# Patient Record
Sex: MALE | Race: WHITE | NOT HISPANIC OR LATINO | Employment: OTHER | URBAN - METROPOLITAN AREA
[De-identification: names, ages, dates, MRNs, and addresses within clinical notes are randomized per-mention and may not be internally consistent; named-entity substitution may affect disease eponyms.]

---

## 2017-06-22 ENCOUNTER — TRANSCRIBE ORDERS (OUTPATIENT)
Dept: ADMINISTRATIVE | Facility: HOSPITAL | Age: 82
End: 2017-06-22

## 2017-06-22 DIAGNOSIS — R09.89 BILATERAL CAROTID BRUITS: Primary | ICD-10-CM

## 2017-06-27 ENCOUNTER — HOSPITAL ENCOUNTER (OUTPATIENT)
Dept: CARDIOLOGY | Facility: HOSPITAL | Age: 82
Discharge: HOME | End: 2017-06-27
Attending: INTERNAL MEDICINE | Admitting: INTERNAL MEDICINE

## 2017-06-27 DIAGNOSIS — R09.89 BILATERAL CAROTID BRUITS: ICD-10-CM

## 2017-06-27 LAB
BH CV XLRA MEAS LEFT CCA RATIO VEL: 134 CM/SEC
BH CV XLRA MEAS LEFT DIST CCA EDV: 12.6 CM/SEC
BH CV XLRA MEAS LEFT DIST CCA PSV: 134 CM/SEC
BH CV XLRA MEAS LEFT DIST ICA EDV: -15.8 CM/SEC
BH CV XLRA MEAS LEFT DIST ICA PSV: -81.5 CM/SEC
BH CV XLRA MEAS LEFT ICA RATIO VEL: -120 CM/SEC
BH CV XLRA MEAS LEFT ICA/CCA RATIO: -0.9
BH CV XLRA MEAS LEFT MID ICA EDV: -16.4 CM/SEC
BH CV XLRA MEAS LEFT MID ICA PSV: -81.5 CM/SEC
BH CV XLRA MEAS LEFT PROX CCA EDV: 14.9 CM/SEC
BH CV XLRA MEAS LEFT PROX CCA PSV: 108 CM/SEC
BH CV XLRA MEAS LEFT PROX ECA EDV: -12.8 CM/SEC
BH CV XLRA MEAS LEFT PROX ECA PSV: -133 CM/SEC
BH CV XLRA MEAS LEFT PROX ICA EDV: -15.7 CM/SEC
BH CV XLRA MEAS LEFT PROX ICA PSV: -120 CM/SEC
BH CV XLRA MEAS LEFT PROX SCLA PSV: 145 CM/SEC
BH CV XLRA MEAS LEFT VERTEBRAL A EDV: 13.5 CM/SEC
BH CV XLRA MEAS LEFT VERTEBRAL A PSV: 63.3 CM/SEC
BH CV XLRA MEAS RIGHT CCA RATIO VEL: -76.2 CM/SEC
BH CV XLRA MEAS RIGHT DIST CCA EDV: -14.1 CM/SEC
BH CV XLRA MEAS RIGHT DIST CCA PSV: -76.2 CM/SEC
BH CV XLRA MEAS RIGHT DIST ICA EDV: -13.5 CM/SEC
BH CV XLRA MEAS RIGHT DIST ICA PSV: -61.6 CM/SEC
BH CV XLRA MEAS RIGHT ICA RATIO VEL: -212 CM/SEC
BH CV XLRA MEAS RIGHT ICA/CCA RATIO: 2.8
BH CV XLRA MEAS RIGHT MID ICA EDV: -14.1 CM/SEC
BH CV XLRA MEAS RIGHT MID ICA PSV: -90.4 CM/SEC
BH CV XLRA MEAS RIGHT PROX CCA EDV: -16.7 CM/SEC
BH CV XLRA MEAS RIGHT PROX CCA PSV: -119 CM/SEC
BH CV XLRA MEAS RIGHT PROX ECA EDV: -29.9 CM/SEC
BH CV XLRA MEAS RIGHT PROX ECA PSV: -372 CM/SEC
BH CV XLRA MEAS RIGHT PROX ICA EDV: -27.5 CM/SEC
BH CV XLRA MEAS RIGHT PROX ICA PSV: -212 CM/SEC
BH CV XLRA MEAS RIGHT PROX SCLA PSV: 127 CM/SEC
BH CV XLRA MEAS RIGHT VERTEBRAL A EDV: -5.2 CM/SEC
BH CV XLRA MEAS RIGHT VERTEBRAL A PSV: -34 CM/SEC
LEFT ARM BP: NORMAL MMHG
RIGHT ARM BP: NORMAL MMHG

## 2017-06-27 PROCEDURE — 93880 EXTRACRANIAL BILAT STUDY: CPT

## 2017-08-15 ENCOUNTER — TRANSCRIBE ORDERS (OUTPATIENT)
Dept: ADMINISTRATIVE | Facility: HOSPITAL | Age: 82
End: 2017-08-15

## 2017-08-15 DIAGNOSIS — H57.12 LEFT EYE PAIN: Primary | ICD-10-CM

## 2017-08-22 ENCOUNTER — HOSPITAL ENCOUNTER (OUTPATIENT)
Dept: MRI IMAGING | Facility: HOSPITAL | Age: 82
Discharge: HOME | End: 2017-08-22
Attending: INTERNAL MEDICINE | Admitting: INTERNAL MEDICINE

## 2017-08-22 DIAGNOSIS — H57.12 LEFT EYE PAIN: ICD-10-CM

## 2017-08-22 PROCEDURE — 70543 MRI ORBT/FAC/NCK W/O &W/DYE: CPT

## 2017-08-22 PROCEDURE — 0 GADOBENATE DIMEGLUMINE 529 MG/ML SOLUTION: Performed by: INTERNAL MEDICINE

## 2017-08-22 PROCEDURE — A9577 INJ MULTIHANCE: HCPCS | Performed by: INTERNAL MEDICINE

## 2017-08-22 PROCEDURE — 82565 ASSAY OF CREATININE: CPT

## 2017-08-22 RX ADMIN — GADOBENATE DIMEGLUMINE 15 ML: 529 INJECTION, SOLUTION INTRAVENOUS at 16:44

## 2017-08-23 LAB — CREAT BLDA-MCNC: 1.1 MG/DL (ref 0.6–1.3)

## 2018-06-14 ENCOUNTER — APPOINTMENT (OUTPATIENT)
Dept: GENERAL RADIOLOGY | Facility: HOSPITAL | Age: 83
End: 2018-06-14

## 2018-06-14 ENCOUNTER — APPOINTMENT (OUTPATIENT)
Dept: CT IMAGING | Facility: HOSPITAL | Age: 83
End: 2018-06-14

## 2018-06-14 ENCOUNTER — HOSPITAL ENCOUNTER (EMERGENCY)
Facility: HOSPITAL | Age: 83
Discharge: HOME | End: 2018-06-14
Attending: EMERGENCY MEDICINE | Admitting: EMERGENCY MEDICINE

## 2018-06-14 VITALS
BODY MASS INDEX: 21.67 KG/M2 | OXYGEN SATURATION: 96 % | HEIGHT: 72 IN | TEMPERATURE: 100.2 F | HEART RATE: 104 BPM | SYSTOLIC BLOOD PRESSURE: 144 MMHG | DIASTOLIC BLOOD PRESSURE: 83 MMHG | RESPIRATION RATE: 18 BRPM | WEIGHT: 160 LBS
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
## 2018-06-14 DIAGNOSIS — R53.1 GENERALIZED WEAKNESS: Primary | ICD-10-CM

## 2018-06-14 LAB
ALBUMIN SERPL-MCNC: 3.7 G/DL (ref 3.5–5.2)
ALBUMIN/GLOB SERPL: 1.2 G/DL
ALP SERPL-CCNC: 122 U/L (ref 39–117)
ALT SERPL W P-5'-P-CCNC: 43 U/L (ref 1–41)
ANION GAP SERPL CALCULATED.3IONS-SCNC: 12 MMOL/L
AST SERPL-CCNC: 52 U/L (ref 1–40)
BACTERIA UR QL AUTO: ABNORMAL /HPF
BASOPHILS # BLD AUTO: 0.01 10*3/MM3 (ref 0–0.2)
BASOPHILS NFR BLD AUTO: 0.2 % (ref 0–1.5)
BILIRUB SERPL-MCNC: 0.9 MG/DL (ref 0.1–1.2)
BILIRUB UR QL STRIP: NEGATIVE
BUN BLD-MCNC: 20 MG/DL (ref 8–23)
BUN/CREAT SERPL: 20.6 (ref 7–25)
CALCIUM SPEC-SCNC: 9.1 MG/DL (ref 8.6–10.5)
CHLORIDE SERPL-SCNC: 91 MMOL/L (ref 98–107)
CLARITY UR: CLEAR
CO2 SERPL-SCNC: 27 MMOL/L (ref 22–29)
COLOR UR: ABNORMAL
CREAT BLD-MCNC: 0.97 MG/DL (ref 0.76–1.27)
D-LACTATE SERPL-SCNC: 1.7 MMOL/L (ref 0.5–2)
DEPRECATED RDW RBC AUTO: 41.7 FL (ref 37–54)
EOSINOPHIL # BLD AUTO: 0 10*3/MM3 (ref 0–0.7)
EOSINOPHIL NFR BLD AUTO: 0 % (ref 0.3–6.2)
ERYTHROCYTE [DISTWIDTH] IN BLOOD BY AUTOMATED COUNT: 12.9 % (ref 11.5–14.5)
GFR SERPL CREATININE-BSD FRML MDRD: 74 ML/MIN/1.73
GLOBULIN UR ELPH-MCNC: 3.1 GM/DL
GLUCOSE BLD-MCNC: 200 MG/DL (ref 65–99)
GLUCOSE UR STRIP-MCNC: ABNORMAL MG/DL
HCT VFR BLD AUTO: 41.4 % (ref 40.4–52.2)
HGB BLD-MCNC: 14.4 G/DL (ref 13.7–17.6)
HGB UR QL STRIP.AUTO: ABNORMAL
HYALINE CASTS UR QL AUTO: ABNORMAL /LPF
IMM GRANULOCYTES # BLD: 0.02 10*3/MM3 (ref 0–0.03)
IMM GRANULOCYTES NFR BLD: 0.4 % (ref 0–0.5)
KETONES UR QL STRIP: ABNORMAL
LEUKOCYTE ESTERASE UR QL STRIP.AUTO: NEGATIVE
LYMPHOCYTES # BLD AUTO: 1.55 10*3/MM3 (ref 0.9–4.8)
LYMPHOCYTES NFR BLD AUTO: 32.6 % (ref 19.6–45.3)
MCH RBC QN AUTO: 30.4 PG (ref 27–32.7)
MCHC RBC AUTO-ENTMCNC: 34.8 G/DL (ref 32.6–36.4)
MCV RBC AUTO: 87.3 FL (ref 79.8–96.2)
MONOCYTES # BLD AUTO: 0.27 10*3/MM3 (ref 0.2–1.2)
MONOCYTES NFR BLD AUTO: 5.7 % (ref 5–12)
NEUTROPHILS # BLD AUTO: 2.93 10*3/MM3 (ref 1.9–8.1)
NEUTROPHILS NFR BLD AUTO: 61.5 % (ref 42.7–76)
NITRITE UR QL STRIP: NEGATIVE
PH UR STRIP.AUTO: 5.5 [PH] (ref 5–8)
PLATELET # BLD AUTO: 66 10*3/MM3 (ref 140–500)
PMV BLD AUTO: 10.7 FL (ref 6–12)
POTASSIUM BLD-SCNC: 4.2 MMOL/L (ref 3.5–5.2)
PROT SERPL-MCNC: 6.8 G/DL (ref 6–8.5)
PROT UR QL STRIP: ABNORMAL
RBC # BLD AUTO: 4.74 10*6/MM3 (ref 4.6–6)
RBC # UR: ABNORMAL /HPF
REF LAB TEST METHOD: ABNORMAL
SODIUM BLD-SCNC: 130 MMOL/L (ref 136–145)
SP GR UR STRIP: 1.03 (ref 1–1.03)
SQUAMOUS #/AREA URNS HPF: ABNORMAL /HPF
TROPONIN T SERPL-MCNC: 0.02 NG/ML (ref 0–0.03)
UROBILINOGEN UR QL STRIP: ABNORMAL
WBC NRBC COR # BLD: 4.76 10*3/MM3 (ref 4.5–10.7)
WBC UR QL AUTO: ABNORMAL /HPF

## 2018-06-14 PROCEDURE — 93005 ELECTROCARDIOGRAM TRACING: CPT | Performed by: PHYSICIAN ASSISTANT

## 2018-06-14 PROCEDURE — 99284 EMERGENCY DEPT VISIT MOD MDM: CPT

## 2018-06-14 PROCEDURE — 71045 X-RAY EXAM CHEST 1 VIEW: CPT

## 2018-06-14 PROCEDURE — 87040 BLOOD CULTURE FOR BACTERIA: CPT | Performed by: PHYSICIAN ASSISTANT

## 2018-06-14 PROCEDURE — 85025 COMPLETE CBC W/AUTO DIFF WBC: CPT | Performed by: PHYSICIAN ASSISTANT

## 2018-06-14 PROCEDURE — 80053 COMPREHEN METABOLIC PANEL: CPT | Performed by: PHYSICIAN ASSISTANT

## 2018-06-14 PROCEDURE — 81001 URINALYSIS AUTO W/SCOPE: CPT | Performed by: PHYSICIAN ASSISTANT

## 2018-06-14 PROCEDURE — 83605 ASSAY OF LACTIC ACID: CPT | Performed by: PHYSICIAN ASSISTANT

## 2018-06-14 PROCEDURE — 93010 ELECTROCARDIOGRAM REPORT: CPT | Performed by: INTERNAL MEDICINE

## 2018-06-14 PROCEDURE — 84484 ASSAY OF TROPONIN QUANT: CPT | Performed by: PHYSICIAN ASSISTANT

## 2018-06-14 PROCEDURE — 70450 CT HEAD/BRAIN W/O DYE: CPT

## 2018-06-14 NOTE — ED PROVIDER NOTES
MD ATTESTATION NOTE    The JESSICA and I have discussed this patient's history, physical exam, and treatment plan.  I have reviewed the documentation and personally had a face to face interaction with the patient. I affirm the documentation and agree with the treatment and plan.  The attached note describes my personal findings.        Pt presents to the ED c/o generalized weakness onset about 3 days ago. Pt has also had malaise, subjective fever, decreased appetite, and a mild cough. Pt denies N/V/D, chest pain, dyspnea, and abdominal pain. Per family, pt fell several days ago secondary to the generalized weakness (however, pt did not sustain any significant injuries). On physical exam, pt is alert and answers most questions appropriately. Heart is tachycardic and irregular. Lungs are CTAB. Abdomen is soft and nontender. No BLE edema. Nonfocal neurological exam. Labs were reviewed. CXR is negative acute. CT Head has been ordered for further evaluation. Will discuss further course of care with LUPIS Pacheco.         EKG           EKG time: 09:04 AM  Rhythm/Rate: A-Fib rate 115  Frequent PVCs  P waves and WA: Irregular P waves. Varying RG  QRS, axis: LAD  Small lateral Q waves   ST and T waves: Nonspecific T wave changes      Interpreted Contemporaneously by me, independently viewed            Documentation assistance provided by Katharina Mcbride. Information recorded by the scribe was done at my direction and has been verified and validated by me.     Entered by Katharina Mcbride, acting as scribe for Dr. Fernando MD.        Katharina Mcbride  06/14/18 0256       Felix King MD  06/14/18 0962

## 2018-06-14 NOTE — DISCHARGE INSTRUCTIONS
Stay well-hydrated and eat regular meals.  Follow up with your PCP early next week.  Return to the ER For chest pain, shortness of breath, recurrent falls, worsening weakness/unable to walk, any concerns.

## 2018-06-14 NOTE — ED NOTES
"Wife notes that patient fell Tuesday afternoon, patient denies any LOC or hitting head.  Per wife, \"it knocked it cap off so I don't know if he hit his head or not, but he said no.\"  Patient notes all over weakness since Tuesday, worse in lower extremities.  Patient normally walks unassisted but since Tuesday patient has had unsteady gait. Patient denies headache.      Michael Glover RN  06/14/18 0839    "

## 2018-06-14 NOTE — ED PROVIDER NOTES
EMERGENCY DEPARTMENT ENCOUNTER    Room Number:  17/17  Date seen:  6/14/2018  Time seen: 8:34 AM  PCP: Percy Em MD    HPI:  Chief complaint: generalized weakness  Context:Javier Marin is a 82 y.o. male who presents to the ED with c/o generalized weakness for the last two days. Pt's wife says that they have not documented any fevers but he had chills and a cold sweat this morning. She states that he had trouble walking to the bathroom this morning due to weakness. He was nauseated this morning and vomited a small amount one time. Pt denies dysuria or difficulty urinating but states that he has had dark and foul smelling urine. Pt's wife states that he fell two days ago getting out of the car. She is not sure if he hit his head or not because she could not see him in the tall grass, but he did not have any injuries or changes from his baseline mental status. She also describes an incident last night in which he stumbled over the corner of his chair but did not fall or injure himself. He denies CP, SOA, or recent illness over the past two days.    Onset: gradual  Location: generalized  Radiation:  none  Duration: two days  Timing:  constant  Character: low energy, weaker than normal  Aggravating Factors: none  Alleviating Factors: none  Severity: moderate    ALLERGIES  Patient has no known allergies.    PAST MEDICAL HISTORY  Active Ambulatory Problems     Diagnosis Date Noted   • No Active Ambulatory Problems     Resolved Ambulatory Problems     Diagnosis Date Noted   • No Resolved Ambulatory Problems     Past Medical History:   Diagnosis Date   • Arthritis    • Diabetes mellitus    • Hard of hearing    • Hypertension    • Ptosis of left eyelid        PAST SURGICAL HISTORY  Past Surgical History:   Procedure Laterality Date   • EYE PTOSIS REPAIR Left 11/15/2016    Procedure: LT UPPER LID EYE PTOSIS REPAIR;  Surgeon: Anup Lancaster MD;  [place] [facility] [place] [facility] [place]  Service:    • EYE SURGERY     • HIP  ARTHROPLASTY         FAMILY HISTORY  History reviewed. No pertinent family history.    SOCIAL HISTORY  Social History     Social History   • Marital status:      Spouse name: N/A   • Number of children: N/A   • Years of education: N/A     Occupational History   • Not on file.     Social History Main Topics   • Smoking status: Former Smoker     Types: Cigars   • Smokeless tobacco: Never Used      Comment: QUIT 1994   • Alcohol use No   • Drug use: No   • Sexual activity: Not on file     Other Topics Concern   • Not on file     Social History Narrative   • No narrative on file       REVIEW OF SYSTEMS  Review of Systems   Constitutional: Positive for chills and diaphoresis (cold sweat). Negative for fever.   HENT: Negative.    Eyes: Negative.    Respiratory: Negative for shortness of breath.    Cardiovascular: Negative for chest pain.   Gastrointestinal: Positive for nausea and vomiting (one episode). Negative for abdominal pain.   Genitourinary: Negative.  Negative for difficulty urinating and dysuria.        Dark and foul smelling urine   Musculoskeletal: Negative.    Skin: Negative.    Neurological: Positive for weakness (generalized).   Psychiatric/Behavioral: Negative.        PHYSICAL EXAM  ED Triage Vitals [06/14/18 0751]   Temp Heart Rate Resp BP SpO2   100.4 °F (38 °C) 92 18 136/76 96 %      Temp src Heart Rate Source Patient Position BP Location FiO2 (%)   Tympanic Monitor -- -- --     Physical Exam   Constitutional: He is oriented to person, place, and time and well-developed, well-nourished, and in no distress.   HENT:   Head: Normocephalic and atraumatic.   Right Ear: External ear normal.   Left Ear: External ear normal.   Nose: Nose normal.   Mouth/Throat: Mucous membranes are dry.   Eyes: Conjunctivae and EOM are normal. Pupils are equal, round, and reactive to light.   Neck: Normal range of motion.   Cardiovascular: Normal rate.  An irregularly irregular rhythm present.   No lower extremity edema    Pulmonary/Chest: Effort normal and breath sounds normal. He has no wheezes. He has no rhonchi. He has no rales.   Abdominal: Soft. There is no tenderness.   Musculoskeletal: Normal range of motion.   Neurological: He is alert and oriented to person, place, and time.   Skin: Skin is warm and dry.   Psychiatric: Affect normal.   Nursing note and vitals reviewed.      LAB RESULTS  Recent Results (from the past 24 hour(s))   Comprehensive Metabolic Panel    Collection Time: 06/14/18  8:56 AM   Result Value Ref Range    Glucose 200 (H) 65 - 99 mg/dL    BUN 20 8 - 23 mg/dL    Creatinine 0.97 0.76 - 1.27 mg/dL    Sodium 130 (L) 136 - 145 mmol/L    Potassium 4.2 3.5 - 5.2 mmol/L    Chloride 91 (L) 98 - 107 mmol/L    CO2 27.0 22.0 - 29.0 mmol/L    Calcium 9.1 8.6 - 10.5 mg/dL    Total Protein 6.8 6.0 - 8.5 g/dL    Albumin 3.70 3.50 - 5.20 g/dL    ALT (SGPT) 43 (H) 1 - 41 U/L    AST (SGOT) 52 (H) 1 - 40 U/L    Alkaline Phosphatase 122 (H) 39 - 117 U/L    Total Bilirubin 0.9 0.1 - 1.2 mg/dL    eGFR Non African Amer 74 >60 mL/min/1.73    Globulin 3.1 gm/dL    A/G Ratio 1.2 g/dL    BUN/Creatinine Ratio 20.6 7.0 - 25.0    Anion Gap 12.0 mmol/L   Troponin    Collection Time: 06/14/18  8:56 AM   Result Value Ref Range    Troponin T 0.018 0.000 - 0.030 ng/mL   Lactic Acid, Plasma    Collection Time: 06/14/18  8:56 AM   Result Value Ref Range    Lactate 1.7 0.5 - 2.0 mmol/L   CBC Auto Differential    Collection Time: 06/14/18  8:56 AM   Result Value Ref Range    WBC 4.76 4.50 - 10.70 10*3/mm3    RBC 4.74 4.60 - 6.00 10*6/mm3    Hemoglobin 14.4 13.7 - 17.6 g/dL    Hematocrit 41.4 40.4 - 52.2 %    MCV 87.3 79.8 - 96.2 fL    MCH 30.4 27.0 - 32.7 pg    MCHC 34.8 32.6 - 36.4 g/dL    RDW 12.9 11.5 - 14.5 %    RDW-SD 41.7 37.0 - 54.0 fl    MPV 10.7 6.0 - 12.0 fL    Platelets 66 (L) 140 - 500 10*3/mm3    Neutrophil % 61.5 42.7 - 76.0 %    Lymphocyte % 32.6 19.6 - 45.3 %    Monocyte % 5.7 5.0 - 12.0 %    Eosinophil % 0.0 (L) 0.3 - 6.2 %     Basophil % 0.2 0.0 - 1.5 %    Immature Grans % 0.4 0.0 - 0.5 %    Neutrophils, Absolute 2.93 1.90 - 8.10 10*3/mm3    Lymphocytes, Absolute 1.55 0.90 - 4.80 10*3/mm3    Monocytes, Absolute 0.27 0.20 - 1.20 10*3/mm3    Eosinophils, Absolute 0.00 0.00 - 0.70 10*3/mm3    Basophils, Absolute 0.01 0.00 - 0.20 10*3/mm3    Immature Grans, Absolute 0.02 0.00 - 0.03 10*3/mm3   Urinalysis With / Culture If Indicated - Urine, Catheter    Collection Time: 06/14/18  8:57 AM   Result Value Ref Range    Color, UA Dark Yellow (A) Yellow, Straw    Appearance, UA Clear Clear    pH, UA 5.5 5.0 - 8.0    Specific Gravity, UA 1.028 1.005 - 1.030    Glucose,  mg/dL (1+) (A) Negative    Ketones, UA 15 mg/dL (1+) (A) Negative    Bilirubin, UA Negative Negative    Blood, UA Moderate (2+) (A) Negative    Protein, UA >=300 mg/dL (3+) (A) Negative    Leuk Esterase, UA Negative Negative    Nitrite, UA Negative Negative    Urobilinogen, UA 1.0 E.U./dL 0.2 - 1.0 E.U./dL   Urinalysis, Microscopic Only - Urine, Clean Catch    Collection Time: 06/14/18  8:57 AM   Result Value Ref Range    RBC, UA 3-5 (A) None Seen, 0-2 /HPF    WBC, UA 0-2 None Seen, 0-2 /HPF    Bacteria, UA 1+ (A) None Seen /HPF    Squamous Epithelial Cells, UA 3-6 (A) None Seen, 0-2 /HPF    Hyaline Casts, UA 0-2 None Seen /LPF    Methodology Manual Light Microscopy        I ordered the above labs and reviewed the results    RADIOLOGY  CT Head Without Contrast   Preliminary Result   No evidence of fracture or hemorrhage. 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   Further evaluation could be performed with a MRI examination of the   brain as indicated.               Radiation dose reduction techniques were utilized, including automated   exposure control and exposure modulation based on body size.              XR Chest 1 View   Final Result   Negative.       This  report was finalized on 6/14/2018 10:38 AM by Dr. Sy Tong M.D.            Reviewed CT head which shows nothing acute. Independently viewed by me. Interpreted by radiologist.    I ordered the above noted radiological studies and reviewed the images on the PACS system.     MEDICATIONS GIVEN IN ER  Medications - No data to display    EKG  Interpreted by ED Physician    PROCEDURES  Procedures      PROGRESS AND CONSULTS    Progress Notes:       1030  Reviewed Pt's history and workup with Dr. King. After bedside evaluation, Dr. King agrees with the plan of care.      1115  Discussed Pt's case with Dr. Em (PCP) who agrees to with plan to discharge Pt after a normal workup in the ER. If Pt does not improve, he should follow up in the office early next week.     1127  Rechecked Pt who is resting comfortably. Informed him that his CXR, CT head, labs, and UA are unremarkable. His low grade fever and normal WBC indicate that it might be a viral illness that could be causing his symptoms. His LFTs are slightly elevated today and I instructed Pt to have these rechecked in a few weeks to see if they improve.  I have also consulted with Dr. Em (PCP) who is in agreement with plans of discharge and instructed Pt to follow up with him if his symptoms persist.  Pt should rest today and try to drink plenty of fluids. If his symptoms worsen, Pt should return to the ED for further workup. Pt's family voiced concern with the Pt being able to reach the car, so I will ambulate Pt to see how he does. If he is unable to ambulate, his disposition may need to be reconsidered. Pt and Pt's family understand and agree to all plans. All questions answered.     1156  Per ER Tech, Pt was able to ambulate without difficulty.     1159  Rechecked Pt and informed him that he will be discharged. After ambulating, Pt and Pt's wife feel comfortable with plan for discharge.       Disposition vitals:  /83   Pulse 104   Temp 100.4  "°F (38 °C) (Tympanic)   Resp 18   Ht 182.9 cm (72\")   Wt 72.6 kg (160 lb)   SpO2 96%   BMI 21.70 kg/m²       DIAGNOSIS  Final diagnoses:   Generalized weakness   Fall, initial encounter     DISCHARGE    Patient discharged in stable condition.    Reviewed implications of results, diagnosis, meds, responsibility to follow up, warning signs and symptoms of possible worsening, potential complications and reasons to return to ER, including new or worsening symptoms .    Patient/Family voiced understanding of above instructions.    Discussed plan for discharge, as there is no emergent indication for admission. Patient referred to primary care provider for BP management due to today's BP. Pt/family is agreeable and understands need for follow up and repeat testing.  Pt is aware that discharge does not mean that nothing is wrong but it indicates no emergency is present that requires admission and they must continue care with follow-up as given below or physician of their choice.       FOLLOW UP   Percy Em MD  [address] [address]  [phone]    In 2 days        RX     Medication List      No changes were made to your prescriptions during this visit.       Documentation assistance provided by zoraida Hernandez for Melly Wild PA-C.  Information recorded by the zoraida was done at my direction and has been verified and validated by me.         Chey Hernandez  06/14/18 1217       ADAM Tucker  06/21/18 1305    "

## 2018-06-15 ENCOUNTER — HOSPITAL ENCOUNTER (EMERGENCY)
Facility: HOSPITAL | Age: 83
Discharge: HOME | End: 2018-06-15
Attending: EMERGENCY MEDICINE | Admitting: EMERGENCY MEDICINE

## 2018-06-15 VITALS
WEIGHT: 160 LBS | HEART RATE: 97 BPM | SYSTOLIC BLOOD PRESSURE: 119 MMHG | TEMPERATURE: 97.6 F | OXYGEN SATURATION: 94 % | HEIGHT: 67 IN | BODY MASS INDEX: 25.11 KG/M2 | RESPIRATION RATE: 18 BRPM | DIASTOLIC BLOOD PRESSURE: 64 MMHG

## 2018-06-15 DIAGNOSIS — I95.9 TRANSIENT HYPOTENSION: ICD-10-CM

## 2018-06-15 DIAGNOSIS — B34.9 VIRAL ILLNESS: Primary | ICD-10-CM

## 2018-06-15 PROCEDURE — 93005 ELECTROCARDIOGRAM TRACING: CPT

## 2018-06-15 PROCEDURE — 93005 ELECTROCARDIOGRAM TRACING: CPT | Performed by: EMERGENCY MEDICINE

## 2018-06-15 PROCEDURE — 99284 EMERGENCY DEPT VISIT MOD MDM: CPT

## 2018-06-15 PROCEDURE — 93010 ELECTROCARDIOGRAM REPORT: CPT | Performed by: INTERNAL MEDICINE

## 2018-06-15 RX ADMIN — SODIUM CHLORIDE 500 ML: 9 INJECTION, SOLUTION INTRAVENOUS at 01:47

## 2018-06-15 NOTE — ED TRIAGE NOTES
Pt to ED per [facility] with reports of dizziness and weakness.  Pt seen in ED today and D/C home.

## 2018-06-15 NOTE — ED PROVIDER NOTES
EMERGENCY DEPARTMENT ENCOUNTER    CHIEF COMPLAINT  Chief Complaint: Generalized weakness  History given by: Pt and pt's family  History limited by: none  Room Number: 06/06  PMD: Percy Em MD      HPI:  Pt is a 82 y.o. male who presents complaining of generalized weakness that began earlier today. He complains of CP that he states is from coughing. He denies all other symptoms. Pt was seen this morning in the ED and discharged after a negative workup with a likely viral infection. Pt's family states they are mostly concerned because pt has been weak and difficult to arouse-although he has been quite fatigued after his recent visit. They state they gave him his BP medicine, Accupril, earlier today.    Duration:  Since earlier today  Onset: gradual  Timing: constant  Location: generalized   Quality: weakness  Intensity/Severity: moderate  Associated Symptoms: CP and cough  Aggravating Factors: Pt has a likely viral infection  Alleviating Factors: none stated  Previous Episodes: none stated  Treatment before arrival: Pt was seen this morning and discharged after a negative workup.    PAST MEDICAL HISTORY  Active Ambulatory Problems     Diagnosis Date Noted   • No Active Ambulatory Problems     Resolved Ambulatory Problems     Diagnosis Date Noted   • No Resolved Ambulatory Problems     Past Medical History:   Diagnosis Date   • Arthritis    • Diabetes mellitus    • Hard of hearing    • Hypertension    • Ptosis of left eyelid        PAST SURGICAL HISTORY  Past Surgical History:   Procedure Laterality Date   • EYE PTOSIS REPAIR Left 11/15/2016    Procedure: LT UPPER LID EYE PTOSIS REPAIR;  Surgeon: Anup Lancaster MD;  Location: [facility] [place] [facility] [place];  Service:    • EYE SURGERY     • HIP ARTHROPLASTY         FAMILY HISTORY  History reviewed. No pertinent family history.    SOCIAL HISTORY  Social History     Social History   • Marital status:      Spouse name: N/A   • Number of children: N/A   • Years of  education: N/A     Occupational History   • Not on file.     Social History Main Topics   • Smoking status: Former Smoker     Types: Cigars   • Smokeless tobacco: Never Used      Comment: QUIT 1994   • Alcohol use No   • Drug use: No   • Sexual activity: Not on file     Other Topics Concern   • Not on file     Social History Narrative   • No narrative on file       ALLERGIES  Patient has no known allergies.    REVIEW OF SYSTEMS  Review of Systems   Constitutional: Negative for activity change, appetite change and fever.   HENT: Negative for congestion and sore throat.    Eyes: Negative.    Respiratory: Positive for cough. Negative for shortness of breath.    Cardiovascular: Positive for chest pain. Negative for leg swelling.   Gastrointestinal: Negative for abdominal pain, diarrhea and vomiting.   Endocrine: Negative.    Genitourinary: Negative for decreased urine volume and dysuria.   Musculoskeletal: Negative for neck pain.   Skin: Negative for rash and wound.   Allergic/Immunologic: Negative.    Neurological: Positive for weakness (Generalized weakness). Negative for numbness and headaches.   Hematological: Negative.    Psychiatric/Behavioral: Negative.    All other systems reviewed and are negative.      PHYSICAL EXAM  ED Triage Vitals   Temp Heart Rate Resp BP SpO2   06/15/18 0031 06/15/18 0029 06/15/18 0029 06/15/18 0029 06/15/18 0029   97.6 °F (36.4 °C) 80 16 101/63 100 %      Temp src Heart Rate Source Patient Position BP Location FiO2 (%)   06/15/18 0031 06/15/18 0029 06/15/18 0029 06/15/18 0029 --   Tympanic Monitor Sitting Right arm        Physical Exam   Constitutional: He is oriented to person, place, and time. No distress.   HENT:   Head: Normocephalic and atraumatic.   Eyes: EOM are normal. Pupils are equal, round, and reactive to light.   Neck: Normal range of motion. Neck supple.   Cardiovascular: Normal rate, regular rhythm and normal heart sounds.    Pulmonary/Chest: Effort normal and breath  sounds normal. No respiratory distress.   Abdominal: Soft. There is no tenderness. There is no rebound and no guarding.   Musculoskeletal: Normal range of motion. He exhibits no edema.   Neurological: He is alert and oriented to person, place, and time. He has normal sensation and normal strength.   Skin: Skin is warm and dry.   Psychiatric: Mood and affect normal.   Nursing note and vitals reviewed.      PROCEDURES  Procedures  EKG           EKG time: 0055  Rhythm/Rate: Afib 80  QRS, axis: nml   ST and T waves: nml     Interpreted Contemporaneously by me, independently viewed  unchanged compared to prior earlier today    PROGRESS AND CONSULTS  ED Course as of Cortez 15 0625   Fri Cortez 15, 2018   0131 1:31 AM  Patient seen earlier in the ER today by Dr. King for gen weakness, low grade fever.  EKG with atrial fibrillation at 115.  Labs, EKG and UA unremarkable.  Patient's case was discussed with Dr. Em at that time, who felt that he could be safely discharged home.  Patient represents and still feels poorly.  [WC]   0309 3:09 AM  Patient feeling better, desires discharge.  He is not septic.  His BP has improved with a gentle IVF bolus- I suspect his symptoms tonight were due to his BP medications.  I advised him to call Dr. Em in the AM and give him an update.  [WC]      ED Course User Index  [WC] Felix Lancaster MD   12:49 AM  Ordered EKG for further evaluation    1:39 AM  Ordered IVF for hydration    3:10 AM  BP- 118/72 HR- 96 Temp- 97.6 °F (36.4 °C) (Tympanic) O2 sat- 99%  Rechecked the patient who is in NAD and is resting comfortably. Informed pt and his family of results of EKG being nml and the plan for discharge with instructions to follow up with his PCP. Pt's wife does NOT want me to call  at this time.Pt understands and agrees with the plan, all questions answered.      MEDICAL DECISION MAKING  Results were reviewed/discussed with the patient and they were also made aware of online access. Pt  also made aware that some labs, such as cultures, will not be resulted during ER visit and follow up with PMD is necessary.     MDM  Number of Diagnoses or Management Options     Amount and/or Complexity of Data Reviewed  Tests in the medicine section of CPT®: reviewed and ordered (See Procedure Note)  Decide to obtain previous medical records or to obtain history from someone other than the patient: yes  Review and summarize past medical records: yes           DIAGNOSIS  Final diagnoses:   Viral illness   Transient hypotension       DISPOSITION  DISCHARGE    Patient discharged in stable condition.    Reviewed implications of results, diagnosis, meds, responsibility to follow up, warning signs and symptoms of possible worsening, potential complications and reasons to return to ER.    Patient/Family voiced understanding of above instructions.    Discussed plan for discharge, as there is no emergent indication for admission. Patient referred to primary care provider for BP management due to today's BP. Pt/family is agreeable and understands need for follow up and repeat testing.  Pt is aware that discharge does not mean that nothing is wrong but it indicates no emergency is present that requires admission and they must continue care with follow-up as given below or physician of their choice.     FOLLOW-UP  Percy Em MD  [address] [id number] [phone]    Call today      [facility] Emergency Department  [address] [facility] [address] [phone]    If symptoms worsen         Medication List      No changes were made to your prescriptions during this visit.           Latest Documented Vital Signs:  As of 6:25 AM  BP- 119/64 HR- 97 Temp- 97.6 °F (36.4 °C) (Tympanic) O2 sat- 94%    --  Documentation assistance provided by zoraida Niño for .  Information recorded by the zoraida was done at my direction and has been verified and validated  by me.          Michael Niño  06/15/18 0310       Felix Lancaster MD  06/15/18 0625

## 2018-06-15 NOTE — ED NOTES
Pt to rm 6 in WC per this RN.  Pt placed on full CR monitor, report given to Nelly RUTH.     Chanell Russo, FARAZ  06/15/18 0132

## 2018-06-18 ENCOUNTER — APPOINTMENT (OUTPATIENT)
Dept: CT IMAGING | Facility: HOSPITAL | Age: 83
End: 2018-06-18

## 2018-06-18 ENCOUNTER — HOSPITAL ENCOUNTER (INPATIENT)
Facility: HOSPITAL | Age: 83
LOS: 4 days | Discharge: HOME HEALTH | End: 2018-06-22
Attending: EMERGENCY MEDICINE | Admitting: INTERNAL MEDICINE

## 2018-06-18 DIAGNOSIS — I10 HYPERTENSION, ESSENTIAL: ICD-10-CM

## 2018-06-18 DIAGNOSIS — N18.9 ACUTE KIDNEY INJURY SUPERIMPOSED ON CHRONIC KIDNEY DISEASE (HCC): ICD-10-CM

## 2018-06-18 DIAGNOSIS — R91.1 LUNG NODULE SEEN ON IMAGING STUDY: ICD-10-CM

## 2018-06-18 DIAGNOSIS — R79.89 ELEVATED LIVER FUNCTION TESTS: ICD-10-CM

## 2018-06-18 DIAGNOSIS — R10.10 PAIN OF UPPER ABDOMEN: Primary | ICD-10-CM

## 2018-06-18 DIAGNOSIS — N17.9 ACUTE KIDNEY INJURY SUPERIMPOSED ON CHRONIC KIDNEY DISEASE (HCC): ICD-10-CM

## 2018-06-18 DIAGNOSIS — M48.061 SPINAL STENOSIS OF LUMBAR REGION WITHOUT NEUROGENIC CLAUDICATION: ICD-10-CM

## 2018-06-18 DIAGNOSIS — N17.9 AKI (ACUTE KIDNEY INJURY) (HCC): ICD-10-CM

## 2018-06-18 DIAGNOSIS — R53.1 GENERAL WEAKNESS: ICD-10-CM

## 2018-06-18 DIAGNOSIS — I48.0 PAROXYSMAL ATRIAL FIBRILLATION (HCC): ICD-10-CM

## 2018-06-18 DIAGNOSIS — K20.90 ESOPHAGITIS DETERMINED BY ENDOSCOPY: ICD-10-CM

## 2018-06-18 DIAGNOSIS — E86.0 DEHYDRATION: ICD-10-CM

## 2018-06-18 DIAGNOSIS — K22.9 ESOPHAGUS DISORDER: ICD-10-CM

## 2018-06-18 PROBLEM — D71 GRANULOMATOUS DISEASE, CHRONIC (HCC): Status: ACTIVE | Noted: 2018-06-18

## 2018-06-18 PROBLEM — IMO0002 DIABETES MELLITUS TYPE 2, UNCONTROLLED: Status: ACTIVE | Noted: 2018-06-18

## 2018-06-18 PROBLEM — M79.89 LIMB SWELLING: Status: ACTIVE | Noted: 2018-06-18

## 2018-06-18 PROBLEM — E87.1 ACUTE HYPONATREMIA: Status: ACTIVE | Noted: 2018-06-18

## 2018-06-18 PROBLEM — E11.9 DIABETES (HCC): Status: ACTIVE | Noted: 2018-06-18

## 2018-06-18 PROBLEM — E11.319: Status: ACTIVE | Noted: 2018-06-18

## 2018-06-18 PROBLEM — D72.829 LEUKOCYTOSIS: Status: ACTIVE | Noted: 2018-06-18

## 2018-06-18 PROBLEM — R05.9 COUGH: Status: ACTIVE | Noted: 2018-06-18

## 2018-06-18 PROBLEM — M48.56XA COMPRESSION FRACTURE OF LUMBAR SPINE, NON-TRAUMATIC (HCC): Status: ACTIVE | Noted: 2018-06-18

## 2018-06-18 PROBLEM — E11.9 DIABETES: Status: RESOLVED | Noted: 2018-06-18 | Resolved: 2018-06-18

## 2018-06-18 PROBLEM — R63.0 ANOREXIA: Status: ACTIVE | Noted: 2018-06-18

## 2018-06-18 LAB
ALBUMIN SERPL-MCNC: 3.3 G/DL (ref 3.5–5.2)
ALBUMIN/GLOB SERPL: 1.1 G/DL
ALP SERPL-CCNC: 139 U/L (ref 39–117)
ALT SERPL W P-5'-P-CCNC: 70 U/L (ref 1–41)
ANION GAP SERPL CALCULATED.3IONS-SCNC: 12.9 MMOL/L
ANION GAP SERPL CALCULATED.3IONS-SCNC: 16.2 MMOL/L
AST SERPL-CCNC: 72 U/L (ref 1–40)
BACTERIA UR QL AUTO: NORMAL /HPF
BASOPHILS # BLD AUTO: 0.17 10*3/MM3 (ref 0–0.2)
BASOPHILS # BLD MANUAL: 0.15 10*3/MM3 (ref 0–0.2)
BASOPHILS NFR BLD AUTO: 1 % (ref 0–1.5)
BASOPHILS NFR BLD AUTO: 1.1 % (ref 0–1.5)
BILIRUB SERPL-MCNC: 0.7 MG/DL (ref 0.1–1.2)
BILIRUB UR QL STRIP: NEGATIVE
BUN BLD-MCNC: 55 MG/DL (ref 8–23)
BUN BLD-MCNC: 56 MG/DL (ref 8–23)
BUN/CREAT SERPL: 40 (ref 7–25)
BUN/CREAT SERPL: 53.4 (ref 7–25)
CALCIUM SPEC-SCNC: 8.5 MG/DL (ref 8.6–10.5)
CALCIUM SPEC-SCNC: 9.3 MG/DL (ref 8.6–10.5)
CHLORIDE SERPL-SCNC: 92 MMOL/L (ref 98–107)
CHLORIDE SERPL-SCNC: 95 MMOL/L (ref 98–107)
CLARITY UR: CLEAR
CO2 SERPL-SCNC: 21.8 MMOL/L (ref 22–29)
CO2 SERPL-SCNC: 27.1 MMOL/L (ref 22–29)
COLOR UR: YELLOW
CREAT BLD-MCNC: 1.03 MG/DL (ref 0.76–1.27)
CREAT BLD-MCNC: 1.4 MG/DL (ref 0.76–1.27)
DACRYOCYTES BLD QL SMEAR: NORMAL
DEPRECATED RDW RBC AUTO: 43.4 FL (ref 37–54)
DEPRECATED RDW RBC AUTO: 43.9 FL (ref 37–54)
EOSINOPHIL # BLD AUTO: 0 10*3/MM3 (ref 0–0.7)
EOSINOPHIL # BLD MANUAL: 0.15 10*3/MM3 (ref 0–0.7)
EOSINOPHIL NFR BLD AUTO: 0 % (ref 0.3–6.2)
EOSINOPHIL NFR BLD MANUAL: 1 % (ref 0.3–6.2)
ERYTHROCYTE [DISTWIDTH] IN BLOOD BY AUTOMATED COUNT: 13.5 % (ref 11.5–14.5)
ERYTHROCYTE [DISTWIDTH] IN BLOOD BY AUTOMATED COUNT: 13.7 % (ref 11.5–14.5)
GFR SERPL CREATININE-BSD FRML MDRD: 49 ML/MIN/1.73
GFR SERPL CREATININE-BSD FRML MDRD: 69 ML/MIN/1.73
GLOBULIN UR ELPH-MCNC: 3 GM/DL
GLUCOSE BLD-MCNC: 211 MG/DL (ref 65–99)
GLUCOSE BLD-MCNC: 254 MG/DL (ref 65–99)
GLUCOSE BLDC GLUCOMTR-MCNC: 234 MG/DL (ref 70–130)
GLUCOSE UR STRIP-MCNC: NEGATIVE MG/DL
HCT VFR BLD AUTO: 37.8 % (ref 40.4–52.2)
HCT VFR BLD AUTO: 42 % (ref 40.4–52.2)
HGB BLD-MCNC: 13 G/DL (ref 13.7–17.6)
HGB BLD-MCNC: 14.5 G/DL (ref 13.7–17.6)
HGB UR QL STRIP.AUTO: NEGATIVE
HOLD SPECIMEN: NORMAL
HOLD SPECIMEN: NORMAL
HYALINE CASTS UR QL AUTO: NORMAL /LPF
IMM GRANULOCYTES # BLD: 0.02 10*3/MM3 (ref 0–0.03)
IMM GRANULOCYTES NFR BLD: 0.1 % (ref 0–0.5)
KETONES UR QL STRIP: ABNORMAL
LEUKOCYTE ESTERASE UR QL STRIP.AUTO: NEGATIVE
LIPASE SERPL-CCNC: 21 U/L (ref 13–60)
LYMPHOCYTES # BLD AUTO: 8.85 10*3/MM3 (ref 0.9–4.8)
LYMPHOCYTES # BLD MANUAL: 5.37 10*3/MM3 (ref 0.9–4.8)
LYMPHOCYTES NFR BLD AUTO: 59.2 % (ref 19.6–45.3)
LYMPHOCYTES NFR BLD MANUAL: 37 % (ref 19.6–45.3)
LYMPHOCYTES NFR BLD MANUAL: 7 % (ref 5–12)
MCH RBC QN AUTO: 29.9 PG (ref 27–32.7)
MCH RBC QN AUTO: 30.2 PG (ref 27–32.7)
MCHC RBC AUTO-ENTMCNC: 34.4 G/DL (ref 32.6–36.4)
MCHC RBC AUTO-ENTMCNC: 34.5 G/DL (ref 32.6–36.4)
MCV RBC AUTO: 86.9 FL (ref 79.8–96.2)
MCV RBC AUTO: 87.5 FL (ref 79.8–96.2)
MONOCYTES # BLD AUTO: 0.7 10*3/MM3 (ref 0.2–1.2)
MONOCYTES # BLD AUTO: 1.02 10*3/MM3 (ref 0.2–1.2)
MONOCYTES NFR BLD AUTO: 4.7 % (ref 5–12)
NEUTROPHILS # BLD AUTO: 5.23 10*3/MM3 (ref 1.9–8.1)
NEUTROPHILS # BLD AUTO: 7.84 10*3/MM3 (ref 1.9–8.1)
NEUTROPHILS NFR BLD AUTO: 35 % (ref 42.7–76)
NEUTROPHILS NFR BLD MANUAL: 54 % (ref 42.7–76)
NITRITE UR QL STRIP: NEGATIVE
NRBC BLD MANUAL-RTO: 0 /100 WBC (ref 0–0)
PH UR STRIP.AUTO: <=5 [PH] (ref 5–8)
PLAT MORPH BLD: NORMAL
PLAT MORPH BLD: NORMAL
PLATELET # BLD AUTO: 62 10*3/MM3 (ref 140–500)
PLATELET # BLD AUTO: 71 10*3/MM3 (ref 140–500)
PMV BLD AUTO: 12.2 FL (ref 6–12)
PMV BLD AUTO: 12.3 FL (ref 6–12)
POTASSIUM BLD-SCNC: 3.9 MMOL/L (ref 3.5–5.2)
POTASSIUM BLD-SCNC: 4.1 MMOL/L (ref 3.5–5.2)
PROT SERPL-MCNC: 6.3 G/DL (ref 6–8.5)
PROT UR QL STRIP: ABNORMAL
RBC # BLD AUTO: 4.35 10*6/MM3 (ref 4.6–6)
RBC # BLD AUTO: 4.8 10*6/MM3 (ref 4.6–6)
RBC # UR: NORMAL /HPF
RBC MORPH BLD: NORMAL
REF LAB TEST METHOD: NORMAL
SCAN SLIDE: NORMAL
SODIUM BLD-SCNC: 132 MMOL/L (ref 136–145)
SODIUM BLD-SCNC: 133 MMOL/L (ref 136–145)
SP GR UR STRIP: 1.02 (ref 1–1.03)
SQUAMOUS #/AREA URNS HPF: NORMAL /HPF
UROBILINOGEN UR QL STRIP: ABNORMAL
WBC MORPH BLD: NORMAL
WBC MORPH BLD: NORMAL
WBC NRBC COR # BLD: 14.51 10*3/MM3 (ref 4.5–10.7)
WBC NRBC COR # BLD: 14.95 10*3/MM3 (ref 4.5–10.7)
WBC UR QL AUTO: NORMAL /HPF
WHOLE BLOOD HOLD SPECIMEN: NORMAL
WHOLE BLOOD HOLD SPECIMEN: NORMAL

## 2018-06-18 PROCEDURE — 85007 BL SMEAR W/DIFF WBC COUNT: CPT | Performed by: INTERNAL MEDICINE

## 2018-06-18 PROCEDURE — 85025 COMPLETE CBC W/AUTO DIFF WBC: CPT | Performed by: INTERNAL MEDICINE

## 2018-06-18 PROCEDURE — 25010000002 ENOXAPARIN PER 10 MG: Performed by: INTERNAL MEDICINE

## 2018-06-18 PROCEDURE — 80053 COMPREHEN METABOLIC PANEL: CPT

## 2018-06-18 PROCEDURE — 85007 BL SMEAR W/DIFF WBC COUNT: CPT

## 2018-06-18 PROCEDURE — 81001 URINALYSIS AUTO W/SCOPE: CPT | Performed by: EMERGENCY MEDICINE

## 2018-06-18 PROCEDURE — 74176 CT ABD & PELVIS W/O CONTRAST: CPT

## 2018-06-18 PROCEDURE — 82962 GLUCOSE BLOOD TEST: CPT

## 2018-06-18 PROCEDURE — 83690 ASSAY OF LIPASE: CPT

## 2018-06-18 PROCEDURE — 99283 EMERGENCY DEPT VISIT LOW MDM: CPT

## 2018-06-18 PROCEDURE — 85025 COMPLETE CBC W/AUTO DIFF WBC: CPT

## 2018-06-18 PROCEDURE — 36415 COLL VENOUS BLD VENIPUNCTURE: CPT

## 2018-06-18 RX ORDER — MELATONIN
1000 DAILY
Status: DISCONTINUED | OUTPATIENT
Start: 2018-06-19 | End: 2018-06-22 | Stop reason: HOSPADM

## 2018-06-18 RX ORDER — SODIUM CHLORIDE 0.9 % (FLUSH) 0.9 %
10 SYRINGE (ML) INJECTION AS NEEDED
Status: DISCONTINUED | OUTPATIENT
Start: 2018-06-18 | End: 2018-06-22 | Stop reason: HOSPADM

## 2018-06-18 RX ORDER — ONDANSETRON 2 MG/ML
4 INJECTION INTRAMUSCULAR; INTRAVENOUS EVERY 6 HOURS PRN
Status: DISCONTINUED | OUTPATIENT
Start: 2018-06-18 | End: 2018-06-22 | Stop reason: HOSPADM

## 2018-06-18 RX ORDER — CALCIUM CARBONATE 200(500)MG
2 TABLET,CHEWABLE ORAL 3 TIMES DAILY PRN
Status: DISCONTINUED | OUTPATIENT
Start: 2018-06-18 | End: 2018-06-22 | Stop reason: HOSPADM

## 2018-06-18 RX ORDER — ASPIRIN 325 MG
325 TABLET ORAL DAILY
Status: ON HOLD | COMMUNITY
End: 2018-06-22

## 2018-06-18 RX ORDER — DILTIAZEM HYDROCHLORIDE 120 MG/1
120 TABLET, FILM COATED ORAL EVERY MORNING
Status: DISCONTINUED | OUTPATIENT
Start: 2018-06-19 | End: 2018-06-22 | Stop reason: HOSPADM

## 2018-06-18 RX ORDER — FUROSEMIDE 20 MG/1
20 TABLET ORAL
Status: DISCONTINUED | OUTPATIENT
Start: 2018-06-18 | End: 2018-06-19

## 2018-06-18 RX ORDER — MORPHINE SULFATE 2 MG/ML
1 INJECTION, SOLUTION INTRAMUSCULAR; INTRAVENOUS EVERY 4 HOURS PRN
Status: DISCONTINUED | OUTPATIENT
Start: 2018-06-18 | End: 2018-06-22 | Stop reason: HOSPADM

## 2018-06-18 RX ORDER — SODIUM CHLORIDE 9 MG/ML
75 INJECTION, SOLUTION INTRAVENOUS CONTINUOUS
Status: DISCONTINUED | OUTPATIENT
Start: 2018-06-18 | End: 2018-06-22 | Stop reason: HOSPADM

## 2018-06-18 RX ORDER — DOCUSATE SODIUM 100 MG/1
100 CAPSULE, LIQUID FILLED ORAL DAILY
Status: DISCONTINUED | OUTPATIENT
Start: 2018-06-19 | End: 2018-06-21 | Stop reason: CLARIF

## 2018-06-18 RX ORDER — SODIUM CHLORIDE 9 MG/ML
125 INJECTION, SOLUTION INTRAVENOUS CONTINUOUS
Status: DISCONTINUED | OUTPATIENT
Start: 2018-06-18 | End: 2018-06-18

## 2018-06-18 RX ORDER — ATORVASTATIN CALCIUM 10 MG/1
10 TABLET, FILM COATED ORAL DAILY
Status: DISCONTINUED | OUTPATIENT
Start: 2018-06-19 | End: 2018-06-22 | Stop reason: HOSPADM

## 2018-06-18 RX ORDER — FUROSEMIDE 20 MG/1
20 TABLET ORAL DAILY
COMMUNITY
End: 2021-08-26 | Stop reason: ALTCHOICE

## 2018-06-18 RX ORDER — NALOXONE HCL 0.4 MG/ML
0.4 VIAL (ML) INJECTION
Status: DISCONTINUED | OUTPATIENT
Start: 2018-06-18 | End: 2018-06-22 | Stop reason: HOSPADM

## 2018-06-18 RX ORDER — LORAZEPAM 2 MG/ML
0.5 INJECTION INTRAMUSCULAR EVERY 6 HOURS PRN
Status: DISCONTINUED | OUTPATIENT
Start: 2018-06-18 | End: 2018-06-22 | Stop reason: HOSPADM

## 2018-06-18 RX ORDER — TAMSULOSIN HYDROCHLORIDE 0.4 MG/1
0.4 CAPSULE ORAL DAILY
Status: DISCONTINUED | OUTPATIENT
Start: 2018-06-19 | End: 2018-06-22 | Stop reason: HOSPADM

## 2018-06-18 RX ORDER — SODIUM CHLORIDE 0.9 % (FLUSH) 0.9 %
1-10 SYRINGE (ML) INJECTION AS NEEDED
Status: DISCONTINUED | OUTPATIENT
Start: 2018-06-18 | End: 2018-06-22 | Stop reason: HOSPADM

## 2018-06-18 RX ORDER — LISINOPRIL 10 MG/1
10 TABLET ORAL
Status: DISCONTINUED | OUTPATIENT
Start: 2018-06-19 | End: 2018-06-19

## 2018-06-18 RX ORDER — ACETAMINOPHEN 325 MG/1
650 TABLET ORAL EVERY 4 HOURS PRN
Status: DISCONTINUED | OUTPATIENT
Start: 2018-06-18 | End: 2018-06-22 | Stop reason: HOSPADM

## 2018-06-18 RX ORDER — NITROGLYCERIN 0.4 MG/1
0.4 TABLET SUBLINGUAL
Status: DISCONTINUED | OUTPATIENT
Start: 2018-06-18 | End: 2018-06-22 | Stop reason: HOSPADM

## 2018-06-18 RX ORDER — HYDROCODONE BITARTRATE AND ACETAMINOPHEN 5; 325 MG/1; MG/1
1 TABLET ORAL EVERY 4 HOURS PRN
Status: DISCONTINUED | OUTPATIENT
Start: 2018-06-18 | End: 2018-06-22 | Stop reason: HOSPADM

## 2018-06-18 RX ADMIN — FUROSEMIDE 20 MG: 20 TABLET ORAL at 22:44

## 2018-06-18 RX ADMIN — ENOXAPARIN SODIUM 40 MG: 100 INJECTION SUBCUTANEOUS at 22:48

## 2018-06-18 RX ADMIN — SODIUM CHLORIDE 75 ML/HR: 9 INJECTION, SOLUTION INTRAVENOUS at 22:45

## 2018-06-18 RX ADMIN — SODIUM CHLORIDE 125 ML/HR: 9 INJECTION, SOLUTION INTRAVENOUS at 16:54

## 2018-06-18 RX ADMIN — SODIUM CHLORIDE 500 ML: 9 INJECTION, SOLUTION INTRAVENOUS at 16:07

## 2018-06-18 RX ADMIN — SODIUM CHLORIDE 125 ML/HR: 9 INJECTION, SOLUTION INTRAVENOUS at 19:15

## 2018-06-18 NOTE — ED PROVIDER NOTES
" EMERGENCY DEPARTMENT ENCOUNTER    CHIEF COMPLAINT  Chief Complaint: Abdominal Pain  History given by: Patient  History limited by: none  Room Number: 05/05  PMD: Percy Em MD      HPI:  Pt is a 82 y.o. male who presents complaining of epigastric abdominal pain and difficulty swallowing for the past several days. Pt's family states pt was seen in ED twice in the past week for weakness and cough, dx with weakness and URI upon discharge. Pt's family states pt has developed loss of appetite for the past few days, \"watery\" emesis, increased cough, and decreased urine and fecal output.     Duration:  Several days  Onset: gradual  Timing: constant  Location: epigastric region  Radiation: none  Quality: pain  Intensity/Severity: moderate  Progression: unchanged  Associated Symptoms: loss of appetite, difficulty swallowing, emesis, cough, and decreased urine and fecal output  Aggravating Factors: unknown  Alleviating Factors: none  Previous Episodes: Pt was seen in ED twice this past week, dx with URI and weakness.  Treatment before arrival: none    PAST MEDICAL HISTORY  Active Ambulatory Problems     Diagnosis Date Noted   • No Active Ambulatory Problems     Resolved Ambulatory Problems     Diagnosis Date Noted   • No Resolved Ambulatory Problems     Past Medical History:   Diagnosis Date   • Arthritis    • Diabetes mellitus    • Hard of hearing    • Hypertension    • Ptosis of left eyelid        PAST SURGICAL HISTORY  Past Surgical History:   Procedure Laterality Date   • EYE PTOSIS REPAIR Left 11/15/2016    Procedure: LT UPPER LID EYE PTOSIS REPAIR;  Surgeon: Anup Lancaster MD;  Location: [facility] OR [facility] [place];  Service:    • EYE SURGERY     • HIP ARTHROPLASTY         FAMILY HISTORY  History reviewed. No pertinent family history.    SOCIAL HISTORY  Social History     Social History   • Marital status:      Spouse name: N/A   • Number of children: N/A   • Years of education: N/A     Occupational History " "  • Not on file.     Social History Main Topics   • Smoking status: Former Smoker     Types: Cigars   • Smokeless tobacco: Never Used      Comment: QUIT 1994   • Alcohol use No   • Drug use: No   • Sexual activity: Not on file     Other Topics Concern   • Not on file     Social History Narrative   • No narrative on file       ALLERGIES  Patient has no known allergies.    REVIEW OF SYSTEMS  Review of Systems   Constitutional: Positive for appetite change (loss of). Negative for activity change and fever.   HENT: Positive for trouble swallowing. Negative for congestion and sore throat.    Eyes: Negative.    Respiratory: Positive for cough (worsening). Negative for shortness of breath.    Cardiovascular: Negative for chest pain and leg swelling.   Gastrointestinal: Positive for abdominal pain (epigastric) and vomiting (\"watery\"). Negative for diarrhea.   Endocrine: Negative.    Genitourinary: Positive for decreased urine volume (and fecal decreased). Negative for dysuria.   Musculoskeletal: Negative for neck pain.   Skin: Negative for rash and wound.   Allergic/Immunologic: Negative.    Neurological: Negative for weakness, numbness and headaches.   Hematological: Negative.    Psychiatric/Behavioral: Negative.    All other systems reviewed and are negative.      PHYSICAL EXAM  ED Triage Vitals   Temp Heart Rate Resp BP SpO2   06/18/18 1101 06/18/18 1101 06/18/18 1101 06/18/18 1342 06/18/18 1101   98.2 °F (36.8 °C) 87 16 (!) 89/50 95 %      Temp src Heart Rate Source Patient Position BP Location FiO2 (%)   -- -- -- -- --              Physical Exam   Constitutional: He is oriented to person, place, and time. No distress.   HENT:   Head: Normocephalic and atraumatic.   Mouth/Throat: Mucous membranes are dry (mildly).   Eyes: EOM are normal. Pupils are equal, round, and reactive to light.   Neck: Normal range of motion. Neck supple.   Cardiovascular: Normal rate, regular rhythm and normal heart sounds.    Pulmonary/Chest: " Effort normal and breath sounds normal. No respiratory distress.   Abdominal: Soft. There is tenderness in the epigastric area. There is no rebound and no guarding.   Musculoskeletal: Normal range of motion. He exhibits no edema.   Neurological: He is alert and oriented to person, place, and time. He has normal sensation and normal strength.   Skin: Skin is warm and dry.   Psychiatric: Mood and affect normal.   Nursing note and vitals reviewed.      LAB RESULTS  Lab Results (last 24 hours)     Procedure Component Value Units Date/Time    CBC & Differential [[id number]] Collected:  06/18/18 1308    Specimen:  Blood Updated:  06/18/18 1413    Narrative:       The following orders were created for panel order CBC & Differential.  Procedure                               Abnormality         Status                     ---------                               -----------         ------                     Manual Differential[[id number]]          Abnormal            Final result               Scan Slide[[id number]]                                       Final result               CBC Auto Differential[[id number]]        Abnormal            Final result                 Please view results for these tests on the individual orders.    Comprehensive Metabolic Panel [[id number]]  (Abnormal) Collected:  06/18/18 1308    Specimen:  Blood Updated:  06/18/18 1358     Glucose 211 (H) mg/dL      BUN 56 (H) mg/dL      Creatinine 1.40 (H) mg/dL      Sodium 132 (L) mmol/L      Potassium 4.1 mmol/L      Chloride 92 (L) mmol/L      CO2 27.1 mmol/L      Calcium 9.3 mg/dL      Total Protein 6.3 g/dL      Albumin 3.30 (L) g/dL      ALT (SGPT) 70 (H) U/L      AST (SGOT) 72 (H) U/L      Alkaline Phosphatase 139 (H) U/L      Total Bilirubin 0.7 mg/dL      eGFR Non African Amer 49 (L) mL/min/1.73      Globulin 3.0 gm/dL      A/G Ratio 1.1 g/dL      BUN/Creatinine Ratio 40.0 (H)     Anion Gap 12.9 mmol/L     Narrative:       The MDRD GFR formula is only  valid for adults with stable renal function between ages 18 and 70.    Lipase [[id number]]  (Normal) Collected:  06/18/18 1308    Specimen:  Blood Updated:  06/18/18 1357     Lipase 21 U/L     CBC Auto Differential [[id number]]  (Abnormal) Collected:  06/18/18 1308    Specimen:  Blood Updated:  06/18/18 1413     WBC 14.51 (H) 10*3/mm3      RBC 4.80 10*6/mm3      Hemoglobin 14.5 g/dL      Hematocrit 42.0 %      MCV 87.5 fL      MCH 30.2 pg      MCHC 34.5 g/dL      RDW 13.5 %      RDW-SD 43.4 fl      MPV 12.2 (H) fL      Platelets 62 (L) 10*3/mm3     Scan Slide [[id number]] Collected:  06/18/18 1308    Specimen:  Blood Updated:  06/18/18 1413     Scan Slide --     Comment: See Manual Differential Results       Manual Differential [[id number]]  (Abnormal) Collected:  06/18/18 1308    Specimen:  Blood Updated:  06/18/18 1413     Neutrophil % 54.0 %      Lymphocyte % 37.0 %      Monocyte % 7.0 %      Eosinophil % 1.0 %      Basophil % 1.0 %      Neutrophils Absolute 7.84 10*3/mm3      Lymphocytes Absolute 5.37 (H) 10*3/mm3      Monocytes Absolute 1.02 10*3/mm3      Eosinophils Absolute 0.15 10*3/mm3      Basophils Absolute 0.15 10*3/mm3      RBC Morphology Normal     WBC Morphology Normal     Platelet Morphology Normal          I ordered the above labs and reviewed the results    RADIOLOGY  CT Abdomen Pelvis Without Contrast   Final Result           1. Mild nonspecific wall thickening in the distal esophagus, as   described. Otherwise no abnormal wall thickening of bowel is identified.   2. No obstructive uropathy. Assessment at the level of the pelvis is   limited by hardware artifact.   3. Age-indeterminate lumbar compression fractures, correlate clinically.   4. Small nonspecific pulmonary nodules, follow-up recommended.       Discussed by telephone with Dr. Mosley at time of interpretation,   1708, 6/18/2018.       This report was finalized on 6/18/2018 5:14 PM by Dr. Ron Freitas M.D.               I ordered  the above noted radiological studies. Interpreted by radiologist. Discussed with radiologist (Fortino). Reviewed by me in PACS.     Procedures      PROGRESS AND CONSULTS     1550: CT Abd/Pelvis ordered for further evaluation.     1712: Call placed to Dr. Rodriguez (family medicine consult)    1735: Discussed pt's case with Dr. Rodriguez (family medicine) who agreed to admit pt to telemetry for fluids and overnight observation.    1737: Pt rechecked and resting comfortably. Discussed results of labs and CT, as well as call with Dr. Rodriguez and plan for admission for further treatment and overnight observation. Pt understands and agrees with plan, all questions addressed.       MEDICAL DECISION MAKING  Results were reviewed/discussed with the patient and they were also made aware of online access. Pt also made aware that some labs, such as cultures, will not be resulted during ER visit and follow up with PMD is necessary.     MDM  Number of Diagnoses or Management Options     Amount and/or Complexity of Data Reviewed  Clinical lab tests: ordered and reviewed (BUN - 56  Creatinine - 1.40  ALT -70  ASR - 72  Alkaline Phosphate - 139  eGFR - 49)  Tests in the radiology section of CPT®: ordered and reviewed (CT - non specific thickening of the wall of the distal esophagus, and lumbar compression fractures of indeterminate age.)  Decide to obtain previous medical records or to obtain history from someone other than the patient: yes  Review and summarize past medical records: yes (6/14/18 - seen in ED for weakness, dx with weakness from fall. Returned 6/15/18 for weakness and cough and was dx with weakness, URI, and transient hypotension)  Discuss the patient with other providers: yes (Dr. Rodriguez (admitting physician))           DIAGNOSIS  Final diagnoses:   Pain of upper abdomen   Dehydration   ROSA (acute kidney injury)       DISPOSITION  ADMISSION    Discussed treatment plan and reason for admission with pt/family and admitting  physician.  Pt/family voiced understanding of the plan for admission for further testing/treatment as needed.         Latest Documented Vital Signs:  As of 5:41 PM  BP- 97/59 HR- 87 Temp- 98.2 °F (36.8 °C) O2 sat- 95%    --  Documentation assistance provided by zoraida Morales for Dr. Mosley.  Information recorded by the scribe was done at my direction and has been verified and validated by me.             Joi Morales  06/18/18 3580       Anand Mosley MD  06/18/18 0943

## 2018-06-19 ENCOUNTER — ANESTHESIA EVENT (OUTPATIENT)
Dept: GASTROENTEROLOGY | Facility: HOSPITAL | Age: 83
End: 2018-06-19

## 2018-06-19 ENCOUNTER — ANESTHESIA (OUTPATIENT)
Dept: GASTROENTEROLOGY | Facility: HOSPITAL | Age: 83
End: 2018-06-19

## 2018-06-19 ENCOUNTER — APPOINTMENT (OUTPATIENT)
Dept: CARDIOLOGY | Facility: HOSPITAL | Age: 83
End: 2018-06-19
Attending: INTERNAL MEDICINE

## 2018-06-19 ENCOUNTER — APPOINTMENT (OUTPATIENT)
Dept: ULTRASOUND IMAGING | Facility: HOSPITAL | Age: 83
End: 2018-06-19
Attending: INTERNAL MEDICINE

## 2018-06-19 ENCOUNTER — APPOINTMENT (OUTPATIENT)
Dept: ULTRASOUND IMAGING | Facility: HOSPITAL | Age: 83
End: 2018-06-19

## 2018-06-19 ENCOUNTER — APPOINTMENT (OUTPATIENT)
Dept: MRI IMAGING | Facility: HOSPITAL | Age: 83
End: 2018-06-19

## 2018-06-19 ENCOUNTER — INPATIENT HOSPITAL (OUTPATIENT)
Dept: URBAN - METROPOLITAN AREA HOSPITAL 113 | Facility: HOSPITAL | Age: 83
End: 2018-06-19

## 2018-06-19 DIAGNOSIS — R13.10 DYSPHAGIA, UNSPECIFIED: ICD-10-CM

## 2018-06-19 DIAGNOSIS — R11.2 NAUSEA WITH VOMITING, UNSPECIFIED: ICD-10-CM

## 2018-06-19 DIAGNOSIS — K44.9 DIAPHRAGMATIC HERNIA WITHOUT OBSTRUCTION OR GANGRENE: ICD-10-CM

## 2018-06-19 DIAGNOSIS — R12 HEARTBURN: ICD-10-CM

## 2018-06-19 PROBLEM — Z86.73 REMOTE HISTORY OF STROKE: Status: ACTIVE | Noted: 2018-06-19
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
## 2018-06-19 PROBLEM — I65.23 CAROTID STENOSIS, ASYMPTOMATIC, BILATERAL: Status: ACTIVE | Noted: 2018-06-19

## 2018-06-19 PROBLEM — H02.402 PTOSIS, LEFT EYELID: Status: ACTIVE | Noted: 2018-06-19

## 2018-06-19 PROBLEM — H02.401 PTOSIS, RIGHT EYELID: Status: ACTIVE | Noted: 2018-06-19

## 2018-06-19 PROBLEM — I48.91 ATRIAL FIBRILLATION (HCC): Status: ACTIVE | Noted: 2018-06-19

## 2018-06-19 PROBLEM — D69.6 THROMBOCYTOPENIA (HCC): Status: ACTIVE | Noted: 2018-06-19

## 2018-06-19 PROBLEM — M15.9 OSTEOARTHRITIS OF MULTIPLE JOINTS: Status: ACTIVE | Noted: 2018-06-19

## 2018-06-19 PROBLEM — H91.90 HARD OF HEARING: Status: ACTIVE | Noted: 2018-06-19

## 2018-06-19 PROBLEM — E78.5 HYPERLIPIDEMIA: Status: ACTIVE | Noted: 2018-06-19
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
## 2018-06-19 LAB
ANION GAP SERPL CALCULATED.3IONS-SCNC: 14.8 MMOL/L
B PERT DNA SPEC QL NAA+PROBE: NOT DETECTED
BACTERIA SPEC AEROBE CULT: NORMAL
BACTERIA SPEC AEROBE CULT: NORMAL
BASOPHILS # BLD AUTO: 0.12 10*3/MM3 (ref 0–0.2)
BASOPHILS NFR BLD AUTO: 0.9 % (ref 0–1.5)
BH CV XLRA MEAS LEFT CCA RATIO VEL: -121 CM/SEC
BH CV XLRA MEAS LEFT DIST CCA EDV: -12.8 CM/SEC
BH CV XLRA MEAS LEFT DIST CCA PSV: -121 CM/SEC
BH CV XLRA MEAS LEFT DIST ICA EDV: -19.6 CM/SEC
BH CV XLRA MEAS LEFT DIST ICA PSV: -86.4 CM/SEC
BH CV XLRA MEAS LEFT ICA RATIO VEL: -121 CM/SEC
BH CV XLRA MEAS LEFT ICA/CCA RATIO: 1
BH CV XLRA MEAS LEFT MID ICA EDV: -23.6 CM/SEC
BH CV XLRA MEAS LEFT MID ICA PSV: -123 CM/SEC
BH CV XLRA MEAS LEFT PROX CCA EDV: -15.7 CM/SEC
BH CV XLRA MEAS LEFT PROX CCA PSV: -132 CM/SEC
BH CV XLRA MEAS LEFT PROX ECA PSV: -158 CM/SEC
BH CV XLRA MEAS LEFT PROX ICA EDV: 17 CM/SEC
BH CV XLRA MEAS LEFT PROX ICA PSV: 121 CM/SEC
BH CV XLRA MEAS LEFT PROX SCLA PSV: 102 CM/SEC
BH CV XLRA MEAS LEFT VERTEBRAL A EDV: 14.7 CM/SEC
BH CV XLRA MEAS LEFT VERTEBRAL A PSV: 70.4 CM/SEC
BH CV XLRA MEAS RIGHT CCA RATIO VEL: 116 CM/SEC
BH CV XLRA MEAS RIGHT DIST CCA EDV: 15.7 CM/SEC
BH CV XLRA MEAS RIGHT DIST CCA PSV: 94.3 CM/SEC
BH CV XLRA MEAS RIGHT DIST ICA EDV: -14.1 CM/SEC
BH CV XLRA MEAS RIGHT DIST ICA PSV: -74.6 CM/SEC
BH CV XLRA MEAS RIGHT ICA RATIO VEL: -121 CM/SEC
BH CV XLRA MEAS RIGHT ICA/CCA RATIO: -1
BH CV XLRA MEAS RIGHT MID ICA EDV: -20.4 CM/SEC
BH CV XLRA MEAS RIGHT MID ICA PSV: -90.4 CM/SEC
BH CV XLRA MEAS RIGHT PROX CCA EDV: 11.8 CM/SEC
BH CV XLRA MEAS RIGHT PROX CCA PSV: 106 CM/SEC
BH CV XLRA MEAS RIGHT PROX ECA EDV: -12.6 CM/SEC
BH CV XLRA MEAS RIGHT PROX ECA PSV: -352 CM/SEC
BH CV XLRA MEAS RIGHT PROX ICA EDV: -26.2 CM/SEC
BH CV XLRA MEAS RIGHT PROX ICA PSV: -230 CM/SEC
BH CV XLRA MEAS RIGHT PROX SCLA PSV: 94.3 CM/SEC
BH CV XLRA MEAS RIGHT VERTEBRAL A EDV: 11.7 CM/SEC
BH CV XLRA MEAS RIGHT VERTEBRAL A PSV: 49.8 CM/SEC
BUN BLD-MCNC: 45 MG/DL (ref 8–23)
BUN/CREAT SERPL: 50 (ref 7–25)
C PNEUM DNA NPH QL NAA+NON-PROBE: NOT DETECTED
CA-I BLD-MCNC: 4.6 MG/DL (ref 4.6–5.4)
CA-I SERPL ISE-MCNC: 1.16 MMOL/L (ref 1.15–1.35)
CALCIUM SPEC-SCNC: 8.3 MG/DL (ref 8.6–10.5)
CHLORIDE SERPL-SCNC: 98 MMOL/L (ref 98–107)
CHOLEST SERPL-MCNC: 75 MG/DL (ref 0–200)
CO2 SERPL-SCNC: 24.2 MMOL/L (ref 22–29)
CREAT BLD-MCNC: 0.9 MG/DL (ref 0.76–1.27)
D-LACTATE SERPL-SCNC: 1.4 MMOL/L (ref 0.5–2)
DEPRECATED RDW RBC AUTO: 44.1 FL (ref 37–54)
EOSINOPHIL # BLD AUTO: 0 10*3/MM3 (ref 0–0.7)
EOSINOPHIL NFR BLD AUTO: 0 % (ref 0.3–6.2)
ERYTHROCYTE [DISTWIDTH] IN BLOOD BY AUTOMATED COUNT: 13.8 % (ref 11.5–14.5)
FLUAV H1 2009 PAND RNA NPH QL NAA+PROBE: NOT DETECTED
FLUAV H1 HA GENE NPH QL NAA+PROBE: NOT DETECTED
FLUAV H3 RNA NPH QL NAA+PROBE: NOT DETECTED
FLUAV SUBTYP SPEC NAA+PROBE: NOT DETECTED
FLUBV RNA ISLT QL NAA+PROBE: NOT DETECTED
GFR SERPL CREATININE-BSD FRML MDRD: 81 ML/MIN/1.73
GLUCOSE BLD-MCNC: 241 MG/DL (ref 65–99)
GLUCOSE BLDC GLUCOMTR-MCNC: 217 MG/DL (ref 70–130)
GLUCOSE BLDC GLUCOMTR-MCNC: 221 MG/DL (ref 70–130)
GLUCOSE BLDC GLUCOMTR-MCNC: 246 MG/DL (ref 70–130)
GLUCOSE BLDC GLUCOMTR-MCNC: 283 MG/DL (ref 70–130)
GLUCOSE BLDC GLUCOMTR-MCNC: 340 MG/DL (ref 70–130)
HADV DNA SPEC NAA+PROBE: NOT DETECTED
HBA1C MFR BLD: 6.8 % (ref 4.8–5.6)
HCOV 229E RNA SPEC QL NAA+PROBE: NOT DETECTED
HCOV HKU1 RNA SPEC QL NAA+PROBE: NOT DETECTED
HCOV NL63 RNA SPEC QL NAA+PROBE: NOT DETECTED
HCOV OC43 RNA SPEC QL NAA+PROBE: NOT DETECTED
HCT VFR BLD AUTO: 38 % (ref 40.4–52.2)
HDLC SERPL-MCNC: 12 MG/DL (ref 40–60)
HGB BLD-MCNC: 13 G/DL (ref 13.7–17.6)
HMPV RNA NPH QL NAA+NON-PROBE: NOT DETECTED
HPIV1 RNA SPEC QL NAA+PROBE: NOT DETECTED
HPIV2 RNA SPEC QL NAA+PROBE: NOT DETECTED
HPIV3 RNA NPH QL NAA+PROBE: NOT DETECTED
HPIV4 P GENE NPH QL NAA+PROBE: NOT DETECTED
IMM GRANULOCYTES # BLD: 0.04 10*3/MM3 (ref 0–0.03)
IMM GRANULOCYTES NFR BLD: 0.3 % (ref 0–0.5)
LDLC SERPL CALC-MCNC: 22 MG/DL (ref 0–100)
LDLC/HDLC SERPL: 1.8 {RATIO}
LEFT ARM BP: NORMAL MMHG
LYMPHOCYTES # BLD AUTO: 7.98 10*3/MM3 (ref 0.9–4.8)
LYMPHOCYTES NFR BLD AUTO: 61.3 % (ref 19.6–45.3)
M PNEUMO IGG SER IA-ACNC: NOT DETECTED
MAGNESIUM SERPL-MCNC: 2.1 MG/DL (ref 1.6–2.4)
MCH RBC QN AUTO: 29.7 PG (ref 27–32.7)
MCHC RBC AUTO-ENTMCNC: 34.2 G/DL (ref 32.6–36.4)
MCV RBC AUTO: 87 FL (ref 79.8–96.2)
MONOCYTES # BLD AUTO: 0.51 10*3/MM3 (ref 0.2–1.2)
MONOCYTES NFR BLD AUTO: 3.9 % (ref 5–12)
NEUTROPHILS # BLD AUTO: 4.4 10*3/MM3 (ref 1.9–8.1)
NEUTROPHILS NFR BLD AUTO: 33.9 % (ref 42.7–76)
NRBC BLD MANUAL-RTO: 0 /100 WBC (ref 0–0)
NT-PROBNP SERPL-MCNC: 974.7 PG/ML (ref 0–1800)
PHOSPHATE SERPL-MCNC: 3.3 MG/DL (ref 2.5–4.5)
PLATELET # BLD AUTO: 91 10*3/MM3 (ref 140–500)
PMV BLD AUTO: 12 FL (ref 6–12)
POTASSIUM BLD-SCNC: 3.9 MMOL/L (ref 3.5–5.2)
PROCALCITONIN SERPL-MCNC: 0.42 NG/ML (ref 0.1–0.25)
PTH-INTACT SERPL-MCNC: 13.3 PG/ML (ref 15–65)
RBC # BLD AUTO: 4.37 10*6/MM3 (ref 4.6–6)
RHINOVIRUS RNA SPEC NAA+PROBE: NOT DETECTED
RIGHT ARM BP: NORMAL MMHG
RSV RNA NPH QL NAA+NON-PROBE: NOT DETECTED
SODIUM BLD-SCNC: 137 MMOL/L (ref 136–145)
TRIGL SERPL-MCNC: 207 MG/DL (ref 0–150)
TROPONIN T SERPL-MCNC: 0.01 NG/ML (ref 0–0.03)
TROPONIN T SERPL-MCNC: <0.01 NG/ML (ref 0–0.03)
TSH SERPL DL<=0.05 MIU/L-ACNC: 0.68 MIU/ML (ref 0.27–4.2)
VIT B12 BLD-MCNC: 1315 PG/ML (ref 211–946)
VLDLC SERPL-MCNC: 41.4 MG/DL (ref 5–40)
WBC NRBC COR # BLD: 13.01 10*3/MM3 (ref 4.5–10.7)

## 2018-06-19 PROCEDURE — 87633 RESP VIRUS 12-25 TARGETS: CPT | Performed by: INTERNAL MEDICINE

## 2018-06-19 PROCEDURE — 63710000001 INSULIN ASPART PER 5 UNITS: Performed by: INTERNAL MEDICINE

## 2018-06-19 PROCEDURE — 84484 ASSAY OF TROPONIN QUANT: CPT | Performed by: INTERNAL MEDICINE

## 2018-06-19 PROCEDURE — 80048 BASIC METABOLIC PNL TOTAL CA: CPT | Performed by: INTERNAL MEDICINE

## 2018-06-19 PROCEDURE — 25010000002 ENOXAPARIN PER 10 MG: Performed by: INTERNAL MEDICINE

## 2018-06-19 PROCEDURE — 87486 CHLMYD PNEUM DNA AMP PROBE: CPT | Performed by: INTERNAL MEDICINE

## 2018-06-19 PROCEDURE — 87581 M.PNEUMON DNA AMP PROBE: CPT | Performed by: INTERNAL MEDICINE

## 2018-06-19 PROCEDURE — 84436 ASSAY OF TOTAL THYROXINE: CPT | Performed by: INTERNAL MEDICINE

## 2018-06-19 PROCEDURE — 70551 MRI BRAIN STEM W/O DYE: CPT

## 2018-06-19 PROCEDURE — 43239 EGD BIOPSY SINGLE/MULTIPLE: CPT | Performed by: INTERNAL MEDICINE

## 2018-06-19 PROCEDURE — 82607 VITAMIN B-12: CPT | Performed by: RADIOLOGY

## 2018-06-19 PROCEDURE — 83036 HEMOGLOBIN GLYCOSYLATED A1C: CPT | Performed by: INTERNAL MEDICINE

## 2018-06-19 PROCEDURE — 70544 MR ANGIOGRAPHY HEAD W/O DYE: CPT

## 2018-06-19 PROCEDURE — 83735 ASSAY OF MAGNESIUM: CPT | Performed by: INTERNAL MEDICINE

## 2018-06-19 PROCEDURE — 83880 ASSAY OF NATRIURETIC PEPTIDE: CPT | Performed by: INTERNAL MEDICINE

## 2018-06-19 PROCEDURE — 84145 PROCALCITONIN (PCT): CPT | Performed by: INTERNAL MEDICINE

## 2018-06-19 PROCEDURE — 82330 ASSAY OF CALCIUM: CPT | Performed by: INTERNAL MEDICINE

## 2018-06-19 PROCEDURE — 85025 COMPLETE CBC W/AUTO DIFF WBC: CPT | Performed by: INTERNAL MEDICINE

## 2018-06-19 PROCEDURE — 84480 ASSAY TRIIODOTHYRONINE (T3): CPT | Performed by: INTERNAL MEDICINE

## 2018-06-19 PROCEDURE — 83970 ASSAY OF PARATHORMONE: CPT | Performed by: INTERNAL MEDICINE

## 2018-06-19 PROCEDURE — 84484 ASSAY OF TROPONIN QUANT: CPT | Performed by: NURSE PRACTITIONER

## 2018-06-19 PROCEDURE — 88312 SPECIAL STAINS GROUP 1: CPT | Performed by: INTERNAL MEDICINE

## 2018-06-19 PROCEDURE — 82962 GLUCOSE BLOOD TEST: CPT

## 2018-06-19 PROCEDURE — 88305 TISSUE EXAM BY PATHOLOGIST: CPT | Performed by: INTERNAL MEDICINE

## 2018-06-19 PROCEDURE — 63710000001 INSULIN DETEMER PER 5 UNITS: Performed by: INTERNAL MEDICINE

## 2018-06-19 PROCEDURE — 84443 ASSAY THYROID STIM HORMONE: CPT | Performed by: INTERNAL MEDICINE

## 2018-06-19 PROCEDURE — 84100 ASSAY OF PHOSPHORUS: CPT | Performed by: INTERNAL MEDICINE

## 2018-06-19 PROCEDURE — 80061 LIPID PANEL: CPT | Performed by: INTERNAL MEDICINE

## 2018-06-19 PROCEDURE — 99222 1ST HOSP IP/OBS MODERATE 55: CPT | Performed by: INTERNAL MEDICINE

## 2018-06-19 PROCEDURE — 99221 1ST HOSP IP/OBS SF/LOW 40: CPT | Performed by: INTERNAL MEDICINE

## 2018-06-19 PROCEDURE — 99223 1ST HOSP IP/OBS HIGH 75: CPT | Performed by: RADIOLOGY

## 2018-06-19 PROCEDURE — 88342 IMHCHEM/IMCYTCHM 1ST ANTB: CPT | Performed by: INTERNAL MEDICINE

## 2018-06-19 PROCEDURE — 70547 MR ANGIOGRAPHY NECK W/O DYE: CPT

## 2018-06-19 PROCEDURE — 76700 US EXAM ABDOM COMPLETE: CPT

## 2018-06-19 PROCEDURE — 83605 ASSAY OF LACTIC ACID: CPT | Performed by: INTERNAL MEDICINE

## 2018-06-19 PROCEDURE — 0DB58ZX EXCISION OF ESOPHAGUS, VIA NATURAL OR ARTIFICIAL OPENING ENDOSCOPIC, DIAGNOSTIC: ICD-10-PCS | Performed by: INTERNAL MEDICINE

## 2018-06-19 PROCEDURE — 25010000002 PROPOFOL 10 MG/ML EMULSION: Performed by: NURSE ANESTHETIST, CERTIFIED REGISTERED

## 2018-06-19 PROCEDURE — 94799 UNLISTED PULMONARY SVC/PX: CPT

## 2018-06-19 PROCEDURE — 87798 DETECT AGENT NOS DNA AMP: CPT | Performed by: INTERNAL MEDICINE

## 2018-06-19 PROCEDURE — 84479 ASSAY OF THYROID (T3 OR T4): CPT | Performed by: INTERNAL MEDICINE

## 2018-06-19 PROCEDURE — 84443 ASSAY THYROID STIM HORMONE: CPT | Performed by: NURSE PRACTITIONER

## 2018-06-19 PROCEDURE — 93880 EXTRACRANIAL BILAT STUDY: CPT

## 2018-06-19 RX ORDER — LIDOCAINE HYDROCHLORIDE 20 MG/ML
INJECTION, SOLUTION INFILTRATION; PERINEURAL AS NEEDED
Status: DISCONTINUED | OUTPATIENT
Start: 2018-06-19 | End: 2018-06-19 | Stop reason: SURG

## 2018-06-19 RX ORDER — DEXTROSE MONOHYDRATE 25 G/50ML
25 INJECTION, SOLUTION INTRAVENOUS
Status: DISCONTINUED | OUTPATIENT
Start: 2018-06-19 | End: 2018-06-22 | Stop reason: HOSPADM

## 2018-06-19 RX ORDER — PROPOFOL 10 MG/ML
VIAL (ML) INTRAVENOUS AS NEEDED
Status: DISCONTINUED | OUTPATIENT
Start: 2018-06-19 | End: 2018-06-19 | Stop reason: SURG

## 2018-06-19 RX ORDER — SUCRALFATE ORAL 1 G/10ML
1 SUSPENSION ORAL EVERY 6 HOURS SCHEDULED
Status: DISCONTINUED | OUTPATIENT
Start: 2018-06-19 | End: 2018-06-22 | Stop reason: HOSPADM

## 2018-06-19 RX ORDER — FAMOTIDINE 10 MG/ML
20 INJECTION, SOLUTION INTRAVENOUS EVERY 12 HOURS SCHEDULED
Status: DISCONTINUED | OUTPATIENT
Start: 2018-06-19 | End: 2018-06-20

## 2018-06-19 RX ORDER — SUCRALFATE ORAL 1 G/10ML
1 SUSPENSION ORAL EVERY 6 HOURS SCHEDULED
Status: DISCONTINUED | OUTPATIENT
Start: 2018-06-19 | End: 2018-06-19 | Stop reason: SDUPTHER

## 2018-06-19 RX ORDER — PROPOFOL 10 MG/ML
VIAL (ML) INTRAVENOUS CONTINUOUS PRN
Status: DISCONTINUED | OUTPATIENT
Start: 2018-06-19 | End: 2018-06-19 | Stop reason: SURG

## 2018-06-19 RX ORDER — SODIUM CHLORIDE, SODIUM LACTATE, POTASSIUM CHLORIDE, CALCIUM CHLORIDE 600; 310; 30; 20 MG/100ML; MG/100ML; MG/100ML; MG/100ML
30 INJECTION, SOLUTION INTRAVENOUS CONTINUOUS PRN
Status: DISCONTINUED | OUTPATIENT
Start: 2018-06-19 | End: 2018-06-19

## 2018-06-19 RX ORDER — PANTOPRAZOLE SODIUM 40 MG/1
40 TABLET, DELAYED RELEASE ORAL NIGHTLY
Status: DISCONTINUED | OUTPATIENT
Start: 2018-06-19 | End: 2018-06-19

## 2018-06-19 RX ORDER — PANTOPRAZOLE SODIUM 40 MG/1
40 TABLET, DELAYED RELEASE ORAL
Status: DISCONTINUED | OUTPATIENT
Start: 2018-06-19 | End: 2018-06-21 | Stop reason: CLARIF

## 2018-06-19 RX ORDER — NICOTINE POLACRILEX 4 MG
15 LOZENGE BUCCAL
Status: DISCONTINUED | OUTPATIENT
Start: 2018-06-19 | End: 2018-06-22 | Stop reason: HOSPADM

## 2018-06-19 RX ADMIN — SODIUM CHLORIDE, POTASSIUM CHLORIDE, SODIUM LACTATE AND CALCIUM CHLORIDE 30 ML/HR: 600; 310; 30; 20 INJECTION, SOLUTION INTRAVENOUS at 11:18

## 2018-06-19 RX ADMIN — INSULIN ASPART 5 UNITS: 100 INJECTION, SOLUTION INTRAVENOUS; SUBCUTANEOUS at 17:54

## 2018-06-19 RX ADMIN — SODIUM CHLORIDE 75 ML/HR: 9 INJECTION, SOLUTION INTRAVENOUS at 07:51

## 2018-06-19 RX ADMIN — PROPOFOL 180 MCG/KG/MIN: 10 INJECTION, EMULSION INTRAVENOUS at 13:01

## 2018-06-19 RX ADMIN — LIDOCAINE HYDROCHLORIDE 50 MG: 20 INJECTION, SOLUTION INFILTRATION; PERINEURAL at 12:59

## 2018-06-19 RX ADMIN — FAMOTIDINE 20 MG: 10 INJECTION, SOLUTION INTRAVENOUS at 08:26

## 2018-06-19 RX ADMIN — SUCRALFATE 1 G: 1 SUSPENSION ORAL at 18:02

## 2018-06-19 RX ADMIN — DILTIAZEM HYDROCHLORIDE 120 MG: 120 TABLET, FILM COATED ORAL at 06:22

## 2018-06-19 RX ADMIN — ATORVASTATIN CALCIUM 10 MG: 10 TABLET, FILM COATED ORAL at 08:26

## 2018-06-19 RX ADMIN — TAMSULOSIN HYDROCHLORIDE 0.4 MG: 0.4 CAPSULE ORAL at 08:26

## 2018-06-19 RX ADMIN — INSULIN ASPART 3 UNITS: 100 INJECTION, SOLUTION INTRAVENOUS; SUBCUTANEOUS at 21:30

## 2018-06-19 RX ADMIN — LISINOPRIL 10 MG: 10 TABLET ORAL at 08:26

## 2018-06-19 RX ADMIN — INSULIN ASPART 4 UNITS: 100 INJECTION, SOLUTION INTRAVENOUS; SUBCUTANEOUS at 17:56

## 2018-06-19 RX ADMIN — FAMOTIDINE 20 MG: 10 INJECTION, SOLUTION INTRAVENOUS at 21:29

## 2018-06-19 RX ADMIN — ENOXAPARIN SODIUM 40 MG: 100 INJECTION SUBCUTANEOUS at 21:29

## 2018-06-19 RX ADMIN — PROPOFOL 100 MG: 10 INJECTION, EMULSION INTRAVENOUS at 13:01

## 2018-06-19 RX ADMIN — SUCRALFATE 1 G: 1 SUSPENSION ORAL at 23:07

## 2018-06-19 RX ADMIN — INSULIN ASPART 3 UNITS: 100 INJECTION, SOLUTION INTRAVENOUS; SUBCUTANEOUS at 08:26

## 2018-06-19 RX ADMIN — FUROSEMIDE 20 MG: 20 TABLET ORAL at 08:26

## 2018-06-19 RX ADMIN — PANTOPRAZOLE SODIUM 40 MG: 40 TABLET, DELAYED RELEASE ORAL at 17:56

## 2018-06-19 RX ADMIN — INSULIN DETEMIR 15 UNITS: 100 INJECTION, SOLUTION SUBCUTANEOUS at 23:07

## 2018-06-19 RX ADMIN — PROPOFOL 60 MG: 10 INJECTION, EMULSION INTRAVENOUS at 13:00

## 2018-06-19 RX ADMIN — METOPROLOL TARTRATE 25 MG: 25 TABLET ORAL at 21:29

## 2018-06-19 NOTE — PROGRESS NOTES
"Pharmacy Consult - [facility]    Javier Marin has a consult for pharmacy to dose enoxaparin for prophylaxis of VTE per Dr. Rodriguez's request.       Relevant clinical data and objective history reviewed:  82 y.o. male 154.4 cm (60.8\") 71.6 kg (157 lb 14.4 oz)      Past Medical History:   Diagnosis Date   • Arthritis    • Diabetes mellitus    • Hard of hearing    • Hypertension    • Ptosis of left eyelid      has No Known Allergies.    Lab Results   Component Value Date    WBC 14.51 (H) 06/18/2018    HGB 14.5 06/18/2018    HCT 42.0 06/18/2018    MCV 87.5 06/18/2018    PLT 62 (L) 06/18/2018     Lab Results   Component Value Date    GLUCOSE 211 (H) 06/18/2018    CALCIUM 9.3 06/18/2018     (L) 06/18/2018    K 4.1 06/18/2018    CO2 27.1 06/18/2018    CL 92 (L) 06/18/2018    BUN 56 (H) 06/18/2018    CREATININE 1.40 (H) 06/18/2018    EGFRIFNONA 49 (L) 06/18/2018    BCR 40.0 (H) 06/18/2018    ANIONGAP 12.9 06/18/2018       Estimated Creatinine Clearance: 34.4 mL/min (A) (by C-G formula based on SCr of 1.4 mg/dL (H)).    Assessment/Plan    Will start patient on 40 mg subcutaneous every q24h hours, adjusted for renal function.  Pharmacy will continue to follow.     Denisse Lane [facility]    "

## 2018-06-19 NOTE — CONSULTS
82 y.o.  Patient Care Team:  Percy Em MD as PCP - General  Percy Em MD as PCP - Family Medicine      Chief complaint - abdominal pain    Reason for consultation-uncontrolled blood sugars.    HPI   82-year-old white male with past medical history of diabetes mellitus, hypertension presents to the hospital with significant weakness, poor by mouth intake associated with some difficulty in swallowing.  Patient has also been noted to be in atrial fibrillation during the hospitalization and is being evaluated by cardiology.  Endocrinology has been consulted for the management of his blood sugars.    Type 2 diabetes mellitus  Diagnosed in 1994.  Patient used to see Dr. David in the past and currently being managed by Dr. Em.   At home he is on Lantus 10 units at bedtime, Humalog 8 units with each meal.  Checks his blood sugars maybe 3-4 times a day.  Average blood sugars are around 100-1 50 mg/dL range.  But in the last few weeks prior to the hospitalization his blood sugars have been running in 200-to 15 mg/dL range.  Does have history of diabetic neuropathy which has been mild with some numbness and tingling in his bilateral feet.  No ulcers or amputations of his toes.  Questionable history of diabetic retinopathy with vitreous hemorrhage.  No prior history of CAD, CK D, does have history of CVA.      Past Medical History:   Diagnosis Date   • Arthritis    • Diabetes mellitus    • Hard of hearing    • Hypertension    • Ptosis of left eyelid      Family history  Mother-diabetes, hypertension  Father-cardiac issues.    Social History     Social History   • Marital status:      Spouse name: N/A   • Number of children: N/A   • Years of education: N/A     Occupational History   • Retired      Social History Main Topics   • Smoking status: Former Smoker     Types: Cigars   • Smokeless tobacco: Never Used      Comment: QUIT 1994   • Alcohol use No   • Drug use: No   • Sexual activity: Not Currently      Partners: Female     Other Topics Concern   • Not on file     Social History Narrative   • No narrative on file       No Known Allergies      Current Facility-Administered Medications:   •  acetaminophen (TYLENOL) tablet 650 mg, 650 mg, Oral, Q4H PRN, Felix Rodriguez MD  •  atorvastatin (LIPITOR) tablet 10 mg, 10 mg, Oral, Daily, Felix Rodriguez MD, 10 mg at 06/19/18 0826  •  calcium carbonate (TUMS) chewable tablet 500 mg (200 mg elemental), 2 tablet, Oral, TID PRN, Felix Rodriguez MD  •  cholecalciferol (VITAMIN D3) tablet 1,000 Units, 1,000 Units, Oral, Daily, Felix Rodriguez MD  •  dextrose (D50W) solution 25 g, 25 g, Intravenous, Q15 Min PRN, Felix Rodriguez MD  •  dextrose (GLUTOSE) oral gel 15 g, 15 g, Oral, Q15 Min PRN, Felix Rodriguez MD  •  diltiaZEM (CARDIZEM) tablet 120 mg, 120 mg, Oral, QAM, Felix Rodriguez MD, 120 mg at 06/19/18 0622  •  docusate sodium (COLACE) capsule 100 mg, 100 mg, Oral, Daily, Felix Rodriguez MD  •  enoxaparin (LOVENOX) syringe 40 mg, 40 mg, Subcutaneous, Q24H, Felix Rodriguez MD, 40 mg at 06/18/18 2248  •  famotidine (PEPCID) injection 20 mg, 20 mg, Intravenous, Q12H, Felix Rodriguez MD, 20 mg at 06/19/18 0826  •  glucagon (human recombinant) (GLUCAGEN DIAGNOSTIC) injection 1 mg, 1 mg, Subcutaneous, PRN, Felix Rodriguez MD  •  HYDROcodone-acetaminophen (NORCO) 5-325 MG per tablet 1 tablet, 1 tablet, Oral, Q4H PRN, Felix Rodriguez MD  •  insulin aspart (novoLOG) injection 0-7 Units, 0-7 Units, Subcutaneous, 4x Daily With Meals & Nightly, Felix Rodriguez MD, 3 Units at 06/19/18 0826  •  insulin aspart (novoLOG) injection 5 Units, 5 Units, Subcutaneous, TID With Meals, Albertina Daniel MD  •  insulin detemir (LEVEMIR) injection 15 Units, 15 Units, Subcutaneous, Nightly, Albertina Daniel MD  •  LORazepam (ATIVAN) injection 0.5 mg, 0.5 mg, Intravenous, Q6H PRN, Felix Rodriguez MD  •  metoprolol tartrate (LOPRESSOR) tablet 25 mg, 25 mg, Oral, Q12H, JERRY Graham  •   morphine injection 1 mg, 1 mg, Intravenous, Q4H PRN **AND** naloxone (NARCAN) injection 0.4 mg, 0.4 mg, Intravenous, Q5 Min PRN, Felix Rodriguez MD  •  nitroglycerin (NITROSTAT) SL tablet 0.4 mg, 0.4 mg, Sublingual, Q5 Min PRN, Felix Rodriguez MD  •  ondansetron (ZOFRAN) injection 4 mg, 4 mg, Intravenous, Q6H PRN, Felix Rodriguez MD  •  pantoprazole (PROTONIX) EC tablet 40 mg, 40 mg, Oral, BID AC, Toribio Wade MD  •  Pharmacy to Dose enoxaparin (LOVENOX), , Does not apply, Continuous PRN, Felix Rodriguez MD  •  sodium chloride 0.9 % flush 1-10 mL, 1-10 mL, Intravenous, PRN, Felix Rodriguez MD  •  sodium chloride 0.9 % flush 10 mL, 10 mL, Intravenous, PRN, Anand Mosley MD  •  sodium chloride 0.9 % infusion, 75 mL/hr, Intravenous, Continuous, Felix Rodriguez MD, Last Rate: 75 mL/hr at 06/19/18 0751, 75 mL/hr at 06/19/18 0751  •  sucralfate (CARAFATE) 1 GM/10ML suspension 1 g, 1 g, Oral, Q6H, Toribio Wade MD  •  tamsulosin (FLOMAX) 24 hr capsule 0.4 mg, 0.4 mg, Oral, Daily, Felix Rodriguez MD, 0.4 mg at 06/19/18 0826         Review of Systems   Constitutional: Positive for appetite change and fatigue. Negative for fever.   Eyes: Negative for visual disturbance.   Respiratory: Negative for shortness of breath.    Cardiovascular: Negative for palpitations and leg swelling.   Gastrointestinal: Positive for nausea and vomiting. Negative for abdominal pain.   Endocrine: Negative for polydipsia and polyuria.   Musculoskeletal: Positive for arthralgias and gait problem. Negative for joint swelling and neck pain.   Skin: Negative for rash.   Neurological: Positive for weakness. Negative for numbness.   Psychiatric/Behavioral: Negative for behavioral problems.     Objective     Vital Signs  Temp:  [98 °F (36.7 °C)-98.7 °F (37.1 °C)] 98.7 °F (37.1 °C)  Heart Rate:  [] 74  Resp:  [16-20] 16  BP: ()/(49-83) 115/59    Physical Exam  Physical Exam   Constitutional: He is oriented to person, place, and  time. He appears well-nourished.   Thin male   HENT:   Head: Normocephalic and atraumatic.   Eyes: Conjunctivae and EOM are normal. No scleral icterus.   Neck: No thyromegaly present.   Cardiovascular: Normal rate.    Murmur heard.  afib   Pulmonary/Chest: Effort normal and breath sounds normal. No stridor. No respiratory distress. He has no wheezes.   Abdominal: Soft. Bowel sounds are normal. He exhibits no distension. There is no tenderness.   Musculoskeletal: He exhibits no edema or deformity.   Lymphadenopathy:     He has no cervical adenopathy.   Neurological: He is alert and oriented to person, place, and time.   Skin: Skin is warm and dry. No rash noted. He is not diaphoretic.   Psychiatric: He has a normal mood and affect.   Vitals reviewed.      Results Review:    I reviewed the patient's new clinical results and summarized them in the HPI and in plan.  Glucose   Date/Time Value Ref Range Status   06/19/2018 1109 217 (H) 70 - 130 mg/dL Final   06/19/2018 0814 246 (H) 70 - 130 mg/dL Final   06/18/2018 2023 234 (H) 70 - 130 mg/dL Final     Lab Results (last 24 hours)     Procedure Component Value Units Date/Time    TSH [[id number]] Collected:  06/19/18 0645    Specimen:  Blood Updated:  06/19/18 1529    Tissue Pathology Exam [[id number]] Collected:  06/19/18 1306    Specimen:  Tissue from Small Intestine; Tissue from Gastric, Antrum; Tissue from Esophagus, Distal Updated:  06/19/18 1435    PTH, Intact [[id number]]  (Abnormal) Collected:  06/19/18 0648    Specimen:  Blood Updated:  06/19/18 1406     PTH, Intact 13.3 (L) pg/mL     POC Glucose Once [[id number]]  (Abnormal) Collected:  06/19/18 1109    Specimen:  Blood Updated:  06/19/18 1112     Glucose 217 (H) mg/dL     Narrative:       Meter: [id number] : [id number] Daphney Ram RN    Respiratory Panel, PCR - Swab, Nasopharynx [[id number]]  (Normal) Collected:  06/19/18 0626    Specimen:  Swab from Nasopharynx Updated:  06/19/18 0941     ADENOVIRUS, PCR Not  "Detected     Coronavirus 229E Not Detected     Coronavirus HKU1 Not Detected     Coronavirus NL63 Not Detected     Coronavirus OC43 Not Detected     Human Metapneumovirus Not Detected     Human Rhinovirus/Enterovirus Not Detected     Influenza B PCR Not Detected     Parainfluenza Virus 1 Not Detected     Parainfluenza Virus 2 Not Detected     Parainfluenza Virus 3 Not Detected     Parainfluenza Virus 4 Not Detected     Bordetella pertussis pcr Not Detected     Influenza A H1 2009 PCR Not Detected     Chlamydophila pneumoniae PCR Not Detected     Mycoplasma pneumo by PCR Not Detected     Influenza A PCR Not Detected     Influenza A H3 Not Detected     Influenza A H1 Not Detected     RSV, PCR Not Detected    POC Glucose Once [[id number]]  (Abnormal) Collected:  06/19/18 0814    Specimen:  Blood Updated:  06/19/18 0816     Glucose 246 (H) mg/dL     Narrative:       Meter: [id number] : [id number] Miesha PIERRE    Calcium, Ionized [[id number]]  (Normal) Collected:  06/19/18 0647    Specimen:  Blood Updated:  06/19/18 0747     Ionized Calcium 1.16 mmol/L      Ionized Calcium 4.6 mg/dL     Procalcitonin [[id number]]  (Abnormal) Collected:  06/19/18 0645    Specimen:  Blood Updated:  06/19/18 0726     Procalcitonin 0.42 (H) ng/mL     Narrative:       As a Marker for Sepsis (Non-Neonates):   1. <0.5 ng/mL represents a low risk of severe sepsis and/or septic shock.  1. >2 ng/mL represents a high risk of severe sepsis and/or septic shock.    As a Marker for Lower Respiratory Tract Infections that require antibiotic therapy:  PCT on Admission     Antibiotic Therapy             6-12 Hrs later  > 0.5                Strongly Recommended            >0.25 - <0.5         Recommended  0.1 - 0.25           Discouraged                   Remeasure/reassess PCT  <0.1                 Strongly Discouraged          Remeasure/reassess PCT      As 28 day mortality risk marker: \"Change in Procalcitonin Result\" (> 80 % or <=80 %) if Day 0 " (or Day 1) and Day 4 values are available. Refer to http://www.[facility]-pct-calculator.com/   Change in PCT <=80 %   A decrease of PCT levels below or equal to 80 % defines a positive change in PCT test result representing a higher risk for 28-day all-cause mortality of patients diagnosed with severe sepsis or septic shock.  Change in PCT > 80 %   A decrease of PCT levels of more than 80 % defines a negative change in PCT result representing a lower risk for 28-day all-cause mortality of patients diagnosed with severe sepsis or septic shock.                Lactic Acid, Plasma [[id number]]  (Normal) Collected:  06/19/18 0642    Specimen:  Blood Updated:  06/19/18 0720     Lactate 1.4 mmol/L     Troponin [[id number]]  (Normal) Collected:  06/19/18 0645    Specimen:  Blood Updated:  06/19/18 0720     Troponin T 0.012 ng/mL     Narrative:       Troponin T Reference Ranges:  Less than 0.03 ng/mL:    Negative for AMI  0.03 to 0.09 ng/mL:      Indeterminant for AMI  Greater than 0.09 ng/mL: Positive for AMI    BNP [[id number]]  (Normal) Collected:  06/19/18 0645    Specimen:  Blood Updated:  06/19/18 0720     proBNP 974.7 pg/mL     Narrative:       Among patients with dyspnea, NT-proBNP is highly sensitive for the detection of acute congestive heart failure. In addition NT-proBNP of <300 pg/ml effectively rules out acute congestive heart failure with 99% negative predictive value.    Basic Metabolic Panel [[id number]]  (Abnormal) Collected:  06/19/18 0645    Specimen:  Blood Updated:  06/19/18 0718     Glucose 241 (H) mg/dL      BUN 45 (H) mg/dL      Creatinine 0.90 mg/dL      Sodium 137 mmol/L      Potassium 3.9 mmol/L      Chloride 98 mmol/L      CO2 24.2 mmol/L      Calcium 8.3 (L) mg/dL      eGFR Non African Amer 81 mL/min/1.73      BUN/Creatinine Ratio 50.0 (H)     Anion Gap 14.8 mmol/L     Narrative:       The MDRD GFR formula is only valid for adults with stable renal function between ages 18 and 70.    Phosphorus  [[id number]]  (Normal) Collected:  06/19/18 0645    Specimen:  Blood Updated:  06/19/18 0718     Phosphorus 3.3 mg/dL     Lipid Panel [[id number]]  (Abnormal) Collected:  06/19/18 0645    Specimen:  Blood Updated:  06/19/18 0718     Total Cholesterol 75 mg/dL      Triglycerides 207 (H) mg/dL      HDL Cholesterol 12 (L) mg/dL      LDL Cholesterol  22 mg/dL      VLDL Cholesterol 41.4 (H) mg/dL      LDL/HDL Ratio 1.80    Narrative:       Cholesterol Reference Ranges  (U.S. Department of Health and Human Services ATP III Classifications)    Desirable          <200 mg/dL  Borderline High    200-239 mg/dL  High Risk          >240 mg/dL      Triglyceride Reference Ranges  (U.S. Department of Health and Human Services ATP III Classifications)    Normal           <150 mg/dL  Borderline High  150-199 mg/dL  High             200-499 mg/dL  Very High        >500 mg/dL    HDL Reference Ranges  (U.S. Department of Health and Human Services ATP III Classifcations)    Low     <40 mg/dl (major risk factor for CHD)  High    >60 mg/dl ('negative' risk factor for CHD)        LDL Reference Ranges  (U.S. Department of Health and Human Services ATP III Classifcations)    Optimal          <100 mg/dL  Near Optimal     100-129 mg/dL  Borderline High  130-159 mg/dL  High             160-189 mg/dL  Very High        >189 mg/dL    Magnesium [[id number]]  (Normal) Collected:  06/19/18 0645    Specimen:  Blood Updated:  06/19/18 0718     Magnesium 2.1 mg/dL     Hemoglobin A1c [[id number]]  (Abnormal) Collected:  06/19/18 0647    Specimen:  Blood Updated:  06/19/18 0704     Hemoglobin A1C 6.80 (H) %     Narrative:       Hemoglobin A1C Ranges:    Increased Risk for Diabetes  5.7% to 6.4%  Diabetes                     >= 6.5%  Diabetic Goal                < 7.0%    CBC & Differential [[id number]] Collected:  06/19/18 0643    Specimen:  Blood Updated:  06/19/18 0659    Narrative:       The following orders were created for panel order CBC &  Differential.  Procedure                               Abnormality         Status                     ---------                               -----------         ------                     Scan Slide[[id number]]                                                                  CBC Auto Differential[[id number]]        Abnormal            Final result                 Please view results for these tests on the individual orders.    CBC Auto Differential [[id number]]  (Abnormal) Collected:  06/19/18 0643    Specimen:  Blood Updated:  06/19/18 0659     WBC 13.01 (H) 10*3/mm3      RBC 4.37 (L) 10*6/mm3      Hemoglobin 13.0 (L) g/dL      Hematocrit 38.0 (L) %      MCV 87.0 fL      MCH 29.7 pg      MCHC 34.2 g/dL      RDW 13.8 %      RDW-SD 44.1 fl      MPV 12.0 fL      Platelets 91 (L) 10*3/mm3      Neutrophil % 33.9 (L) %      Lymphocyte % 61.3 (H) %      Monocyte % 3.9 (L) %      Eosinophil % 0.0 (L) %      Basophil % 0.9 %      Immature Grans % 0.3 %      Neutrophils, Absolute 4.40 10*3/mm3      Lymphocytes, Absolute 7.98 (H) 10*3/mm3      Monocytes, Absolute 0.51 10*3/mm3      Eosinophils, Absolute 0.00 10*3/mm3      Basophils, Absolute 0.12 10*3/mm3      Immature Grans, Absolute 0.04 (H) 10*3/mm3      nRBC 0.0 /100 WBC     Thyroid Profile II [[id number]] Collected:  06/19/18 0647    Specimen:  Blood Updated:  06/19/18 0647    Basic Metabolic Panel [[id number]]  (Abnormal) Collected:  06/18/18 2046    Specimen:  Blood Updated:  06/18/18 2200     Glucose 254 (H) mg/dL      BUN 55 (H) mg/dL      Creatinine 1.03 mg/dL      Sodium 133 (L) mmol/L      Potassium 3.9 mmol/L      Chloride 95 (L) mmol/L      CO2 21.8 (L) mmol/L      Calcium 8.5 (L) mg/dL      eGFR Non African Amer 69 mL/min/1.73      BUN/Creatinine Ratio 53.4 (H)     Anion Gap 16.2 mmol/L     Narrative:       The MDRD GFR formula is only valid for adults with stable renal function between ages 18 and 70.    CBC & Differential [[id number]] Collected:  06/18/18 2046     Specimen:  Blood Updated:  06/18/18 2149    Narrative:       The following orders were created for panel order CBC & Differential.  Procedure                               Abnormality         Status                     ---------                               -----------         ------                     Scan Slide[[id number]]                                       Final result               CBC Auto Differential[[id number]]        Abnormal            Final result                 Please view results for these tests on the individual orders.    Scan Slide [[id number]] Collected:  06/18/18 2046    Specimen:  Blood Updated:  06/18/18 2149     Dacrocytes Slight/1+     WBC Morphology Normal     Platelet Morphology Normal    CBC Auto Differential [[id number]]  (Abnormal) Collected:  06/18/18 2046    Specimen:  Blood Updated:  06/18/18 2149     WBC 14.95 (H) 10*3/mm3      RBC 4.35 (L) 10*6/mm3      Hemoglobin 13.0 (L) g/dL      Hematocrit 37.8 (L) %      MCV 86.9 fL      MCH 29.9 pg      MCHC 34.4 g/dL      RDW 13.7 %      RDW-SD 43.9 fl      MPV 12.3 (H) fL      Platelets 71 (L) 10*3/mm3      Neutrophil % 35.0 (L) %      Lymphocyte % 59.2 (H) %      Monocyte % 4.7 (L) %      Eosinophil % 0.0 (L) %      Basophil % 1.1 %      Immature Grans % 0.1 %      Neutrophils, Absolute 5.23 10*3/mm3      Lymphocytes, Absolute 8.85 (H) 10*3/mm3      Monocytes, Absolute 0.70 10*3/mm3      Eosinophils, Absolute 0.00 10*3/mm3      Basophils, Absolute 0.17 10*3/mm3      Immature Grans, Absolute 0.02 10*3/mm3      nRBC 0.0 /100 WBC     POC Glucose Once [[id number]]  (Abnormal) Collected:  06/18/18 2023    Specimen:  Blood Updated:  06/18/18 2024     Glucose 234 (H) mg/dL     Narrative:       Meter: [id number] : [id number] Tong PIERRE    Urinalysis With / Culture If Indicated - Urine, Clean Catch [[id number]]  (Abnormal) Collected:  06/18/18 1754    Specimen:  Urine from Urine, Clean Catch Updated:  06/18/18 1814     Color, UA Yellow      Appearance, UA Clear     pH, UA <=5.0     Specific Gravity, UA 1.022     Glucose, UA Negative     Ketones, UA Trace (A)     Bilirubin, UA Negative     Blood, UA Negative     Protein, UA 30 mg/dL (1+) (A)     Leuk Esterase, UA Negative     Nitrite, UA Negative     Urobilinogen, UA 1.0 E.U./dL    Urinalysis, Microscopic Only - Urine, Clean Catch [[id number]] Collected:  06/18/18 1754    Specimen:  Urine from Urine, Clean Catch Updated:  06/18/18 1814     RBC, UA 0-2 /HPF      WBC, UA 0-2 /HPF      Bacteria, UA None Seen /HPF      Squamous Epithelial Cells, UA 0-2 /HPF      Hyaline Casts, UA 3-6 /LPF      Methodology Automated Microscopy          Imaging Results (last 24 hours)     Procedure Component Value Units Date/Time    US Abdomen Complete [[id number]] Updated:  06/19/18 0949    CT Abdomen Pelvis Without Contrast [[id number]] Collected:  06/18/18 1658     Updated:  06/18/18 1717    Narrative:       CT ABDOMEN PELVIS WO CONTRAST-     INDICATIONS: Epigastric pain     TECHNIQUE: Radiation dose reduction techniques were utilized, including  automated exposure control and exposure modulation based on body size.   Unenhanced ABDOMEN AND PELVIS CT     COMPARISON: None available     FINDINGS:      Assessment is limited at the level of the pelvis by hardware artifact,  which partly obscures the urinary bladder, distal ureters and bowel.  Motion artifact limits the assessment.     Pancreas is thinned.     Slight perinephric fat stranding may reflect chronic change motion  artifact, correlate clinically to exclude any possibility of urinary  infection. Urachal duct remnant is apparent, with calcifications,  similar in appearance to the prior exam, interval follow-up can  characterize change.     Otherwise unremarkable unenhanced appearance of the liver, gallbladder,  spleen, adrenal glands, pancreas, kidneys, bladder.     No bowel obstruction. Distal esophageal wall appears mildly thickened  that could be evidence of  inflammation, for example 1 cm, image 11 of  series 1, endoscopy can be obtained to exclude the possibility of a  lesion. Moderate colonic fecal retention is apparent. The appendix does  not appear thickened. Small hiatal hernia.     No free intraperitoneal gas or free fluid.     Scattered small mesenteric and para-aortic lymph nodes are seen that are  not significant by size criteria.     Abdominal aorta is not aneurysmal. Aortic and other arterial  calcifications are present.     The lung bases show old granulomatous disease. Lateral and posterior  subpleural nodules of the right lower lobe, 2 to 3 mm on image 8 of  series 1, and another on image 17, and another in the left lower lobe,  image 12, are indeterminate, advise follow-up CT in 3 months to  characterize change coronary artery calcification is present.     Degenerative changes are seen in the spine. Compression fractures at L4  (50% loss of height), and L5 appearance 40% loss of height) represent a  change from 5/23/2013, age-indeterminate, correlate clinically.  Compression fracture of L1 (25% loss of height) above the level of the  prior exam, likewise, is age-indeterminate. The posterior margins of the  upper aspect of the L4 and L5 vertebral bodies show posterior  retropulsion by about 4 mm, contributing to conspicuous central stenosis  (if further evaluation is indicated, MRI could be considered). Some  subcutaneous stranding density at the right anterior pelvis, for example  image 94 of series 1 could for example reflect contusion or sequela of  injection.             Impression:             1. Mild nonspecific wall thickening in the distal esophagus, as  described. Otherwise no abnormal wall thickening of bowel is identified.  2. No obstructive uropathy. Assessment at the level of the pelvis is  limited by hardware artifact.  3. Age-indeterminate lumbar compression fractures, correlate clinically.  4. Small nonspecific pulmonary nodules, follow-up  recommended.     Discussed by telephone with Dr. Mosley at time of interpretation,  1708, 6/18/2018.     This report was finalized on 6/18/2018 5:14 PM by Dr. Ron Freitas M.D.             Assessment/Plan     Active Hospital Problems (** Indicates Principal Problem)    Diagnosis Date Noted   • **Esophagus disorder, thickened distal wall CT Scan 6/18/18 [K22.9] 06/18/2018   • Hard of hearing [H91.90] 06/19/2018   • Ptosis, left eyelid [H02.402] 06/19/2018   • Osteoarthritis of multiple joints [M15.9] 06/19/2018   • Atrial fibrillation [I48.91] 06/19/2018   • Carotid stenosis, asymptomatic, bilateral 16-49% 2/24/14 [I65.23] 06/19/2018   • Remote history of stroke, small caudate nucleus [Z86.73] 06/19/2018   • Thrombocytopenia [D69.6] 06/19/2018   • Fall at home, subsequent encounter [W19.XXXD, Y92.099] 06/19/2018   • Hyperlipidemia [E78.5] 06/19/2018   • Pain of upper abdomen [R10.10] 06/18/2018   • Hypertension, essential [I10] 06/18/2018   • Acute hyponatremia [E87.1] 06/18/2018   • Acute kidney injury superimposed on chronic kidney disease [N17.9, N18.9] 06/18/2018   • Elevated liver function tests [R79.89] 06/18/2018   • Leukocytosis [D72.829] 06/18/2018   • Anorexia [R63.0] 06/18/2018   • Granulomatous disease, chronic [D71] 06/18/2018   • Lung nodule seen on imaging study [R91.1] 06/18/2018   • Compression fracture of lumbar spine, non-traumatic [M48.56XA] 06/18/2018   • Lumbar canal stenosis [M48.061] 06/18/2018   • Weakness generalized [R53.1] 06/18/2018   • Cough [R05] 06/18/2018   • Diabetes mellitus type 2, uncontrolled [E11.65] 06/18/2018   • Limb swelling [M79.89] 06/18/2018   • 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       Type 2 diabetes mellitus-decently controlled  Will start patient on levemir 15 units at bedtime  Will start NovoLog 5 units with each meal  Will cover with  "NovoLog sliding scale 3 times a day before meals and at bedtime    Hyperlipidemia  Continue Lipitor 10 mg oral daily  Will check lipid panel    New onset A. fib  Cardiology on board.  Will check thyroid levels to rule out thyroid abnormality to be a precipitating factor in his A. fib.    Discussed plan with nurse.     Albertina Daniel MD.  06/19/18  4:00 PM      [facility] Dragon / transcription disclaimer:    \"Dictated utilizing Dragon dictation\".   "

## 2018-06-19 NOTE — CONSULTS
Inpatient Consult to Gastroenterology  Consult performed by: MAYRA MARTINEZ  Consult ordered by: KRIS SAMPSON          Patient Care Team:  Percy Em MD as PCP - General  Percy Em MD as PCP - Family Medicine    Chief complaint: vomiting, abnormal imaging    Subjective     History of Present Illness  Presents with nausea, vomiting, renal failure,  Abnormal imaging.  Food may stick in the esophagus at times. No black or bloody stools  Ct notes thickened esophagus.   Review of Systems   Constitutional: Positive for unexpected weight change.   HENT: Positive for trouble swallowing.    Cardiovascular: Positive for chest pain.   Gastrointestinal: Positive for nausea and vomiting.   Musculoskeletal: Positive for arthralgias.        Past Medical History:   Diagnosis Date   • Arthritis    • Diabetes mellitus    • Hard of hearing    • Hypertension    • Ptosis of left eyelid    ,   Past Surgical History:   Procedure Laterality Date   • EYE PTOSIS REPAIR Left 11/15/2016    Procedure: LT UPPER LID EYE PTOSIS REPAIR;  Surgeon: Anup Lancaster MD;  Location: [facility] OR [facility] [place];  Service:    • EYE SURGERY     • HIP ARTHROPLASTY     • JOINT REPLACEMENT     , History reviewed. No pertinent family history.,   Social History   Substance Use Topics   • Smoking status: Former Smoker     Types: Cigars   • Smokeless tobacco: Never Used      Comment: QUIT 1994   • Alcohol use No   ,   Prescriptions Prior to Admission   Medication Sig Dispense Refill Last Dose   • aspirin 325 MG tablet Take 325 mg by mouth Daily.      • atorvastatin (LIPITOR) 10 MG tablet Take 10 mg by mouth Daily.      • cholecalciferol (VITAMIN D3) 1000 UNITS tablet Take 1,000 Units by mouth Daily. PT HOLDING FOR SURGERY   11/1/2016   • diltiaZEM (CARDIZEM) 120 MG tablet Take 120 mg by mouth Every Morning.   11/15/2016 at Unknown time   • furosemide (LASIX) 20 MG tablet Take 20 mg by mouth 2 (Two) Times a Day.      • insulin glargine (LANTUS) 100  UNIT/ML injection Inject 10 Units under the skin Every Night.   11/14/2016 at Unknown time   • insulin lispro (humaLOG) 100 UNIT/ML injection Inject 8 Units under the skin 3 (Three) Times a Day Before Meals.      • quinapril (ACCUPRIL) 10 MG tablet Take 10 mg by mouth Every Morning.   11/14/2016 at Unknown time   • tamsulosin (FLOMAX) 0.4 MG capsule 24 hr capsule Take 1 capsule by mouth Daily.   11/14/2016 at Unknown time   , Scheduled Meds:    atorvastatin 10 mg Oral Daily   cholecalciferol 1,000 Units Oral Daily   diltiaZEM 120 mg Oral QAM   docusate sodium 100 mg Oral Daily   enoxaparin 40 mg Subcutaneous Q24H   famotidine 20 mg Intravenous Q12H   furosemide 20 mg Oral BID   insulin aspart 0-7 Units Subcutaneous 4x Daily With Meals & Nightly   insulin aspart 8 Units Subcutaneous TID With Meals   lisinopril 10 mg Oral Q24H   tamsulosin 0.4 mg Oral Daily   , Continuous Infusions:    lactated ringers 30 mL/hr Last Rate: 30 mL/hr (06/19/18 1118)   Pharmacy to Dose enoxaparin (LOVENOX)     sodium chloride 75 mL/hr Last Rate: 75 mL/hr (06/19/18 0751)   , PRN Meds:  •  acetaminophen  •  calcium carbonate  •  dextrose  •  dextrose  •  glucagon (human recombinant)  •  HYDROcodone-acetaminophen  •  lactated ringers  •  LORazepam  •  Morphine **AND** naloxone  •  nitroglycerin  •  ondansetron  •  Pharmacy to Dose enoxaparin (LOVENOX)  •  sodium chloride  •  sodium chloride and Allergies:  Patient has no known allergies.    Objective      Vital Signs  Temp:  [98 °F (36.7 °C)-98.6 °F (37 °C)] 98.6 °F (37 °C)  Heart Rate:  [] 105  Resp:  [16-18] 16  BP: ()/(50-83) 140/83    Physical Exam   Constitutional: He is oriented to person, place, and time. He appears well-developed and well-nourished.   HENT:   Mouth/Throat: Oropharynx is clear and moist.   Eyes: Conjunctivae are normal.   Neck: Neck supple.   Cardiovascular: Normal rate and regular rhythm.    Pulmonary/Chest: Effort normal and breath sounds normal.    Abdominal: Soft. Bowel sounds are normal.   Neurological: He is alert and oriented to person, place, and time.   Skin: Skin is warm and dry.   Psychiatric: He has a normal mood and affect.       Results Review:    I reviewed the patient's new clinical results.        Assessment/Plan     Principal Problem:    Esophagus disorder, thickened distal wall CT Scan 6/18/18  Active Problems:    Pain of upper abdomen    Advanced diabetic retinal disease    Limb swelling    Diabetes mellitus type 2, uncontrolled    Hypertension, essential    Acute hyponatremia    Acute kidney injury superimposed on chronic kidney disease    Elevated liver function tests    Leukocytosis    Anorexia    Granulomatous disease, chronic    Lung nodule seen on imaging study    Compression fracture of lumbar spine, non-traumatic    Lumbar canal stenosis    Weakness generalized    Cough    Hard of hearing    Ptosis, left eyelid    Osteoarthritis of multiple joints    Atrial fibrillation    Carotid stenosis, asymptomatic, bilateral 16-49% 2/24/14    Remote history of stroke, small caudate nucleus    Thrombocytopenia    Fall at home, subsequent encounter    Hyperlipidemia      Assessment:  (Nausea and vomiting  Dysphagia for liquids suspect motility disorder  Dyspepsia,  gerd).     Plan:   (Upper tract endoscopy today if schedule will allow  Suspect he will improve and that the vomiting was in part due to renal failure, that is improved, and decreased motility ).       I discussed the patients findings and my recommendations with patient and nursing staff    Toribio Wade MD  06/19/18  7:40 AM    Time: Critical care 20 min

## 2018-06-19 NOTE — SIGNIFICANT NOTE
06/19/18 1114   Rehab Time/Intention   Evaluation Not Performed patient unavailable for evaluation  (Currently JOSSELIN: ENDO. Will check back later today as time allows.)   Rehab Treatment   Discipline physical therapist   Recommendation   PT - Next Appointment 06/20/18

## 2018-06-19 NOTE — PROGRESS NOTES
Discharge Planning Assessment  [facility]     Patient Name: Javier Marin  MRN: [id number]  Today's Date: 6/19/2018    Admit Date: 6/18/2018          Discharge Needs Assessment     Row Name 06/19/18 1011       Living Environment    Lives With spouse    Name(s) of Who Lives With Patient --   Spouse- Olga Marin [phone]    Current Living Arrangements home/apartment/condo    Primary Care Provided by self    Provides Primary Care For no one    Family Caregiver if Needed spouse    Able to Return to Prior Arrangements yes       Resource/Environmental Concerns    Resource/Environmental Concerns none    Transportation Concerns car, none       Transition Planning    Patient/Family Anticipates Transition to home with family    Patient/Family Anticipated Services at Transition none    Transportation Anticipated family or friend will provide       Discharge Needs Assessment    Readmission Within the Last 30 Days no previous admission in last 30 days    Concerns to be Addressed no discharge needs identified    Equipment Currently Used at Home shower chair;other (see comments)   walking stick    Anticipated Changes Related to Illness none    Equipment Needed After Discharge none            Discharge Plan     Row Name 06/19/18 1013       Plan    Plan plan is home with spouse at d/c    Patient/Family in Agreement with Plan yes    Plan Comments CCP role explained and face sheet verified; advance directive on file; emergency contact is wife Olga Marin [phone], she will provide transportation at d/c; current pharmacy is [email or web address] in [facility]; DME includes a walking stick and a shower chair; IMM given 6/18; has used [facility] and [facility] in the past. Plans to return home at d/c no needs anticipated, CCP to follow. FARAZ Pyle        Destination     No service coordination in this encounter.      Durable Medical Equipment     No service coordination in this encounter.      Dialysis/Infusion      No service coordination in this encounter.      Home Medical Care     No service coordination in this encounter.      Social Care     No service coordination in this encounter.                Demographic Summary     Row Name 06/19/18 1010       General Information    Admission Type inpatient    Required Notices Provided Important Message from Medicare   IMM given 6/18    Reason for Consult discharge planning            Functional Status     Row Name 06/19/18 1011       Functional Status    Usual Activity Tolerance moderate    Current Activity Tolerance fair       Functional Status, IADL    Medications independent    Meal Preparation independent    Housekeeping independent    Laundry independent    Shopping independent            Psychosocial    No documentation.           Abuse/Neglect    No documentation.           Legal     Row Name 06/19/18 1011       Financial/Legal    Finance Comments --   Advance directive on file            Substance Abuse    No documentation.           Patient Forms    No documentation.         Luzma Lam RN

## 2018-06-19 NOTE — SIGNIFICANT NOTE
06/19/18 1301   Rehab Time/Intention   Evaluation Not Performed patient unavailable for evaluation;other (see comments)  (NPO for studies.  Currently in endoscopy.  Will see pt tomorrow as schedule allows.)   Rehab Treatment   Discipline speech language pathologist

## 2018-06-19 NOTE — CONSULTS
"Adult Nutrition  Assessment/PES    Patient Name:  Javier Marin  YOB: 1935  MRN: [id number]  Admit Date:  6/18/2018    Assessment Date:  6/19/2018    Comments:  Nutrition assessment initiated.  Pt just came back from Endo. S/P EGD. Esophagitis noted. Spoke with pt and wife. Clarified Ht and corrected in Epic. Pain with swallow and nausea. Will follow po and add supplements if needed.  ? Need for calorie count. Po only has been poor for a few days per wife. No significant weight loss.           Reason for Assessment     Row Name 06/19/18 1350          Reason for Assessment    Reason For Assessment physician consult     Diagnosis gastrointestinal disease   esophagitis, DM             Nutrition/Diet History     Row Name 06/19/18 1353          Nutrition/Diet History    Typical Food/Fluid Intake very little po x 3 days due to N/V     Factors Affecting Nutritional Intake nausea;vomiting             Anthropometrics     Row Name 06/19/18 1354 06/19/18 1112       Anthropometrics    Height Method stated  --    Height 172.7 cm (68\") 154 cm (60.63\")    Weight  -- 73 kg (160 lb 15 oz)       Admit Weight    Admit Weight 73 kg (160 lb 15 oz)  --       Ideal Body Weight (IBW)    Ideal Body Weight (IBW) (kg) 70.89 50.3    % Ideal Body Weight  -- 145.13       Body Mass Index (BMI)    BMI (kg/m2)  -- 30.85    BMI Assessment BMI 18.5-24.9: normal   BMI=24  --       IBW Adjustment, Para/Tetraplegia    5% Adjustment, Para (IBW) 67.35 47.79    10% Adjustment, Para (IBW) 63.8 45.27    10% Adjustment, Tetra (IBW) 63.8 45.27    15% Adjustment, Tetra (IBW) 60.26 42.76    Row Name 06/19/18 0626          Anthropometrics    Height 177.8 cm (70\")     Weight 73.4 kg (161 lb 13.1 oz)        Ideal Body Weight (IBW)    Ideal Body Weight (IBW) (kg) 76.48     % Ideal Body Weight 95.97        Body Mass Index (BMI)    BMI (kg/m2) 23.27        IBW Adjustment, Para/Tetraplegia    5% Adjustment, Para (IBW) 72.66     10% Adjustment, Para " "(IBW) 68.83     10% Adjustment, Tetra (IBW) 68.83     15% Adjustment, Tetra (IBW) 65.01             Labs/Tests/Procedures/Meds     Row Name 06/19/18 1356          Labs/Procedures/Meds    Lab Results Reviewed reviewed, pertinent     Lab Results Comments glu, HgbA1c        Diagnostic Tests/Procedures    Diagnostic Test/Procedure Reviewed reviewed, pertinent     Diagnostic Test/Procedures Comments EGD today        Medications    Pertinent Medications Reviewed reviewed, pertinent     Pertinent Medications Comments Vit D, pepcid, lasix, insulin             Physical Findings     Row Name 06/19/18 1356          Physical Findings    Oral/Mouth Cavity --   dentures     Skin other (see comments)   no breakdown             Estimated/Assessed Needs     Row Name 06/19/18 1409 06/19/18 1354       Calculation Measurements    Height  -- 172.7 cm (68\")       Estimated/Assessed Needs    Additional Documentation --   1825 kcals (25/kg), 73g pro (1.0/kg)  --    Row Name 06/19/18 1112 06/19/18 0626       Calculation Measurements    Height 154 cm (60.63\") 177.8 cm (70\")            Nutrition Prescription Ordered     Row Name 06/19/18 1357          Nutrition Prescription PO    Current PO Diet NPO             Evaluation of Received Nutrient/Fluid Intake     Row Name 06/19/18 1354 06/19/18 1112       Calculation Measurements    Height 172.7 cm (68\") 154 cm (60.63\")    Row Name 06/19/18 0626          Calculation Measurements    Height 177.8 cm (70\")             Evaluation of Prescribed Nutrient/Fluid Intake     Row Name 06/19/18 1354 06/19/18 1112       Calculation Measurements    Height 172.7 cm (68\") 154 cm (60.63\")    Row Name 06/19/18 0626          Calculation Measurements    Height 177.8 cm (70\")       Problem/Interventions:        Problem 1     Row Name 06/19/18 1358          Nutrition Diagnoses Problem 1    Problem 1 Altered GI Function     Etiology (related to) Medical Diagnosis     Gastrointestinal Esophagitis             "   Intervention Goal     Row Name 06/19/18 1358          Intervention Goal    General Maintain nutrition;Reduce/improve symptoms;Meet nutritional needs for age/condition;Disease management/therapy;Improved nutrition related lab(s)     PO Tolerate PO;Initiate feeding     Weight Maintain weight             Nutrition Intervention     Row Name 06/19/18 1358          Nutrition Intervention    RD/Tech Action Follow Tx progress;Care plan reviewd;Await begin PO               Education/Evaluation     Row Name 06/19/18 1358          Education    Education Will Instruct as appropriate        Monitor/Evaluation    Monitor Per protocol;Symptoms;I&O;PO intake;Pertinent labs;Skin status;Weight         Electronically signed by:  Collette Botello RD  06/19/18 2:10 PM

## 2018-06-19 NOTE — SIGNIFICANT NOTE
06/19/18 1442   Rehab Time/Intention   Evaluation Not Performed patient unavailable for evaluation  (OT attempted x2 to see pt in PM- at 1:45, pt was still JOSSELIN. At 2:42, MD was in room still working w/ pt; OT to evaluate tomorrow 6/20)   Rehab Treatment   Discipline occupational therapist   Recommendation   OT - Next Appointment 06/20/18

## 2018-06-19 NOTE — CONSULTS
[facility]  Cardiology Consult Note    Patient Identification:  Name: Javier Marin  Age: 82 y.o.  Sex: male  :  1935  MRN: [id number]             Requesting Physician: Dr Rodriguez    Reason for Consultation / Chief Complaint: new onset atrial fib, anticoagulation recommmendations    History of Present Illness:   This patient is an 82-year-old white male new to our service.  He has no known history of CAD, previous ischemic workup, arrhythmia or congestive heart failure.  His PCP manages his benign essential hypertension and diabetes.    The patient was seen in the emergency room last week with complaints of generalized weakness - especially lower extremity weakness, and unsteady gait and nausea/ vomiting.  There was also an unwitnessed fall-spouse states his legs were very weak -she found him out in the yard on the ground in tall grass for an undetermined length of time without any overt injuries or changes in his baseline mental status.  He denied any chest pain or shortness of breath.  Workup showed evidence of transient hypotension (treated with IV fluids) and viral URI.    He presented back to the emergency room last evening reporting a 2-3 day history of abdominal pain and epigastric discomfort with development of decreased oral intake, decreased urine output, vomiting and worsened coughing.  We have been asked to see the patient for A. fib RVR noted on admission EKG.  The A. fib was present on 2018 but new since study dated 2016.     The patient (and spouse) deny any history of chest pain, tightness, palpitations, dizziness or syncope.  Troponin on 2018 and 2018 were both negative.  Admission  with unremarkable chest x-ray.  He was also found to have hyponatremia, acute on CKD,  UTI, leukocytosis, thrombocytopenia must small nonspecific pulmonary nodules, and age indeterminate lumbar compression fractures    Comorbid cardiac risk factors: Pretension,  diabetes    Past Medical History:  Past Medical History:   Diagnosis Date   • Arthritis    • Diabetes mellitus    • Hard of hearing    • Hypertension    • Ptosis of left eyelid      Past Surgical History:  Past Surgical History:   Procedure Laterality Date   • EYE PTOSIS REPAIR Left 11/15/2016    Procedure: LT UPPER LID EYE PTOSIS REPAIR;  Surgeon: Anup Lancaster MD;  Location: [facility] OR [facility] [place];  Service:    • EYE SURGERY     • HIP ARTHROPLASTY        Allergies:  No Known Allergies  Home Meds:  Prescriptions Prior to Admission   Medication Sig Dispense Refill Last Dose   • aspirin 325 MG tablet Take 325 mg by mouth Daily.      • atorvastatin (LIPITOR) 10 MG tablet Take 10 mg by mouth Daily.      • cholecalciferol (VITAMIN D3) 1000 UNITS tablet Take 1,000 Units by mouth Daily. PT HOLDING FOR SURGERY   11/1/2016   • diltiaZEM (CARDIZEM) 120 MG tablet Take 120 mg by mouth Every Morning.   11/15/2016 at Unknown time   • furosemide (LASIX) 20 MG tablet Take 20 mg by mouth 2 (Two) Times a Day.      • insulin glargine (LANTUS) 100 UNIT/ML injection Inject 10 Units under the skin Every Night.   11/14/2016 at Unknown time   • insulin lispro (humaLOG) 100 UNIT/ML injection Inject 8 Units under the skin 3 (Three) Times a Day Before Meals.      • quinapril (ACCUPRIL) 10 MG tablet Take 10 mg by mouth Every Morning.   11/14/2016 at Unknown time   • tamsulosin (FLOMAX) 0.4 MG capsule 24 hr capsule Take 1 capsule by mouth Daily.   11/14/2016 at Unknown time     Current Meds:   [unfilled]  Social History:   Social History   Substance Use Topics   • Smoking status: Former Smoker     Types: Cigars   • Smokeless tobacco: Never Used      Comment: QUIT 1994   • Alcohol use No      Family History:  History reviewed. No pertinent family history.     Review of Systems    Constitutional: +weakness,fatigue. No rigors, chills   Eyes: No vision changes, eye pain   ENT/oropharynx: No  sore throat, epistaxis, changes in hearing    Cardiovascular: No chest pain, chest tightness, palpitations, paroxysmal nocturnal dyspnea, orthopnea, diaphoresis   Respiratory: No shortness of breath, dyspnea on exertion, +Nonproductive cough   Gastrointestinal: +abdominal and epigastric pain, nausea, vomiting, diarrhea. No bloody stools   Genitourinary: No hematuria,+ dysuria   Neurological: No headache, +Nausea of generalized and lower extremity weakness    Musculoskeletal: No neck pain joint swelling   Integument: No rash, edema           Constitutional:  Temp:  [98 °F (36.7 °C)-98.6 °F (37 °C)] 98.6 °F (37 °C)  Heart Rate:  [] 105  Resp:  [16-18] 16  BP: ()/(50-83) 140/83    Physical Exam   General:  Well-developed elderly white male resting quietly.  Appears in no acute distress  Eyes: PERTL,  HEENT:  No JVD. Thyroid not visibly enlarged. No mucosal pallor or cyanosis  Respiratory: Respirations regular and unlabored at rest. BBS with good air entry in all fields. No crackles or wheezes auscultated  Cardiovascular: S1S2 irregular rate and rhythm. No murmur, rub or gallop auscultated. Faint right carotid bruits. DP pulses 2   . No pretibial pitting edema  Gastrointestinal: Abdomen soft, flat, . Bowel sounds present. No ascites  Musculoskeletal: LOVELACE x4. No abnormal movements  Extremities: No digital clubbing or cyanosis  Skin: Color pink. Skin warm and dry to touch. No rashes  Neuro: AAO x2-3. [facility]               Cardiographics  Admission ECG:       Comparison EKG :      Telemetry:        Imaging  Chest X-ray FINDINGS: The cardiomediastinal silhouette is normal. Lung volumes are low but the lungs are clear and the costophrenic sulci are dry.     IMPRESSION: Negative.    Head CT 6-14-18 IMPRESSION:  No evidence of fracture or hemorrhage. Atrophy, small vessel  ischemic disease, small remote infarct lateral to the head of the  caudate nucleus on the right and vascular calcification is noted.  Further evaluation could be performed with a MRI  examination of the brain as indicated    Lab Review     Results from last 7 days  Lab Units 06/19/18  0645 06/14/18  0856   TROPONIN T ng/mL 0.012 0.018       Results from last 7 days  Lab Units 06/19/18  0645   MAGNESIUM mg/dL 2.1     Results from last 7 days  Lab Units 06/19/18  0643 06/18/18  2046 06/18/18  1308   WBC 10*3/mm3 13.01* 14.95* 14.51*   HEMOGLOBIN g/dL 13.0* 13.0* 14.5   HEMATOCRIT % 38.0* 37.8* 42.0   PLATELETS 10*3/mm3 91* 71* 62*       Assessment:  - New onset atrial fibrillation with RVR, unknown duration  - elevated [id number]   - HTN  - Generalized weakness with fall  - Abdominal/epigastric pain  - Anorexia  - dilated distal esophagus  - Nausea/vomiting  - acute on CKD  - Leukocytosis  - DM  - anemia  - thrombocytopenia  - Age indeterminate lumbar compression fractures  - h/o stroke  - h/o carotid disease    Recommendations / Plan:   In summary, this is an 82-year-old male admitted with multiple medical issues with newly diagnosed atrial fibrillation with rvr, generalized weakness with fall and possible syncope.  He has been started on oral Cardizem for rate control. He has a [id number] score of >/= 6 and a HAS BLED score of >/=  (hypertension, abnormal LFTs, stroke history, predisposition to bleeding [anemia, thrombocytopenia] and age >65) in addition to lower extremity weakness with gait disturbance and fall.  His first set of cardiac enzymes are negative.  For now, will add beta blocker to his home dose of Cardizem and hold on aspirin/anticoagulation until patient seen and cleared by neurology.  A 2-D echo, TSH, additional troponin have been ordered.  Further recommendations including ischemic workup pending    Labs/tests ordered: Medication adjustment, echo, TSH, troponin      Juliana Mccrary MD  6/19/2018, 9:23 AM      EMR Dragon/Transcription:   Dictated utilizing Dragon dictation

## 2018-06-19 NOTE — ANESTHESIA POSTPROCEDURE EVALUATION
"Patient: Javier Marin    Procedure Summary     Date:  06/19/18 Room / Location:   [facility] /  [facility]    Anesthesia Start:  1245 Anesthesia Stop:  1316    Procedure:  ESOPHAGOGASTRODUODENOSCOPY WITH BIOPSY (N/A Esophagus) Diagnosis:       Esophagus disorder      Pain of upper abdomen      (Esophagus disorder [K22.9])      (Pain of upper abdomen [R10.10])    Surgeon:  Toribio Wade MD Provider:  Leanna Kent MD    Anesthesia Type:  MAC ASA Status:  3          Anesthesia Type: MAC  Last vitals  BP   115/59 (06/19/18 1341)   Temp   37.1 °C (98.7 °F) (06/19/18 1112)   Pulse   74 (06/19/18 1341)   Resp   16 (06/19/18 1341)     SpO2   96 % (06/19/18 1341)     Post Anesthesia Care and Evaluation    Patient location during evaluation: PACU  Patient participation: complete - patient participated  Level of consciousness: awake and alert  Pain score: 0  Pain management: adequate  Airway patency: patent  Anesthetic complications: No anesthetic complications    Cardiovascular status: acceptable  Respiratory status: acceptable  Hydration status: acceptable    Comments: /59 (Patient Position: Sitting)   Pulse 74   Temp 37.1 °C (98.7 °F) (Oral)   Resp 16   Ht 154 cm (60.63\")   Wt 73 kg (160 lb 15 oz)   SpO2 96%   BMI 30.78 kg/m²       "

## 2018-06-19 NOTE — CONSULTS
DOS: 2018  NAME: Javier Marin   : 1935  PCP: Percy Em MD  CC: Stroke  Referring MD: Felix Rodriguez MD    Neurological Problem and Interval History:  82 y.o. RHW male with a Hx of diabetes mellitus, hypertension and new onset AF.  Patient has been in his usual state of health until the past week or 2 when he is had some generalized weakness and difficulty eating.  He also had a fall last week.  He was admitted and is being evaluated for the cause of these symptoms.  [place] evaluation a CT scan of the brain was performed which revealed a chronic stroke.  The patient and his wife deny prior stroke or TIA symptoms however less than a year ago he had a nerve palsy dose followed by third nerve palsy.  He was worked up and reportedly no cause was found.  He states he has recovered fully from that event.  Joya in a defibrillation was diagnosed.  He has been taking aspirin.  He has been using a walker only recently.  He denies a history of headaches.  Per his wife he is cognitively normal for an 82-year-old and by that she means he has some occasional forgetfulness.  He is independent for ADLs.    Past Medical/Surgical Hx:  Past Medical History:   Diagnosis Date   • Arthritis    • Diabetes mellitus    • Hard of hearing    • Hypertension    • Ptosis of left eyelid      Past Surgical History:   Procedure Laterality Date   • EYE PTOSIS REPAIR Left 11/15/2016    Procedure: LT UPPER LID EYE PTOSIS REPAIR;  Surgeon: Anup Lancaster MD;  Location: [facility];  Service:    • EYE SURGERY     • HIP ARTHROPLASTY     • JOINT REPLACEMENT         Review of Systems:        A complete review of all systems is negative except as described above plus see H&P by Dr Rodriguez from yesterday.    Medications On Admission  Prescriptions Prior to Admission   Medication Sig Dispense Refill Last Dose   • aspirin 325 MG tablet Take 325 mg by mouth Daily.      • atorvastatin (LIPITOR) 10 MG tablet Take 10 mg by  mouth Daily.      • cholecalciferol (VITAMIN D3) 1000 UNITS tablet Take 1,000 Units by mouth Daily. PT HOLDING FOR SURGERY   11/1/2016   • diltiaZEM (CARDIZEM) 120 MG tablet Take 120 mg by mouth Every Morning.   11/15/2016 at Unknown time   • furosemide (LASIX) 20 MG tablet Take 20 mg by mouth 2 (Two) Times a Day.      • insulin glargine (LANTUS) 100 UNIT/ML injection Inject 10 Units under the skin Every Night.   11/14/2016 at Unknown time   • insulin lispro (humaLOG) 100 UNIT/ML injection Inject 8 Units under the skin 3 (Three) Times a Day Before Meals.      • quinapril (ACCUPRIL) 10 MG tablet Take 10 mg by mouth Every Morning.   11/14/2016 at Unknown time   • tamsulosin (FLOMAX) 0.4 MG capsule 24 hr capsule Take 1 capsule by mouth Daily.   11/14/2016 at Unknown time       Allergies:  No Known Allergies    Social Hx:  Social History     Social History   • Marital status:      Spouse name: N/A   • Number of children: N/A   • Years of education: N/A     Occupational History   • Retired      Social History Main Topics   • Smoking status: Former Smoker     Types: Cigars   • Smokeless tobacco: Never Used      Comment: QUIT 1994   • Alcohol use No   • Drug use: No   • Sexual activity: Not Currently     Partners: Female     Other Topics Concern   • Not on file     Social History Narrative   • No narrative on file       Family Hx:  History reviewed. No pertinent family history.    Review of Imaging (Interpretation of images not reports):  CT brain 6/14/18: There is diffuse cerebral atrophy, as an old right head of caudate stroke, there is severe concentric ossification of the intracranial vertebral arteries bilaterally there is severe calcification of the intracranial internal carotid arteries bilaterally  Carotid ultrasound 6/19/18: Is a mild stenosis of the proximal left internal carotid artery with a peak systolic velocity of 121 there is acoustic shadowing of the right ICA bulb consistent with dense  "calcification and turbulence with a peak systolic velocity of 230 and an EDV of 61, compared to carotid upstroke 2017 peak systolic velocity on the right was 212 and EDV was 27 Master the left ICA peak systolic velocity is unchanged    Laboratory Results:   Lab Results   Component Value Date    GLUCOSE 241 (H) 06/19/2018    CALCIUM 8.3 (L) 06/19/2018     06/19/2018    K 3.9 06/19/2018    CO2 24.2 06/19/2018    CL 98 06/19/2018    BUN 45 (H) 06/19/2018    CREATININE 0.90 06/19/2018    EGFRIFNONA 81 06/19/2018    BCR 50.0 (H) 06/19/2018    ANIONGAP 14.8 06/19/2018     Lab Results   Component Value Date    WBC 13.01 (H) 06/19/2018    HGB 13.0 (L) 06/19/2018    HCT 38.0 (L) 06/19/2018    MCV 87.0 06/19/2018    PLT 91 (L) 06/19/2018     Lab Results   Component Value Date    CHOL 75 06/19/2018     Lab Results   Component Value Date    HDL 12 (L) 06/19/2018     Lab Results   Component Value Date    LDL 22 06/19/2018     Lab Results   Component Value Date    TRIG 207 (H) 06/19/2018     Lab Results   Component Value Date    HGBA1C 6.80 (H) 06/19/2018     No results found for: INR, PROTIME  EKG: A. fib    Physical Examination:  /63 (BP Location: Left arm, Patient Position: Lying)   Pulse 76   Temp 98.7 °F (37.1 °C) (Oral)   Resp 16   Ht 154 cm (60.63\")   Wt 73 kg (160 lb 15 oz)   SpO2 95%   BMI 30.78 kg/m²  patient height is incorrect  General Appearance:   Well developed, thin, well groomed, alert, and cooperative.  HEENT: Normocephalic.  Normal fundoscopic exam including normal retina, discs are flat with sharp margins, normal vasculature.  Neck and Spine: Normal range of motion.  Normal alignment. No mass or tenderness. No bruits.  Cardiac: Regular rate and rhythm. No murmurs.  Peripheral Vasculature: Radial and pedal pulses are equal and symmetric. No signs of distal embolization.  Extremities:    No edema or deformities. Normal joint ROM. AGUSTIN hoses and SCD's in place  Skin:    No rashes or birth " marks.    Neurological examination:  Higher Integrative  Function: Oriented to middle of June 2018, place and person. Normal registration, recall, attention span and concentration. Normal language including comprehension, spontaneous speech, repetition, reading, writing, naming and vocabulary. No neglect with normal visual-spatial function and construction.  Fair to slightly reduced fund of knowledge and higher integrative function.  CN II: Pupils are equal, round, and reactive to light. Normal visual acuity and visual fields.    CN III IV VI: Extraocular movements are full without nystagmus.   CN V: Normal facial sensation and strength of muscles of mastication.  CN VII: Facial movements are symmetric. No weakness.  CN VIII:   Auditory acuity is reduced left greater than right.  CN IX & X:   Symmetric palatal movement.  CN XI: Sternocleidomastoid and trapezius are normal.  No weakness.  CN XII:   The tongue is midline.  No atrophy or fasciculations.  Motor: Normal muscle strength and tone in upper and lower extremities.  Reduced muscle bulk in the hands as well as the legs.  No fasciculations, rigidity, spasticity, or abnormal movements.  Reflexes: 1+ in the upper and absent lower extremities. Plantar responses are flexor.  Sensation: Normal to light touch, temperature, and proprioception in arms and legs. Normal graphesthesia and no extinction on DSS.  Station and Gait: Needs assistance to stand up as well as to walk but gait is only slightly wide based slow and cautious.    Coordination: Finger to nose test shows no dysmetria.  Rapid alternating movements are normal.  Heel to shin normal.  NIHSS: 0    CHADSVASC: 6    Diagnoses / Discussion:  82 y.o. who presents with Sx of generalized weakness and decreased intake who was found to have an incidental stroke on CT scan.  He has known mild carotid artery disease and has now been found to have atrial fibrillation.  Neurological examination is nonfocal although he  does have some gait imbalance as well as areflexia in the legs suggesting a polyneuropathy.  It is reasonable to complete the stroke workup with an MRA of the head and neck because carotid ultrasonography alone is insufficient for the evaluation of patients with cerebrovascular disease.  If no significant stenosis is found then the patient will need anticoagulation for his atrial fibrillation as he has a very high risk of stroke.  His other risk factors are well-controlled and in fact his LDL is very low which may necessitate discontinuing the statin due to a theoretical increased risk of intracerebral hemorrhage.  An MRI would be helpful to look for silent hemosiderin deposits in the brain.    Plan:  AC depending on MRI/MRA  Hydrate  Neurochecks  Check RPR, TSH, B12  Non-pharmacological DVT prophylaxis  TTE  MRI/MRA  EKG Tele  PT/OT/ST  Stroke Education  Blood pressure control to <130/80  Goal LDL <70-recommend high dose statins-   Serum glucose < 140     Call 911 for stroke any stroke symptoms    I have discussed the above with the patient and family.  Time spent with patient: 60min    MDM  Reviewed: vitals  Interpretation: CT scan, MRI and labs        Dictated using Dragon dictation.

## 2018-06-19 NOTE — PLAN OF CARE
Problem: Nutrition, Imbalanced: Inadequate Oral Intake (Pediatric)  Intervention: Promote/Optimize Nutrition   06/19/18 1412   Promote Effective Wound Healing   Oral Nutrition Promotion (Peds) nutritional therapy counseling provided. Calorie count ordered. Pt is NPO.       Goal: Identify Related Risk Factors and Signs and Symptoms  Outcome: Ongoing (interventions implemented as appropriate)    Goal: Improved Oral Intake  Outcome: Ongoing (interventions implemented as appropriate)

## 2018-06-19 NOTE — ANESTHESIA PREPROCEDURE EVALUATION
Anesthesia Evaluation     NPO Solid Status: > 8 hours  NPO Liquid Status: > 2 hours           Airway   Mallampati: I  TM distance: >3 FB  Neck ROM: full  No difficulty expected  Dental    (+) edentulous    Pulmonary - normal exam   Cardiovascular - normal exam    (+) hypertension 2 medications or greater, dysrhythmias Atrial Fib, hyperlipidemia,  carotid artery disease      Neuro/Psych  GI/Hepatic/Renal/Endo    (+)   renal disease CRI, diabetes mellitus using insulin,     Musculoskeletal     Abdominal    Substance History      OB/GYN          Other   (+) arthritis                     Anesthesia Plan    ASA 3     MAC     Anesthetic plan and risks discussed with patient.

## 2018-06-19 NOTE — PROGRESS NOTES
Asked to see for possible VCF.    82 yr old admitted with abd pain, currently in endo.      Spoke to family in the room. Pt has chronic mild LBP, has not reported any back pain to them. CT A/P yesterday suggested age indeterminate VCF L4, L5.    He is currently off the floor. I will try to follow up again later today or we can see tomorrow. If he is not having much pain then there may not be much of a need for any additional workup. If having pain can order L spine MRI to further eval.     Call as needed in the interim.

## 2018-06-19 NOTE — SIGNIFICANT NOTE
06/19/18 1048   Rehab Time/Intention   Evaluation Not Performed patient unavailable for evaluation  (Per RN, pt is off floor for testing, and will be in testing most of the the day. OT to check back in PM if time allows.)   Rehab Treatment   Discipline occupational therapist   Recommendation   OT - Next Appointment 06/19/18

## 2018-06-19 NOTE — CONSULTS
Referring Provider: Dr. Felix Rodriguez  Reason for Consultation: ROSA    Subjective     Chief complaint   Chief Complaint   Patient presents with   • Abdominal Pain     can't eat - no nvd       History of present illness:  83 yo WM with well-preserved renal fxn at baseline admitted yesterday for further eval of burning chest and neck pain, weakness, and inability to eat.  Asked to see d/t SCr 1.4, tho after IVF level has fallen to 0.9 today.  Full PMH outlined below.  Just got back from EGD: gastritis and esophagitis.  · No urinary c/o; BM's ok  · Has had N/V for a few days; has eaten very little past few days, per wife  · No SOB, orthopnea, or CP; no leg swelling.  Was having orthostatic sx in days preceding admission  · No chills but wife reports low-grade F few days ago  · Weakness for past few weeks      Past Medical History:   Diagnosis Date   • Arthritis    • Diabetes mellitus    • Hard of hearing    • Hypertension    • Ptosis of left eyelid      Past Surgical History:   Procedure Laterality Date   • ENDOSCOPY N/A 6/19/2018    Procedure: ESOPHAGOGASTRODUODENOSCOPY WITH BIOPSY;  Surgeon: Toribio Wade MD;  Location: [facility] ENDOSCOPY;  Service: Gastroenterology   • EYE PTOSIS REPAIR Left 11/15/2016    Procedure: LT UPPER LID EYE PTOSIS REPAIR;  Surgeon: Anup Lancaster MD;  Location: [facility] OR [facility];  Service:    • EYE SURGERY     • HIP ARTHROPLASTY     • JOINT REPLACEMENT       History reviewed. No pertinent family history.  Social History   Substance Use Topics   • Smoking status: Former Smoker     Types: Cigars   • Smokeless tobacco: Never Used      Comment: QUIT 1994   • Alcohol use No     Prescriptions Prior to Admission   Medication Sig Dispense Refill Last Dose   • aspirin 325 MG tablet Take 325 mg by mouth Daily.      • atorvastatin (LIPITOR) 10 MG tablet Take 10 mg by mouth Daily.      • cholecalciferol (VITAMIN D3) 1000 UNITS tablet Take 1,000 Units by mouth Daily. PT HOLDING FOR SURGERY    "11/1/2016   • diltiaZEM (CARDIZEM) 120 MG tablet Take 120 mg by mouth Every Morning.   11/15/2016 at Unknown time   • furosemide (LASIX) 20 MG tablet Take 20 mg by mouth 2 (Two) Times a Day.      • insulin glargine (LANTUS) 100 UNIT/ML injection Inject 10 Units under the skin Every Night.   11/14/2016 at Unknown time   • insulin lispro (humaLOG) 100 UNIT/ML injection Inject 8 Units under the skin 3 (Three) Times a Day Before Meals.      • quinapril (ACCUPRIL) 10 MG tablet Take 10 mg by mouth Every Morning.   11/14/2016 at Unknown time   • tamsulosin (FLOMAX) 0.4 MG capsule 24 hr capsule Take 1 capsule by mouth Daily.   11/14/2016 at Unknown time     Allergies:  Patient has no known allergies.    Review of Systems  14-point ROS performed and all negative except for pertinent +/-'s detailed in HPI.     Objective     Vital Signs  Temp:  [98 °F (36.7 °C)-98.7 °F (37.1 °C)] 98.7 °F (37.1 °C)  Heart Rate:  [] 74  Resp:  [16-20] 16  BP: ()/(49-83) 115/59    Flowsheet Rows      First Filed Value   Admission Height  172.7 cm (68\") Documented at 06/18/2018 1101   Admission Weight  72.6 kg (160 lb) Documented at 06/18/2018 1101           I/O this shift:  In: 1300 [I.V.:1300]  Out: 250 [Urine:250]  I/O last 3 completed shifts:  In: 853.8 [P.O.:60; I.V.:293.8; IV Piggyback:500]  Out: 1500 [Urine:1500]    Intake/Output Summary (Last 24 hours) at 06/19/18 1444  Last data filed at 06/19/18 1315   Gross per 24 hour   Intake          2153.75 ml   Output             1750 ml   Net           403.75 ml       Physical Exam:  NAD; pleasant; oriented; hard of hearing; looks stated age  Dry MM; AT/NC  No eye d/c; no scleral icterus  No JVD; no carotid bruits  CTA bilat; not labored  irreg irreg, no rub  Soft, NT, ND, BS+  No edema  No clubbing  No asterixis  Moves all extremities   Speech fluent  Mood and affect fine  No skin rash; no tenting    Results Review:    Results from last 7 days  Lab Units 06/19/18  0645 06/18/18 2046 " 06/18/18  1308 06/14/18  0856   SODIUM mmol/L 137 133* 132* 130*   POTASSIUM mmol/L 3.9 3.9 4.1 4.2   CHLORIDE mmol/L 98 95* 92* 91*   CO2 mmol/L 24.2 21.8* 27.1 27.0   BUN mg/dL 45* 55* 56* 20   CREATININE mg/dL 0.90 1.03 1.40* 0.97   CALCIUM mg/dL 8.3* 8.5* 9.3 9.1   BILIRUBIN mg/dL  --   --  0.7 0.9   ALK PHOS U/L  --   --  139* 122*   ALT (SGPT) U/L  --   --  70* 43*   AST (SGOT) U/L  --   --  72* 52*   GLUCOSE mg/dL 241* 254* 211* 200*       Estimated Creatinine Clearance: 65.3 mL/min (by C-G formula based on SCr of 0.9 mg/dL).      Results from last 7 days  Lab Units 06/19/18  0645   MAGNESIUM mg/dL 2.1   PHOSPHORUS mg/dL 3.3         Results from last 7 days  Lab Units 06/19/18  0643 06/18/18 2046 06/18/18  1308 06/14/18  0856   WBC 10*3/mm3 13.01* 14.95* 14.51* 4.76   HEMOGLOBIN g/dL 13.0* 13.0* 14.5 14.4   PLATELETS 10*3/mm3 91* 71* 62* 66*             Active Medications    atorvastatin 10 mg Oral Daily   cholecalciferol 1,000 Units Oral Daily   diltiaZEM 120 mg Oral QAM   docusate sodium 100 mg Oral Daily   enoxaparin 40 mg Subcutaneous Q24H   famotidine 20 mg Intravenous Q12H   furosemide 20 mg Oral BID   insulin aspart 0-7 Units Subcutaneous 4x Daily With Meals & Nightly   insulin aspart 8 Units Subcutaneous TID With Meals   lisinopril 10 mg Oral Q24H   pantoprazole 40 mg Oral BID AC   pantoprazole 40 mg Oral Nightly   sucralfate 1 g Oral Q6H   sucralfate 1 g Oral Q6H   tamsulosin 0.4 mg Oral Daily       lactated ringers 30 mL/hr Last Rate: 30 mL/hr (06/19/18 1118)   Pharmacy to Dose enoxaparin (LOVENOX)     sodium chloride 75 mL/hr Last Rate: 75 mL/hr (06/19/18 4685)       Assessment/Plan   Assessment  1.  RSOA, non-oliguric, resolving.  Prerenal from poor PO, modest hypotension, and compromised renal autoregulation from ACEi and loop diuretic.  Better with IVF.  No hydro by CT; minimal proteinuria in concentrated urine free of cells.  Stable lytes; central vol low  2.  Gastritis and esophagitis  3.   Atrial fibrillation  4.  Generalized weakness  5.  Anemia and TCP  6.  HTN, over-controlled    Principal Problem:    Esophagus disorder, thickened distal wall CT Scan 6/18/18  Active Problems:    Pain of upper abdomen    Advanced diabetic retinal disease    Limb swelling    Diabetes mellitus type 2, uncontrolled    Hypertension, essential    Acute hyponatremia    Acute kidney injury superimposed on chronic kidney disease    Elevated liver function tests    Leukocytosis    Anorexia    Granulomatous disease, chronic    Lung nodule seen on imaging study    Compression fracture of lumbar spine, non-traumatic    Lumbar canal stenosis    Weakness generalized    Cough    Hard of hearing    Ptosis, left eyelid    Osteoarthritis of multiple joints    Atrial fibrillation    Carotid stenosis, asymptomatic, bilateral 16-49% 2/24/14    Remote history of stroke, small caudate nucleus    Thrombocytopenia    Fall at home, subsequent encounter    Hyperlipidemia      Plan  1.  IVF for now  2.  Hold both diuretic and ACEi  3.  Surveillance labs    I discussed the patient's findings and my recommendations with patient and wife at bedside    Ramez Knowles MD  06/19/18  2:44 PM

## 2018-06-19 NOTE — H&P
[facility]  INTERNAL MEDICINE  FELIX RODRIGUEZ MD  [address] [place] [address]  Phone [phone] Fax [phone]  E-mail:  [email or web address]      INTERNAL MEDICINE HISTORY AND PHYSICAL EXAM  Felix Rodriguez M.D.  2018            Patient Identification:  Name: Javier Marin  Age: 82 y.o.  Sex: male  :  1935  MRN: [id number]         Primary Care Physician: Percy Em MD  LENGTH OF STAY 1 DAYS    Consults     Date and Time Order Name Status Description    2018 Inpatient Consult to Endocrinology      2018 Inpatient Consult to Gastroenterology      2018 1712 Family Medicine Consult Completed     2018 1038 Family Medicine Consult Completed           Chief Complaint: Abdominal pain and weakness    History of Present Illness:  Subjective     Interval History: Patient is a 82 y.o.male who presented with abdominal pain and weakness to the [facility] ER or the third time in 3 days.  Patient is a pleasant 82-year-old white male who is regularly followed by Dr. Percy Em.  Patient's symptoms on each of the ER visits have been relatively stable.  He apparently developed weakness 3 days prior to the original ER presentation on 18.  In addition he had complaints of generalized malaise, low-grade fever, decreased appetite, and a mild cough.  Patient denies nausea vomiting diarrhea, chest pain, dyspnea.  He has developed a new symptom of upper abdominal pain and localizes pain to an area near the sternum.  He has had one small bowel movement in the last 24 hours and has not noticed any melena, blood, or diarrhea with the bowel movement.  He has been tachycardic at the time of his ER presentation and has had irregular heartbeat found on EKG to be atrial fibrillation.  Patient does have a long history of insulin-dependent diabetes mellitus and has been followed by Dr. David in the past for this  condition.  His other medical problems include: hypertension for which he takes diltiazem, Accupril, and Lasix; hyperlipidemia for which he takes atorvastatin; diabetes mellitus2 for which she takes NovoLog and Lantus; BPH for which he takes Flomax, and vitamin D deficiency.    Patient was evaluated on his urgent visit to the ER by Dr. Anand Mosley.  Family and patient were complaining of ongoing   epigastric abdominal pain and difficulty swallowing, weakness, loss of appetite, watery emesis, increasing cough, and decreased output of urine and feces.  His ER history of present illness described the abdominal pain as being of several days' duration, gradual onset, constant timing, epigastric location, no radiation, quality pain, moderate intensity, unchanged progression, associated symptoms of decreased appetite and difficulty swallowing emesis cough and decreased urine output, aggravating factors unknown, alleviating factors none, previous episodes 2 ER visits in the past week, treatment before arrival none.  Review of systems was positive for appetite loss, trouble swallowing, worsening cough, abdominal pain and vomiting, decreased urine volume, decreased fetal volume.  Physical exam in the ER showed temperature 98.2 pulse 87 respiratory rate 16 blood pressure 89/50 sat 95%.  Positives on exam included dry mucous membranes  Tenderness in the epigastric region.lab results showed blood sugar uncontrolled slightly at 211, BUN 56, creatinine 1.40, sodium 132, potassium 4.1, albumin 3.3, ALT 70, AST 72, alkaline phosphatase 139, lipase normal at 21, WBC 14.51, platelets 62,000.  CT scan of abdomen and pelvis showed nonspecific wall thickening of the distal esophagus, no obstructive uropathy, lumbar compression fractures, small nonspecific pulmonary nodule with three-month follow-up recommended by radiology.  Patient was discussed with me as the on-call doctor for Dr. Em and was admitted with diagnoses of acute  kidney injury, volume depletion, and esophageal distal thickening.      6/18/2018.  I personally saw the patient for the first time on this date.  He was resting quietly in his bed on 6 S. room 601 with wife and son present at the bedside.  Patient appeared to be in no acute pain.  Nurse was in the middle of completing his history when I entered the room.  Patient reported to me that he was having ongoing pain in the mid epigastric area without radiation.  He stated that his appetite was quite poor and that he really had not been able to eat much of anything for the last 3 days.  The last thing he tried earlier today was of normal and he actually ended up vomiting that back up.  Patient has no other recent medical issues and has been followed by only  since Dr. David's care home a few years ago.  He is unaware of the cardiac arrhythmia which has been seen on EKGs during his last 2 hospital visits and consisted of atrial fibrillation with right and left bundle branch block.  He also did not recall his previous history of carotid stenosis bilaterally.  He does not recall a history of stroke though a small infarct age undetermined was seen on his CT scan in the ER 4 days ago.  Wife does recall that patient had a previous upper GI endoscopy.  Records show that this was done by Dr. Villalobos in 2005 and was unremarkable.  Patient seems very comfortable at the present time.  He is on IV fluids that are infusing now at 75 cc per hour.  He did receive a bolus of normal saline in the emergency room.  I discussed the planned workup with the patient and his family.  I actually called Dr.L. Wade who will see the patient in the a.m. to consider a possible upper GI endoscopy.  I will also seek input from renal with respect to his volume status, from endocrine with respect to his poorly controlled diabetes, from cardiology with respect to his newly diagnosed atrial fibrillation, and from neurosurgery regarding new  discovery of multiple compression fractures and lumbar spine age undetermined.    Review of Systems:    A comprehensive 14 point review of systems was negative except for:  Constitution:  positive for anorexia, fatigue, fevers and malaise  Eyes:  positive for blurriness and Ptosis left eye  Respiratory: positive for  cough, dry and pleuritic pain  Cardiovascular: positive for  irregular pulse and palpitations  Gastrointestinal: postitive for  constipation, difficulty / pain with swallowing, nausea, vomiting and Upper abdominal pain  Hematologic / Lymphatic: positive for  fatigue and Low platelet count  Musculoskeletal: positive for  back pain, muscle weakness and Difficulty walking  Neurological: positive for  difficulty walking and dizziness    Past Medical History:   Diagnosis Date   • Arthritis    • Diabetes mellitus    • Hard of hearing    • Hypertension    • Ptosis of left eyelid      Past Surgical History:   Procedure Laterality Date   • EYE PTOSIS REPAIR Left 11/15/2016    Procedure: LT UPPER LID EYE PTOSIS REPAIR;  Surgeon: Anup Lancaster MD;  [place] [facility] [place] [facility] [place]  Service:    • EYE SURGERY     • HIP ARTHROPLASTY       No Known Allergies  History reviewed. No pertinent family history.    Social History     Social History   • Marital status:      Occupational History   • Retired      Social History Main Topics   • Smoking status: Former Smoker     Types: Cigars   • Smokeless tobacco: Never Used      Comment: QUIT 1994   • Alcohol use No   • Drug use: No   • Sexual activity: Not Currently     Partners: Female     Other Topics Concern   • Not on file       PMH, FH, SH and ROS completed with Admission History and Physical and updated in EPIC system.        Objective     Scheduled Meds:    atorvastatin 10 mg Oral Daily   cholecalciferol 1,000 Units Oral Daily   diltiaZEM 120 mg Oral QAM   docusate sodium 100 mg Oral Daily   enoxaparin 40 mg Subcutaneous Q24H   furosemide 20 mg Oral BID  "  insulin aspart 8 Units Subcutaneous TID With Meals   lisinopril 10 mg Oral Q24H   tamsulosin 0.4 mg Oral Daily     Continuous Infusions:    Pharmacy to Dose enoxaparin (LOVENOX)     sodium chloride 75 mL/hr Last Rate: 75 mL/hr (06/18/18 2245)       Vital signs in last 24 hours:  Temp:  [98 °F (36.7 °C)-98.4 °F (36.9 °C)] 98 °F (36.7 °C)  Heart Rate:  [] 105  Resp:  [16-18] 18  BP: ()/(50-82) 151/82    Intake/Output:    Intake/Output Summary (Last 24 hours) at 06/19/18 0059  Last data filed at 06/18/18 2319   Gross per 24 hour   Intake           853.75 ml   Output              400 ml   Net           453.75 ml       Exam:  /82 (BP Location: Right arm, Patient Position: Lying)   Pulse 105   Temp 98 °F (36.7 °C) (Oral)   Resp 18   Ht 154.4 cm (60.8\")   Wt 71.6 kg (157 lb 14.4 oz)   SpO2 94%   BMI 30.03 kg/m²     Constitutional: Alert, cooperative, mild distress, AAOx3, resting comfortably   Head: Normocephalic, without obvious abnormality, atraumatic   Eyes: PERRLA, conjunctiva/corneas clear, no icterus, no conjunctival pallor, EOM's intact, both eyes, mild ptosis left eyelid upper    ENT and Mouth: Lips, tongue, gums normal; oral mucosa pink and Dry    Neck: Supple, symmetrical, trachea midline, no JVD, Bilateral bruits    Respiratory: Clear to auscultation bilaterally, respirations unlabored   Cardiovascular: Regular rate and rhythm, S1 and S2 normal, no murmur, No rub or gallop. Pulses normal.   Gastrointestinal: BS present x4. Soft, non-tender, bowels sounds active, no masses no hepatosplenomegaly.Mid epigastric tenderness noted no rebound or guarding    : No hernia. Normal exam for sex.   Neurologic: CN-XII intact, motor strength grossly intact, sensation grossly intact to light touch, no focal reflex deficits noted.Generalized weakness    Psychiatric: Alert, oriented X3, no delusions, psychoses, depression or anxiety   Heme/Lymph/Imun: No bruises, petechiae. Lymph nodes normal in size " / configuration.     Data Review:  Lab Results   Component Value Date    CALCIUM 8.5 (L) 06/18/2018       Results from last 7 days  Lab Units 06/18/18  2046 06/18/18  1308 06/14/18  0856   AST (SGOT) U/L  --  72* 52*   SODIUM mmol/L 133* 132* 130*   POTASSIUM mmol/L 3.9 4.1 4.2   CHLORIDE mmol/L 95* 92* 91*   CO2 mmol/L 21.8* 27.1 27.0   BUN mg/dL 55* 56* 20   CREATININE mg/dL 1.03 1.40* 0.97   GLUCOSE mg/dL 254* 211* 200*   CALCIUM mg/dL 8.5* 9.3 9.1   WBC 10*3/mm3 14.95* 14.51* 4.76   HEMOGLOBIN g/dL 13.0* 14.5 14.4   PLATELETS 10*3/mm3 71* 62* 66*   ALT (SGPT) U/L  --  70* 43*     Lab Results   Component Value Date    TROPONINT 0.018 06/14/2018     Estimated Creatinine Clearance: 46.7 mL/min (by C-G formula based on SCr of 1.03 mg/dL).  WEIGHTS:     Wt Readings from Last 1 Encounters:   06/18/18 1912 71.6 kg (157 lb 14.4 oz)   06/18/18 1101 72.6 kg (160 lb)         Assessment:  Principal Problem:    Esophagus disorder, thickened distal wall CT Scan 6/18/18  Active Problems:    Pain of upper abdomen    Diabetes mellitus type 2, uncontrolled    Acute hyponatremia    Acute kidney injury superimposed on chronic kidney disease    Elevated liver function tests    Leukocytosis    Compression fracture of lumbar spine, non-traumatic    Lumbar canal stenosis    Weakness generalized    Cough    Atrial fibrillation    Advanced diabetic retinal disease    Limb swelling    Hypertension, essential    Anorexia    Granulomatous disease, chronic    Lung nodule seen on imaging study    Carotid stenosis, asymptomatic, bilateral 16-49% 2/24/14    Remote history of stroke, small caudate nucleus    Thrombocytopenia    Fall at home, subsequent encounter    Hyperlipidemia    Hard of hearing    Ptosis, left eyelid    Osteoarthritis of multiple joints      Attending Physician Assessment and Plan:    1.  Thickened distal esophageal wall on CT scan in 6/18/18 accompanied by dysphagia and generalized weakness.  Exact etiology unknown.   Patient had normal EGD 2005 by Dr. Villalobos.  Discussed with Dr. SHAWNA Wade.   He will see patient tomorrow a.m. And consider for possible repeat EGD to further evaluate and recommend treatment options.  Speech therapy consulted for input on swallowing and dysphagia.  We'll cover with empiric Protonix.  We will ask dietitian to see and do calorie count.  2.  Volume depletion with acute kidney injury superimposed on chronic kidney disease.  IV fluids were started in the ER and are continued on the floor.  Renal has been consulted for their input on volume status and correction of deficits.  Will continue to monitor kidney function and hope for resolution of the acute kidney injury and improvement in the chronic kidney disease.  3.  Generalized weakness with reported difficulty using legs and fall at home prior to first ER visit.  Workup at the time of the first ER visit did show a remote caudate nucleus infarct.  MRI of brain will be ordered.  PT and OT to evaluate patient and make recommendations on safety to prevent further falls.  4.  Upper abdominal pain reported on physical exam.  Etiology of this symptom is unclear at the present time.  Ultrasound of abdomen is ordered to look for gallstones and other biliary duct abnormalities.  GI planning endoscopy which may well help with diagnosis of this symptom.  Liver function tests noted to be elevated at present time.  5.  Atrial fibrillation on EKGs from first 2 ER visits apparently new in onset.  Cardiology has been consulted for input on his finding.  Echocardiogram, EKG, and cardiac enzymes have been ordered.  We will discuss with cardiology need for anticoagulation in the future at the time of patient's discharge since CHRIS score is elevated.  Further work up as planned by cardiology.  6.  Multiple compression fractures of lumbar spine, age undetermined.  Neurosurgery is consulted for their input on this finding.  At least 2 of the fracture showed greater than 50%  compression.  Patient's symptomatology is relatively limited.  May need to consider MRI to further evaluate situation.  PT and OT to work with patient tomorrow as indicated above.  7.  Leukocytosis with dry nonproductive cough and no other signs of acute infection.  Patient is afebrile at the present time with no other signs of sepsis.  We will check pro-calcitonin and lactate levels with a.m. labs.  Holding at present time with respect to IV antibiotics as no signs of acute infection.  8.  Type 2 diabetes mellitus poorly controlled at times.  Input is requested from endocrinology with respect to diabetic management.  Thyroid profile is ordered.  Will check fingerstick glucoses and hemoglobin A1c.  9.  Acute hyponatremia.  Treating with IV fluids which should help with this problem.  Again, renal is seeing the patient and we will be addressing fluid and volume.  10.  Thrombocytopenia with platelet counts in the 60 and 70,000 range.  Etiology for this finding is unclear at the present time.  We will monitor and consider hematology consult if situation changes or worsens.  Could be result of mild viral infection.  11.  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  Patient certainly is at risk for strokes with the diabetic history.  Holding aspirin for now but will resume prior to discharge.  May need to anticoagulate or atrial fibrillation which also would be useful with respect to this diagnosis.  May need to consider MRI of brain. Neurologic consultation is planned.  12.  Essential hypertension.  This seems to be fairly well controlled at present time with current medications.  Cardiology to follow patient and adjust medications as they feel necessary.  13.  Hyperlipidemia. Patient is currently on Lipitor for treatment of this problem.  We will continue to monitor and adjust diet as necessary to control.   14.  Granulomatous disease of lung with nodules noted on  scan in the ER.  Follow-up suggested in 3 months and will be arranged at time of patient's discharge.  15.  Carotid stenosis bilateral asymptomatic in range of 16-49% on last scan in 2014.  We will repeat the carotid Doppler studies while patient is in the hospital and treat based upon findings from that study.      Plan for disposition:Where: home and home health and When:  Medically stable 3-4 days.      Felix Rodriguez MD  6/18/2018  7:15 PM

## 2018-06-20 ENCOUNTER — APPOINTMENT (OUTPATIENT)
Dept: MRI IMAGING | Facility: HOSPITAL | Age: 83
End: 2018-06-20

## 2018-06-20 ENCOUNTER — APPOINTMENT (OUTPATIENT)
Dept: GENERAL RADIOLOGY | Facility: HOSPITAL | Age: 83
End: 2018-06-20
Attending: INTERNAL MEDICINE

## 2018-06-20 ENCOUNTER — APPOINTMENT (OUTPATIENT)
Dept: CARDIOLOGY | Facility: HOSPITAL | Age: 83
End: 2018-06-20
Attending: INTERNAL MEDICINE

## 2018-06-20 PROBLEM — K21.00 HIATAL HERNIA WITH GERD AND ESOPHAGITIS: Status: ACTIVE | Noted: 2018-06-20

## 2018-06-20 PROBLEM — K29.00 ACUTE GASTRITIS: Status: ACTIVE | Noted: 2018-06-18

## 2018-06-20 PROBLEM — K20.90 ESOPHAGITIS DETERMINED BY ENDOSCOPY: Status: ACTIVE | Noted: 2018-06-20

## 2018-06-20 PROBLEM — K44.9 HIATAL HERNIA WITH GERD AND ESOPHAGITIS: Status: ACTIVE | Noted: 2018-06-20

## 2018-06-20 LAB
ALBUMIN SERPL-MCNC: 2.7 G/DL (ref 3.5–5.2)
ALBUMIN/GLOB SERPL: 1 G/DL
ALP SERPL-CCNC: 111 U/L (ref 39–117)
ALT SERPL W P-5'-P-CCNC: 63 U/L (ref 1–41)
ANION GAP SERPL CALCULATED.3IONS-SCNC: 9.5 MMOL/L
AORTIC DIMENSIONLESS INDEX: 0.5 (DI)
AST SERPL-CCNC: 58 U/L (ref 1–40)
BASOPHILS # BLD AUTO: 0.05 10*3/MM3 (ref 0–0.2)
BASOPHILS NFR BLD AUTO: 0.5 % (ref 0–1.5)
BH CV ECHO MEAS - ACS: 2 CM
BH CV ECHO MEAS - AO MAX PG (FULL): 4.7 MMHG
BH CV ECHO MEAS - AO MAX PG: 7.2 MMHG
BH CV ECHO MEAS - AO MEAN PG (FULL): 3 MMHG
BH CV ECHO MEAS - AO MEAN PG: 4 MMHG
BH CV ECHO MEAS - AO ROOT AREA (BSA CORRECTED): 1.9
BH CV ECHO MEAS - AO ROOT AREA: 9.6 CM^2
BH CV ECHO MEAS - AO ROOT DIAM: 3.5 CM
BH CV ECHO MEAS - AO V2 MAX: 134 CM/SEC
BH CV ECHO MEAS - AO V2 MEAN: 93.1 CM/SEC
BH CV ECHO MEAS - AO V2 VTI: 30.3 CM
BH CV ECHO MEAS - AVA(I,A): 1.9 CM^2
BH CV ECHO MEAS - AVA(I,D): 1.9 CM^2
BH CV ECHO MEAS - AVA(V,A): 2.1 CM^2
BH CV ECHO MEAS - AVA(V,D): 2.1 CM^2
BH CV ECHO MEAS - BSA(HAYCOCK): 1.9 M^2
BH CV ECHO MEAS - BSA: 1.9 M^2
BH CV ECHO MEAS - BZI_BMI: 24.3 KILOGRAMS/M^2
BH CV ECHO MEAS - BZI_METRIC_HEIGHT: 172.7 CM
BH CV ECHO MEAS - BZI_METRIC_WEIGHT: 72.6 KG
BH CV ECHO MEAS - CONTRAST EF (2CH): 62.8 ML/M^2
BH CV ECHO MEAS - CONTRAST EF 4CH: 64.6 ML/M^2
BH CV ECHO MEAS - EDV(CUBED): 64 ML
BH CV ECHO MEAS - EDV(MOD-SP2): 78 ML
BH CV ECHO MEAS - EDV(MOD-SP4): 65 ML
BH CV ECHO MEAS - EDV(TEICH): 70 ML
BH CV ECHO MEAS - EF(CUBED): 38.6 %
BH CV ECHO MEAS - EF(MOD-BP): 64 %
BH CV ECHO MEAS - EF(MOD-SP2): 62.8 %
BH CV ECHO MEAS - EF(MOD-SP4): 64.6 %
BH CV ECHO MEAS - EF(TEICH): 32.2 %
BH CV ECHO MEAS - ESV(CUBED): 39.3 ML
BH CV ECHO MEAS - ESV(MOD-SP2): 29 ML
BH CV ECHO MEAS - ESV(MOD-SP4): 23 ML
BH CV ECHO MEAS - ESV(TEICH): 47.4 ML
BH CV ECHO MEAS - FS: 15 %
BH CV ECHO MEAS - IVS/LVPW: 1.1
BH CV ECHO MEAS - IVSD: 1 CM
BH CV ECHO MEAS - LAT PEAK E' VEL: 4 CM/SEC
BH CV ECHO MEAS - LV DIASTOLIC VOL/BSA (35-75): 35 ML/M^2
BH CV ECHO MEAS - LV MASS(C)D: 118.2 GRAMS
BH CV ECHO MEAS - LV MASS(C)DI: 63.6 GRAMS/M^2
BH CV ECHO MEAS - LV MAX PG: 2.5 MMHG
BH CV ECHO MEAS - LV MEAN PG: 1 MMHG
BH CV ECHO MEAS - LV SYSTOLIC VOL/BSA (12-30): 12.4 ML/M^2
BH CV ECHO MEAS - LV V1 MAX: 79.5 CM/SEC
BH CV ECHO MEAS - LV V1 MEAN: 53.8 CM/SEC
BH CV ECHO MEAS - LV V1 VTI: 16.6 CM
BH CV ECHO MEAS - LVIDD: 4 CM
BH CV ECHO MEAS - LVIDS: 3.4 CM
BH CV ECHO MEAS - LVLD AP2: 7.6 CM
BH CV ECHO MEAS - LVLD AP4: 7 CM
BH CV ECHO MEAS - LVLS AP2: 5.7 CM
BH CV ECHO MEAS - LVLS AP4: 5.8 CM
BH CV ECHO MEAS - LVOT AREA (M): 3.5 CM^2
BH CV ECHO MEAS - LVOT AREA: 3.5 CM^2
BH CV ECHO MEAS - LVOT DIAM: 2.1 CM
BH CV ECHO MEAS - LVPWD: 0.9 CM
BH CV ECHO MEAS - MED PEAK E' VEL: 11 CM/SEC
BH CV ECHO MEAS - MV A DUR: 0.15 SEC
BH CV ECHO MEAS - MV A MAX VEL: 53.8 CM/SEC
BH CV ECHO MEAS - MV DEC SLOPE: 379.5 CM/SEC^2
BH CV ECHO MEAS - MV DEC TIME: 0.09 SEC
BH CV ECHO MEAS - MV E MAX VEL: 78.6 CM/SEC
BH CV ECHO MEAS - MV E/A: 1.5
BH CV ECHO MEAS - MV MAX PG: 3.4 MMHG
BH CV ECHO MEAS - MV MEAN PG: 2 MMHG
BH CV ECHO MEAS - MV P1/2T MAX VEL: 94.4 CM/SEC
BH CV ECHO MEAS - MV P1/2T: 72.9 MSEC
BH CV ECHO MEAS - MV V2 MAX: 92.8 CM/SEC
BH CV ECHO MEAS - MV V2 MEAN: 60.1 CM/SEC
BH CV ECHO MEAS - MV V2 VTI: 23.6 CM
BH CV ECHO MEAS - MVA P1/2T LCG: 2.3 CM^2
BH CV ECHO MEAS - MVA(P1/2T): 3 CM^2
BH CV ECHO MEAS - MVA(VTI): 2.4 CM^2
BH CV ECHO MEAS - PA ACC TIME: 0.11 SEC
BH CV ECHO MEAS - PA MAX PG (FULL): 3 MMHG
BH CV ECHO MEAS - PA MAX PG: 5.9 MMHG
BH CV ECHO MEAS - PA PR(ACCEL): 29.1 MMHG
BH CV ECHO MEAS - PA V2 MAX: 121 CM/SEC
BH CV ECHO MEAS - PULM DIAS VEL: 87.5 CM/SEC
BH CV ECHO MEAS - PULM S/D: 0.5
BH CV ECHO MEAS - PULM SYS VEL: 43.5 CM/SEC
BH CV ECHO MEAS - PVA(V,A): 1.4 CM^2
BH CV ECHO MEAS - PVA(V,D): 1.4 CM^2
BH CV ECHO MEAS - QP/QS: 0.51
BH CV ECHO MEAS - RAP SYSTOLE: 8 MMHG
BH CV ECHO MEAS - RV MAX PG: 2.8 MMHG
BH CV ECHO MEAS - RV MEAN PG: 1 MMHG
BH CV ECHO MEAS - RV V1 MAX: 83.9 CM/SEC
BH CV ECHO MEAS - RV V1 MEAN: 54.3 CM/SEC
BH CV ECHO MEAS - RV V1 VTI: 14.6 CM
BH CV ECHO MEAS - RVOT AREA: 2 CM^2
BH CV ECHO MEAS - RVOT DIAM: 1.6 CM
BH CV ECHO MEAS - RVSP: 76.6 MMHG
BH CV ECHO MEAS - SI(AO): 156.8 ML/M^2
BH CV ECHO MEAS - SI(CUBED): 13.3 ML/M^2
BH CV ECHO MEAS - SI(LVOT): 30.9 ML/M^2
BH CV ECHO MEAS - SI(MOD-SP2): 26.4 ML/M^2
BH CV ECHO MEAS - SI(MOD-SP4): 22.6 ML/M^2
BH CV ECHO MEAS - SI(TEICH): 12.1 ML/M^2
BH CV ECHO MEAS - SV(AO): 291.5 ML
BH CV ECHO MEAS - SV(CUBED): 24.7 ML
BH CV ECHO MEAS - SV(LVOT): 57.5 ML
BH CV ECHO MEAS - SV(MOD-SP2): 49 ML
BH CV ECHO MEAS - SV(MOD-SP4): 42 ML
BH CV ECHO MEAS - SV(RVOT): 29.4 ML
BH CV ECHO MEAS - SV(TEICH): 22.6 ML
BH CV ECHO MEAS - TAPSE (>1.6): 1.5 CM2
BH CV ECHO MEAS - TR MAX VEL: 414 CM/SEC
BH CV ECHO MEASUREMENTS AVERAGE E/E' RATIO: 10.48
BH CV VAS BP LEFT ARM: NORMAL MMHG
BH CV XLRA - RV BASE: 3.8 CM
BH CV XLRA - TDI S': 11 CM/SEC
BILIRUB SERPL-MCNC: 0.6 MG/DL (ref 0.1–1.2)
BUN BLD-MCNC: 27 MG/DL (ref 8–23)
BUN/CREAT SERPL: 38.6 (ref 7–25)
CALCIUM SPEC-SCNC: 8.3 MG/DL (ref 8.6–10.5)
CHLORIDE SERPL-SCNC: 102 MMOL/L (ref 98–107)
CHOLEST SERPL-MCNC: 71 MG/DL (ref 0–200)
CO2 SERPL-SCNC: 27.5 MMOL/L (ref 22–29)
CREAT BLD-MCNC: 0.7 MG/DL (ref 0.76–1.27)
DEPRECATED RDW RBC AUTO: 45.2 FL (ref 37–54)
EOSINOPHIL # BLD AUTO: 0 10*3/MM3 (ref 0–0.7)
EOSINOPHIL NFR BLD AUTO: 0 % (ref 0.3–6.2)
ERYTHROCYTE [DISTWIDTH] IN BLOOD BY AUTOMATED COUNT: 13.9 % (ref 11.5–14.5)
FT4I SERPL CALC-MCNC: 2.2 (ref 1.2–4.9)
GFR SERPL CREATININE-BSD FRML MDRD: 108 ML/MIN/1.73
GLOBULIN UR ELPH-MCNC: 2.8 GM/DL
GLUCOSE BLD-MCNC: 120 MG/DL (ref 65–99)
GLUCOSE BLDC GLUCOMTR-MCNC: 106 MG/DL (ref 70–130)
GLUCOSE BLDC GLUCOMTR-MCNC: 185 MG/DL (ref 70–130)
GLUCOSE BLDC GLUCOMTR-MCNC: 195 MG/DL (ref 70–130)
GLUCOSE BLDC GLUCOMTR-MCNC: 244 MG/DL (ref 70–130)
HCT VFR BLD AUTO: 35.7 % (ref 40.4–52.2)
HDLC SERPL-MCNC: 14 MG/DL (ref 40–60)
HGB BLD-MCNC: 11.9 G/DL (ref 13.7–17.6)
IMM GRANULOCYTES # BLD: 0.02 10*3/MM3 (ref 0–0.03)
IMM GRANULOCYTES NFR BLD: 0.2 % (ref 0–0.5)
LDLC SERPL CALC-MCNC: 29 MG/DL (ref 0–100)
LDLC/HDLC SERPL: 2.1 {RATIO}
LEFT ATRIUM VOLUME INDEX: 42.4 ML/M2
LYMPHOCYTES # BLD AUTO: 5.41 10*3/MM3 (ref 0.9–4.8)
LYMPHOCYTES NFR BLD AUTO: 59.3 % (ref 19.6–45.3)
MAXIMAL PREDICTED HEART RATE: 138 BPM
MCH RBC QN AUTO: 29.5 PG (ref 27–32.7)
MCHC RBC AUTO-ENTMCNC: 33.3 G/DL (ref 32.6–36.4)
MCV RBC AUTO: 88.4 FL (ref 79.8–96.2)
MONOCYTES # BLD AUTO: 0.45 10*3/MM3 (ref 0.2–1.2)
MONOCYTES NFR BLD AUTO: 4.9 % (ref 5–12)
NEUTROPHILS # BLD AUTO: 3.22 10*3/MM3 (ref 1.9–8.1)
NEUTROPHILS NFR BLD AUTO: 35.3 % (ref 42.7–76)
NRBC BLD MANUAL-RTO: 0 /100 WBC (ref 0–0)
PHOSPHATE SERPL-MCNC: 1.4 MG/DL (ref 2.5–4.5)
PLATELET # BLD AUTO: 114 10*3/MM3 (ref 140–500)
PMV BLD AUTO: 10.7 FL (ref 6–12)
POTASSIUM BLD-SCNC: 3.5 MMOL/L (ref 3.5–5.2)
PROCALCITONIN SERPL-MCNC: 0.24 NG/ML (ref 0.1–0.25)
PROT SERPL-MCNC: 5.5 G/DL (ref 6–8.5)
RBC # BLD AUTO: 4.04 10*6/MM3 (ref 4.6–6)
RPR SER QL: NORMAL
SODIUM BLD-SCNC: 139 MMOL/L (ref 136–145)
STRESS TARGET HR: 117 BPM
T3 SERPL-MCNC: 68 NG/DL (ref 71–180)
T3RU NFR SERPL: 36 % (ref 24–39)
T4 SERPL-MCNC: 6 UG/DL (ref 4.5–12)
TRIGL SERPL-MCNC: 138 MG/DL (ref 0–150)
TROPONIN T SERPL-MCNC: <0.01 NG/ML (ref 0–0.03)
TSH SERPL-ACNC: 0.66 UIU/ML (ref 0.45–4.5)
VLDLC SERPL-MCNC: 27.6 MG/DL (ref 5–40)
WBC NRBC COR # BLD: 9.13 10*3/MM3 (ref 4.5–10.7)

## 2018-06-20 PROCEDURE — 70549 MR ANGIOGRAPH NECK W/O&W/DYE: CPT

## 2018-06-20 PROCEDURE — 84145 PROCALCITONIN (PCT): CPT | Performed by: INTERNAL MEDICINE

## 2018-06-20 PROCEDURE — 71046 X-RAY EXAM CHEST 2 VIEWS: CPT

## 2018-06-20 PROCEDURE — 80061 LIPID PANEL: CPT | Performed by: INTERNAL MEDICINE

## 2018-06-20 PROCEDURE — 99232 SBSQ HOSP IP/OBS MODERATE 35: CPT | Performed by: INTERNAL MEDICINE

## 2018-06-20 PROCEDURE — 87340 HEPATITIS B SURFACE AG IA: CPT | Performed by: INTERNAL MEDICINE

## 2018-06-20 PROCEDURE — 25010000002 ENOXAPARIN PER 10 MG: Performed by: INTERNAL MEDICINE

## 2018-06-20 PROCEDURE — 80053 COMPREHEN METABOLIC PANEL: CPT | Performed by: INTERNAL MEDICINE

## 2018-06-20 PROCEDURE — 99231 SBSQ HOSP IP/OBS SF/LOW 25: CPT | Performed by: INTERNAL MEDICINE

## 2018-06-20 PROCEDURE — 93010 ELECTROCARDIOGRAM REPORT: CPT | Performed by: INTERNAL MEDICINE

## 2018-06-20 PROCEDURE — 86709 HEPATITIS A IGM ANTIBODY: CPT | Performed by: INTERNAL MEDICINE

## 2018-06-20 PROCEDURE — 63710000001 INSULIN ASPART PER 5 UNITS: Performed by: INTERNAL MEDICINE

## 2018-06-20 PROCEDURE — 82962 GLUCOSE BLOOD TEST: CPT

## 2018-06-20 PROCEDURE — 86592 SYPHILIS TEST NON-TREP QUAL: CPT | Performed by: RADIOLOGY

## 2018-06-20 PROCEDURE — 93306 TTE W/DOPPLER COMPLETE: CPT | Performed by: INTERNAL MEDICINE

## 2018-06-20 PROCEDURE — 93005 ELECTROCARDIOGRAM TRACING: CPT | Performed by: INTERNAL MEDICINE

## 2018-06-20 PROCEDURE — 86705 HEP B CORE ANTIBODY IGM: CPT | Performed by: INTERNAL MEDICINE

## 2018-06-20 PROCEDURE — 84100 ASSAY OF PHOSPHORUS: CPT | Performed by: INTERNAL MEDICINE

## 2018-06-20 PROCEDURE — 94799 UNLISTED PULMONARY SVC/PX: CPT

## 2018-06-20 PROCEDURE — 93306 TTE W/DOPPLER COMPLETE: CPT

## 2018-06-20 PROCEDURE — 97161 PT EVAL LOW COMPLEX 20 MIN: CPT

## 2018-06-20 PROCEDURE — 0 GADOBENATE DIMEGLUMINE 529 MG/ML SOLUTION: Performed by: INTERNAL MEDICINE

## 2018-06-20 PROCEDURE — 85025 COMPLETE CBC W/AUTO DIFF WBC: CPT | Performed by: INTERNAL MEDICINE

## 2018-06-20 PROCEDURE — 99233 SBSQ HOSP IP/OBS HIGH 50: CPT | Performed by: NURSE PRACTITIONER

## 2018-06-20 PROCEDURE — 97110 THERAPEUTIC EXERCISES: CPT | Performed by: OCCUPATIONAL THERAPIST

## 2018-06-20 PROCEDURE — 63710000001 INSULIN DETEMER PER 5 UNITS: Performed by: INTERNAL MEDICINE

## 2018-06-20 PROCEDURE — 92610 EVALUATE SWALLOWING FUNCTION: CPT | Performed by: SPEECH-LANGUAGE PATHOLOGIST

## 2018-06-20 PROCEDURE — 97165 OT EVAL LOW COMPLEX 30 MIN: CPT | Performed by: OCCUPATIONAL THERAPIST

## 2018-06-20 PROCEDURE — A9577 INJ MULTIHANCE: HCPCS | Performed by: INTERNAL MEDICINE

## 2018-06-20 PROCEDURE — 99222 1ST HOSP IP/OBS MODERATE 55: CPT | Performed by: INTERNAL MEDICINE

## 2018-06-20 PROCEDURE — 99221 1ST HOSP IP/OBS SF/LOW 40: CPT | Performed by: NURSE PRACTITIONER

## 2018-06-20 RX ORDER — ASPIRIN 81 MG/1
81 TABLET ORAL DAILY
Status: DISCONTINUED | OUTPATIENT
Start: 2018-06-20 | End: 2018-06-21 | Stop reason: CLARIF

## 2018-06-20 RX ORDER — POTASSIUM CHLORIDE 750 MG/1
40 CAPSULE, EXTENDED RELEASE ORAL ONCE
Status: COMPLETED | OUTPATIENT
Start: 2018-06-20 | End: 2018-06-20

## 2018-06-20 RX ADMIN — ATORVASTATIN CALCIUM 10 MG: 10 TABLET, FILM COATED ORAL at 13:05

## 2018-06-20 RX ADMIN — PANTOPRAZOLE SODIUM 40 MG: 40 TABLET, DELAYED RELEASE ORAL at 18:59

## 2018-06-20 RX ADMIN — PANTOPRAZOLE SODIUM 40 MG: 40 TABLET, DELAYED RELEASE ORAL at 13:05

## 2018-06-20 RX ADMIN — INSULIN ASPART 2 UNITS: 100 INJECTION, SOLUTION INTRAVENOUS; SUBCUTANEOUS at 13:06

## 2018-06-20 RX ADMIN — GADOBENATE DIMEGLUMINE 15 ML: 529 INJECTION, SOLUTION INTRAVENOUS at 17:55

## 2018-06-20 RX ADMIN — SUCRALFATE 1 G: 1 SUSPENSION ORAL at 18:59

## 2018-06-20 RX ADMIN — DILTIAZEM HYDROCHLORIDE 120 MG: 120 TABLET, FILM COATED ORAL at 13:05

## 2018-06-20 RX ADMIN — INSULIN ASPART 3 UNITS: 100 INJECTION, SOLUTION INTRAVENOUS; SUBCUTANEOUS at 21:50

## 2018-06-20 RX ADMIN — SUCRALFATE 1 G: 1 SUSPENSION ORAL at 13:05

## 2018-06-20 RX ADMIN — ASPIRIN 81 MG: 81 TABLET, DELAYED RELEASE ORAL at 18:59

## 2018-06-20 RX ADMIN — ENOXAPARIN SODIUM 40 MG: 100 INJECTION SUBCUTANEOUS at 21:49

## 2018-06-20 RX ADMIN — INSULIN DETEMIR 15 UNITS: 100 INJECTION, SOLUTION SUBCUTANEOUS at 21:51

## 2018-06-20 RX ADMIN — TAMSULOSIN HYDROCHLORIDE 0.4 MG: 0.4 CAPSULE ORAL at 13:05

## 2018-06-20 RX ADMIN — METOPROLOL TARTRATE 25 MG: 25 TABLET ORAL at 13:07

## 2018-06-20 RX ADMIN — SUCRALFATE 1 G: 1 SUSPENSION ORAL at 06:24

## 2018-06-20 RX ADMIN — VITAMIN D, TAB 1000IU (100/BT) 1000 UNITS: 25 TAB at 13:05

## 2018-06-20 RX ADMIN — POTASSIUM CHLORIDE 40 MEQ: 750 CAPSULE, EXTENDED RELEASE ORAL at 21:58

## 2018-06-20 RX ADMIN — METOPROLOL TARTRATE 37.5 MG: 25 TABLET ORAL at 21:48

## 2018-06-20 RX ADMIN — INSULIN ASPART 2 UNITS: 100 INJECTION, SOLUTION INTRAVENOUS; SUBCUTANEOUS at 18:58

## 2018-06-20 RX ADMIN — SODIUM CHLORIDE 75 ML/HR: 9 INJECTION, SOLUTION INTRAVENOUS at 21:54

## 2018-06-20 RX ADMIN — DOCUSATE SODIUM 100 MG: 100 CAPSULE, LIQUID FILLED ORAL at 13:05

## 2018-06-20 NOTE — PLAN OF CARE
Problem: Patient Care Overview  Goal: Plan of Care Review  Outcome: Ongoing (interventions implemented as appropriate)   06/20/18 0404   Coping/Psychosocial   Plan of Care Reviewed With patient   Plan of Care Review   Progress improving   OTHER   Outcome Summary Patient had minimal c/o discomfort in chest/stomach when laying down but improved with upright posture and sleeping with HOB elevated. IVF continued. Heart rhythm has been sinus with some periods of a-fib, appears to be trying to convert back to SR. Infectious disease consult called. ACHS. VSS. Started on PO Protonix in morning and stopped Pepcid IV. MRI completed at beginning of shift. Will continue to monitor closely.     Goal: Individualization and Mutuality  Outcome: Ongoing (interventions implemented as appropriate)    Goal: Discharge Needs Assessment  Outcome: Ongoing (interventions implemented as appropriate)      Problem: Fall Risk (Adult)  Goal: Identify Related Risk Factors and Signs and Symptoms  Outcome: Outcome(s) achieved Date Met: 06/20/18    Goal: Absence of Fall  Outcome: Ongoing (interventions implemented as appropriate)      Problem: Skin Injury Risk (Adult)  Goal: Identify Related Risk Factors and Signs and Symptoms  Outcome: Outcome(s) achieved Date Met: 06/20/18    Goal: Skin Health and Integrity  Outcome: Ongoing (interventions implemented as appropriate)      Problem: Nutrition, Imbalanced: Inadequate Oral Intake (Pediatric)  Goal: Identify Related Risk Factors and Signs and Symptoms  Outcome: Ongoing (interventions implemented as appropriate)    Goal: Improved Oral Intake  Outcome: Ongoing (interventions implemented as appropriate)

## 2018-06-20 NOTE — NURSING NOTE
Spoke with MRI. Patient was c/o pain during MRI and was moving a lot during. MRI Tech said they got the images they could and uploaded them. If repeat needs to be done possibly need to consider sedation per MRI notes.

## 2018-06-20 NOTE — PROGRESS NOTES
Nutrition Services    Patient Name:  Javier Marin  YOB: 1935  MRN: [id number]  Admit Date:  6/18/2018    Calorie Count - Pt was on clears yesterday and on Full liquids today. Both diets are insufficient in calories even when consumed 100%. Will cancel order for calorie count.    Electronically signed by:  Collette Botello RD  06/20/18 12:58 PM

## 2018-06-20 NOTE — CONSULTS
"Inpatient Neurosurgery Consult  Consult performed by: SHAW BUENO  Consult ordered by: MAYRA MARTINEZ  Reason for consult: incidental finding of vertebral compression fractures on CT abdomen and pelvis          Patient Care Team:  Percy Em MD as PCP - General  Percy Em MD as PCP - Family Medicine    Chief complaint:    Patient has no back pain or leg pain.    Subjective     This is a very pleasant 81 yo male with history of back pain \"on\" and \"off\" for a number of years. He denies any back pain currently. He denies any recent falls or trauma. He also denies any leg pain, bowel/bladder incontinence or numbness. He has no history of ataxia. The patient had been dealing with viral illness, limited po intake for a couple of weeks. He had presented to the ER a week or so ago with c/o generalized weakness and then returned again 2 days ago when he did not improve. He was dehydrated and experiencing abdominal distension and a CT abdomen and pelvis was performed. The study showed some compression fractures. He denies any back pain. He states that over the last 24 hours the abdominal symptoms have improved. We have been asked to give a neurosurgical opinion regarding the compression fractures.       Weakness - Generalized   This is a new problem. The current episode started 1 to 4 weeks ago. The problem has been gradually improving. Associated symptoms include anorexia, fatigue, myalgias and weakness. Pertinent negatives include no abdominal pain, arthralgias, fever, headaches, numbness or urinary symptoms. He has tried nothing for the symptoms. The treatment provided mild relief.       Review of Systems   Constitutional: Positive for fatigue. Negative for fever.   Gastrointestinal: Positive for anorexia. Negative for abdominal pain.   Musculoskeletal: Positive for myalgias. Negative for arthralgias.   Neurological: Positive for weakness. Negative for numbness and headaches.   All other systems " reviewed and are negative.       Past Medical History:   Diagnosis Date   • Arthritis    • Diabetes mellitus    • Hard of hearing    • Hypertension    • Ptosis of left eyelid    ,   Past Surgical History:   Procedure Laterality Date   • ENDOSCOPY N/A 6/19/2018    Procedure: ESOPHAGOGASTRODUODENOSCOPY WITH BIOPSY;  Surgeon: Toribio Wade MD;  Location: [facility] ENDOSCOPY;  Service: Gastroenterology   • EYE PTOSIS REPAIR Left 11/15/2016    Procedure: LT UPPER LID EYE PTOSIS REPAIR;  Surgeon: Anup Lancaster MD;  [place] [facility] [place] [facility] [place]:    • EYE SURGERY     • HIP ARTHROPLASTY     • JOINT REPLACEMENT     , History reviewed. No pertinent family history.,   Social History   Substance Use Topics   • Smoking status: Former Smoker     Types: Cigars   • Smokeless tobacco: Never Used      Comment: QUIT 1994   • Alcohol use No   ,   Prescriptions Prior to Admission   Medication Sig Dispense Refill Last Dose   • aspirin 325 MG tablet Take 325 mg by mouth Daily.      • atorvastatin (LIPITOR) 10 MG tablet Take 10 mg by mouth Daily.      • cholecalciferol (VITAMIN D3) 1000 UNITS tablet Take 1,000 Units by mouth Daily. PT HOLDING FOR SURGERY   11/1/2016   • diltiaZEM (CARDIZEM) 120 MG tablet Take 120 mg by mouth Every Morning.   11/15/2016 at Unknown time   • furosemide (LASIX) 20 MG tablet Take 20 mg by mouth 2 (Two) Times a Day.      • insulin glargine (LANTUS) 100 UNIT/ML injection Inject 10 Units under the skin Every Night.   11/14/2016 at Unknown time   • insulin lispro (humaLOG) 100 UNIT/ML injection Inject 8 Units under the skin 3 (Three) Times a Day Before Meals.      • quinapril (ACCUPRIL) 10 MG tablet Take 10 mg by mouth Every Morning.   11/14/2016 at Unknown time   • tamsulosin (FLOMAX) 0.4 MG capsule 24 hr capsule Take 1 capsule by mouth Daily.   11/14/2016 at Unknown time   , Scheduled Meds:    aspirin 81 mg Oral Daily   atorvastatin 10 mg Oral Daily   cholecalciferol 1,000 Units Oral Daily   diltiaZEM  120 mg Oral QAM   docusate sodium 100 mg Oral Daily   enoxaparin 40 mg Subcutaneous Q24H   gadobenate dimeglumine 15 mL Intravenous Once in imaging   insulin aspart 0-7 Units Subcutaneous 4x Daily With Meals & Nightly   insulin aspart 5 Units Subcutaneous TID With Meals   insulin detemir 15 Units Subcutaneous Nightly   metoprolol tartrate 37.5 mg Oral Q12H   pantoprazole 40 mg Oral BID AC   sucralfate 1 g Oral Q6H   tamsulosin 0.4 mg Oral Daily   , Continuous Infusions:    Pharmacy to Dose enoxaparin (LOVENOX)     sodium chloride 75 mL/hr Last Rate: 75 mL/hr (06/19/18 0751)   , PRN Meds:  •  acetaminophen  •  calcium carbonate  •  dextrose  •  dextrose  •  glucagon (human recombinant)  •  HYDROcodone-acetaminophen  •  LORazepam  •  Morphine **AND** naloxone  •  nitroglycerin  •  ondansetron  •  Pharmacy to Dose enoxaparin (LOVENOX)  •  sodium chloride  •  sodium chloride and Allergies:  Patient has no known allergies.    Objective      Vital Signs  Temp:  [98.1 °F (36.7 °C)-99.4 °F (37.4 °C)] 98.9 °F (37.2 °C)  Heart Rate:  [71-93] 89  Resp:  [18] 18  BP: (128-155)/(61-82) 155/72    Physical Exam   Constitutional: He is oriented to person, place, and time. He appears well-developed and well-nourished. He is cooperative. No distress.   HENT:   Head: Normocephalic and atraumatic.   Eyes: Conjunctivae and EOM are normal. Pupils are equal, round, and reactive to light.   Neck: Normal range of motion. Neck supple.   Cardiovascular: Normal rate.    Pulmonary/Chest: Effort normal. No respiratory distress.   Abdominal: Soft. He exhibits no distension. There is no tenderness.   Musculoskeletal: Normal range of motion. He exhibits no edema or tenderness.   No tenderness with palpation of the thoracic or lumbar spine.    Neurological: He is alert and oriented to person, place, and time. He has normal strength. He displays normal reflexes. No sensory deficit. He exhibits normal muscle tone. Coordination normal. GCS eye subscore  is 4. GCS verbal subscore is 5. GCS motor subscore is 6.   Negative David's; negative clonus.    Skin: Skin is warm and dry. No rash noted. He is not diaphoretic.   Psychiatric: He has a normal mood and affect. Thought content normal.   Vitals reviewed.      Results Review:    I reviewed the patient's new clinical results.     CT ABDOMEN PELVIS WO CONTRAST revealed age indeterminate compression fractures L1, L4 and L5.           Assessment/Plan     Principal Problem:    Esophagus disorder, thickened distal wall CT Scan 6/18/18  Active Problems:    Acute gastritis    Advanced diabetic retinal disease    Limb swelling    Diabetes mellitus type 2, uncontrolled    Hypertension, essential    Acute hyponatremia    Acute kidney injury superimposed on chronic kidney disease    Elevated liver function tests    Leukocytosis    Anorexia    Granulomatous disease, chronic    Lung nodule seen on imaging study    Compression fracture of lumbar spine, non-traumatic    Lumbar canal stenosis    Weakness generalized    Cough    Hard of hearing    Ptosis, left eyelid    Osteoarthritis of multiple joints    Atrial fibrillation    Carotid stenosis, asymptomatic, bilateral 16-49% 2/24/14    Remote history of stroke, small caudate nucleus    Thrombocytopenia    Fall at home, subsequent encounter    Hyperlipidemia    Esophagitis determined by endoscopy by Sheri 6/19/18 LA Grade D Lower 1/3    Hiatal hernia with GERD and esophagitis      Assessment & Plan     Incidental finding of L1, L4, L5 VCF    Given that patient is not symptomatic; no further work up needed. I encouraged both the patient and his wife to call us if he develops any back pain. We would be happy to see if needed. Please call us if needed.     I discussed the patients findings and my recommendations with patient and family    JERRY Lomax  06/20/18  6:04 PM

## 2018-06-20 NOTE — PLAN OF CARE
Problem: Patient Care Overview  Goal: Plan of Care Review   06/20/18 1532   Coping/Psychosocial   Plan of Care Reviewed With patient;spouse   Plan of Care Review   Progress no change   OTHER   Outcome Summary pt evaluated for OT. pt w. decr B UE strength. tsf CGA w. walker and walks w. wx CGA.. pt did not want to complete bathing at this time due to fatigue. AROM 10x1. pt continue to benefit from OT to address self-care, tsf, balance, strength

## 2018-06-20 NOTE — PROGRESS NOTES
Continued Stay Note  [facility]     Patient Name: Javier Marin  MRN: [id number]  Today's Date: 6/20/2018    Admit Date: 6/18/2018          Discharge Plan     Row Name 06/20/18 1403       Plan    Plan Home    Plan Comments Unsure if HH services will be needed at d/c, CCP to follow up 6/21. FARAZ Dodson              Discharge Codes    No documentation.           Luzma Lam RN

## 2018-06-20 NOTE — PROGRESS NOTES
NEPHROLOGY PROGRESS NOTE    PATIENT IDENTIFICATION:   Name:  Javier Marin      MRN:  [id number]     82 y.o.  male             Reason for visit: ROSA    SUBJECTIVE:  Feels better, but still not eating much; coughing when taking present liquid diet; IVF infusing    OBJECTIVE:  Vitals:    06/20/18 0718 06/20/18 1110 06/20/18 1300 06/20/18 1307   BP: 148/82  155/72 155/72   BP Location: Right arm  Right arm    Patient Position: Sitting  Lying    Pulse: 88 93 89 89   Resp: 18  18    Temp: 99.4 °F (37.4 °C)  98.9 °F (37.2 °C)    TempSrc: Oral  Oral    SpO2: 95% 96%     Weight:       Height:               Body mass index is 24.43 kg/m².    Intake/Output Summary (Last 24 hours) at 06/20/18 1607  Last data filed at 06/20/18 0718   Gross per 24 hour   Intake              300 ml   Output             1000 ml   Net             -700 ml     Wt Readings from Last 1 Encounters:   06/20/18 0442 72.9 kg (160 lb 11.2 oz)   06/19/18 1112 73 kg (160 lb 15 oz)   06/19/18 0555 73.4 kg (161 lb 14.4 oz)   06/18/18 1912 71.6 kg (157 lb 14.4 oz)   06/18/18 1101 72.6 kg (160 lb)     Wt Readings from Last 3 Encounters:   06/20/18 72.9 kg (160 lb 11.2 oz)   06/19/18 73.4 kg (161 lb 13.1 oz)   06/15/18 72.6 kg (160 lb)         Exam:  NAD; pleasant; oriented; hard of hearing; looks stated age  MMM; AT/NC  No eye d/c; no scleral icterus  No JVD; no carotid bruits  CTA bilat; not labored  Mostly regular today, no rub  Soft, NT, ND, BS+  No edema  No clubbing  No asterixis  Moves all extremities   Speech fluent  Mood and affect fine  No skin rash; no tenting    Scheduled meds:    aspirin 81 mg Oral Daily   atorvastatin 10 mg Oral Daily   cholecalciferol 1,000 Units Oral Daily   diltiaZEM 120 mg Oral QAM   docusate sodium 100 mg Oral Daily   enoxaparin 40 mg Subcutaneous Q24H   insulin aspart 0-7 Units Subcutaneous 4x Daily With Meals & Nightly   insulin aspart 5 Units Subcutaneous TID With Meals   insulin detemir 15 Units Subcutaneous Nightly    metoprolol tartrate 37.5 mg Oral Q12H   pantoprazole 40 mg Oral BID AC   sucralfate 1 g Oral Q6H   tamsulosin 0.4 mg Oral Daily     IV meds:                        Pharmacy to Dose enoxaparin (LOVENOX)     sodium chloride 75 mL/hr Last Rate: 75 mL/hr (06/19/18 0751)       Data Review:      Results from last 7 days  Lab Units 06/20/18  0720 06/19/18  0645 06/18/18 2046 06/18/18  1308 06/14/18  0856   SODIUM mmol/L 139 137 133* 132* 130*   POTASSIUM mmol/L 3.5 3.9 3.9 4.1 4.2   CHLORIDE mmol/L 102 98 95* 92* 91*   CO2 mmol/L 27.5 24.2 21.8* 27.1 27.0   BUN mg/dL 27* 45* 55* 56* 20   CREATININE mg/dL 0.70* 0.90 1.03 1.40* 0.97   CALCIUM mg/dL 8.3* 8.3* 8.5* 9.3 9.1   BILIRUBIN mg/dL 0.6  --   --  0.7 0.9   ALK PHOS U/L 111  --   --  139* 122*   ALT (SGPT) U/L 63*  --   --  70* 43*   AST (SGOT) U/L 58*  --   --  72* 52*   GLUCOSE mg/dL 120* 241* 254* 211* 200*     Estimated Creatinine Clearance: 73.4 mL/min (A) (by C-G formula based on SCr of 0.7 mg/dL (L)).        Results from last 7 days  Lab Units 06/20/18  0720 06/19/18  0645   MAGNESIUM mg/dL  --  2.1   PHOSPHORUS mg/dL 1.4* 3.3         Results from last 7 days  Lab Units 06/20/18  0719 06/19/18  0643 06/18/18 2046 06/18/18  1308 06/14/18  0856   WBC 10*3/mm3 9.13 13.01* 14.95* 14.51* 4.76   HEMOGLOBIN g/dL 11.9* 13.0* 13.0* 14.5 14.4   PLATELETS 10*3/mm3 114* 91* 71* 62* 66*                   ASSESSMENT:   Principal Problem:    Esophagus disorder, thickened distal wall CT Scan 6/18/18  Active Problems:    Acute gastritis    Advanced diabetic retinal disease    Limb swelling    Diabetes mellitus type 2, uncontrolled    Hypertension, essential    Acute hyponatremia    Acute kidney injury superimposed on chronic kidney disease    Elevated liver function tests    Leukocytosis    Anorexia    Granulomatous disease, chronic    Lung nodule seen on imaging study    Compression fracture of lumbar spine, non-traumatic    Lumbar canal stenosis    Weakness generalized     Cough    Hard of hearing    Ptosis, left eyelid    Osteoarthritis of multiple joints    Atrial fibrillation    Carotid stenosis, asymptomatic, bilateral 16-49% 2/24/14    Remote history of stroke, small caudate nucleus    Thrombocytopenia    Fall at home, subsequent encounter    Hyperlipidemia    Esophagitis determined by endoscopy by Sheri 6/19/18 LA Grade D Lower 1/3    Hiatal hernia with GERD and esophagitis    1.  ROSA, non-oliguric, resolved.  Prerenal from poor PO, modest hypotension, and compromised renal autoregulation from ACEi and loop diuretic.  Better with IVF.  No hydro by CT; minimal proteinuria in concentrated urine free of cells.  Stable lytes; central vol low  2.  Gastritis and esophagitis  3.  Atrial fibrillation: sound like NSR today  4.  Generalized weakness  5.  Anemia and TCP  6.  HTN, stabilizing      PLAN:  1.  Continue IVF given still-poor PO intake  2.  D/c IVF entirely once taking PO well  3.  Will sign off for now, but please call if any questions    Ramez Knowles MD  6/20/2018    4:07 PM

## 2018-06-20 NOTE — THERAPY EVALUATION
Acute Care - Physical Therapy Initial Evaluation  [facility]     Patient Name: Javier Marin  : 1935  MRN: [id number]  Today's Date: 2018   Onset of Illness/Injury or Date of Surgery: 18  Date of Referral to PT: 18  Referring Physician: Michael      Admit Date: 2018    Visit Dx:     ICD-10-CM ICD-9-CM   1. Pain of upper abdomen R10.10 789.09   2. Dehydration E86.0 276.51   3. ROSA (acute kidney injury) N17.9 584.9   4. Esophagus disorder, thickened distal wall CT Scan 18 K22.9 530.9   5. Esophagus disorder K22.9 530.9   6. General weakness R53.1 780.79     Patient Active Problem List   Diagnosis   • Acute gastritis   • Advanced diabetic retinal disease   • Limb swelling   • Diabetes mellitus type 2, uncontrolled   • Hypertension, essential   • Acute hyponatremia   • Acute kidney injury superimposed on chronic kidney disease   • Elevated liver function tests   • Leukocytosis   • Anorexia   • Esophagus disorder, thickened distal wall CT Scan 18   • Granulomatous disease, chronic   • Lung nodule seen on imaging study   • Compression fracture of lumbar spine, non-traumatic   • Lumbar canal stenosis   • Weakness generalized   • Cough   • Hard of hearing   • Ptosis, left eyelid   • Osteoarthritis of multiple joints   • Atrial fibrillation   • Carotid stenosis, asymptomatic, bilateral 16-49% 14   • Remote history of stroke, small caudate nucleus   • Thrombocytopenia   • Fall at home, subsequent encounter   • Hyperlipidemia   • Esophagitis determined by endoscopy by Sheri 18 LA Grade D Lower 1/3   • Hiatal hernia with GERD and esophagitis     Past Medical History:   Diagnosis Date   • Arthritis    • Diabetes mellitus    • Hard of hearing    • Hypertension    • Ptosis of left eyelid      Past Surgical History:   Procedure Laterality Date   • ENDOSCOPY N/A 2018    Procedure: ESOPHAGOGASTRODUODENOSCOPY WITH BIOPSY;  Surgeon: Toribio Wade MD;  Location: [facility]  ENDOSCOPY;  Service: Gastroenterology   • EYE PTOSIS REPAIR Left 11/15/2016    Procedure: LT UPPER LID EYE PTOSIS REPAIR;  Surgeon: Anup Lancaster MD;  [place] [facility] [place] [facility] [place]  Service:    • EYE SURGERY     • HIP ARTHROPLASTY     • JOINT REPLACEMENT          PT ASSESSMENT (last 12 hours)      Physical Therapy Evaluation     Row Name 06/20/18 1100          PT Evaluation Time/Intention    Subjective Information complains of;weakness;fatigue  -SV     Document Type evaluation  -SV     Mode of Treatment individual therapy  -SV     Total Evaluation Minutes, Physical Therapy 18  -SV     Patient Effort good  -SV     Symptoms Noted During/After Treatment fatigue  -SV     Row Name 06/20/18 1100          General Information    Patient Profile Reviewed? yes  -SV     Onset of Illness/Injury or Date of Surgery 06/18/18  -SV     Referring Physician Michael  -     Patient Observations alert;cooperative;agree to therapy  -SV     Patient/Family Observations wife present  -SV     General Observations of Patient eob eating following test  -SV     Prior Level of Function independent:   has started using rwx in the last month, used cane   -SV     Equipment Currently Used at Home cane, straight;rollator  -SV     Pertinent History of Current Functional Problem Pt admit with afib, ROSA, recent fall,  gastritis and esphagitis, anemia, TCP, HTN, OA mulitple areas, prior right hip fx with arthroplasty  -SV     Existing Precautions/Restrictions fall   old right hip arthroplasty  -SV     Row Name 06/20/18 1100          Relationship/Environment    Primary Source of Support/Comfort spouse  -SV     Lives With spouse  -SV     Row Name 06/20/18 1100          Resource/Environmental Concerns    Current Living Arrangements home/apartment/condo  -SV     Row Name 06/20/18 1100          Home Main Entrance    Number of Stairs, Main Entrance two  -SV     Row Name 06/20/18 1100          Cognitive Assessment/Intervention- PT/OT    Orientation Status  (Cognition) oriented to;person;place;situation  -SV     Follows Commands (Cognition) follows one step commands;75-90% accuracy  -     Row Name 06/20/18 1100          Bed Mobility Assessment/Treatment    Bed Mobility Assessment/Treatment sit-supine  -SV     Sit-Supine Chapman (Bed Mobility) supervision  -     Row Name 06/20/18 1100          Transfer Assessment/Treatment    Transfer Assessment/Treatment sit-stand transfer;stand-sit transfer  -SV     Sit-Stand Chapman (Transfers) contact guard  -SV     Stand-Sit Chapman (Transfers) contact guard  -SV     Row Name 06/20/18 1100          Sit-Stand Transfer    Assistive Device (Sit-Stand Transfers) walker, front-wheeled  -SV     Row Name 06/20/18 1100          Stand-Sit Transfer    Assistive Device (Stand-Sit Transfers) walker, front-wheeled  -SV     Row Name 06/20/18 1100          Gait/Stairs Assessment/Training    Gait/Stairs Assessment/Training gait/ambulation assistive device  -     Chapman Level (Gait) contact guard  -     Assistive Device (Gait) walker, front-wheeled   flexed posture, shuffle steps, narrow ZENAIDA  -     Row Name 06/20/18 1100          General ROM    RT Upper Ext --   maris elbows/hands wfl, maris shoulder flex 50% sitting  -SV     Right Hand --   post lean  in sitting: worse with knee ext  -SV     RT Lower Ext --   hip flex, abd, knee ext all wfl observed sitting  -SV     LT Lower Ext --   hip flex, hip abd, knee ext wfl tested sitting  -SV     GENERAL ROM COMMENTS maris ankles < neutral( right worse than left)  -     Row Name 06/20/18 1100          General Assessment (Manual Muscle Testing)    Comment, General Manual Muscle Testing (MMT) Assessment gross strength tested sitting >3/5 except shoulders and DF2-/5  -     Row Name 06/20/18 1130          Motor Assessment/Intervention    Additional Documentation --   Posterior leg stretch: LAQwith DF 2 reps 10 count hold  -     Row Name 06/20/18 1100          Vision  Assessment/Intervention    Vision Assessment Comment Nt but appears wfl  -SV     Row Name 06/20/18 1100          Pain Assessment    Additional Documentation --   denies pain today  -SV     Row Name 06/20/18 1100          Physical Therapy Clinical Impression    Date of Referral to PT 06/19/18  -SV     PT Diagnosis (PT Clinical Impression) general weakness, unsteady gait  -SV     Functional Level at Time of Evaluation (PT Clinical Impression) cga with rwx 150'  -SV     Patient/Family Goals Statement (PT Clinical Impression) indep with least vs no Ad  -SV     Criteria for Skilled Interventions Met (PT Clinical Impression) treatment indicated  -SV     Pathology/Pathophysiology Noted (Describe Specifically for Each System) musculoskeletal  -SV     Impairments Found (describe specific impairments) aerobic capacity/endurance;gait, locomotion, and balance  -SV     Functional Limitations in Following Categories (Describe Specific Limitations) self-care;home management;community/leisure  -SV     Rehab Potential (PT Clinical Summary) good, to achieve stated therapy goals  -SV     Predicted Duration of Therapy (PT) 2-4 weeks  -SV     Row Name 06/20/18 1100          Vital Signs    Post SpO2 (%) 95  -SV     O2 Delivery Post Treatment room air  -SV     Pre Patient Position Sitting  -SV     Intra Patient Position Standing  -SV     Post Patient Position Supine  -SV     Row Name 06/20/18 1100          Physical Therapy Goals    Transfer Goal Selection (PT) transfer, PT goal 1  -SV     Gait Training Goal Selection (PT) gait training, PT goal 1  -SV     ROM Goal Selection (PT) ROM, PT goal 1  -SV     Additional Documentation ROM Goal Selection (PT) (Row)  -SV     Row Name 06/20/18 1100          Transfer Goal 1 (PT)    Activity/Assistive Device (Transfer Goal 1, PT) sit-to-stand/stand-to-sit  -SV     Nicholas Level/Cues Needed (Transfer Goal 1, PT) independent  -SV     Time Frame (Transfer Goal 1, PT) 2 weeks  -SV     Barriers  (Transfers Goal 1, PT) least vs no AD  -SV     Row Name 06/20/18 1100          Gait Training Goal 1 (PT)    Activity/Assistive Device (Gait Training Goal 1, PT) gait (walking locomotion)  -SV     [place] Level (Gait Training Goal 1, PT) independent  -SV     Distance (Gait Goal 1, PT) 300  -SV     Time Frame (Gait Training Goal 1, PT) 2 weeks  -SV     Barriers (Gait Training Goal 1, PT) least vs no AD  -SV     Row Name 06/20/18 1100          ROM Goal 1 (PT)    ROM Goal 1 (PT) --   maris ankle DF> neutral  -SV     Time Frame (ROM Goal 1, PT) 2 weeks  -SV     Row Name 06/20/18 1100          Positioning and Restraints    Pre-Treatment Position sitting in chair/recliner  -SV     Post Treatment Position bed  -SV     In Bed supine;call light within reach;encouraged to call for assist;exit alarm on;with family/caregiver  -SV     Row Name 06/20/18 1100          Living Environment    Home Accessibility stairs to enter home  -SV       User Key  (r) = Recorded By, (t) = Taken By, (c) = Cosigned By    Initials Name Provider Type    SV Bethanie Garcia, PT Physical Therapist          Physical Therapy Education     Title: PT OT SLP Therapies (Done)     Topic: Physical Therapy (Done)     Point: Mobility training (Done)    Learning Progress Summary     Learner Status Readiness Method Response Comment Documented by    Patient Done Acceptance E,TB,D VU,NR   06/20/18 1130    Significant Other Done Acceptance E,TB,D VU,NR   06/20/18 1130          Point: Home exercise program (Done)    Learning Progress Summary     Learner Status Readiness Method Response Comment Documented by    Patient Done Acceptance E,TB,D VU,NR   06/20/18 1130    Significant Other Done Acceptance E,TB,D VU,NR   06/20/18 1130                      User Key     Initials Effective Dates Name Provider Type Discipline     04/03/18 -  Bethanie Garcia, PT Physical Therapist PT                PT Recommendation and Plan  Anticipated Discharge Disposition (PT):  home with home health  Planned Therapy Interventions (PT Eval): balance training, bed mobility training, gait training, home exercise program, patient/family education, ROM (range of motion), strengthening, stair training, stretching, transfer training  Therapy Frequency (PT Clinical Impression): daily  Outcome Summary/Treatment Plan (PT)  Anticipated Discharge Disposition (PT): home with home health  Plan of Care Reviewed With: patient  Outcome Summary: Pt presents with decline in mobility due to general weakness, loss of ankle rom and impaired balance. Pt fell recently and has increased his need for external support with amb from stc to rwx. Pt will benefit from cont skilled PT for progression toward indep amb with least vs no AD. Recommend cont PT at discharge: HHPT vs outpatient PT.          Outcome Measures     Row Name 06/20/18 1100             How much help from another person do you currently need...    Turning from your back to your side while in flat bed without using bedrails? 4  -SV      Moving from lying on back to sitting on the side of a flat bed without bedrails? 4  -SV      Moving to and from a bed to a chair (including a wheelchair)? 3  -SV      Standing up from a chair using your arms (e.g., wheelchair, bedside chair)? 3  -SV      Climbing 3-5 steps with a railing? 2  -SV      To walk in hospital room? 3  -SV      AM-PAC 6 Clicks Score 19  -SV         Functional Assessment    Outcome Measure Options AM-PAC 6 Clicks Basic Mobility (PT)  -SV        User Key  (r) = Recorded By, (t) = Taken By, (c) = Cosigned By    Initials Name Provider Type    CELINE aGrcia, PT Physical Therapist           Time Calculation:         PT Charges     Row Name 06/20/18 1134             Time Calculation    Start Time 1100  -SV      Stop Time 1120  -SV      Time Calculation (min) 20 min  -SV      PT Received On 06/20/18  -SV      PT - Next Appointment 06/21/18  -SV      PT Goal Re-Cert Due Date 07/04/18  -SV         User Key  (r) = Recorded By, (t) = Taken By, (c) = Cosigned By    Initials Name Provider Type    SV Bethanie Garcia, PT Physical Therapist        Therapy Suggested Charges     Code   Minutes Charges    None           Therapy Charges for Today     Code Description Service Date Service Provider Modifiers Qty    39418548323 HC PT EVAL LOW COMPLEXITY 2 6/20/2018 Bethanie Garcia, PT GP 1          PT G-Codes  Outcome Measure Options: AM-PAC 6 Clicks Basic Mobility (PT)      Bethanie Garcia PT  6/20/2018

## 2018-06-20 NOTE — CONSULTS
Referring Provider: Felix Rodriguez MD  [address] [place] [id number]    Reason for Consultation: ? Role for antibiotics?    History of present illness:  Mr Marin is a 81 YO who I am asked to evaluate and give opinion for ? Role for antibiotics?. History is obtained from the patient, his wife, and review of the old medical records which I summarize/synthesize as follows: He came to the ER on 6/18/18 with gradual onset epigastric pain of several days' duration. He had associated dysphagia and poor appetite leading to generalized weakness. He was seen in the ER 6/14 and 6/15 and was diagnosed with a URI. When he came back to the ER on 6/18, he was afebrile with a slightly low systolic blood pressure. Labs were notable for Crt 1.4, ALT 70, AST 72, WBC 14, Plt 62. CT A/P showed only mild esophageal thickening Brain MRI with old lacunar infarcts. He was admitted for fluid resuscitation. He has been afebrile since admission and not antibiotics administered. WBC has now normalized with volume repletion. GI has performed upper endoscopy for the mild esophageal thickening. It showed esophagitis.     Past Medical History:   Diagnosis Date   • Arthritis    • Diabetes mellitus    • Hard of hearing    • Hypertension    • Ptosis of left eyelid        Past Surgical History:   Procedure Laterality Date   • ENDOSCOPY N/A 6/19/2018    Procedure: ESOPHAGOGASTRODUODENOSCOPY WITH BIOPSY;  Surgeon: Toribio Wade MD;  Location: [facility] ENDOSCOPY;  Service: Gastroenterology   • EYE PTOSIS REPAIR Left 11/15/2016    Procedure: LT UPPER LID EYE PTOSIS REPAIR;  Surgeon: Anup Lancaster MD;  Location: [facility] OR [facility] [place];  Service:    • EYE SURGERY     • HIP ARTHROPLASTY     • JOINT REPLACEMENT         Social History:  Retired     L[facility] in [facility]    Family History:  No 1st degree relatives w/ esophagitis    Allergies:  No Antibiotic Allergies    Medications:    Current Facility-Administered Medications:   •   acetaminophen (TYLENOL) tablet 650 mg, 650 mg, Oral, Q4H PRN, Felix Rodriguez MD  •  atorvastatin (LIPITOR) tablet 10 mg, 10 mg, Oral, Daily, Felix Rodriguez MD, 10 mg at 06/19/18 0826  •  calcium carbonate (TUMS) chewable tablet 500 mg (200 mg elemental), 2 tablet, Oral, TID PRN, Felix Rodriguez MD  •  cholecalciferol (VITAMIN D3) tablet 1,000 Units, 1,000 Units, Oral, Daily, Felix Rodriguez MD  •  dextrose (D50W) solution 25 g, 25 g, Intravenous, Q15 Min PRN, Felix Rodriguez MD  •  dextrose (GLUTOSE) oral gel 15 g, 15 g, Oral, Q15 Min PRN, Felix Rodriguez MD  •  diltiaZEM (CARDIZEM) tablet 120 mg, 120 mg, Oral, QAM, Felix Rodriguez MD, 120 mg at 06/19/18 0622  •  docusate sodium (COLACE) capsule 100 mg, 100 mg, Oral, Daily, Felix Rodriguez MD  •  enoxaparin (LOVENOX) syringe 40 mg, 40 mg, Subcutaneous, Q24H, Felix Rodriguez MD, 40 mg at 06/19/18 2129  •  glucagon (human recombinant) (GLUCAGEN DIAGNOSTIC) injection 1 mg, 1 mg, Subcutaneous, PRN, Felix Rodriguez MD  •  HYDROcodone-acetaminophen (NORCO) 5-325 MG per tablet 1 tablet, 1 tablet, Oral, Q4H PRN, Felix Rodriguez MD  •  insulin aspart (novoLOG) injection 0-7 Units, 0-7 Units, Subcutaneous, 4x Daily With Meals & Nightly, Felix Rodriguez MD, 3 Units at 06/19/18 2130  •  insulin aspart (novoLOG) injection 5 Units, 5 Units, Subcutaneous, TID With Meals, Albertina Daniel MD, 5 Units at 06/19/18 1754  •  insulin detemir (LEVEMIR) injection 15 Units, 15 Units, Subcutaneous, Nightly, Albertina Daniel MD, 15 Units at 06/19/18 2307  •  LORazepam (ATIVAN) injection 0.5 mg, 0.5 mg, Intravenous, Q6H PRN, Felix Rodriguez MD  •  metoprolol tartrate (LOPRESSOR) tablet 25 mg, 25 mg, Oral, Q12H, Jyoti Odonnell, APRN, 25 mg at 06/19/18 2129  •  morphine injection 1 mg, 1 mg, Intravenous, Q4H PRN **AND** naloxone (NARCAN) injection 0.4 mg, 0.4 mg, Intravenous, Q5 Min PRN, Felix Rodriguez MD  •  nitroglycerin (NITROSTAT) SL tablet 0.4 mg, 0.4 mg, Sublingual, Q5 Min  PRN, Felix Rodriguez MD  •  ondansetron (ZOFRAN) injection 4 mg, 4 mg, Intravenous, Q6H PRN, Felix Rodriguez MD  •  pantoprazole (PROTONIX) EC tablet 40 mg, 40 mg, Oral, BID AC, Toribio Wade MD, 40 mg at 06/19/18 1756  •  Pharmacy to Dose enoxaparin (LOVENOX), , Does not apply, Continuous PRN, Felix Rodriguez MD  •  sodium chloride 0.9 % flush 1-10 mL, 1-10 mL, Intravenous, PRN, Felix Rodriguez MD  •  sodium chloride 0.9 % flush 10 mL, 10 mL, Intravenous, PRN, Anand Mosley MD  •  sodium chloride 0.9 % infusion, 75 mL/hr, Intravenous, Continuous, Felix Rodriguez MD, Last Rate: 75 mL/hr at 06/19/18 0751, 75 mL/hr at 06/19/18 0751  •  sucralfate (CARAFATE) 1 GM/10ML suspension 1 g, 1 g, Oral, Q6H, Toribio Wade MD, 1 g at 06/20/18 0624  •  tamsulosin (FLOMAX) 24 hr capsule 0.4 mg, 0.4 mg, Oral, Daily, Felix Rodriguez MD, 0.4 mg at 06/19/18 0826      Review of Systems  All systems were reviewed and are negative unless otherwise stated above in the HPI    Objective   Vital Signs   Temp:  [98.1 °F (36.7 °C)-99.4 °F (37.4 °C)] 99.4 °F (37.4 °C)  Heart Rate:  [65-88] 88  Resp:  [16-20] 18  BP: ()/(49-82) 148/82    Physical Exam:   General: awake, alert, NAD, very nice  Head: Normocephalic, atraumatic  Eyes: , no scleral icterus, no conjunctival pallor, no conjunctival hemorrhages.   ENT: MM dry, OP clear, no thrush. Good dentition.   Neck: Supple, no visible thyromegaly  Cardiovascular: NR, RR, no murmurs, rubs, or gallops; no LE edema  Respiratory: Lungs are clear to ascultation bilaterally, no rales or wheezing; normal work of breathing on ambient air  GI: Abdomen is mild TTP in epigastrum, no distension, no HSM  : no Salazar catheter present  Musculoskeletal: no joint abnormalities, normal musculature  Skin: No rashes, lesions, or embolic phenomenon  Neurological: Alert and oriented x 3, cranial nerves 2-12 grossly intact, motor strength 5/5 in all four extremities  Psychiatric: Normal mood and affect    Lymph: no pre-auricular, post-auricular, submandibular, cervical, supraclavicular  LAD  Vasc: no cyanosis; PIV w/o erythema    Labs:     Lab Results   Component Value Date    WBC 9.13 06/20/2018    HGB 11.9 (L) 06/20/2018    HCT 35.7 (L) 06/20/2018    MCV 88.4 06/20/2018     (L) 06/20/2018       Lab Results   Component Value Date    GLUCOSE 120 (H) 06/20/2018    BUN 27 (H) 06/20/2018    CREATININE 0.70 (L) 06/20/2018    EGFRIFNONA 108 06/20/2018    BCR 38.6 (H) 06/20/2018    CO2 27.5 06/20/2018    CALCIUM 8.3 (L) 06/20/2018    ALBUMIN 2.70 (L) 06/20/2018    LABIL2 1.0 06/20/2018    AST 58 (H) 06/20/2018    ALT 63 (H) 06/20/2018     Lab Results   Component Value Date    HGBA1C 6.80 (H) 06/19/2018     UA 0-2 WBCs  RPR non-reactive  Procal 0.42--->0.24    Microbiology:  6/14 BCx: negative  6/19 RVP; negative    Radiology (personally reviewed images/report):  MRI brain with atrophy, multiple small and old lacunar infarcts per radiologist  Abdominal US negative for cholecystitis  CT A/P with no acute process; mild non-specific esophageal thickening  CXR negative for PNA    Assessment/Plan   1. Esophagitis  2. Leukocytosis  3. Hepatitis  4. Acute kidney injury  5. Generalized weakness    Not obvious ongoing infection. Low platelets and hepatitis bring to mind tick borne illness but his WBC argues against this and he has no known exposure. Also he is not having fever which is another predominant symptom.    I think his presentation can be explained by the reflux esophagitis, poor intake, and volume depletion. WBC has normalized with fluid resuscitation and I suspect an element of hemoconcentration. BCx and RVP are negative. I recommend continued supportive care from primary team. Reviewed GI procedure note. No evidence of Candida esophagitis.    Thank you for this consult. Please call ID at [phone] if repeat evaluation is needed.

## 2018-06-20 NOTE — PLAN OF CARE
Problem: Patient Care Overview  Goal: Plan of Care Review   06/20/18 1100   Coping/Psychosocial   Plan of Care Reviewed With patient   OTHER   Outcome Summary Speak with pt's spouse at bedside to assess needs for DM ed. pt's spouse denies need for DM ed, stating pt has had DM since 1993.

## 2018-06-20 NOTE — PROGRESS NOTES
82 y.o.   LOS: 2 days   Patient Care Team:  Percy Em MD as PCP - General  Percy Em MD as PCP - Family Medicine    Chief Complaint:  Elevated BG    Chief Complaint   Patient presents with   • Abdominal Pain     can't eat - no nvd     Subjective   No major overnight events.  Patient hasn't been eating well.  Blood sugars are decently controlled.      Interval History:    Review of Systems:   Review of Systems   Constitutional: Positive for appetite change and fatigue.   Eyes: Negative for visual disturbance.   Respiratory: Negative for shortness of breath.    Cardiovascular: Negative for palpitations.   Gastrointestinal: Negative for nausea and vomiting.   Endocrine: Negative for polydipsia and polyuria.   Musculoskeletal: Positive for arthralgias.   Skin: Negative for pallor and wound.   Neurological: Positive for weakness and numbness.   Psychiatric/Behavioral: Negative for agitation.     Objective     Vital Signs   Temp:  [98.1 °F (36.7 °C)-99.4 °F (37.4 °C)] 98.9 °F (37.2 °C)  Heart Rate:  [71-93] 89  Resp:  [18] 18  BP: (128-155)/(61-82) 155/72    Physical Exam:  Physical Exam   Constitutional: He is oriented to person, place, and time. He appears well-nourished.   HENT:   Head: Normocephalic and atraumatic.   poor dental hygiene   Eyes: Conjunctivae and EOM are normal. No scleral icterus.   Neck: No thyromegaly present.   Cardiovascular: Normal rate.    Murmur heard.  Pulmonary/Chest: Effort normal and breath sounds normal. No stridor. No respiratory distress. He has no wheezes.   Abdominal: Soft. Bowel sounds are normal. He exhibits no distension. There is no tenderness.   Musculoskeletal: He exhibits no edema or deformity.   Lymphadenopathy:     He has no cervical adenopathy.   Neurological: He is alert and oriented to person, place, and time.   Hammer toes   Skin: Skin is warm and dry. No rash noted. He is not diaphoretic.   Psychiatric: He has a normal mood and affect.   Vitals  reviewed.  Results Review:     I reviewed the patient's new clinical results and summarized them in subjective and in plan.      Glucose   Date/Time Value Ref Range Status   06/20/2018 0720 120 (H) 65 - 99 mg/dL Final   06/19/2018 0645 241 (H) 65 - 99 mg/dL Final   06/18/2018 2046 254 (H) 65 - 99 mg/dL Final   06/18/2018 1308 211 (H) 65 - 99 mg/dL Final     Lab Results (last 24 hours)     Procedure Component Value Units Date/Time    Tissue Pathology Exam [[id number]] Collected:  06/19/18 1306    Specimen:  Tissue from Small Intestine; Tissue from Gastric, Antrum; Tissue from Esophagus, Distal Updated:  06/20/18 1219     Case Report --     Surgical Pathology Report                         Case: [id number]                                  Authorizing Provider:  Toribio Wade MD         Collected:           06/19/2018 01:06 PM          Ordering Location:     [facility]  Received:            06/19/2018 02:35 PM                                 ENDO SUITES                                                                  Pathologist:           Anup Levin MD                                                                           Specimens:   1) - Small Intestine                                                                                2) - Gastric, Antrum                                                                                3) - Esophagus, Distal                                                                      Final Diagnosis --     1: SMALL INTESTINE:   SMALL INTESTINAL MUCOSA WITH MILD INCREASE OF CHRONIC INFLAMMATORY CELLS IN THE     LAM[employer] ASSOCIATED WITH FOCAL MILD VILLOUS BLUNTING, NONSPECIFIC.    COMMENT: I do not detect any definite increase in the number of intraepithelial mononuclear cells.    2: ANTRUM:   MILD CHRONIC GASTRITIS.   NO HELICOBACTER ORGANISMS IDENTIFIED ON DIFF-QUIK  "STAIN.    3: ESOPHAGUS, DISTAL:   SQUAMOUS MUCOSA WITH ULCERATION, FRAGMENTS OF PURULENT/NECROTIC EXUDATE.    COMMENT: A GMS stain for fungus will be performed on specimen #3 and reported separately. No glandular epithelium present for evaluation.  Immunoperoxidase stain for HSV will be performed as well.        IHC/TDJ (3)  CMK/antone     CPT CODES:  1: 77145  2: 81441, 40974  3: 33529, 21869, 30836       Gross Description --     1.  Received in formalin formalin labeled \"small intestine\" is a 0.6 x 0.6 x 0.1 cm aggregate of pink tan pieces of tissue.  The specimens are submitted all in block 1A.     2.  Received in formalin labeled \"gastric, antrum\" is a 0.8 x 0.5 x 0.1 cm aggregate of tan pieces of tissue.  The specimens are submitted all in block 2A.      3.  Received in formalin labeled \"esophagus, distal\" is a 0.7 x 0.7 x 0.1 cm aggregate of white tan friable tissue fragments.  The specimens are submitted all in block 3A.      CC/USO/TDJ/brb       Microscopic Description --     Performed, incorporated in diagnosis.       POC Glucose Once [[id number]]  (Abnormal) Collected:  06/20/18 1136    Specimen:  Blood Updated:  06/20/18 1138     Glucose 185 (H) mg/dL     Narrative:       Meter: [id number] : [id number] Miesha Lorena PIERRE    RPR [[id number]]  (Normal) Collected:  06/20/18 0720    Specimen:  Blood Updated:  06/20/18 0945     RPR Non-Reactive    CBC & Differential [[id number]] Collected:  06/20/18 0719    Specimen:  Blood Updated:  06/20/18 0758    Narrative:       The following orders were created for panel order CBC & Differential.  Procedure                               Abnormality         Status                     ---------                               -----------         ------                     Scan Slide[[id number]]                                                                  CBC Auto Differential[[id number]]        Abnormal            Final result                 Please view results for these " "tests on the individual orders.    CBC Auto Differential [[id number]]  (Abnormal) Collected:  06/20/18 0719    Specimen:  Blood Updated:  06/20/18 0758     WBC 9.13 10*3/mm3      RBC 4.04 (L) 10*6/mm3      Hemoglobin 11.9 (L) g/dL      Hematocrit 35.7 (L) %      MCV 88.4 fL      MCH 29.5 pg      MCHC 33.3 g/dL      RDW 13.9 %      RDW-SD 45.2 fl      MPV 10.7 fL      Platelets 114 (L) 10*3/mm3      Neutrophil % 35.3 (L) %      Lymphocyte % 59.3 (H) %      Monocyte % 4.9 (L) %      Eosinophil % 0.0 (L) %      Basophil % 0.5 %      Immature Grans % 0.2 %      Neutrophils, Absolute 3.22 10*3/mm3      Lymphocytes, Absolute 5.41 (H) 10*3/mm3      Monocytes, Absolute 0.45 10*3/mm3      Eosinophils, Absolute 0.00 10*3/mm3      Basophils, Absolute 0.05 10*3/mm3      Immature Grans, Absolute 0.02 10*3/mm3      nRBC 0.0 /100 WBC     Procalcitonin [[id number]]  (Normal) Collected:  06/20/18 0720    Specimen:  Blood Updated:  06/20/18 0757     Procalcitonin 0.24 ng/mL     Narrative:       As a Marker for Sepsis (Non-Neonates):   1. <0.5 ng/mL represents a low risk of severe sepsis and/or septic shock.  1. >2 ng/mL represents a high risk of severe sepsis and/or septic shock.    As a Marker for Lower Respiratory Tract Infections that require antibiotic therapy:  PCT on Admission     Antibiotic Therapy             6-12 Hrs later  > 0.5                Strongly Recommended            >0.25 - <0.5         Recommended  0.1 - 0.25           Discouraged                   Remeasure/reassess PCT  <0.1                 Strongly Discouraged          Remeasure/reassess PCT      As 28 day mortality risk marker: \"Change in Procalcitonin Result\" (> 80 % or <=80 %) if Day 0 (or Day 1) and Day 4 values are available. Refer to http://[email or web address]-pct-[email or web address]/   Change in PCT <=80 %   A decrease of PCT levels below or equal to 80 % defines a positive change in PCT test result representing a higher risk for 28-day all-cause mortality of patients " diagnosed with severe sepsis or septic shock.  Change in PCT > 80 %   A decrease of PCT levels of more than 80 % defines a negative change in PCT result representing a lower risk for 28-day all-cause mortality of patients diagnosed with severe sepsis or septic shock.                Phosphorus [[id number]]  (Abnormal) Collected:  06/20/18 0720    Specimen:  Blood Updated:  06/20/18 0756     Phosphorus 1.4 (L) mg/dL     Lipid Panel [[id number]]  (Abnormal) Collected:  06/20/18 0720    Specimen:  Blood Updated:  06/20/18 0752     Total Cholesterol 71 mg/dL      Triglycerides 138 mg/dL      HDL Cholesterol 14 (L) mg/dL      LDL Cholesterol  29 mg/dL      VLDL Cholesterol 27.6 mg/dL      LDL/HDL Ratio 2.10    Narrative:       Cholesterol Reference Ranges  (U.S. Department of Health and Human Services ATP III Classifications)    Desirable          <200 mg/dL  Borderline High    200-239 mg/dL  High Risk          >240 mg/dL      Triglyceride Reference Ranges  (U.S. Department of Health and Human Services ATP III Classifications)    Normal           <150 mg/dL  Borderline High  150-199 mg/dL  High             200-499 mg/dL  Very High        >500 mg/dL    HDL Reference Ranges  (U.S. Department of Health and Human Services ATP III Classifcations)    Low     <40 mg/dl (major risk factor for CHD)  High    >60 mg/dl ('negative' risk factor for CHD)        LDL Reference Ranges  (U.S. Department of Health and Human Services ATP III Classifcations)    Optimal          <100 mg/dL  Near Optimal     100-129 mg/dL  Borderline High  130-159 mg/dL  High             160-189 mg/dL  Very High        >189 mg/dL    Comprehensive Metabolic Panel [[id number]]  (Abnormal) Collected:  06/20/18 0720    Specimen:  Blood Updated:  06/20/18 0752     Glucose 120 (H) mg/dL      BUN 27 (H) mg/dL      Creatinine 0.70 (L) mg/dL      Sodium 139 mmol/L      Potassium 3.5 mmol/L      Chloride 102 mmol/L      CO2 27.5 mmol/L      Calcium 8.3 (L) mg/dL      Total  Protein 5.5 (L) g/dL      Albumin 2.70 (L) g/dL      ALT (SGPT) 63 (H) U/L      AST (SGOT) 58 (H) U/L      Alkaline Phosphatase 111 U/L      Total Bilirubin 0.6 mg/dL      eGFR Non African Amer 108 mL/min/1.73      Globulin 2.8 gm/dL      A/G Ratio 1.0 g/dL      BUN/Creatinine Ratio 38.6 (H)     Anion Gap 9.5 mmol/L     Narrative:       The MDRD GFR formula is only valid for adults with stable renal function between ages 18 and 70.    POC Glucose Once [[id number]]  (Normal) Collected:  06/20/18 0731    Specimen:  Blood Updated:  06/20/18 0733     Glucose 106 mg/dL     Narrative:       Meter: [id number] : [id number] Miesha PIERRE    Hepatitis Diagnostic Profile [[id number]] Collected:  06/20/18 0720    Specimen:  Blood Updated:  06/20/18 0720    Thyroid Profile II [[id number]]  (Abnormal) Collected:  06/19/18 0647    Specimen:  Blood Updated:  06/20/18 0618     TSH 0.661 uIU/mL      T4, Total 6.0 ug/dL      T3 Uptake 36 %      Free Thyroxine Index 2.2     T3, Total 68 (L) ng/dL     Narrative:       Performed at:  [address] [place], [place] [id number]  : Rodriguez Reddy PhD, Phone:  [phone]    Troponin [[id number]]  (Normal) Collected:  06/19/18 2357    Specimen:  Blood Updated:  06/20/18 0047     Troponin T <0.010 ng/mL     Narrative:       Troponin T Reference Ranges:  Less than 0.03 ng/mL:    Negative for AMI  0.03 to 0.09 ng/mL:      Indeterminant for AMI  Greater than 0.09 ng/mL: Positive for AMI    POC Glucose Once [[id number]]  (Abnormal) Collected:  06/19/18 2122    Specimen:  Blood Updated:  06/19/18 2123     Glucose 221 (H) mg/dL     Narrative:       Meter: [id number] : [id number] Tong PIERRE    Vitamin B12 [[id number]]  (Abnormal) Collected:  06/19/18 1837    Specimen:  Blood Updated:  06/19/18 2003     Vitamin B-12 1,315 (H) pg/mL     Troponin [[id number]]  (Normal) Collected:  06/19/18 1837    Specimen:  Blood Updated:  06/19/18 1943     Troponin T <0.010  ng/mL     Narrative:       Troponin T Reference Ranges:  Less than 0.03 ng/mL:    Negative for AMI  0.03 to 0.09 ng/mL:      Indeterminant for AMI  Greater than 0.09 ng/mL: Positive for AMI    POC Glucose Once [[id number]]  (Abnormal) Collected:  06/19/18 1923    Specimen:  Blood Updated:  06/19/18 1923     Glucose 340 (H) mg/dL     Narrative:       Meter: [id number] : [id number] Tong PIERRE    POC Glucose Once [[id number]]  (Abnormal) Collected:  06/19/18 1744    Specimen:  Blood Updated:  06/19/18 1746     Glucose 283 (H) mg/dL     Narrative:       Meter: [id number] : [id number] Miesha PIERRE    TSH [[id number]]  (Normal) Collected:  06/19/18 0645    Specimen:  Blood Updated:  06/19/18 1607     TSH 0.676 mIU/mL         Imaging Results (last 24 hours)     Procedure Component Value Units Date/Time    XR Chest PA & Lateral [[id number]] Collected:  06/20/18 1141     Updated:  06/20/18 1237    Narrative:       XR CHEST PA AND LATERAL-     HISTORY: 82-year-old male with leukocytosis.     FINDINGS: There is underexpansion of both lung fields with secondary  crowding of lung markings. There is prominence of the central pulmonary  vascularity, but no definite interstitial edema is seen and there are no  effusions. There is a focal increase in markings at the medial aspect of  the left lower lobe which may represent atelectasis, but pneumonia  cannot be excluded.     This report was finalized on 6/20/2018 12:34 PM by Dr. Eleonora Whiting M.D.       MRI Angiogram Head Without Contrast [[id number]] Collected:  06/19/18 2146     Updated:  06/19/18 2146    Narrative:       BRAIN MRA     HISTORY: Stroke; R10.10-Upper abdominal pain, unspecified;  E86.0-Dehydration; N17.9-Acute kidney failure, unspecified;  K22.9-Disease of esophagus, unspecified; K22.9-Disease of esophagus,  unspecified          FINDINGS: Axial 3-D time-of-flight images of the intracranial arterial  circulation centered at the level of the [place] were obtained  without contrast. Reconstruction images were supplemented.     Exam quality is degraded by excessive patient motion. Within these  limitations, the distal vertebrals are patent, left is dominant. Basilar  artery appears to be widely patent. Both posterior cerebrals originate  from the basilar and are grossly unremarkable. Internal carotid arteries  are widely patent bilaterally. Bifurcation regions are grossly  unremarkable. A1 and M1 segments appear to be unremarkable. MCA  trifurcations are grossly unremarkable. There is a small patent anterior  communicating artery present.       Impression:       1. Grossly unremarkable exam considering patient motion.                MRI Angiogram Neck Without Contrast [[id number]] Collected:  06/19/18 2144     Updated:  06/19/18 2145    Narrative:       MRA NECK WITHOUT CONTRAST     CLINICAL HISTORY: Ischemia, stenosis.     COMPARISON: [<None>].     FINDINGS: Axial and coronal source imaging centered about the cervical  carotid bifurcation regions performed without gadolinium.  Multiprojection 3-D MIP reformatted images were supplemented and  reviewed.     Exam quality is compromised by excessive patient motion. No obvious  proximal great vessel stenosis is appreciated. The cervical carotid  arteries bilaterally without an obvious significant stenosis. On the  right side, there is suspicion of moderate narrowing of the proximal  right internal carotid artery best seen on axial source images 113-119.  There is some motion but stenosis estimated at at least 60-70%. Milder  narrowing of the proximal left internal carotid artery, less than 50%.     Vertebral arteries are patent, the left is dominant. NASCET criteria was  employed.                   Impression:       1.  Limited study as above, there is suspicion of moderate narrowing of  the proximal right internal carotid artery. Consider either a repeat  examination or CT angiogram when the patient can fully  cooperate or with  conscious sedation provided to better evaluate..          MRI Brain Without Contrast [[id number]] Collected:  06/19/18 2143     Updated:  06/19/18 2143    Narrative:       BRAIN MRI WITHOUT GADOLINIUM     HISTORY: Stroke     COMPARISON: None     FINDINGS:  Multiplanar images of the head were obtained without the use  of intravenous contrast.     Motion artifact degrades image quality. Within these limitations, there  is diffuse cortical atrophy. Mild scattered areas of increased T2 and  FLAIR signal abnormality are noted in the periventricular white matter  predominantly and likely related to chronic ischemic gliotic changes.  There are multiple small and old appearing lacunar type infarcts, right  greater than left. No foci of restricted diffusion are demonstrated on  diffusion weighted images (DWI) to suggest acute ischemia. The  ventricular system is normal in caliber and configuration for age.  Midline structures corpus callosum, pituitary gland, and brainstem are  unremarkable. There is no large extra-axial mass or large extra-axial  fluid collection.          Impression:       1. Atrophy and chronic-appearing parenchymal changes as discussed,  grossly unremarkable exam otherwise considering extensive motion.          US Abdomen Complete [[id number]] Collected:  06/19/18 1606     Updated:  06/19/18 1614    Narrative:       US ABDOMEN COMPLETE-     INDICATIONS: Elevated liver function tests, abdominal pain     TECHNIQUE: Ultrasound of the abdomen     COMPARISON: CT from 6/18/2018     FINDINGS:     The gallbladder is nontender, with minimal sludge but no shadowing  stones demonstrated. No gallbladder wall thickening or pericholecystic  fluid.     No intrahepatic or extrahepatic biliary ductal dilatation is  demonstrated. The common biliary duct caliber is measured at 4 mm.     Trace perihepatic ascites is suggested. No liver lesion is is  identified, but assessment of the liver is limited by  partial  obscuration of the left hepatic lobe. Diffusely, the echogenicity of the  liver is otherwise in the range of normal relative to that of the right  kidney.     The pancreas is obscured.      The right kidney, 10.3 cm, left kidney, 10.5 cm, spleen, 10.8 cm, and  the pancreas appear unremarkable.     Bowel gas obscures the aorta, inferior vena cava.          Impression:          No evidence for acute cholecystitis. With persistent clinical  indication, hepatobiliary scintigraphy could be considered for further  evaluation.           This report was finalized on 6/19/2018 4:11 PM by Dr. Ron Freitas M.D.             Medication Review:  done      Current Facility-Administered Medications:   •  acetaminophen (TYLENOL) tablet 650 mg, 650 mg, Oral, Q4H PRN, Felix Rodriguez MD  •  atorvastatin (LIPITOR) tablet 10 mg, 10 mg, Oral, Daily, Felix Rodriguez MD, 10 mg at 06/20/18 1305  •  calcium carbonate (TUMS) chewable tablet 500 mg (200 mg elemental), 2 tablet, Oral, TID PRN, Felix Rodriguez MD  •  cholecalciferol (VITAMIN D3) tablet 1,000 Units, 1,000 Units, Oral, Daily, Felix Rodriguez MD, 1,000 Units at 06/20/18 1305  •  dextrose (D50W) solution 25 g, 25 g, Intravenous, Q15 Min PRN, Felix Rodriguez MD  •  dextrose (GLUTOSE) oral gel 15 g, 15 g, Oral, Q15 Min PRN, Felix Rodriguez MD  •  diltiaZEM (CARDIZEM) tablet 120 mg, 120 mg, Oral, QAM, Felix Rodriguez MD, 120 mg at 06/20/18 1305  •  docusate sodium (COLACE) capsule 100 mg, 100 mg, Oral, Daily, Felix Rodriguez MD, 100 mg at 06/20/18 1305  •  enoxaparin (LOVENOX) syringe 40 mg, 40 mg, Subcutaneous, Q24H, Felix Rodriguez MD, 40 mg at 06/19/18 2129  •  glucagon (human recombinant) (GLUCAGEN DIAGNOSTIC) injection 1 mg, 1 mg, Subcutaneous, PRN, Felix Rodriguez MD  •  HYDROcodone-acetaminophen (NORCO) 5-325 MG per tablet 1 tablet, 1 tablet, Oral, Q4H PRN, Felix Rodriguez MD  •  insulin aspart (novoLOG) injection 0-7 Units, 0-7 Units, Subcutaneous, 4x  Daily With Meals & Nightly, Felix Rodriguez MD, 2 Units at 06/20/18 1306  •  insulin aspart (novoLOG) injection 5 Units, 5 Units, Subcutaneous, TID With Meals, Albertina Daniel MD, 5 Units at 06/19/18 1754  •  insulin detemir (LEVEMIR) injection 15 Units, 15 Units, Subcutaneous, Nightly, Albertina Daniel MD, 15 Units at 06/19/18 2307  •  LORazepam (ATIVAN) injection 0.5 mg, 0.5 mg, Intravenous, Q6H PRN, Felix Rodriguez MD  •  metoprolol tartrate (LOPRESSOR) tablet 25 mg, 25 mg, Oral, Q12H, Jyoti Odonnell, APRN, 25 mg at 06/20/18 1307  •  morphine injection 1 mg, 1 mg, Intravenous, Q4H PRN **AND** naloxone (NARCAN) injection 0.4 mg, 0.4 mg, Intravenous, Q5 Min PRN, Felix Rodriguez MD  •  nitroglycerin (NITROSTAT) SL tablet 0.4 mg, 0.4 mg, Sublingual, Q5 Min PRN, Felix Rodriguez MD  •  ondansetron (ZOFRAN) injection 4 mg, 4 mg, Intravenous, Q6H PRN, Felix Rodriguez MD  •  pantoprazole (PROTONIX) EC tablet 40 mg, 40 mg, Oral, BID AC, Toribio Wade MD, 40 mg at 06/20/18 1305  •  Pharmacy to Dose enoxaparin (LOVENOX), , Does not apply, Continuous PRN, Felix Rodriguez MD  •  sodium chloride 0.9 % flush 1-10 mL, 1-10 mL, Intravenous, PRN, Felix Rodriguez MD  •  sodium chloride 0.9 % flush 10 mL, 10 mL, Intravenous, PRN, Anand Mosley MD  •  sodium chloride 0.9 % infusion, 75 mL/hr, Intravenous, Continuous, Felix Rodriguez MD, Last Rate: 75 mL/hr at 06/19/18 0751, 75 mL/hr at 06/19/18 0751  •  sucralfate (CARAFATE) 1 GM/10ML suspension 1 g, 1 g, Oral, Q6H, Toribio Wade MD, 1 g at 06/20/18 1305  •  tamsulosin (FLOMAX) 24 hr capsule 0.4 mg, 0.4 mg, Oral, Daily, Felix Rodriguez MD, 0.4 mg at 06/20/18 1305    Assessment/Plan     Active Hospital Problems (** Indicates Principal Problem)    Diagnosis Date Noted   • **Esophagus disorder, thickened distal wall CT Scan 6/18/18 [K22.9] 06/18/2018   • Esophagitis determined by endoscopy by Sheri 6/19/18 LA Grade D Lower 1/3 [K20.9] 06/20/2018   • Hiatal hernia with GERD and  "esophagitis [K44.9, K21.0] 06/20/2018   • Hard of hearing [H91.90] 06/19/2018   • Ptosis, left eyelid [H02.402] 06/19/2018   • Osteoarthritis of multiple joints [M15.9] 06/19/2018   • Atrial fibrillation [I48.91] 06/19/2018   • Carotid stenosis, asymptomatic, bilateral 16-49% 2/24/14 [I65.23] 06/19/2018   • Remote history of stroke, small caudate nucleus [Z86.73] 06/19/2018   • Thrombocytopenia [D69.6] 06/19/2018   • Fall at home, subsequent encounter [W19.XXXD, Y92.099] 06/19/2018   • Hyperlipidemia [E78.5] 06/19/2018   • Acute gastritis [K29.00] 06/18/2018   • Hypertension, essential [I10] 06/18/2018   • Acute hyponatremia [E87.1] 06/18/2018   • Acute kidney injury superimposed on chronic kidney disease [N17.9, N18.9] 06/18/2018   • Elevated liver function tests [R79.89] 06/18/2018   • Leukocytosis [D72.829] 06/18/2018   • Anorexia [R63.0] 06/18/2018   • Granulomatous disease, chronic [D71] 06/18/2018   • Lung nodule seen on imaging study [R91.1] 06/18/2018   • Compression fracture of lumbar spine, non-traumatic [M48.56XA] 06/18/2018   • Lumbar canal stenosis [M48.061] 06/18/2018   • Weakness generalized [R53.1] 06/18/2018   • Cough [R05] 06/18/2018   • Diabetes mellitus type 2, uncontrolled [E11.65] 06/18/2018   • Limb swelling [M79.89] 06/18/2018   • 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     Type 2 diabetes mellitus decently controlled  Continue levemir 15 units at bedtime  Continue NovoLog 5 units with each meal  Continue NovoLog sliding scale 3 times a day before meals and at bedtime.    Hyperlipidemia  Continue Lipitor 10 mg oral daily.  LDL at goal.    TSH levels noted to be within normal limits.    Discussed plan with Nurse.     Albertina Daniel MD.  06/20/18  2:31 PM      [facility] Dragon / transcription disclaimer:    \"Dictated utilizing Dragon dictation\".   "

## 2018-06-20 NOTE — PROGRESS NOTES
[facility]  INTERNAL MEDICINE  FELIX RODRIGUEZ MD  [address] [place] [address]  Phone [phone] Fax [phone]  E-mail:  [email or web address]      INTERNAL MEDICINE DAILY PROGRESS NOTE  Felix Rodriguez M.D.  2018              Patient Identification:  Name: Javier Marin  Age: 82 y.o.  Sex: male  :  1935  MRN: [id number]         Primary Care Physician: Percy Em MD  LENGTH OF STAY 1 DAYS    Consults     Date and Time Order Name Status Description    2018 0145 Inpatient Neurology Consult      2018 0114 Inpatient Neurosurgery Consult      2018 0114 Inpatient Nephrology Consult      2018 0114 Inpatient Cardiology Consult Completed     2018 Inpatient Consult to Endocrinology Completed     2018 Inpatient Consult to Gastroenterology Completed     2018 171 Family Medicine Consult Completed     2018 1038 Family Medicine Consult Completed            Chief Complaint: Abdominal pain and weakness     History of Present Illness:     Subjective         Interval History: Patient is a 82 y.o.male who presented with abdominal pain and weakness to the [facility] ER or the third time in 3 days.  Patient is a pleasant 82-year-old white male who is regularly followed by Dr. Percy Em.  Patient's symptoms on each of the ER visits have been relatively stable.  He apparently developed weakness 3 days prior to the original ER presentation on 18.  In addition he had complaints of generalized malaise, low-grade fever, decreased appetite, and a mild cough.  Patient denies nausea vomiting diarrhea, chest pain, dyspnea.  He has developed a new symptom of upper abdominal pain and localizes pain to an area near the sternum.  He has had one small bowel movement in the last 24 hours and has not noticed any melena, blood, or diarrhea with the bowel movement.  He has been tachycardic at the time  of his ER presentation and has had irregular heartbeat found on EKG to be atrial fibrillation.  Patient does have a long history of insulin-dependent diabetes mellitus and has been followed by Dr. David in the past for this condition.  His other medical problems include: hypertension for which he takes diltiazem, Accupril, and Lasix; hyperlipidemia for which he takes atorvastatin; diabetes mellitus2 for which she takes NovoLog and Lantus; BPH for which he takes Flomax, and vitamin D deficiency.     Patient was evaluated on his urgent visit to the ER by Dr. Anand Mosley.  Family and patient were complaining of ongoing   epigastric abdominal pain and difficulty swallowing, weakness, loss of appetite, watery emesis, increasing cough, and decreased output of urine and feces.  His ER history of present illness described the abdominal pain as being of several days' duration, gradual onset, constant timing, epigastric location, no radiation, quality pain, moderate intensity, unchanged progression, associated symptoms of decreased appetite and difficulty swallowing emesis cough and decreased urine output, aggravating factors unknown, alleviating factors none, previous episodes 2 ER visits in the past week, treatment before arrival none.  Review of systems was positive for appetite loss, trouble swallowing, worsening cough, abdominal pain and vomiting, decreased urine volume, decreased fetal volume.  Physical exam in the ER showed temperature 98.2 pulse 87 respiratory rate 16 blood pressure 89/50 sat 95%.  Positives on exam included dry mucous membranes  Tenderness in the epigastric region.lab results showed blood sugar uncontrolled slightly at 211, BUN 56, creatinine 1.40, sodium 132, potassium 4.1, albumin 3.3, ALT 70, AST 72, alkaline phosphatase 139, lipase normal at 21, WBC 14.51, platelets 62,000.  CT scan of abdomen and pelvis showed nonspecific wall thickening of the distal esophagus, no obstructive uropathy,  lumbar compression fractures, small nonspecific pulmonary nodule with three-month follow-up recommended by radiology.  Patient was discussed with me as the on-call doctor for Dr. Em and was admitted with diagnoses of acute kidney injury, volume depletion, and esophageal distal thickening.       6/18/2018.  I personally saw the patient for the first time on this date.  He was resting quietly in his bed on 6 S. room 601 with wife and son present at the bedside.  Patient appeared to be in no acute pain.  Nurse was in the middle of completing his history when I entered the room.  Patient reported to me that he was having ongoing pain in the mid epigastric area without radiation.  He stated that his appetite was quite poor and that he really had not been able to eat much of anything for the last 3 days.  The last thing he tried earlier today was of normal and he actually ended up vomiting that back up.  Patient has no other recent medical issues and has been followed by only  since Dr. David's [facility] a few years ago.  He is unaware of the cardiac arrhythmia which has been seen on EKGs during his last 2 hospital visits and consisted of atrial fibrillation with right and left bundle branch block.  He also did not recall his previous history of carotid stenosis bilaterally.  He does not recall a history of stroke though a small infarct age undetermined was seen on his CT scan in the ER 4 days ago.  Wife does recall that patient had a previous upper GI endoscopy.  Records show that this was done by Dr. Villalobos in 2005 and was unremarkable.  Patient seems very comfortable at the present time.  He is on IV fluids that are infusing now at 75 cc per hour.  He did receive a bolus of normal saline in the emergency room.  I discussed the planned workup with the patient and his family.  I actually called Dr.L. Wade who will see the patient in the a.m. to consider a possible upper GI endoscopy.  I will also seek input  from renal with respect to his volume status, from endocrine with respect to his poorly controlled diabetes, from cardiology with respect to his newly diagnosed atrial fibrillation, and from neurosurgery regarding new discovery of multiple compression fractures and lumbar spine age undetermined.    6/19/2018.  Patient in the midst of workup for multiple problems as outlined above.  My visit today with the patient was in the cardiology suite where he was in the midst of additional testing including an echocardiogram and ultrasound of abdomen.  Patient tells me that he still has ongoing discomfort and pain in the upper stomach and central chest areas.  He states that even liquids seem to still be hurting when he swallows them unless he warms them in his mouth for an extended period time before swallowing.  Speech therapy has not been able to meet with the patient at this point.  GI did take the patient to upper GI endoscopy and found evidence of severe distal esophagitis.  Patient has been started on PPI and Carafate for treatment of these findings.  Neurology did see the patient and has initiated a neurologic workup for stroke which is felt to be old in nature.  Cardiology is in the process of evaluating atrial fibrillation which appears to be new in onset.  This obviously increases need for anticoagulation.  Endocrinology is helping with adjustment of the patient's insulin levels.  Dietitian has seen the patient and his landing a calorie count to start once the patient is no longer nothing by mouth.  PT and speech therapy have not been able to complete their evaluations at this point.  Neurosurgery did see the patient for compression fractures of the lower back and are continuing to follow-up on this but feel that unless patient has significant pain or difficulty walking with PT and that little else needs to be done for the issue at this point.  Speech therapy also has not been able to complete their consult on the  patient will be trying to initiate this tomorrow.  Nephrology has helped with fluid balance.  Creatinine is improved on this date.  IV fluids are continued while patient has poor appetite, poor by mouth intake, and is continuing diagnostic testing.     Review of Systems:               A comprehensive 14 point review of systems was negative except for:  Constitution:  positive for anorexia, fatigue, fevers and malaise  Eyes:  positive for blurriness and Ptosis left eye  Respiratory: positive for  cough, dry and pleuritic pain  Cardiovascular: positive for  irregular pulse and palpitations  Gastrointestinal: postitive for  constipation, difficulty / pain with swallowing, nausea, vomiting and Upper abdominal pain  Hematologic / Lymphatic: positive for  fatigue and Low platelet count  Musculoskeletal: positive for  back pain, muscle weakness and Difficulty walking  Neurological: positive for  difficulty walking and dizziness      Past Medical History:   Diagnosis Date   • Arthritis    • Diabetes mellitus    • Hard of hearing    • Hypertension    • Ptosis of left eyelid      Past Surgical History:   Procedure Laterality Date   • ENDOSCOPY N/A 6/19/2018    Procedure: ESOPHAGOGASTRODUODENOSCOPY WITH BIOPSY;  Surgeon: Toribio Wade MD;  Location: [facility] ENDOSCOPY;  Service: Gastroenterology   • EYE PTOSIS REPAIR Left 11/15/2016    Procedure: LT UPPER LID EYE PTOSIS REPAIR;  Surgeon: Anup Lancaster MD;  [place] [facility] [place] [facility] [place]  Service:    • EYE SURGERY     • HIP ARTHROPLASTY     • JOINT REPLACEMENT       No Known Allergies  History reviewed. No pertinent family history.  Social History     Social History   • Marital status:      Occupational History   • Retired      Social History Main Topics   • Smoking status: Former Smoker     Types: Cigars   • Smokeless tobacco: Never Used      Comment: QUIT 1994   • Alcohol use No   • Drug use: No   • Sexual activity: Not Currently     Partners: Female     Other  "Topics Concern   • Not on file       PMH, FH, SH and ROS completed with Admission History and Physical and updated in EPIC system.        Objective     Scheduled Meds:  atorvastatin 10 mg Oral Daily   cholecalciferol 1,000 Units Oral Daily   diltiaZEM 120 mg Oral QAM   docusate sodium 100 mg Oral Daily   enoxaparin 40 mg Subcutaneous Q24H   famotidine 20 mg Intravenous Q12H   insulin aspart 0-7 Units Subcutaneous 4x Daily With Meals & Nightly   insulin aspart 5 Units Subcutaneous TID With Meals   insulin detemir 15 Units Subcutaneous Nightly   metoprolol tartrate 25 mg Oral Q12H   pantoprazole 40 mg Oral BID AC   sucralfate 1 g Oral Q6H   tamsulosin 0.4 mg Oral Daily     Continuous Infusions:  Pharmacy to Dose enoxaparin (LOVENOX)     sodium chloride 75 mL/hr Last Rate: 75 mL/hr (06/19/18 0751)       Vital signs in last 24 hours:  Temp:  [98.1 °F (36.7 °C)-98.7 °F (37.1 °C)] 98.1 °F (36.7 °C)  Heart Rate:  [65-83] 71  Resp:  [16-20] 18  BP: ()/(49-83) 128/74    Intake/Output:    Intake/Output Summary (Last 24 hours) at 06/20/18 0201  Last data filed at 06/19/18 2143   Gross per 24 hour   Intake             1420 ml   Output             1900 ml   Net             -480 ml       Exam:  /74 (BP Location: Left arm, Patient Position: Lying)   Pulse 71   Temp 98.1 °F (36.7 °C) (Oral)   Resp 18   Ht 172.7 cm (68\")   Wt 73 kg (160 lb 15 oz)   SpO2 97%   BMI 24.47 kg/m²     Constitutional:  Alert, cooperative, moderate distress due to GI issues, AAOx3, resting comfortably on stretcher in cardiology   Head:      Normocephalic, without obvious abnormality, atraumatic   Eyes:     PERRLA, conjunctiva/corneas clear, no icterus, no conjunctival                                     pallor, EOM's intact, both eyes      ENT and Mouth: Lips, tongue, gums normal; oral mucosa pink and moist   Neck:     Supple, symmetrical, trachea midline, no JVD  Respiratory:     Clear to auscultation bilaterally, respirations unlabored, " occasional cough but no significant wheezing  Cardiovascular:  Irregular rate and rhythm, S1 and S2 normal, no murmur,      no  Rub or gallop.  Pulses normal.    Gastrointestinal:   BS present x 4 Soft, tender in midepigastric area, bowel sounds active,      no masses, no hepatosplenomegaly                                                     :       No hernia.  Normal exam for sex.         Musculoskeletal: Extremities normal, atraumatic, no cyanosis or edema     No arthropathy.  No deformity.  Gait poorly about                                                 Skin:   Skin is warm and dry,  no rashes, swelling or palpable lesions   Neurologic:  CN -XII intact, motor strength showing lower extremity weakness, sensation grossly intact to light touch, no focal reflex deficits noted    Psychiatric:     Alert,oriented X3, no delusions, psychoses, depression or anxiety      Heme/Lymph/Imun:   No bruises, petechiae.  Lymph nodes normal in size/configuration       Data Review:  Lab Results   Component Value Date    CALCIUM 8.3 (L) 06/19/2018    PHOS 3.3 06/19/2018     Results from last 7 days  Lab Units 06/19/18  0645 06/19/18  0643 06/18/18  2046 06/18/18  1308 06/14/18  0856   AST (SGOT) U/L  --   --   --  72* 52*   MAGNESIUM mg/dL 2.1  --   --   --   --    SODIUM mmol/L 137  --  133* 132* 130*   POTASSIUM mmol/L 3.9  --  3.9 4.1 4.2   CHLORIDE mmol/L 98  --  95* 92* 91*   CO2 mmol/L 24.2  --  21.8* 27.1 27.0   BUN mg/dL 45*  --  55* 56* 20   CREATININE mg/dL 0.90  --  1.03 1.40* 0.97   GLUCOSE mg/dL 241*  --  254* 211* 200*   CALCIUM mg/dL 8.3*  --  8.5* 9.3 9.1   WBC 10*3/mm3  --  13.01* 14.95* 14.51* 4.76   HEMOGLOBIN g/dL  --  13.0* 13.0* 14.5 14.4   PLATELETS 10*3/mm3  --  91* 71* 62* 66*   ALT (SGPT) U/L  --   --   --  70* 43*     Lab Results   Component Value Date    TROPONINT <0.010 06/19/2018     Estimated Creatinine Clearance: 65.3 mL/min (by C-G formula based on SCr of 0.9 mg/dL).  WEIGHTS:     Wt Readings from  Last 1 Encounters:   06/19/18 1112 73 kg (160 lb 15 oz)   06/19/18 0555 73.4 kg (161 lb 14.4 oz)   06/18/18 1912 71.6 kg (157 lb 14.4 oz)   06/18/18 1101 72.6 kg (160 lb)         Assessment:  Principal Problem:    Esophagus disorder, thickened distal wall CT Scan 6/18/18  Active Problems:    Pain of upper abdomen    Diabetes mellitus type 2, uncontrolled    Acute hyponatremia    Acute kidney injury superimposed on chronic kidney disease    Elevated liver function tests    Leukocytosis    Compression fracture of lumbar spine, non-traumatic    Lumbar canal stenosis    Weakness generalized    Cough    Atrial fibrillation    Advanced diabetic retinal disease    Limb swelling    Hypertension, essential    Anorexia    Granulomatous disease, chronic    Lung nodule seen on imaging study    Carotid stenosis, asymptomatic, bilateral 16-49% 2/24/14    Remote history of stroke, small caudate nucleus    Thrombocytopenia    Fall at home, subsequent encounter    Hyperlipidemia    Hard of hearing    Ptosis, left eyelid    Osteoarthritis of multiple joints         Attending Physician Assessment and Plan:     1.  Thickened distal esophageal wall on CT scan in 6/18/18 accompanied by dysphagia and generalized weakness.  Exact etiology unknown.  Patient had normal EGD 2005 by Dr. Villalobos.  Discussed with Dr. SHAWNA Wade.    EGD today showing distal esophagitis and evidence of hiatal hernia.  Speech therapy consulted for input on swallowing and dysphagia.  Added oral protonix and Carafate slurry  We will ask dietitian to see and do calorie count.  As patient begins to take oral intake again.    2.  Volume depletion with acute kidney injury superimposed on chronic kidney disease.  IV fluids were started in the ER and are continued on the floor.  Renal has been consulted for their input on volume status and correction of deficits.  Acute kidney injury resolving.  Continuing IV fluid until able to support self with by mouth  3.  Generalized  weakness with reported difficulty using legs and fall at home prior to first ER visit.  Workup at the time of the first ER visit did show a remote caudate nucleus infarct.  MRI and MRA of brain  ordered .  Neurology to offer input on anticoagulation needs once studies are complete.  MRA of carotids also ordered along with carotid ultrasound.  Ultrasound does show moderate stenosis on the right and minimal stenosis on left sides  PT and OT to evaluate patient and make recommendations on safety to prevent further falls.  May need use of walker  4.  Upper abdominal pain reported on physical exam.  Etiology of this symptom would seem to be the esophagitis.  Ultrasound of abdomen is ordered to look for gallstones and other biliary duct abnormalities, but none were found other than minimal sludging.  .  Liver function tests noted to be elevated at present time.  5.  Atrial fibrillation on EKGs from first 2 ER visits apparently new in onset.  Cardiology has been consulted for input on his finding.  Echocardiogram, EKG, and cardiac enzymes have been ordered.  We will discuss with cardiology need for anticoagulation in the future at the time of patient's discharge since CHRIS score is elevated.  Further work up as planned by cardiology.  6.  Multiple compression fractures of lumbar spine, age undetermined.  Neurosurgery is consulted for their input on this finding.  At least 2 of the fracture showed greater than 50% compression.  Patient's symptomatology is relatively limited.  May need to consider MRI to further evaluate situation.  PT and OT to work with patient tomorrow as indicated above.  7.  Leukocytosis with dry nonproductive cough and no other signs of acute infection.  Patient is afebrile at the present time with no other signs of sepsis.  We will check pro-calcitonin and lactate levels with a.m. labs.  Holding at present time with respect to IV antibiotics as no signs of acute infection.ID consulted due to elevated  WBC and procalcitonin.  Will repeat in am.  8.  Type 2 diabetes mellitus poorly controlled at times.  Input is requested from endocrinology with respect to diabetic management.  Thyroid profile is ordered.  Will check fingerstick glucoses and hemoglobin A1c.  9.  Acute hyponatremia.  Treating with IV fluids which should help with this problem.  Again, renal is seeing the patient and we will be addressing fluid and volume.  10.  Thrombocytopenia with platelet counts in the 60 and 70,000 range.  Etiology for this finding is unclear at the present time.  We will monitor and consider hematology consult if situation changes or worsens.  Could be result of mild viral infection.  11.  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  Patient certainly is at risk for strokes with the diabetic history.  Holding aspirin for now but will resume prior to discharge.  May need to anticoagulate or atrial fibrillation which also would be useful with respect to this diagnosis.  May need to consider MRI of brain. Neurologic consultation is planned.  12.  Essential hypertension.  This seems to be fairly well controlled at present time with current medications.  Cardiology to follow patient and adjust medications as they feel necessary.  13.  Hyperlipidemia. Patient is currently on Lipitor for treatment of this problem.  We will continue to monitor and adjust diet as necessary to control.   14.  Granulomatous disease of lung with nodules noted on scan in the ER.  Follow-up suggested in 3 months and will be arranged at time of patient's discharge.  15.  Carotid stenosis bilateral asymptomatic in range of 16-49% on last scan in 2014.  We will repeat the carotid Doppler studies while patient is in the hospital and treat based upon findings from that study.       Plan for disposition:Where: home and home health and When:  2-3 days      Felix Rodriguez MD  6/19/2018  9:00 AM

## 2018-06-20 NOTE — PROGRESS NOTES
"   LOS: 2 days   Patient Care Team:  Percy Em MD as PCP - General  Percy Em MD as PCP - Family Medicine    Chief Complaint: esophagititis     Subjective     History of Present Illness  Patient is feeling ok,  Eating a little better currently  Weak.  Less chest discomfort   Subjective:  Symptoms:  Improved.    Diet:  Poor intake.    Activity level: Impaired due to weakness.        History taken from: patient chart RN    Objective     Vital Signs  Temp:  [98.1 °F (36.7 °C)-99.4 °F (37.4 °C)] 99.4 °F (37.4 °C)  Heart Rate:  [65-93] 93  Resp:  [16-20] 18  BP: ()/(49-82) 148/82    Objective:  General Appearance:  Ill-appearing.    Vital signs: (most recent): Blood pressure 155/72, pulse 89, temperature 98.9 °F (37.2 °C), temperature source Oral, resp. rate 18, height 172.7 cm (68\"), weight 72.9 kg (160 lb 11.2 oz), SpO2 96 %.    Output: Producing urine.    Lungs:  Increased effort.    Heart: Normal rate.    Abdomen: Abdomen is soft.  There is no abdominal tenderness.     Skin:  Warm.              Results Review:     I reviewed the patient's new clinical results.    Medication Review: done    Assessment/Plan     Principal Problem:    Esophagus disorder, thickened distal wall CT Scan 6/18/18  Active Problems:    Acute gastritis    Advanced diabetic retinal disease    Limb swelling    Diabetes mellitus type 2, uncontrolled    Hypertension, essential    Acute hyponatremia    Acute kidney injury superimposed on chronic kidney disease    Elevated liver function tests    Leukocytosis    Anorexia    Granulomatous disease, chronic    Lung nodule seen on imaging study    Compression fracture of lumbar spine, non-traumatic    Lumbar canal stenosis    Weakness generalized    Cough    Hard of hearing    Ptosis, left eyelid    Osteoarthritis of multiple joints    Atrial fibrillation    Carotid stenosis, asymptomatic, bilateral 16-49% 2/24/14    Remote history of stroke, small caudate nucleus    " Thrombocytopenia    Fall at home, subsequent encounter    Hyperlipidemia    Esophagitis determined by endoscopy by Sheri 6/19/18 LA Grade D Lower 1/3    Hiatal hernia with GERD and esophagitis      Assessment:  (Severe esophagitis  Dysphagia  Nausea secondary to renal failure  Possible oropharyngeal dysphagia).     Plan:   (Continue the PPI  May need eventual repeat endoscopy to document healing  Per speech pathology recommendation will order a VSS to further evaluate deglutition .).       Toribio Wade MD  06/20/18  12:35 PM    Time: Critical care 12 min

## 2018-06-20 NOTE — PROGRESS NOTES
"DOS: 2018  NAME: Javier Marin   : 1935  PCP: Percy Em MD  Chief Complaint   Patient presents with   • Abdominal Pain     can't eat - no nvd     Stroke    Subjective: No events overnight. No complaints on my exam.     Patient denies HA, double/blurred vision, dizziness, numbness or loss of sensation or any other new neurological complaints at this time.       Objective:  Vital signs: /82 (BP Location: Right arm, Patient Position: Sitting)   Pulse 88   Temp 99.4 °F (37.4 °C) (Oral)   Resp 18   Ht 172.7 cm (68\")   Wt 72.9 kg (160 lb 11.2 oz)   SpO2 95%   BMI 24.43 kg/m²       HEENT: Normocephalic, atraumatic   COR: RRR  Resp: Even and unlabored  Extremities: Equal pulses, non distal embolization    Neurological:   MS: AO. Language normal. No neglect. Higher integrative function normal  CN: II-XII normal; CN 1 impaired patient very [place]   Motor: 5/5, normal tone  Sensory: Intact  Coordination: Normal (finger to nose and heel to shin)     Laboratory results:  Lab Results   Component Value Date    GLUCOSE 120 (H) 2018    CALCIUM 8.3 (L) 2018     2018    K 3.5 2018    CO2 27.5 2018     2018    BUN 27 (H) 2018    CREATININE 0.70 (L) 2018    EGFRIFNONA 108 2018    BCR 38.6 (H) 2018    ANIONGAP 9.5 2018     Lab Results   Component Value Date    WBC 9.13 2018    HGB 11.9 (L) 2018    HCT 35.7 (L) 2018    MCV 88.4 2018     (L) 2018     Lab Results   Component Value Date    CHOL 71 2018    CHOL 75 2018     Lab Results   Component Value Date    HDL 14 (L) 2018    HDL 12 (L) 2018     Lab Results   Component Value Date    LDL 29 2018    LDL 22 2018     Lab Results   Component Value Date    TRIG 138 2018    TRIG 207 (H) 2018     Lab Results   Component Value Date    TSH 0.661 2018     Lab Results   Component Value Date    " VQNVLQYC51 1,315 (H) 06/19/2018     Lab Results   Component Value Date    CHOL 71 06/20/2018    TRIG 138 06/20/2018    HDL 14 (L) 06/20/2018    LDL 29 06/20/2018     Lab Results   Component Value Date    HGBA1C 6.80 (H) 06/19/2018     Lab Results   Component Value Date    RPR Non-Reactive 06/20/2018         Review and interpretation of imaging per Dr. Lawler:   CT brain 6/14/18: There is diffuse cerebral atrophy, as an old right head of caudate stroke, there is severe concentric ossification of the intracranial vertebral arteries bilaterally there is severe calcification of the intracranial internal carotid arteries bilaterally  Carotid ultrasound 6/19/18: Is a mild stenosis of the proximal left internal carotid artery with a peak systolic velocity of 121 there is acoustic shadowing of the right ICA bulb consistent with dense calcification and turbulence with a peak systolic velocity of 230 and an EDV of 61, compared to carotid upstroke 2017 peak systolic velocity on the right was 212 and EDV was 27 Master the left ICA peak systolic velocity is unchanged     MRI Brain (perindependent review w/ Dr. Lawler)   DWI negative. Flair poor quality. Right head of caudate. Old laculnar infarct. Old left anterior centrum semioval. GRE sequence w/ questionable lesion right sub. Ventricle; other wise negative.   MRA Head and Neck   Moderate-possible severe ICA stenosis; imaging completed w/o contrast d/t concern for ARF.      Impression: This is a 81 yo male w/  diabetes mellitus, hypertension and new onset AF who our service was consulted to see d/t incidental finding on imaging of chronic CVA which was being completed d/t patient complaint of generalized weakness and difficulty eating. MRI brain was unremarkable. MRA head and neck revealed moderate to possible severe right ICA stenosis. Unfortunately imaging was completed w/o contrast d/t concern for ARF which appears to be resolved. Nephrology consulted per primary  team (pending). We would like to obtain MRA Neck (aortic arch) w/ and w/o contrast if Renal agreeable. TTE pending. CCP to assist w/ discharge planning. PT/OT/ST. Call RRT for acute neurological changes. We will continue to follow and advise.     Plan:  NIHSS q shift and with acute neurological changes.   MRA of aortic arch with contrast (if renal agreeable)   Keep Hydrated  Neurochecks per protocol call RRT for any acute neurological chnages.   Check RPR, TSH, B12 (results above)   Non-pharmacological DVT prophylaxis  TTE (pending)   MRI/MRA (results above)   EKG Tele  PT/OT/ST  Stroke Education  Blood pressure control to <130/80  Goal LDL <70-recommend high dose statins-             Serum glucose < 140        Ideal targets for stroke prevention would be :  Blood pressure < 130/80; B12 > 500 TSH in normal range and LDL < 70; HbA1c < 6.5 and smoking cessation if applicable.      Case reviewed with Dr. Perla and he agrees with plan above.     Ideal targets for stroke prevention would be :  Blood pressure < 130/80; B12 > 500 TSH in normal range and LDL < 70; HbA1c < 6.5 and smoking cessation if applicable.       Case reviewed w/ Dr. Perla and he agrees with plan above.

## 2018-06-20 NOTE — PLAN OF CARE
Problem: Patient Care Overview  Goal: Plan of Care Review  Outcome: Ongoing (interventions implemented as appropriate)   06/20/18 1130   Coping/Psychosocial   Plan of Care Reviewed With patient   OTHER   Outcome Summary Pt presents with decline in mobility due to general weakness, loss of ankle rom and impaired balance. Pt fell recently and has increased his need for external support with amb from stc to rwx. Pt will benefit from cont skilled PT for progression toward indep amb with least vs no AD. Recommend cont PT at discharge: HHPT vs outpatient PT.

## 2018-06-20 NOTE — THERAPY EVALUATION
Acute Care - Occupational Therapy Initial Evaluation  [facility]     Patient Name: Javier Marin  : 1935  MRN: [id number]  Today's Date: 2018  Onset of Illness/Injury or Date of Surgery: 18     Referring Physician: Michael    Admit Date: 2018       ICD-10-CM ICD-9-CM   1. Pain of upper abdomen R10.10 789.09   2. Dehydration E86.0 276.51   3. ROSA (acute kidney injury) N17.9 584.9   4. Esophagus disorder, thickened distal wall CT Scan 18 K22.9 530.9   5. Esophagus disorder K22.9 530.9   6. General weakness R53.1 780.79     Patient Active Problem List   Diagnosis   • Acute gastritis   • Advanced diabetic retinal disease   • Limb swelling   • Diabetes mellitus type 2, uncontrolled   • Hypertension, essential   • Acute hyponatremia   • Acute kidney injury superimposed on chronic kidney disease   • Elevated liver function tests   • Leukocytosis   • Anorexia   • Esophagus disorder, thickened distal wall CT Scan 18   • Granulomatous disease, chronic   • Lung nodule seen on imaging study   • Compression fracture of lumbar spine, non-traumatic   • Lumbar canal stenosis   • Weakness generalized   • Cough   • Hard of hearing   • Ptosis, left eyelid   • Osteoarthritis of multiple joints   • Atrial fibrillation   • Carotid stenosis, asymptomatic, bilateral 16-49% 14   • Remote history of stroke, small caudate nucleus   • Thrombocytopenia   • Fall at home, subsequent encounter   • Hyperlipidemia   • Esophagitis determined by endoscopy by Sheri 18 LA Grade D Lower 1/3   • Hiatal hernia with GERD and esophagitis     Past Medical History:   Diagnosis Date   • Arthritis    • Diabetes mellitus    • Hard of hearing    • Hypertension    • Ptosis of left eyelid      Past Surgical History:   Procedure Laterality Date   • ENDOSCOPY N/A 2018    Procedure: ESOPHAGOGASTRODUODENOSCOPY WITH BIOPSY;  Surgeon: Toribio Wade MD;  [facility]: [facility] ENDOSCOPY;  Service: Gastroenterology   • EYE  PTOSIS REPAIR Left 11/15/2016    Procedure: LT UPPER LID EYE PTOSIS REPAIR;  Surgeon: Anup Lancaster MD;  Location: [facility] OR [facility];  Service:    • EYE SURGERY     • HIP ARTHROPLASTY     • JOINT REPLACEMENT            OT ASSESSMENT FLOWSHEET (last 72 hours)      Occupational Therapy Evaluation     Row Name 06/20/18 1518                   OT Evaluation Time/Intention    Subjective Information no complaints  -        Document Type evaluation  -        Mode of Treatment individual therapy;occupational therapy  -        Patient Effort good  -        Symptoms Noted During/After Treatment fatigue  -           General Information    Patient Profile Reviewed? yes  -        Onset of Illness/Injury or Date of Surgery 06/18/18  -        Patient Observations alert;cooperative;agree to therapy  -        Patient/Family Observations pt supine in bed. wife present in room  -        Prior Level of Function independent:;transfer;ADL's;min assist:   wife A some w. ADLs  -        Equipment Currently Used at Home cane, straight  -        Existing Precautions/Restrictions fall  -        Benefits Reviewed patient:;improve function;increase independence;increase strength;increase balance  -           Cognitive Assessment/Intervention- PT/OT    Orientation Status (Cognition) oriented to;person;place;situation;time  -        Follows Commands (Cognition) follows one step commands;over 90% accuracy;verbal cues/prompting required  -        Safety Deficit (Cognitive) mild deficit  -           Bed Mobility Assessment/Treatment    Bed Mobility Assessment/Treatment supine-sit-supine  -        Sit-Supine [place] (Bed Mobility) set up;minimum assist (75% patient effort)  -        Supine-Sit-Supine [place] (Bed Mobility) minimum assist (75% patient effort)  -           Functional Mobility    Functional Mobility- Ind. Level set up required;contact guard assist  -        Functional Mobility- Device  standard walker  -KP        Functional Mobility- Comment walks about in room out to ash and back.  -KP           Transfer Assessment/Treatment    Transfer Assessment/Treatment sit-stand transfer;stand-sit transfer  -        Sit-Stand [place] (Transfers) contact guard  -        Stand-Sit [place] (Transfers) set up;contact guard  -KP           Sit-Stand Transfer    Assistive Device (Sit-Stand Transfers) walker, standard  -KP           Stand-Sit Transfer    Assistive Device (Stand-Sit Transfers) walker, standard  -           ADL Assessment/Intervention    BADL Assessment/Intervention toileting;grooming  -           Grooming Assessment/Training    [place] Level (Grooming) grooming skills;hair care, combing/brushing;set up;supervision  -        Grooming Position edge of bed sitting  -           Toileting Assessment/Training    [place] Level (Toileting) toileting skills;adjust/manage clothing;set up;contact guard assist  -        Assistive Devices (Toileting) urinal  -        Toileting Position supported standing  -           General ROM    GENERAL ROM COMMENTS B UE 8/8  -KP           General Assessment (Manual Muscle Testing)    Comment, General Manual Muscle Testing (MMT) Assessment B UE 3+/5  -KP           Motor Assessment/Interventions    Additional Documentation Balance (Group);Balance Interventions (Group);Fine Motor Testing & Training (Group);Therapeutic Exercise (Group);Therapeutic Exercise Interventions (Group)  -           Therapeutic Exercise    Upper Extremity (Therapeutic Exercise) bicep curl, left;bicep curl, right  -        Upper Extremity Range of Motion (Therapeutic Exercise) shoulder flexion/extension, left;shoulder flexion/extension, right  -        Exercise Type (Therapeutic Exercise) AROM (active range of motion)  -        Position (Therapeutic Exercise) seated  -KP        Sets/Reps (Therapeutic Exercise) 10x1  -           Balance    Balance static  sitting balance;static standing balance  -           Static Sitting Balance    Level of [place] (Unsupported Sitting, Static Balance) supervision  -KP        Sitting Position (Unsupported Sitting, Static Balance) sitting on edge of bed  -KP        Time Able to Maintain Position (Unsupported Sitting, Static Balance) more than 5 minutes  -           Static Standing Balance    Level of [place] (Supported Standing, Static Balance) contact guard assist  -KP        Time Able to Maintain Position (Supported Standing, Static Balance) 3 to 4 minutes  -           Fine Motor Testing & Training    Comment, Fine Motor Coordination opposition in tact  -KP           Positioning and Restraints    Pre-Treatment Position in bed  -KP        Post Treatment Position bed  -KP        In Bed supine;call light within reach;encouraged to call for assist;exit alarm on;with family/caregiver  -           Plan of Care Review    Plan of Care Reviewed With patient  -KP           Clinical Impression (OT)    Criteria for Skilled Therapeutic Interventions Met (OT Eval) treatment indicated;yes  -        Rehab Potential (OT Eval) good, to achieve stated therapy goals  -        Therapy Frequency (OT Eval) 5 times/wk  -KP        Care Plan Review, Other Participant (OT Eval) spouse  -        Anticipated Discharge Disposition (OT) home;home with assist  -KP           OT Goals    Transfer Goal Selection (OT) transfer, OT goal 1  -KP        Strength Goal Selection (OT) strength, OT goal 1  -KP        Balance Goal Selection (OT) balance, OT goal 1  -KP        Additional Documentation Strength Goal Selection (OT) (Row);Balance Goal Selection (OT) (Row)  -           Transfer Goal 1 (OT)    Activity/Assistive Device (Transfer Goal 1, OT) toilet;sit-to-stand/stand-to-sit;walker, rolling  -        [place] Level/Cues Needed (Transfer Goal 1, OT) set-up required;supervision required  -        Time Frame (Transfer Goal 1, OT) long  term goal (LTG);by discharge  -           Strength Goal 1 (OT)    Strength Goal 1 (OT) to increase to 4/5 to inc tsf and self-care skills  -        Time Frame (Strength Goal 1, OT) long term goal (LTG);by discharge  -           Balance Goal 1 (OT)    Activity/Assistive Device (Balance Goal 1, OT) sitting, static;standing, static;walker, rolling  -        [place] Level/Cues Needed (Balance Goal 1, OT) supervision required  -        Time Frame (Balance Goal 1, OT) long term goal (LTG);by discharge  -          User Key  (r) = Recorded By, (t) = Taken By, (c) = Cosigned By    Initials Name Effective Dates     Catherine Adhikari, OTR 06/08/18 -            Occupational Therapy Education     Title: PT OT SLP Therapies (Done)     Topic: Occupational Therapy (Done)     Point: ADL training (Done)     Description: Instruct learner(s) on proper safety adaptation and remediation techniques during self care or transfers.   Instruct in proper use of assistive devices.   Learning Progress Summary     Learner Status Readiness Method Response Comment Documented by    Patient Done Acceptance E,MICHAELA JOVEL ed pt and family on role of OT. ed on safety w. ADLs and tsf. pt and family verbally understand and demo act  06/20/18 1531    Family Done Acceptance E,MICHAELA JOVEL ed pt and family on role of OT. ed on safety w. ADLs and tsf. pt and family verbally understand and demo act  06/20/18 1531          Point: Precautions (Done)     Description: Instruct learner(s) on prescribed precautions during self-care and functional transfers.   Learning Progress Summary     Learner Status Readiness Method Response Comment Documented by    Patient Done Acceptance E,MICHAELA JOVEL ed pt and family on role of OT. ed on safety w. ADLs and tsf. pt and family verbally understand and demo act  06/20/18 1531    Family Done Acceptance E,MICHAELA JOVEL ed pt and family on role of OT. ed on safety w. ADLs and tsf. pt and family verbally understand and  demo act  06/20/18 1531          Point: Body mechanics (Done)     Description: Instruct learner(s) on proper positioning and spine alignment during self-care, functional mobility activities and/or exercises.   Learning Progress Summary     Learner Status Readiness Method Response Comment Documented by    Patient Done Acceptance E,MICHAELA JOVEL ed pt and family on role of OT. ed on safety w. ADLs and tsf. pt and family verbally understand and demo act  06/20/18 1531    Family Done Acceptance E,MICHAELA JOVEL ed pt and family on role of OT. ed on safety w. ADLs and tsf. pt and family verbally understand and demo act  06/20/18 1531                      User Key     Initials Effective Dates Name Provider Type Discipline     06/08/18 -  Catherine Adhikari OTR Occupational Therapist OT                  OT Recommendation and Plan  Outcome Summary/Treatment Plan (OT)  Anticipated Discharge Disposition (OT): home, home with assist  Therapy Frequency (OT Eval): 5 times/wk  Plan of Care Review  Plan of Care Reviewed With: patient, spouse  Plan of Care Reviewed With: patient, spouse  Outcome Summary: pt evaluated for OT. pt w. decr B UE strength. tsf CGA w. walker and walks w. wx CGA.. pt did not want to complete bathing at this time due to fatigue. AROM 10x1. pt continue to benefit from OT to address self-care, tsf, balance, strength          Outcome Measures     Row Name 06/20/18 1533 06/20/18 1100          How much help from another person do you currently need...    Turning from your back to your side while in flat bed without using bedrails?  -- 4  -SV     Moving from lying on back to sitting on the side of a flat bed without bedrails?  -- 4  -SV     Moving to and from a bed to a chair (including a wheelchair)?  -- 3  -SV     Standing up from a chair using your arms (e.g., wheelchair, bedside chair)?  -- 3  -SV     Climbing 3-5 steps with a railing?  -- 2  -SV     To walk in hospital room?  -- 3  -SV     AM-PAC 6 Clicks  Score  -- 19  -SV        How much help from another is currently needed...    Putting on and taking off regular lower body clothing? 3  -KP  --     Bathing (including washing, rinsing, and drying) 3  -KP  --     Toileting (which includes using toilet bed pan or urinal) 3  -KP  --     Putting on and taking off regular upper body clothing 3  -KP  --     Taking care of personal grooming (such as brushing teeth) 3  -KP  --     Eating meals 3  -KP  --     Score 18  -KP  --        Functional Assessment    Outcome Measure Options AM-PAC 6 Clicks Daily Activity (OT)  -KP AM-PAC 6 Clicks Basic Mobility (PT)  -SV       User Key  (r) = Recorded By, (t) = Taken By, (c) = Cosigned By    Initials Name Provider Type    LISA Adhikari OTR Occupational Therapist    CELINE Garcia, PT Physical Therapist          Time Calculation:   OT Start Time: 1320  OT Stop Time: 1344  OT Time Calculation (min): 24 min  Therapy Suggested Charges     Code   Minutes Charges    None           Therapy Charges for Today     Code Description Service Date Service Provider Modifiers Qty    [id number]  OT THER PROC EA 15 MIN 6/20/2018 JIN Araiza GO 2    [id number]  OT EVAL LOW COMPLEXITY 2 6/20/2018 JIN Araiza GO 1               JIN Araiza  6/20/2018

## 2018-06-20 NOTE — PROGRESS NOTES
[facility]  Cardiology Progress Note    Patient Identification:  Name: Javier Marin  Age: 82 y.o.  Sex: male  :  1935  MRN: [id number]                 Follow Up / Chief Complaint: new onset atrial fib, anticoagulation recommmendations    Interval History:  82-year-old male admitted with multiple medical issues with newly diagnosed atrial fibrillation with rvr, generalized weakness with fall and possible syncope.  He has been started on oral Cardizem for rate control. He has a [id number] score of >/= 6 and a HAS BLED score of >/= 5 (hypertension, abnormal LFTs, stroke history, predisposition to bleeding [anemia, thrombocytopenia] and age >65) in addition to lower extremity weakness, gait disturbance and falls.      Subjective:  Denies chest pain, tightness, dizziness or palpitations. Denies PND or orthopnea.     Objective:  Afib -> SR 70-80's  -155 / 70's  Afeb      Past Medical History:  Past Medical History:   Diagnosis Date   • Arthritis    • Diabetes mellitus    • Hard of hearing    • Hypertension    • Ptosis of left eyelid      Past Surgical History:  Past Surgical History:   Procedure Laterality Date   • ENDOSCOPY N/A 2018    Procedure: ESOPHAGOGASTRODUODENOSCOPY WITH BIOPSY;  Surgeon: Toribio Wade MD;  Location: [facility] ENDOSCOPY;  Service: Gastroenterology   • EYE PTOSIS REPAIR Left 11/15/2016    Procedure: LT UPPER LID EYE PTOSIS REPAIR;  Surgeon: Anup Lancaster MD;  Location: [facility] OR [facility] [place];  Service:    • EYE SURGERY     • HIP ARTHROPLASTY     • JOINT REPLACEMENT          Social History:   Social History   Substance Use Topics   • Smoking status: Former Smoker     Types: Cigars   • Smokeless tobacco: Never Used      Comment: QUIT    • Alcohol use No      Family History:  History reviewed. No pertinent family history.       Allergies:  No Known Allergies  Scheduled Meds:    atorvastatin 10 mg Daily   cholecalciferol 1,000 Units Daily   diltiaZEM 120 mg  QAM   docusate sodium 100 mg Daily   enoxaparin 40 mg Q24H   insulin aspart 0-7 Units 4x Daily With Meals & Nightly   insulin aspart 5 Units TID With Meals   insulin detemir 15 Units Nightly   metoprolol tartrate 25 mg Q12H   pantoprazole 40 mg BID AC   sucralfate 1 g Q6H   tamsulosin 0.4 mg Daily           INTAKE AND OUTPUT:    Intake/Output Summary (Last 24 hours) at 06/20/18 1341  Last data filed at 06/20/18 0718   Gross per 24 hour   Intake              300 ml   Output             1250 ml   Net             -950 ml       Review of Systems:  GI: no nausea, appetite improved  Cardiac: No chest ain or palpitations  Pulmonary: No increased work of breathing    Constitutional:  Temp:  [98.1 °F (36.7 °C)-99.4 °F (37.4 °C)] 99.4 °F (37.4 °C)  Heart Rate:  [71-93] 89  Resp:  [18] 18  BP: (128-155)/(61-82) 155/72    Physical Exam:  General:  Awake, alert resting quietly Appears weak, but in no acute distress  Eyes: PERTL,  HEENT:  No JVD.  Oral mucosa moist, no cyanosis  Respiratory: Respirations regular and unlabored at rest. BBS with good air entry in all fields. No crackles, rubs or wheezes auscultated  Cardiovascular: S1S2 Regular rate and rhythm. No murmur, rub or gallop. No carotid bruits. DP/PT pulses     . No pretibial pitting edema  Gastrointestinal: Abdomen soft, flat, non tender. Bowel sounds present. No hepatosplenomegaly. No ascites  Musculoskeletal: LOVELACE x4. No abnormal movements  Extremities: No digital clubbing or cyanosis  Skin: Color pink. Skin warm and dry to touch. No rashes    Neuro: AAO x3 CN II-XII grossly intact  Psych: Mood and affect normal, pleasant and cooperative          Cardiographics  Telemetry:         ECG 6-20-18:       Echocardiogram: results pending    Lab Review     Results from last 7 days  Lab Units 06/19/18  2357 06/19/18  1837 06/19/18  0645   TROPONIN T ng/mL <0.010 <0.010 0.012     Results from last 7 days  Lab Units 06/19/18  0645   MAGNESIUM mg/dL 2.1     Results from last 7  "days  Lab Units 06/20/18  0720   SODIUM mmol/L 139   POTASSIUM mmol/L 3.5   BUN mg/dL 27*   CREATININE mg/dL 0.70*   CALCIUM mg/dL 8.3*     Results from last 7 days  Lab Units 06/20/18  0719 06/19/18  0643 06/18/18  2046   WBC 10*3/mm3 9.13 13.01* 14.95*   HEMOGLOBIN g/dL 11.9* 13.0* 13.0*   HEMATOCRIT % 35.7* 38.0* 37.8*   PLATELETS 10*3/mm3 114* 91* 71*             Assessment:  - New onset atrial fibrillation with RVR, unknown duration  - elevated [id number]   - HTN  - Generalized weakness with fall  - Esophagitis with distal bleeding, chronic gastritis  - Anorexia  - Nausea/vomiting  - acute on CKD  - Leukocytosis  - DM  - anemia  - thrombocytopenia  - Age indeterminate lumbar compression fractures  - Small, remote right lateral infarct   - h/o carotid disease      Plan:  - New onset atrial fib with RVR, unknown duration -> SR on Lopressor and Cardizem    - elevated [id number] - >/= 6 and HAS BLED score of >/= 5 (hypertension, abnormal LFTs, stroke history, predisposition to bleeding [anemia, thrombocytopenia] and age >65) in addition to lower extremity weakness, gait disturbance,  Falls and EGD=> esophagitis with evidence of bleeding in the distal third, chronic gastritis .    - HTN - 140-150s/80s on low-dose Lopressor and Cardizem       Increase Lopressor, continue Cardizem to maintain sinus rhythm.  Neurosurgery to make final recommendation regarding need for anticoagulation after review of MRI.  Difficult situation: Elevated [id number] and HAS BLED scores (see above) in this elderly patient with lower extremity weakness, falls, evidence of esophageal bleeding, anemia and (improving) thrombocytopenia.  For now, continue enteric-coated low-dose aspirin, DVT prophylaxis    Labs/tests ordered: EKG, medication adjustment    )6/20/2018  Juliana Mccrary MD      EMR Dragon/Transcription:   \"Dictated utilizing Dragon dictation\".     "

## 2018-06-20 NOTE — PLAN OF CARE
Problem: Patient Care Overview  Goal: Plan of Care Review  Outcome: Ongoing (interventions implemented as appropriate)   06/20/18 2681   Coping/Psychosocial   Plan of Care Reviewed With patient   OTHER   Outcome Summary Adequate laryngeal elevation and oral manipulation. Pt demonstrated delayed cough with all consistencies. Can not r/o cough as related to aspiration. REC VFSS to assess safety of diet.

## 2018-06-20 NOTE — MBS/VFSS/FEES
Acute Care - Speech Language Pathology   Swallow Initial Evaluation [facility]     Patient Name: Javier Marin  : 1935  MRN: [id number]  Today's Date: 2018  Onset of Illness/Injury or Date of Surgery: 18     Referring Physician: Michael      Admit Date: 2018    Visit Dx:     ICD-10-CM ICD-9-CM   1. Pain of upper abdomen R10.10 789.09   2. Dehydration E86.0 276.51   3. ROSA (acute kidney injury) N17.9 584.9   4. Esophagus disorder, thickened distal wall CT Scan 18 K22.9 530.9   5. Esophagus disorder K22.9 530.9   6. General weakness R53.1 780.79     Patient Active Problem List   Diagnosis   • Acute gastritis   • Advanced diabetic retinal disease   • Limb swelling   • Diabetes mellitus type 2, uncontrolled   • Hypertension, essential   • Acute hyponatremia   • Acute kidney injury superimposed on chronic kidney disease   • Elevated liver function tests   • Leukocytosis   • Anorexia   • Esophagus disorder, thickened distal wall CT Scan 18   • Granulomatous disease, chronic   • Lung nodule seen on imaging study   • Compression fracture of lumbar spine, non-traumatic   • Lumbar canal stenosis   • Weakness generalized   • Cough   • Hard of hearing   • Ptosis, left eyelid   • Osteoarthritis of multiple joints   • Atrial fibrillation   • Carotid stenosis, asymptomatic, bilateral 16-49% 14   • Remote history of stroke, small caudate nucleus   • Thrombocytopenia   • Fall at home, subsequent encounter   • Hyperlipidemia   • Esophagitis determined by endoscopy by Sheri 18 LA Grade D Lower 1/3   • Hiatal hernia with GERD and esophagitis     Past Medical History:   Diagnosis Date   • Arthritis    • Diabetes mellitus    • Hard of hearing    • Hypertension    • Ptosis of left eyelid      Past Surgical History:   Procedure Laterality Date   • ENDOSCOPY N/A 2018    Procedure: ESOPHAGOGASTRODUODENOSCOPY WITH BIOPSY;  Surgeon: Toribio Wade MD;  Location: [facility] ENDOSCOPY;  Service:  Gastroenterology   • EYE PTOSIS REPAIR Left 11/15/2016    Procedure: LT UPPER LID EYE PTOSIS REPAIR;  Surgeon: Anup Lancaster MD;  Location: [facility] OR [facility] [place];  Service:    • EYE SURGERY     • HIP ARTHROPLASTY     • JOINT REPLACEMENT            SWALLOW EVALUATION (last 72 hours)      SLP Adult Swallow Evaluation     Row Name 06/20/18 1430                   Rehab Evaluation    Document Type evaluation  -SA        Patient Observations alert;cooperative  -SA        Patient Effort good  -SA        Symptoms Noted During/After Treatment none  -SA           General Information    Patient Profile Reviewed yes  -SA        Pertinent History Of Current Problem reflux esophagitis, medium hiatal hernia, CXR negative for PNA, incidental finding of old stroke on CT  -SA        Current Method of Nutrition full liquids   per GI  -SA        Prior Level of Function-Swallowing no diet consistency restrictions  -SA        Plans/Goals Discussed with patient and family  -SA        Barriers to Rehab none identified  -SA        Patient's Goals for Discharge return home  -SA           Pain Assessment    Additional Documentation Pain Scale: Numbers Pre/Post-Treatment (Group)  -SA           Pain Scale: Numbers Pre/Post-Treatment    Pain Scale: Numbers, Pretreatment 0/10 - no pain  -SA        Pain Scale: Numbers, Post-Treatment 0/10 - no pain  -SA           Clinical Swallow Eval    Clinical Swallow Evaluation Summary Adequate laryngeal elevation and oral manipulation.  Pt demonstrated delayed cough with all consistencies.  Can not r/o cough as related to aspiration. REC VFSS to assess safety of diet.  -SA           Clinical Impression    SLP Swallowing Diagnosis suspected pharyngeal dysfunction  -SA        Functional Impact risk of aspiration/pneumonia  -SA        Rehab Potential/Prognosis, Swallowing good, to achieve stated therapy goals  -SA        Criteria for Skilled Therapeutic Interventions Met demonstrates skilled criteria  -SA            Recommendations    Therapy Frequency (Swallow) PRN  -        Predicted Duration Therapy Intervention (Days) until discharge  -        SLP Diet Recommendation full liquid diet  -        Recommended Diagnostics VFSS (MBS)  -        Recommended Precautions and Strategies upright posture during/after eating;small bites of food and sips of liquid  -        SLP Rec. for Method of Medication Administration as tolerated  -        Monitor for Signs of Aspiration yes;notify SLP if any concerns;gurgly voice;cough;pneumonia;right lower lobe infiltrates  -        Anticipated Dischage Disposition unknown  -          User Key  (r) = Recorded By, (t) = Taken By, (c) = Cosigned By    Initials Name Effective Dates     Arely Lazcano, MS CCC-SLP 03/07/18 -         EDUCATION  The patient has been educated in the following areas:   Dysphagia (Swallowing Impairment).    SLP Recommendation and Plan  SLP Swallowing Diagnosis: suspected pharyngeal dysfunction  SLP Diet Recommendation: full liquid diet  Recommended Precautions and Strategies: upright posture during/after eating, small bites of food and sips of liquid     Monitor for Signs of Aspiration: yes, notify SLP if any concerns, gurgly voice, cough, pneumonia, right lower lobe infiltrates  Recommended Diagnostics: VFSS (MBS)  Criteria for Skilled Therapeutic Interventions Met: demonstrates skilled criteria  Anticipated Dischage Disposition: unknown  Rehab Potential/Prognosis, Swallowing: good, to achieve stated therapy goals  Therapy Frequency (Swallow): PRN  Predicted Duration Therapy Intervention (Days): until discharge       Plan of Care Reviewed With: patient  Plan of Care Review  Plan of Care Reviewed With: patient  Outcome Summary: Adequate laryngeal elevation and oral manipulation.  Pt demonstrated delayed cough with all consistencies.  Can not r/o cough as related to aspiration. REC VFSS to assess safety of diet.           SLP Outcome Measures (last 72  hours)      SLP Outcome Measures     Row Name 06/20/18 1600             SLP Outcome Measures    Outcome Measure Used? Adult [facility]         FCM Scores    Swallowing FCM Score 3  -        User Key  (r) = Recorded By, (t) = Taken By, (c) = Cosigned By    Initials Name Effective Dates    SA Arely Lazcano MS CCC-SLP 03/07/18 -            Time Calculation:         Time Calculation- SLP     Row Name 06/20/18 1657             Time Calculation- SLP    SLP Start Time 1430  -      SLP Stop Time 1530  -      SLP Time Calculation (min) 60 min  -      SLP Received On 06/20/18  -        User Key  (r) = Recorded By, (t) = Taken By, (c) = Cosigned By    Initials Name Provider Type    SA Arely Lazcano MS CCC-SLP Speech and Language Pathologist          Therapy Charges for Today     Code Description Service Date Service Provider Modifiers Qty    [id number] HC ST EVAL ORAL PHARYNG SWALLOW 4 6/20/2018 Arely Lazcano MS CCC-SLP GN 1               Arely Lazcano MS CCC-JOSE F  6/20/2018

## 2018-06-20 NOTE — SIGNIFICANT NOTE
06/20/18 0938   Rehab Time/Intention   Evaluation Not Performed patient unavailable for evaluation;other (see comments)  (pt off floor in xray/echo)   Rehab Treatment   Discipline speech language pathologist

## 2018-06-21 ENCOUNTER — APPOINTMENT (OUTPATIENT)
Dept: GENERAL RADIOLOGY | Facility: HOSPITAL | Age: 83
End: 2018-06-21

## 2018-06-21 LAB
ALBUMIN SERPL-MCNC: 2.7 G/DL (ref 3.5–5.2)
ALBUMIN/GLOB SERPL: 1 G/DL
ALP SERPL-CCNC: 107 U/L (ref 39–117)
ALT SERPL W P-5'-P-CCNC: 64 U/L (ref 1–41)
ANION GAP SERPL CALCULATED.3IONS-SCNC: 7.4 MMOL/L
AST SERPL-CCNC: 61 U/L (ref 1–40)
BILIRUB SERPL-MCNC: 0.5 MG/DL (ref 0.1–1.2)
BUN BLD-MCNC: 19 MG/DL (ref 8–23)
BUN/CREAT SERPL: 32.8 (ref 7–25)
CALCIUM SPEC-SCNC: 8.1 MG/DL (ref 8.6–10.5)
CHLORIDE SERPL-SCNC: 105 MMOL/L (ref 98–107)
CO2 SERPL-SCNC: 25.6 MMOL/L (ref 22–29)
CREAT BLD-MCNC: 0.58 MG/DL (ref 0.76–1.27)
DEPRECATED RDW RBC AUTO: 46.2 FL (ref 37–54)
ERYTHROCYTE [DISTWIDTH] IN BLOOD BY AUTOMATED COUNT: 13.7 % (ref 11.5–14.5)
GFR SERPL CREATININE-BSD FRML MDRD: 134 ML/MIN/1.73
GLOBULIN UR ELPH-MCNC: 2.7 GM/DL
GLUCOSE BLD-MCNC: 113 MG/DL (ref 65–99)
GLUCOSE BLDC GLUCOMTR-MCNC: 102 MG/DL (ref 70–130)
GLUCOSE BLDC GLUCOMTR-MCNC: 105 MG/DL (ref 70–130)
GLUCOSE BLDC GLUCOMTR-MCNC: 107 MG/DL (ref 70–130)
GLUCOSE BLDC GLUCOMTR-MCNC: 138 MG/DL (ref 70–130)
HAV IGM SERPL QL IA: NEGATIVE
HBV CORE IGM SERPL QL IA: NEGATIVE
HBV SURFACE AG SERPL QL IA: NEGATIVE
HCT VFR BLD AUTO: 36.8 % (ref 40.4–52.2)
HGB BLD-MCNC: 11.8 G/DL (ref 13.7–17.6)
MCH RBC QN AUTO: 29.6 PG (ref 27–32.7)
MCHC RBC AUTO-ENTMCNC: 32.1 G/DL (ref 32.6–36.4)
MCV RBC AUTO: 92.2 FL (ref 79.8–96.2)
PLATELET # BLD AUTO: 124 10*3/MM3 (ref 140–500)
PMV BLD AUTO: 10.7 FL (ref 6–12)
POTASSIUM BLD-SCNC: 3.7 MMOL/L (ref 3.5–5.2)
PROT SERPL-MCNC: 5.4 G/DL (ref 6–8.5)
RBC # BLD AUTO: 3.99 10*6/MM3 (ref 4.6–6)
SODIUM BLD-SCNC: 138 MMOL/L (ref 136–145)
WBC NRBC COR # BLD: 8.24 10*3/MM3 (ref 4.5–10.7)

## 2018-06-21 PROCEDURE — 97530 THERAPEUTIC ACTIVITIES: CPT

## 2018-06-21 PROCEDURE — 74230 X-RAY XM SWLNG FUNCJ C+: CPT

## 2018-06-21 PROCEDURE — 92611 MOTION FLUOROSCOPY/SWALLOW: CPT

## 2018-06-21 PROCEDURE — 99232 SBSQ HOSP IP/OBS MODERATE 35: CPT | Performed by: NURSE PRACTITIONER

## 2018-06-21 PROCEDURE — 99231 SBSQ HOSP IP/OBS SF/LOW 25: CPT | Performed by: INTERNAL MEDICINE

## 2018-06-21 PROCEDURE — 99232 SBSQ HOSP IP/OBS MODERATE 35: CPT | Performed by: INTERNAL MEDICINE

## 2018-06-21 PROCEDURE — 25010000002 ENOXAPARIN PER 10 MG: Performed by: INTERNAL MEDICINE

## 2018-06-21 PROCEDURE — 63710000001 INSULIN ASPART PER 5 UNITS: Performed by: INTERNAL MEDICINE

## 2018-06-21 PROCEDURE — 82962 GLUCOSE BLOOD TEST: CPT

## 2018-06-21 PROCEDURE — 93010 ELECTROCARDIOGRAM REPORT: CPT | Performed by: INTERNAL MEDICINE

## 2018-06-21 PROCEDURE — 97110 THERAPEUTIC EXERCISES: CPT

## 2018-06-21 PROCEDURE — 85027 COMPLETE CBC AUTOMATED: CPT | Performed by: INTERNAL MEDICINE

## 2018-06-21 PROCEDURE — 93005 ELECTROCARDIOGRAM TRACING: CPT | Performed by: NURSE PRACTITIONER

## 2018-06-21 PROCEDURE — 94799 UNLISTED PULMONARY SVC/PX: CPT

## 2018-06-21 PROCEDURE — 80053 COMPREHEN METABOLIC PANEL: CPT | Performed by: INTERNAL MEDICINE

## 2018-06-21 RX ORDER — ASPIRIN 81 MG/1
81 TABLET, CHEWABLE ORAL DAILY
Status: DISCONTINUED | OUTPATIENT
Start: 2018-06-22 | End: 2018-06-22 | Stop reason: HOSPADM

## 2018-06-21 RX ORDER — DOCUSATE SODIUM 50 MG/5 ML
100 LIQUID (ML) ORAL 2 TIMES DAILY
Status: DISCONTINUED | OUTPATIENT
Start: 2018-06-21 | End: 2018-06-22 | Stop reason: HOSPADM

## 2018-06-21 RX ORDER — LANSOPRAZOLE
30 KIT EVERY MORNING
Status: DISCONTINUED | OUTPATIENT
Start: 2018-06-22 | End: 2018-06-22 | Stop reason: HOSPADM

## 2018-06-21 RX ADMIN — DOCUSATE SODIUM 100 MG: 50 LIQUID ORAL at 21:25

## 2018-06-21 RX ADMIN — ATORVASTATIN CALCIUM 10 MG: 10 TABLET, FILM COATED ORAL at 08:31

## 2018-06-21 RX ADMIN — SUCRALFATE 1 G: 1 SUSPENSION ORAL at 06:36

## 2018-06-21 RX ADMIN — ASPIRIN 81 MG: 81 TABLET, DELAYED RELEASE ORAL at 08:30

## 2018-06-21 RX ADMIN — BARIUM SULFATE 50 ML: 400 SUSPENSION ORAL at 09:45

## 2018-06-21 RX ADMIN — SUCRALFATE 1 G: 1 SUSPENSION ORAL at 23:58

## 2018-06-21 RX ADMIN — INSULIN ASPART 5 UNITS: 100 INJECTION, SOLUTION INTRAVENOUS; SUBCUTANEOUS at 08:30

## 2018-06-21 RX ADMIN — BARIUM SULFATE 700 MG: 700 TABLET ORAL at 09:44

## 2018-06-21 RX ADMIN — METOPROLOL TARTRATE 37.5 MG: 25 TABLET ORAL at 08:31

## 2018-06-21 RX ADMIN — DILTIAZEM HYDROCHLORIDE 120 MG: 120 TABLET, FILM COATED ORAL at 08:30

## 2018-06-21 RX ADMIN — BARIUM SULFATE 4 ML: 980 POWDER, FOR SUSPENSION ORAL at 09:45

## 2018-06-21 RX ADMIN — VITAMIN D, TAB 1000IU (100/BT) 1000 UNITS: 25 TAB at 08:31

## 2018-06-21 RX ADMIN — METOPROLOL TARTRATE 37.5 MG: 25 TABLET ORAL at 21:24

## 2018-06-21 RX ADMIN — ENOXAPARIN SODIUM 40 MG: 100 INJECTION SUBCUTANEOUS at 21:24

## 2018-06-21 RX ADMIN — PANTOPRAZOLE SODIUM 40 MG: 40 TABLET, DELAYED RELEASE ORAL at 06:36

## 2018-06-21 RX ADMIN — SUCRALFATE 1 G: 1 SUSPENSION ORAL at 00:46

## 2018-06-21 RX ADMIN — TAMSULOSIN HYDROCHLORIDE 0.4 MG: 0.4 CAPSULE ORAL at 08:31

## 2018-06-21 RX ADMIN — BARIUM SULFATE 65 ML: 960 POWDER, FOR SUSPENSION ORAL at 09:44

## 2018-06-21 RX ADMIN — DOCUSATE SODIUM 100 MG: 100 CAPSULE, LIQUID FILLED ORAL at 08:32

## 2018-06-21 NOTE — PLAN OF CARE
Problem: Patient Care Overview  Goal: Plan of Care Review  Outcome: Ongoing (interventions implemented as appropriate)   06/21/18 1400   Coping/Psychosocial   Plan of Care Reviewed With patient   Plan of Care Review   Progress improving   OTHER   Outcome Summary Pt increasing with strength and balance with use of RWX and increased amb distance with RWX

## 2018-06-21 NOTE — PROGRESS NOTES
"   LOS: 3 days   Patient Care Team:  Percy Em MD as PCP - General  Percy Em MD as PCP - Family Medicine    Chief Complaint:  Chest pain,  Swallowing issues    Subjective     History of Present Illness  No nausea, eating breakfast,  On full liquids does not want to try anything else  But appears to be tolerating it.   Subjective:  Activity level: Normal.    Pain:  He reports no pain.        History taken from: patient chart    Objective     Vital Signs  Temp:  [98.1 °F (36.7 °C)-99 °F (37.2 °C)] 98.1 °F (36.7 °C)  Heart Rate:  [69-89] 79  Resp:  [18-20] 18  BP: (112-155)/(64-92) 152/85    Objective:  General Appearance:  Comfortable.    Vital signs: (most recent): Blood pressure 152/85, pulse 79, temperature 98.1 °F (36.7 °C), temperature source Oral, resp. rate 18, height 172.7 cm (68\"), weight 71.5 kg (157 lb 11.2 oz), SpO2 96 %.  Vital signs are normal.    Output: Producing urine.    Lungs:  Normal effort.    Abdomen: Abdomen is soft.  There is no abdominal tenderness.               Results Review:     I reviewed the patient's new clinical results.    Medication Review: done    Assessment/Plan     Principal Problem:    Esophagus disorder, thickened distal wall CT Scan 6/18/18  Active Problems:    Acute gastritis    Advanced diabetic retinal disease    Limb swelling    Diabetes mellitus type 2, uncontrolled    Hypertension, essential    Acute hyponatremia    Acute kidney injury superimposed on chronic kidney disease    Elevated liver function tests    Leukocytosis    Anorexia    Granulomatous disease, chronic    Lung nodule seen on imaging study    Compression fracture of lumbar spine, non-traumatic    Lumbar canal stenosis    Weakness generalized    Cough    Hard of hearing    Ptosis, left eyelid    Osteoarthritis of multiple joints    Atrial fibrillation    Carotid stenosis, asymptomatic, bilateral 16-49% 2/24/14    Remote history of stroke, small caudate nucleus    Thrombocytopenia    Fall at " home, subsequent encounter    Hyperlipidemia    Esophagitis determined by endoscopy by Sheri 6/19/18 LA Grade D Lower 1/3    Hiatal hernia with GERD and esophagitis      Assessment:  (Severe esophagitis  gerd  Dysphagia? oropharygneal component. ).     Plan:   (Advance diet when patient wants to   Speech pathology to further evaluated with vss  Stable form gi viewpoint. ).       Toribio Wade MD  06/21/18  12:37 PM    Time: Critical care 12 min

## 2018-06-21 NOTE — PLAN OF CARE
Problem: Patient Care Overview  Goal: Plan of Care Review  Outcome: Ongoing (interventions implemented as appropriate)   06/21/18 0502   Coping/Psychosocial   Plan of Care Reviewed With patient   Plan of Care Review   Progress no change   OTHER   Outcome Summary Prt alert & oriented. VSS. Pt arrived to this unit @ 2112 via transport. Pt continues to randomly cough after PO intake. Pt states he has coughed for about 3 weeks now. Pt scheduled for a video swallow study on 06/21. Denies pain or discomfort. Pt able to voice needs. No acute distress noted this shift. Will continue to monitor.       Problem: Fall Risk (Adult)  Goal: Absence of Fall  Outcome: Ongoing (interventions implemented as appropriate)      Problem: Skin Injury Risk (Adult)  Goal: Skin Health and Integrity  Outcome: Ongoing (interventions implemented as appropriate)      Problem: Nutrition, Imbalanced: Inadequate Oral Intake (Pediatric)  Goal: Identify Related Risk Factors and Signs and Symptoms  Outcome: Outcome(s) achieved Date Met: 06/21/18    Goal: Improved Oral Intake  Outcome: Ongoing (interventions implemented as appropriate)

## 2018-06-21 NOTE — PROGRESS NOTES
82 y.o.   LOS: 3 days   Patient Care Team:  Percy Em MD as PCP - General  Percy Em MD as PCP - Family Medicine    Chief Complaint:  Elevated BG    Chief Complaint   Patient presents with   • Abdominal Pain     can't eat - no nvd     Subjective   Patient diet was advanced to soft diet.  He did not receive any short-acting insulin yesterday as he was not eating.  Blood sugars are tightly controlled.    Interval History:    Review of Systems:   Review of Systems   Constitutional: Positive for appetite change and fatigue.   Eyes: Negative for visual disturbance.   Respiratory: Negative for shortness of breath.    Cardiovascular: Negative for palpitations.   Gastrointestinal: Negative for nausea and vomiting.   Endocrine: Negative for polydipsia and polyuria.   Musculoskeletal: Positive for arthralgias.   Skin: Negative for pallor and wound.   Neurological: Positive for weakness. Negative for numbness.   Psychiatric/Behavioral: Negative for agitation.     Objective     Vital Signs   Temp:  [98.1 °F (36.7 °C)-99 °F (37.2 °C)] 98.1 °F (36.7 °C)  Heart Rate:  [64-79] 64  Resp:  [18-20] 20  BP: (112-152)/(64-92) 119/64    Physical Exam:  Physical Exam   Constitutional: He is oriented to person, place, and time. He appears well-nourished.   HENT:   Head: Normocephalic and atraumatic.   Eyes: Conjunctivae and EOM are normal. No scleral icterus.   Neck: No thyromegaly present.   Cardiovascular: Normal rate and regular rhythm.    Murmur heard.  Pulmonary/Chest: Effort normal and breath sounds normal. No stridor. No respiratory distress. He has no wheezes.   Abdominal: Soft. Bowel sounds are normal. He exhibits no distension. There is no tenderness.   Musculoskeletal: He exhibits no edema or deformity.   Lymphadenopathy:     He has no cervical adenopathy.   Neurological: He is alert and oriented to person, place, and time.   Skin: Skin is warm and dry. No rash noted. He is not diaphoretic.   Psychiatric: He has  a normal mood and affect.   Vitals reviewed.  Results Review:     I reviewed the patient's new clinical results and summarized them in subjective and in plan.      Glucose   Date/Time Value Ref Range Status   06/21/2018 0553 113 (H) 65 - 99 mg/dL Final   06/20/2018 0720 120 (H) 65 - 99 mg/dL Final   06/19/2018 0645 241 (H) 65 - 99 mg/dL Final   06/18/2018 2046 254 (H) 65 - 99 mg/dL Final     Lab Results (last 24 hours)     Procedure Component Value Units Date/Time    POC Glucose Once [[id number]]  (Normal) Collected:  06/21/18 1055    Specimen:  Blood Updated:  06/21/18 1056     Glucose 105 mg/dL     Narrative:       Meter: [id number] : [id number] Nevaeh PIERRE    POC Glucose Once [[id number]]  (Normal) Collected:  06/21/18 0705    Specimen:  Blood Updated:  06/21/18 0706     Glucose 102 mg/dL     Narrative:       Meter: [id number] : [id number] Cody PIERRE    Comprehensive Metabolic Panel [[id number]]  (Abnormal) Collected:  06/21/18 0553    Specimen:  Blood Updated:  06/21/18 0644     Glucose 113 (H) mg/dL      BUN 19 mg/dL      Creatinine 0.58 (L) mg/dL      Sodium 138 mmol/L      Potassium 3.7 mmol/L      Chloride 105 mmol/L      CO2 25.6 mmol/L      Calcium 8.1 (L) mg/dL      Total Protein 5.4 (L) g/dL      Albumin 2.70 (L) g/dL      ALT (SGPT) 64 (H) U/L      AST (SGOT) 61 (H) U/L      Alkaline Phosphatase 107 U/L      Total Bilirubin 0.5 mg/dL      eGFR Non African Amer 134 mL/min/1.73      Globulin 2.7 gm/dL      A/G Ratio 1.0 g/dL      BUN/Creatinine Ratio 32.8 (H)     Anion Gap 7.4 mmol/L     Narrative:       The MDRD GFR formula is only valid for adults with stable renal function between ages 18 and 70.    CBC (No Diff) [[id number]]  (Abnormal) Collected:  06/21/18 0553    Specimen:  Blood Updated:  06/21/18 0625     WBC 8.24 10*3/mm3      RBC 3.99 (L) 10*6/mm3      Hemoglobin 11.8 (L) g/dL      Hematocrit 36.8 (L) %      MCV 92.2 fL      MCH 29.6 pg      MCHC 32.1 (L) g/dL      RDW 13.7 %       RDW-SD 46.2 fl      MPV 10.7 fL      Platelets 124 (L) 10*3/mm3     Hepatitis Diagnostic Profile [[id number]] Collected:  06/20/18 0720    Specimen:  Blood Updated:  06/21/18 0619     Hep A IgM Negative     Hepatitis B Surface Ag Negative     Hep B Core IgM Negative    Narrative:       Performed at:  [facility]  [id number]  : Rodriguez Reddy PhD, Phone:  [phone]    POC Glucose Once [[id number]]  (Abnormal) Collected:  06/20/18 2137    Specimen:  Blood Updated:  06/20/18 2138     Glucose 244 (H) mg/dL     Narrative:       Meter: [id number] : [id number] Cody PIERRE    POC Glucose Once [[id number]]  (Abnormal) Collected:  06/20/18 1656    Specimen:  Blood Updated:  06/20/18 1658     Glucose 195 (H) mg/dL     Narrative:       Meter: [id number] : [id number] Miesha PIERRE        Imaging Results (last 24 hours)     Procedure Component Value Units Date/Time    FL Video Swallow [[id number]] Updated:  06/21/18 0945    MRI Angiogram Neck With & Without Contrast [[id number]] Collected:  06/20/18 2057     Updated:  06/21/18 0854    Narrative:       MR ANGIOGRAM OF THE NECK WITHOUT CONTRAST 06/20/2018     HISTORY: Stroke.     TECHNIQUE: 3-D time-of-flight MR angiography was performed through the  neck without contrast. Source images and maximum intensity projection  images were reviewed.     NASCET criteria was used.     FINDINGS: There is moderately severe narrowing at the origin of the  right internal carotid artery estimated at approximately 70% to 80%  diameter stenosis..     Moderate narrowing at the origin of the right external carotid artery is  seen.     There is minimal narrowing at the origin of the left internal carotid  artery estimated at approximately 20% diameter. Left external carotid  artery appears patent. The bilateral vertebral arteries demonstrate  normal flow signal except for some absent flow signal in the distal  vertebral artery  approximately 2 cm from the basilar artery. This could  be at least partially related to artifact related to direction of flow.       Impression:       1. Moderately severe narrowing at the origin of the right internal  carotid artery with some narrowing at the origin of the right external  carotid artery as well.  2. Minimal narrowing at the origin of the left internal carotid artery.     This report was finalized on 6/21/2018 8:51 AM by Dr. Umesh Velasquez M.D.       MRI Angiogram Head Without Contrast [[id number]] Collected:  06/19/18 2146     Updated:  06/21/18 0244    Narrative:       BRAIN MRA     HISTORY: Stroke; R10.10-Upper abdominal pain, unspecified;  E86.0-Dehydration; N17.9-Acute kidney failure, unspecified;  K22.9-Disease of esophagus, unspecified; K22.9-Disease of esophagus,  unspecified          FINDINGS: Axial 3-D time-of-flight images of the intracranial arterial  circulation centered at the level of the [place] were obtained  without contrast. Reconstruction images were supplemented.     Exam quality is degraded by excessive patient motion. Within these  limitations, the distal vertebrals are patent, left is dominant. Basilar  artery appears to be widely patent. Both posterior cerebrals originate  from the basilar and are grossly unremarkable. Internal carotid arteries  are widely patent bilaterally. Bifurcation regions are grossly  unremarkable. A1 and M1 segments appear to be unremarkable. MCA  trifurcations are grossly unremarkable. There is a small patent anterior  communicating artery present.       Impression:       1. Grossly unremarkable exam considering patient motion.        This report was finalized on 6/21/2018 2:40 AM by Clement Faulkner M.D.       MRI Angiogram Neck Without Contrast [[id number]] Collected:  06/19/18 2144     Updated:  06/21/18 0243    Narrative:       MRA NECK WITHOUT CONTRAST     CLINICAL HISTORY: Ischemia, stenosis.     COMPARISON: [<None>].     FINDINGS:  Axial and coronal source imaging centered about the cervical  carotid bifurcation regions performed without gadolinium.  Multiprojection 3-D MIP reformatted images were supplemented and  reviewed.     Exam quality is compromised by excessive patient motion. No obvious  proximal great vessel stenosis is appreciated. The cervical carotid  arteries bilaterally without an obvious significant stenosis. On the  right side, there is suspicion of moderate narrowing of the proximal  right internal carotid artery best seen on axial source images 113-119.  There is some motion but stenosis estimated at at least 60-70%. Milder  narrowing of the proximal left internal carotid artery, less than 50%.     Vertebral arteries are patent, the left is dominant. NASCET criteria was  employed.                   Impression:       1.  Limited study as above, there is suspicion of moderate narrowing of  the proximal right internal carotid artery. Consider either a repeat  examination or CT angiogram when the patient can fully cooperate or with  conscious sedation provided to better evaluate..     This report was finalized on 6/21/2018 2:40 AM by Clement Faulkner M.D.       MRI Brain Without Contrast [[id number]] Collected:  06/19/18 2143     Updated:  06/21/18 0243    Narrative:       BRAIN MRI WITHOUT GADOLINIUM     HISTORY: Stroke     COMPARISON: None     FINDINGS:  Multiplanar images of the head were obtained without the use  of intravenous contrast.     Motion artifact degrades image quality. Within these limitations, there  is diffuse cortical atrophy. Mild scattered areas of increased T2 and  FLAIR signal abnormality are noted in the periventricular white matter  predominantly and likely related to chronic ischemic gliotic changes.  There are multiple small and old appearing lacunar type infarcts, right  greater than left. No foci of restricted diffusion are demonstrated on  diffusion weighted images (DWI) to suggest acute ischemia.  The  ventricular system is normal in caliber and configuration for age.  Midline structures corpus callosum, pituitary gland, and brainstem are  unremarkable. There is no large extra-axial mass or large extra-axial  fluid collection.          Impression:       1. Atrophy and chronic-appearing parenchymal changes as discussed,  grossly unremarkable exam otherwise considering extensive motion.     This report was finalized on 6/21/2018 2:40 AM by Clement Faulkner M.D.             Medication Review:  done      Current Facility-Administered Medications:   •  acetaminophen (TYLENOL) tablet 650 mg, 650 mg, Oral, Q4H PRN, Felix Rodriguez MD  •  [START ON 6/22/2018] aspirin chewable tablet 81 mg, 81 mg, Oral, Daily, Felix Rodriguez MD  •  atorvastatin (LIPITOR) tablet 10 mg, 10 mg, Oral, Daily, Felix Rodriguez MD, 10 mg at 06/21/18 0831  •  calcium carbonate (TUMS) chewable tablet 500 mg (200 mg elemental), 2 tablet, Oral, TID PRN, Felix Rodriguez MD  •  cholecalciferol (VITAMIN D3) tablet 1,000 Units, 1,000 Units, Oral, Daily, Felix Rodriguez MD, 1,000 Units at 06/21/18 0831  •  dextrose (D50W) solution 25 g, 25 g, Intravenous, Q15 Min PRN, Felix Rodriguez MD  •  dextrose (GLUTOSE) oral gel 15 g, 15 g, Oral, Q15 Min PRN, Felix Rodriguez MD  •  diltiaZEM (CARDIZEM) tablet 120 mg, 120 mg, Oral, QAM, Felix Rodriguez MD, 120 mg at 06/21/18 0830  •  docusate sodium (COLACE) liquid 100 mg, 100 mg, Oral, BID, Felix Rodriguez MD  •  enoxaparin (LOVENOX) syringe 40 mg, 40 mg, Subcutaneous, Q24H, Felix Rodriguez MD, 40 mg at 06/20/18 2149  •  glucagon (human recombinant) (GLUCAGEN DIAGNOSTIC) injection 1 mg, 1 mg, Subcutaneous, PRN, Felix Rodriguez MD  •  HYDROcodone-acetaminophen (NORCO) 5-325 MG per tablet 1 tablet, 1 tablet, Oral, Q4H PRN, Felix Rodriguez MD  •  insulin aspart (novoLOG) injection 0-7 Units, 0-7 Units, Subcutaneous, 4x Daily With Meals & Nightly, Felix Rodriguez MD, 3 Units at 06/20/18 5930  •  insulin  aspart (novoLOG) injection 5 Units, 5 Units, Subcutaneous, TID With Meals, Albertina Daniel MD, 5 Units at 06/21/18 0830  •  insulin detemir (LEVEMIR) injection 12 Units, 12 Units, Subcutaneous, Nightly, Albertina Daniel MD  •  [START ON 6/22/2018] lansoprazole (FIRST) oral suspension 30 mg, 30 mg, Oral, QAM, Felix Rodriguez MD  •  LORazepam (ATIVAN) injection 0.5 mg, 0.5 mg, Intravenous, Q6H PRN, Felix Rodriguez MD  •  metoprolol tartrate (LOPRESSOR) tablet 37.5 mg, 37.5 mg, Oral, Q12H, JERRY Graham, 37.5 mg at 06/21/18 0831  •  morphine injection 1 mg, 1 mg, Intravenous, Q4H PRN **AND** naloxone (NARCAN) injection 0.4 mg, 0.4 mg, Intravenous, Q5 Min PRN, Felix Rodriguez MD  •  nitroglycerin (NITROSTAT) SL tablet 0.4 mg, 0.4 mg, Sublingual, Q5 Min PRN, Felix Rodriguez MD  •  ondansetron (ZOFRAN) injection 4 mg, 4 mg, Intravenous, Q6H PRN, Felix Rodriguez MD  •  Pharmacy to Dose enoxaparin (LOVENOX), , Does not apply, Continuous PRN, Felix Rodriguez MD  •  sodium chloride 0.9 % flush 1-10 mL, 1-10 mL, Intravenous, PRN, Felix Rodriguez MD  •  sodium chloride 0.9 % flush 10 mL, 10 mL, Intravenous, PRN, Anand Mosley MD  •  sodium chloride 0.9 % infusion, 75 mL/hr, Intravenous, Continuous, Felix Rodriguez MD, Last Rate: 75 mL/hr at 06/20/18 2154, 75 mL/hr at 06/20/18 2154  •  sucralfate (CARAFATE) 1 GM/10ML suspension 1 g, 1 g, Oral, Q6H, Toribio Wade MD, 1 g at 06/21/18 0636  •  tamsulosin (FLOMAX) 24 hr capsule 0.4 mg, 0.4 mg, Oral, Daily, Felix Rodriguez MD, 0.4 mg at 06/21/18 0831    Assessment/Plan     Active Hospital Problems (** Indicates Principal Problem)    Diagnosis Date Noted   • **Esophagus disorder, thickened distal wall CT Scan 6/18/18 [K22.9] 06/18/2018   • Esophagitis determined by endoscopy by Sheri 6/19/18 LA Grade D Lower 1/3 [K20.9] 06/20/2018   • Hiatal hernia with GERD and esophagitis [K44.9, K21.0] 06/20/2018   • Hard of hearing [H91.90] 06/19/2018   • Ptosis, left eyelid  "[H02.402] 06/19/2018   • Osteoarthritis of multiple joints [M15.9] 06/19/2018   • Atrial fibrillation [I48.91] 06/19/2018   • Carotid stenosis, asymptomatic, bilateral 16-49% 2/24/14 [I65.23] 06/19/2018   • Remote history of stroke, small caudate nucleus [Z86.73] 06/19/2018   • Thrombocytopenia [D69.6] 06/19/2018   • Fall at home, subsequent encounter [W19.XXXD, Y92.099] 06/19/2018   • Hyperlipidemia [E78.5] 06/19/2018   • Acute gastritis [K29.00] 06/18/2018   • Hypertension, essential [I10] 06/18/2018   • Acute hyponatremia [E87.1] 06/18/2018   • Acute kidney injury superimposed on chronic kidney disease [N17.9, N18.9] 06/18/2018   • Elevated liver function tests [R79.89] 06/18/2018   • Leukocytosis [D72.829] 06/18/2018   • Anorexia [R63.0] 06/18/2018   • Granulomatous disease, chronic [D71] 06/18/2018   • Lung nodule seen on imaging study [R91.1] 06/18/2018   • Compression fracture of lumbar spine, non-traumatic [M48.56XA] 06/18/2018   • Lumbar canal stenosis [M48.061] 06/18/2018   • Weakness generalized [R53.1] 06/18/2018   • Cough [R05] 06/18/2018   • Diabetes mellitus type 2, uncontrolled [E11.65] 06/18/2018   • Limb swelling [M79.89] 06/18/2018   • 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     Type 2 diabetes mellitus decently controlled  Decrease levemir to 12 units at bedtime  Continue NovoLog 5 units with each meal, will place holding parameters.  Continue NovoLog sliding scale 3 times a day before meals and at bedtime.    Hyperlipidemia  Continue Lipitor 10 mg oral daily.  LDL at goal.    TSH levels noted to be within normal limits.    Discussed plan with Nurse.     Albertina Daniel MD.  06/21/18  2:31 PM      [facility] Dragon / transcription disclaimer:    \"Dictated utilizing Dragon dictation\".   "

## 2018-06-21 NOTE — PLAN OF CARE
Problem: Patient Care Overview  Goal: Plan of Care Review  Outcome: Ongoing (interventions implemented as appropriate)   06/21/18 7461   Coping/Psychosocial   Plan of Care Reviewed With patient;spouse   Plan of Care Review   Progress improving   OTHER   Outcome Summary Pt participated in OT treatment. Pt engaged grooming tasks and UE thera ex while seated at EOB w/ good sitting balance. Pt completed supine <> sit @ EOB transition w/ min A - SBA. Pt completed sit <> stand transfer w/ CGA. Pt wife present.

## 2018-06-21 NOTE — NURSING NOTE
Spoke with family about speech/swallow results-ok per MD.  Family does not wish to have any type of enteral feeding.  Dr. Michael choi to order [facility] soft diet with nectar thickened liquids. Patient and spouse educated on dysphagia.

## 2018-06-21 NOTE — THERAPY TREATMENT NOTE
Acute Care - Physical Therapy Treatment Note  [facility]     Patient Name: Javier Marin  : 1935  MRN: [id number]  Today's Date: 2018  Onset of Illness/Injury or Date of Surgery: 18  Date of Referral to PT: 18  Referring Physician: Michael    Admit Date: 2018    Visit Dx:    ICD-10-CM ICD-9-CM   1. Pain of upper abdomen R10.10 789.09   2. Dehydration E86.0 276.51   3. ROSA (acute kidney injury) N17.9 584.9   4. Esophagus disorder, thickened distal wall CT Scan 18 K22.9 530.9   5. Esophagus disorder K22.9 530.9   6. General weakness R53.1 780.79     Patient Active Problem List   Diagnosis   • Acute gastritis   • Advanced diabetic retinal disease   • Limb swelling   • Diabetes mellitus type 2, uncontrolled   • Hypertension, essential   • Acute hyponatremia   • Acute kidney injury superimposed on chronic kidney disease   • Elevated liver function tests   • Leukocytosis   • Anorexia   • Esophagus disorder, thickened distal wall CT Scan 18   • Granulomatous disease, chronic   • Lung nodule seen on imaging study   • Compression fracture of lumbar spine, non-traumatic   • Lumbar canal stenosis   • Weakness generalized   • Cough   • Hard of hearing   • Ptosis, left eyelid   • Osteoarthritis of multiple joints   • Atrial fibrillation   • Carotid stenosis, asymptomatic, bilateral 16-49% 14   • Remote history of stroke, small caudate nucleus   • Thrombocytopenia   • Fall at home, subsequent encounter   • Hyperlipidemia   • Esophagitis determined by endoscopy by Sheri 18 LA Grade D Lower /3   • Hiatal hernia with GERD and esophagitis       Therapy Treatment          Rehabilitation Treatment Summary     Row Name 18 1300             Treatment Time/Intention    Discipline physical therapy assistant  -CW      Document Type therapy note (daily note)  -CW      Subjective Information complains of;weakness;fatigue  -CW      Mode of Treatment physical therapy  -CW      Therapy  Frequency (PT Clinical Impression) daily  -CW      Patient Effort good  -CW      Existing Precautions/Restrictions fall  -CW      Recorded by [CW] Giacomo Ayala, [facility] 06/21/18 1400      Row Name 06/21/18 1300             Vital Signs    O2 Delivery Pre Treatment room air  -CW      Recorded by [CW] Giacomo Ayala, [facility] 06/21/18 1400      Row Name 06/21/18 1300             Cognitive Assessment/Intervention- PT/OT    Orientation Status (Cognition) oriented x 3  -CW      Follows Commands (Cognition) follows one step commands;over 90% accuracy;verbal cues/prompting required  -CW      Safety Deficit (Cognitive) mild deficit  -CW      Personal Safety Interventions fall prevention program maintained;gait belt;muscle strengthening facilitated;nonskid shoes/slippers when out of bed  -CW      Recorded by [CW] Giacomo Ayala, [facility] 06/21/18 1400      Row Name 06/21/18 1300             Bed Mobility Assessment/Treatment    Bed Mobility Assessment/Treatment supine-sit;sit-supine  -CW      Supine-Sit [place] (Bed Mobility) supervision  -CW      Sit-Supine [place] (Bed Mobility) supervision  -CW      Recorded by [CW] Giacomo Ayala, [facility] 06/21/18 1400      Row Name 06/21/18 1300             Transfer Assessment/Treatment    Transfer Assessment/Treatment sit-stand transfer;stand-sit transfer  -CW      Sit-Stand [place] (Transfers) contact guard  -CW      Stand-Sit [place] (Transfers) contact guard  -CW      Recorded by [CW] Giacomo Ayala, [facility] 06/21/18 1400      Row Name 06/21/18 1300             Sit-Stand Transfer    Assistive Device (Sit-Stand Transfers) walker, front-wheeled  -CW      Recorded by [CW] Giacomo Ayala, PTA 06/21/18 1400      Row Name 06/21/18 1300             Stand-Sit Transfer    Assistive Device (Stand-Sit Transfers) walker, front-wheeled  -CW      Recorded by [CW] Giacomo Ayala, [facility] 06/21/18 1400      Row Name 06/21/18 1300             Gait/Stairs Assessment/Training     [place] Level (Gait) contact guard  -CW      Assistive Device (Gait) walker, front-wheeled  -CW      Distance in Feet (Gait) 100  -CW      Pattern (Gait) step-through  -CW      Deviations/Abnormal Patterns (Gait) oneil decreased;gait speed decreased;stride length decreased  -CW      Recorded by [CW] Giacomo Ayala, [facility] 06/21/18 1400      Row Name 06/21/18 1300             Positioning and Restraints    Pre-Treatment Position in bed  -CW      Post Treatment Position bed  -CW      In Bed notified nsg;fowlers;call light within reach;encouraged to call for assist;exit alarm on;with family/caregiver  -CW      Recorded by [] Giacomo Ayala, [facility] 06/21/18 1400      Row Name 06/21/18 1300             Outcome Summary/Treatment Plan (PT)    Anticipated Discharge Disposition (PT) home with home health  -CW      Recorded by [] Giacomo Ayala, [facility] 06/21/18 1400        User Key  (r) = Recorded By, (t) = Taken By, (c) = Cosigned By    Initials Name Effective Dates Discipline     Giacomo Ayala, [facility] 03/07/18 -  PT                     Physical Therapy Education     Title: PT OT SLP Therapies (Done)     Topic: Physical Therapy (Done)     Point: Mobility training (Done)    Learning Progress Summary     Learner Status Readiness Method Response Comment Documented by    Patient Done Acceptance E,TB VU,MICHAELA   06/21/18 1400     Done Acceptance E,TB,D VU,NR   06/20/18 1130    Significant Other Done Acceptance E,TB,D VU,NR   06/20/18 1130          Point: Home exercise program (Done)    Learning Progress Summary     Learner Status Readiness Method Response Comment Documented by    Patient Done Acceptance E,TB VU,MICHAELA   06/21/18 1400     Done Acceptance E,TB,D VU,NR   06/20/18 1130    Significant Other Done Acceptance E,TB,D VU,NR   06/20/18 1130                      User Key     Initials Effective Dates Name Provider Type Discipline     04/03/18 -  Bethanie Garcia, PT Physical Therapist PT     03/07/18 -   Giacomo Ayala, PTA Physical Therapy Assistant PT                    PT Recommendation and Plan  Anticipated Discharge Disposition (PT): home with home health  Therapy Frequency (PT Clinical Impression): daily  Outcome Summary/Treatment Plan (PT)  Anticipated Discharge Disposition (PT): home with home health  Plan of Care Reviewed With: patient  Progress: improving  Outcome Summary: Pt increasing with strength and balance with use of RWX and increased amb distance with RWX          Outcome Measures     Row Name 06/21/18 1400 06/20/18 1533 06/20/18 1100       How much help from another person do you currently need...    Turning from your back to your side while in flat bed without using bedrails? 4  -CW  -- 4  -SV    Moving from lying on back to sitting on the side of a flat bed without bedrails? 4  -CW  -- 4  -SV    Moving to and from a bed to a chair (including a wheelchair)? 3  -CW  -- 3  -SV    Standing up from a chair using your arms (e.g., wheelchair, bedside chair)? 3  -CW  -- 3  -SV    Climbing 3-5 steps with a railing? 2  -CW  -- 2  -SV    To walk in hospital room? 3  -CW  -- 3  -SV    AM-PAC 6 Clicks Score 19  -CW  -- 19  -SV       How much help from another is currently needed...    Putting on and taking off regular lower body clothing?  -- 3  -KP  --    Bathing (including washing, rinsing, and drying)  -- 3  -KP  --    Toileting (which includes using toilet bed pan or urinal)  -- 3  -KP  --    Putting on and taking off regular upper body clothing  -- 3  -KP  --    Taking care of personal grooming (such as brushing teeth)  -- 3  -KP  --    Eating meals  -- 3  -KP  --    Score  -- 18  -KP  --       Functional Assessment    Outcome Measure Options AM-PAC 6 Clicks Basic Mobility (PT)  -CW AM-PAC 6 Clicks Daily Activity (OT)  -KP AM-PAC 6 Clicks Basic Mobility (PT)  -SV      User Key  (r) = Recorded By, (t) = Taken By, (c) = Cosigned By    Initials Name Provider Type     JIN Araiza  Occupational Therapist    SV Bethanie Garcia, PT Physical Therapist    DYLON Ayala PTA Physical Therapy Assistant           Time Calculation:         PT Charges     Row Name 06/21/18 1402             Time Calculation    Start Time 1347  -CW      Stop Time 1402  -CW      Time Calculation (min) 15 min  -CW      PT Received On 06/21/18  -CW      PT - Next Appointment 06/22/18  -CW        User Key  (r) = Recorded By, (t) = Taken By, (c) = Cosigned By    Initials Name Provider Type    DYLON Ayala PTA Physical Therapy Assistant        Therapy Suggested Charges     Code   Minutes Charges    None           Therapy Charges for Today     Code Description Service Date Service Provider Modifiers Qty    57642479697 HC PT THER PROC EA 15 MIN 6/21/2018 Giacomo Ayala PTA GP 1    83139697698 HC PT THER SUPP EA 15 MIN 6/21/2018 Giacomo Ayala PTA GP 1          PT G-Codes  Outcome Measure Options: AM-PAC 6 Clicks Basic Mobility (PT)    Giacomo Ayala PTA  6/21/2018

## 2018-06-21 NOTE — DISCHARGE PLACEMENT REQUEST
"Radha Santos (82 y.o. Male)     Date of Birth Social Security Number Address Home Phone MRN    1935  [facility] [place] [address] [phone] [id number]    Gnosticist Marital Status          Religion        Admission Date Admission Type Admitting Provider Attending Provider Department, Room/Bed    6/18/18 Emergency Felix Rodriguez MD Haney, William H, MD [address], 536/1    Discharge Date Discharge Disposition Discharge Destination                       Attending Provider:  Felix Rodriguez MD    Allergies:  No Known Allergies    Isolation:  None   Infection:  None   Code Status:  CPR    Ht:  172.7 cm (68\")   Wt:  71.5 kg (157 lb 11.2 oz)    Admission Cmt:  None   Principal Problem:  Esophagus disorder, thickened distal wall CT Scan 6/18/18 [K22.9]                 Active Insurance as of 6/18/2018     Primary Coverage     Payor Plan Insurance Group Employer/Plan Group    MEDICARE MEDICARE A & B      Payor Plan Address Payor Plan Phone Number Effective From Effective To    [address] [phone] 10/1/2000     [facility] [id number]       Subscriber Name Subscriber Birth Date Member ID       RADHA SANTOS 1935 [id number]           Secondary Coverage     Payor Plan Insurance Group Employer/Plan Group    [facility] SUPP [id number]     Payor Plan Address Payor Plan Phone Number Effective From Effective To    [address]  1/1/2007     [facility] [id number]       Subscriber Name Subscriber Birth Date Member ID       RADHA SANTOS 1935 [id number]                 Emergency Contacts      (Rel.) Home Phone Work Phone Mobile Phone    SantosOlga (Spouse) [phone] -- --              "

## 2018-06-21 NOTE — NURSING NOTE
Pt arrived to  via transport. Assisted to be by staff. No acute distress noted. Will continue to monitor.

## 2018-06-21 NOTE — MBS/VFSS/FEES
Acute Care - Speech Language Pathology   Swallow Initial Evaluation [facility]     Patient Name: Javier Marin  : 1935  MRN: [id number]  Today's Date: 2018  Onset of Illness/Injury or Date of Surgery: 18     Referring Physician: Michael      Admit Date: 2018    Visit Dx:     ICD-10-CM ICD-9-CM   1. Pain of upper abdomen R10.10 789.09   2. Dehydration E86.0 276.51   3. ROSA (acute kidney injury) N17.9 584.9   4. Esophagus disorder, thickened distal wall CT Scan 18 K22.9 530.9   5. Esophagus disorder K22.9 530.9   6. General weakness R53.1 780.79     Patient Active Problem List   Diagnosis   • Acute gastritis   • Advanced diabetic retinal disease   • Limb swelling   • Diabetes mellitus type 2, uncontrolled   • Hypertension, essential   • Acute hyponatremia   • Acute kidney injury superimposed on chronic kidney disease   • Elevated liver function tests   • Leukocytosis   • Anorexia   • Esophagus disorder, thickened distal wall CT Scan 18   • Granulomatous disease, chronic   • Lung nodule seen on imaging study   • Compression fracture of lumbar spine, non-traumatic   • Lumbar canal stenosis   • Weakness generalized   • Cough   • Hard of hearing   • Ptosis, left eyelid   • Osteoarthritis of multiple joints   • Atrial fibrillation   • Carotid stenosis, asymptomatic, bilateral 16-49% 14   • Remote history of stroke, small caudate nucleus   • Thrombocytopenia   • Fall at home, subsequent encounter   • Hyperlipidemia   • Esophagitis determined by endoscopy by Sheri 18 LA Grade D Lower 1/3   • Hiatal hernia with GERD and esophagitis     Past Medical History:   Diagnosis Date   • Arthritis    • Diabetes mellitus    • Hard of hearing    • Hypertension    • Ptosis of left eyelid      Past Surgical History:   Procedure Laterality Date   • ENDOSCOPY N/A 2018    Procedure: ESOPHAGOGASTRODUODENOSCOPY WITH BIOPSY;  Surgeon: Toribio Wade MD;  Location: [facility] ENDOSCOPY;  Service:  Gastroenterology   • EYE PTOSIS REPAIR Left 11/15/2016    Procedure: LT UPPER LID EYE PTOSIS REPAIR;  Surgeon: Anup Lancaster MD;  [place] [facility]  Service:    • EYE SURGERY     • HIP ARTHROPLASTY     • JOINT REPLACEMENT            SWALLOW EVALUATION (last 72 hours)      SLP Adult Swallow Evaluation     Row Name 06/21/18 1000 06/20/18 1890                Rehab Evaluation    Document Type evaluation  -JT evaluation  -SA       Subjective Information no complaints  -JT  --       Patient Observations alert;cooperative;agree to therapy  -JT alert;cooperative  -SA       Patient/Family Observations pt upright on stretcher  -JT  --       Patient Effort good  -JT good  -SA       Comment pt [place]  -JT  --       Symptoms Noted During/After Treatment none  -JT none  -SA          General Information    Patient Profile Reviewed  -- yes  -SA       Pertinent History Of Current Problem  -- reflux esophagitis, medium hiatal hernia, CXR negative for PNA, incidental finding of old stroke on CT  -SA       Current Method of Nutrition  -- full liquids   per GI  -SA       Prior Level of Function-Swallowing  -- no diet consistency restrictions  -SA       Plans/Goals Discussed with  -- patient and family  -       Barriers to Rehab  -- none identified  -SA       Patient's Goals for Discharge  -- return home  -          Pain Assessment    Additional Documentation  -- Pain Scale: Numbers Pre/Post-Treatment (Group)  -SA          Pain Scale: Numbers Pre/Post-Treatment    Pain Scale: Numbers, Pretreatment 0/10 - no pain  -JT 0/10 - no pain  -SA       Pain Scale: Numbers, Post-Treatment 0/10 - no pain  -JT 0/10 - no pain  -SA          Clinical Swallow Eval    Clinical Swallow Evaluation Summary  -- Adequate laryngeal elevation and oral manipulation.  Pt demonstrated delayed cough with all consistencies.  Can not r/o cough as related to aspiration. REC VFSS to assess safety of diet.  -SA          MBS/VFSS    Utensils Used  spoon;cup;straw  -JT  --       Consistencies Trialed regular textures;soft textures;chopped;pureed;thin liquids;nectar/syrup-thick liquids;barium pill  -JT  --          MBS/VFSS Interpretation    Oral Prep Phase impaired oral phase of swallowing  -JT  --       Oral Transit Phase impaired  -JT  --       Oral Residue impaired  -JT  --       VFSS Summary Pt w/reduced bolus prep, weak hyolaryngeal function, decreased airway protectioin, mild-mod diffuse pharyngeal residue, and pen w/suspected aspiration of most consistencies. Shoulder obs throughout study limiting view. Pt at risk of asp with all consistencies.   -JT  --          Oral Preparatory Phase    Oral Preparatory Phase prolonged manipulation;inadequate manipulation;bolus removed from mouth manually  -JT  --       Prolonged Manipulation mixed consistency;mechanical soft;regular textures;pudding/puree;secondary to reduced lingual strength;secondary to reduced lingual range of motion;other (see comments)   edentulous, dentures not present/not used  -JT  --       Inadequate Manipulation mixed consistency;mechanical soft;regular textures;secondary to reduced lingual range of motion;secondary to reduced lingual strength;secondary to reduced labial seal  -JT  --       Bolus Removed from Mouth Manually other (see comments)   barium pill; could not swallow w/thin or puree  -JT  --          Oral Transit Phase    Impaired Oral Transit Phase tongue pumping;premature spillage of liquids into pharynx;other (see comments)   incoordination  -JT  --       Tongue Pumping thin liquids;nectar-thick liquids  -JT  --       Premature Spillage of Liquids into Pharynx all consistencies tested;discoordination of lingual movement  -JT  --          Oral Residue    Impaired Oral Residue floor of mouth residue;diffuse residue throughout oral cavity  -JT  --       Floor of Mouth Residue all consistencies tested  -JT  --       Diffuse Residue throughout Oral Cavity all consistencies tested   -JT  --       Response to Oral Residue cleared residue;with cued swallow;with liquid wash  -JT  --          Initiation of Pharyngeal Swallow    Initiation of Pharyngeal Swallow bolus in valleculae;bolus in pyriform sinuses  -JT  --       Pharyngeal Phase impaired pharyngeal phase of swallowing  -JT  --       Penetration During the Swallow thin liquids;nectar-thick liquids;pudding/puree;mixed consistency;mechanical soft;regular textures;all consistencies tested;secondary to reduced vestibular closure;secondary to delayed swallow initiation or mistiming   posterior pen   -JT  --       Aspiration After the Swallow thin liquids;pudding/puree;mixed consistency;regular textures;secondary to residue;in pyriform sinuses   suspected w/all; difficult to view due to shoulder obs  -JT  --       Response to Penetration inconsistent response;cough  -JT  --       Response to Aspiration cough  -JT  --       Pharyngeal Residue all consistencies tested;valleculae;pyriform sinuses;posterior pharyngeal wall;secondary to reduced posterior pharyngeal wall stripping;secondary to reduced laryngeal elevation;secondary to reduced hyolaryngeal excursion;secondary to reduced base of tongue retraction;diffuse within pharynx  -JT  --       Response to Residue cleared residue with cued swallow;cleared residue with liquid wash  -JT  --       Attempted Compensatory Maneuvers bolus size;additional subsequent swallow;multiple swallows;alternative liquids/solids  -JT  --       Response to Attempted Compensatory Maneuvers reduced residue  -JT  --          Esophageal Phase    Esophageal Phase esophageal retention;esophageal retention with retrograde flow below PES;esophageal retention with retrograde flow through PES;minimal to no esophageal clearance;irregular barium column;see radiology report for further details  -JT  --          SLP Communication to Radiology    Severity Level of Dysphagia mod-severe dysphagia;oral dysfunction;pharyngeal  dysfunction;suspected esophageal dysfunction  -JT  --       Consistencies Aspirated/Penetrated penetrated;nectar-thick liquids;penetrated and aspirated;thin liquids;mixed consistency;pudding/puree;regular textures  -JT  --       Summary Statement Pt w/mod-severe oropharyngeal dysphagia with esophageal component. Difficult view due to shoulder obs. Post pen noted during swallow w/all consistencies, and trace asp suspected with thin, puree, mixed, and reg consistencies, from pyriform residue. Pt w/esophageal dysmotility with reduced clearance, backflow into pharynx, retrograde movement throughout esophagus, and irregular appearance of esophagus. Pt at risk to asp all consistencies.   -JT  --          Clinical Impression    SLP Swallowing Diagnosis  -- suspected pharyngeal dysfunction  -SA       Functional Impact  -- risk of aspiration/pneumonia  -SA       Rehab Potential/Prognosis, Swallowing  -- good, to achieve stated therapy goals  -SA       Criteria for Skilled Therapeutic Interventions Met  -- demonstrates skilled criteria  -SA          Recommendations    Therapy Frequency (Swallow) PRN  -JT PRN  -SA       Predicted Duration Therapy Intervention (Days) until discharge  -JT until discharge  -SA       SLP Diet Recommendation NPO;temporary alternate methods of nutrition/hydration;water between meals after oral care, with supervision;ice chips between meals after oral care, with supervision   if PO preferred, rec mech soft, no mixed, w/NTL; meds crushe  -JT full liquid diet  -SA       Recommended Diagnostics FEES  -JT VFSS (MBS)  -SA       Recommended Precautions and Strategies  -- upright posture during/after eating;small bites of food and sips of liquid  -SA       SLP Rec. for Method of Medication Administration meds via alternate route;meds crushed;with thick liquids;as tolerated  -JT as tolerated  -SA       Monitor for Signs of Aspiration yes;notify SLP if any concerns  -JT yes;notify SLP if any concerns;gurgly  voice;cough;pneumonia;right lower lobe infiltrates  -SA       Anticipated Dischage Disposition unknown;anticipate therapy at next level of care  -JT unknown  -SA       Demonstrates Need for Referral to Another Service otolaryngology (ENT)   cricopharyngeal dysfunction  -JT  --          Swallow Goals (SLP)    Oral Nutrition/Hydration Goal Selection (SLP) oral nutrition/hydration, SLP goal 1  -JT  --          Oral Nutrition/Hydration Goal 1 (SLP)    Oral Nutrition/Hydration Goal 1, SLP Improve swallowing to be able to take some PO.  -JT  --       Time Frame (Oral Nutrition/Hydration Goal 1, SLP) by discharge  -JT  --         User Key  (r) = Recorded By, (t) = Taken By, (c) = Cosigned By    Initials Name Effective Dates    SA Arely Neno, MS CCC-SLP 03/07/18 -     JT Alexnadra Romero 03/07/18 -         EDUCATION  The patient has been educated in the following areas:   Dysphagia (Swallowing Impairment).    SLP Recommendation and Plan     SLP Diet Recommendation: NPO, temporary alternate methods of nutrition/hydration, water between meals after oral care, with supervision, ice chips between meals after oral care, with supervision (if PO preferred, rec mech soft, no mixed, w/NTL; meds crushe)        Monitor for Signs of Aspiration: yes, notify SLP if any concerns  Recommended Diagnostics: FEES     Anticipated Dischage Disposition: unknown, anticipate therapy at next level of care     Therapy Frequency (Swallow): PRN  Predicted Duration Therapy Intervention (Days): until discharge  Demonstrates Need for Referral to Another Service: otolaryngology (ENT) (cricopharyngeal dysfunction)    Plan of Care Reviewed With: patient  Plan of Care Review  Plan of Care Reviewed With: patient  Progress: no change  Outcome Summary: Pt completed VFSS w/limited view. Suspected asp w/mult consistencies, risk with all consistencies. rec NPO w/FEES 6/22. ice/water with oral care. meds alt route.           SLP GOALS     Row Name 06/21/18  1000             Oral Nutrition/Hydration Goal 1 (SLP)    Oral Nutrition/Hydration Goal 1, SLP Improve swallowing to be able to take some PO.  -JT      Time Frame (Oral Nutrition/Hydration Goal 1, SLP) by discharge  -JT        User Key  (r) = Recorded By, (t) = Taken By, (c) = Cosigned By    Initials Name Provider Type    JT Alexandra Romero Speech and Language Pathologist             SLP Outcome Measures (last 72 hours)      SLP Outcome Measures     Row Name 06/21/18 1300 06/20/18 1600          SLP Outcome Measures    Outcome Measure Used? Adult NOMS  -JT Adult NOMS  -SA        FCM Scores    FCM Chosen Swallowing  -JT  --     Swallowing FCM Score 1  -JT 3  -SA       User Key  (r) = Recorded By, (t) = Taken By, (c) = Cosigned By    Initials Name Effective Dates    SA Arely Lazcano, MS CCC-SLP 03/07/18 -     JT Alexandra Romero 03/07/18 -            Time Calculation:         Time Calculation- SLP     Row Name 06/21/18 1308             Time Calculation- SLP    SLP Start Time 0830  -JT      SLP Stop Time 1030  -JT      SLP Time Calculation (min) 120 min  -JT      SLP Received On 06/21/18  -JT        User Key  (r) = Recorded By, (t) = Taken By, (c) = Cosigned By    Initials Name Provider Type    LAURA Romero Speech and Language Pathologist          Therapy Charges for Today     Code Description Service Date Service Provider Modifiers Qty    79155437252 HC ST MOTION FLUORO EVAL SWALLOW 8 6/21/2018 Alexandra Romero GN 1               Alexandra Romero  6/21/2018

## 2018-06-21 NOTE — THERAPY TREATMENT NOTE
Acute Care - Occupational Therapy Treatment Note  [facility]     Patient Name: Javier Marin  : 1935  MRN: [id number]  Today's Date: 2018  Onset of Illness/Injury or Date of Surgery: 18     Referring Physician: Michael    Admit Date: 2018       ICD-10-CM ICD-9-CM   1. Pain of upper abdomen R10.10 789.09   2. Dehydration E86.0 276.51   3. ROSA (acute kidney injury) N17.9 584.9   4. Esophagus disorder, thickened distal wall CT Scan 18 K22.9 530.9   5. Esophagus disorder K22.9 530.9   6. General weakness R53.1 780.79     Patient Active Problem List   Diagnosis   • Acute gastritis   • Advanced diabetic retinal disease   • Limb swelling   • Diabetes mellitus type 2, uncontrolled   • Hypertension, essential   • Acute hyponatremia   • Acute kidney injury superimposed on chronic kidney disease   • Elevated liver function tests   • Leukocytosis   • Anorexia   • Esophagus disorder, thickened distal wall CT Scan 18   • Granulomatous disease, chronic   • Lung nodule seen on imaging study   • Compression fracture of lumbar spine, non-traumatic   • Lumbar canal stenosis   • Weakness generalized   • Cough   • Hard of hearing   • Ptosis, left eyelid   • Osteoarthritis of multiple joints   • Atrial fibrillation   • Carotid stenosis, asymptomatic, bilateral 16-49% 14   • Remote history of stroke, small caudate nucleus   • Thrombocytopenia   • Fall at home, subsequent encounter   • Hyperlipidemia   • Esophagitis determined by endoscopy by Sheri 18 LA Grade D Lower 1/3   • Hiatal hernia with GERD and esophagitis     Past Medical History:   Diagnosis Date   • Arthritis    • Diabetes mellitus    • Hard of hearing    • Hypertension    • Ptosis of left eyelid      Past Surgical History:   Procedure Laterality Date   • ENDOSCOPY N/A 2018    Procedure: ESOPHAGOGASTRODUODENOSCOPY WITH BIOPSY;  Surgeon: Toribio Wade MD;  Location: [facility] ENDOSCOPY;  Service: Gastroenterology   • EYE PTOSIS  REPAIR Left 11/15/2016    Procedure: LT UPPER LID EYE PTOSIS REPAIR;  Surgeon: Anup Lancaster MD;  Location: [facility] [place];  Service:    • EYE SURGERY     • HIP ARTHROPLASTY     • JOINT REPLACEMENT         Therapy Treatment          Rehabilitation Treatment Summary     Row Name 06/21/18 1348 06/21/18 1300          Treatment Time/Intention    Discipline occupational therapist  -RP physical therapy assistant  -CW     Document Type therapy note (daily note)  -RP therapy note (daily note)  -CW     Subjective Information no complaints  -RP complains of;weakness;fatigue  -CW     Mode of Treatment occupational therapy  -RP physical therapy  -CW     Patient/Family Observations Pt received semi-supine in bed and agreeable to therapy; pt wife and family present at conclusion of OT session  -RP  --     Care Plan Review evaluation/treatment results reviewed;care plan/treatment goals reviewed;patient/other agree to care plan  -RP  --     Care Plan Review, Other Participant(s) spouse  -RP  --     Therapy Frequency (PT Clinical Impression)  -- daily  -CW     Patient Effort good  -RP good  -CW     Existing Precautions/Restrictions fall  -RP fall  -CW     Recorded by [RP] Leila Pandey, OT 06/21/18 1507 [CW] Giacomo Ayala, [facility] 06/21/18 1400     Row Name 06/21/18 1300             Vital Signs    O2 Delivery Pre Treatment room air  -CW      Recorded by [CW] Giacomo Ayala, [facility] 06/21/18 1400      Row Name 06/21/18 1348 06/21/18 1300          Cognitive Assessment/Intervention- PT/OT    Orientation Status (Cognition) oriented x 3  -RP oriented x 3  -CW     Follows Commands (Cognition) follows one step commands;over 90% accuracy;verbal cues/prompting required  -RP follows one step commands;over 90% accuracy;verbal cues/prompting required  -CW     Safety Deficit (Cognitive) mild deficit  -RP mild deficit  -CW     Personal Safety Interventions fall prevention program maintained;gait belt;nonskid shoes/slippers when out of  bed;muscle strengthening facilitated  -RP fall prevention program maintained;gait belt;muscle strengthening facilitated;nonskid shoes/slippers when out of bed  -CW     Recorded by [RP] Leila Pandey, OT 06/21/18 1507 [CW] Giacomo Ayala, PTA 06/21/18 1400     Row Name 06/21/18 1348 06/21/18 1300          Bed Mobility Assessment/Treatment    Bed Mobility Assessment/Treatment supine-sit;sit-supine;scooting/bridging  -RP supine-sit;sit-supine  -CW     Scooting/Bridging Garfield (Bed Mobility) supervision  -RP  --     Supine-Sit Gar[place] (Bed Mobility) minimum assist (75% patient effort);verbal cues  -RP supervision  -CW     Sit-Supine [place]field (Bed Mobility) minimum assist (75% patient effort);verbal cues  -RP supervision  -CW     Bed Mobility, Safety Issues decreased use of arms for pushing/pulling  -RP  --     Assistive Device (Bed Mobility) bed rails;head of bed elevated  -RP  --     Recorded by [RP] Leila Pandey, OT 06/21/18 1507 [CW] Giacomo Ayala, PTA 06/21/18 1400     Row Name 06/21/18 1348 06/21/18 1300          Transfer Assessment/Treatment    Transfer Assessment/Treatment sit-stand transfer;stand-sit transfer  -RP sit-stand transfer;stand-sit transfer  -CW     Sit-Stand Garfield (Transfers) contact guard;verbal cues  -RP contact guard  -CW     Stand-Sit [place] (Transfers) contact guard;verbal cues  -RP contact guard  -CW     Recorded by [RP] Leila Pandey, OT 06/21/18 1507 [CW] Giacomo Ayala, PTA 06/21/18 1400     Row Name 06/21/18 1300             Sit-Stand Transfer    Assistive Device (Sit-Stand Transfers) walker, front-wheeled  -CW      Recorded by [CW] Giacomo Ayala, PTA 06/21/18 1400      Row Name 06/21/18 1300             Stand-Sit Transfer    Assistive Device (Stand-Sit Transfers) walker, front-wheeled  -CW      Recorded by [CW] Giacomo Ayala, PTA 06/21/18 1400      Row Name 06/21/18 1300             Gait/Stairs Assessment/Training    Garfield Level  (Gait) contact guard  -CW      Assistive Device (Gait) walker, front-wheeled  -CW      Distance in Feet (Gait) 100  -CW      Pattern (Gait) step-through  -CW      Deviations/Abnormal Patterns (Gait) oneil decreased;gait speed decreased;stride length decreased  -CW      Recorded by [CW] Giacomo Ayala, KRUPA 06/21/18 1400      Row Name 06/21/18 1348             ADL Assessment/Intervention    BADL Assessment/Intervention grooming  -RP      Recorded by [RP] Leila Pandey, OT 06/21/18 1507      Row Name 06/21/18 1348             Grooming Assessment/Training    Lockhart Level (Grooming) hair care, combing/brushing;set up  -RP      Grooming Position edge of bed sitting  -RP      Recorded by [RP] Leila Pandey, PASCUAL 06/21/18 1507      Row Name 06/21/18 1348             Motor Skills Assessment/Interventions    Additional Documentation Therapeutic Exercise (Group)  -RP      Recorded by [RP] Leila Pandey, OT 06/21/18 1507      Row Name 06/21/18 1348             Therapeutic Exercise    Upper Extremity (Therapeutic Exercise) wand exercises  -RP      Weight/Resistance (Therapeutic Exercise) yellow  -RP      Exercise Type (Therapeutic Exercise) resistive exercises  -RP      Position (Therapeutic Exercise) seated  -RP      Sets/Reps (Therapeutic Exercise) 10 reps x 2 sets  -RP      Equipment (Therapeutic Exercise) resistive bands  -RP      Expected Outcome (Therapeutic Exercise) improve functional tolerance, self-care activity;improve performance, BADLs;improve performance, transfer skills  -RP      Recorded by [RP] Leila Pandey, OT 06/21/18 1507      Row Name 06/21/18 1348             Static Sitting Balance    Level of Lockhart (Unsupported Sitting, Static Balance) supervision  -RP      Sitting Position (Unsupported Sitting, Static Balance) sitting on edge of bed  -RP      Time Able to Maintain Position (Unsupported Sitting, Static Balance) more than 5 minutes  -RP      Recorded by [RP] Leila Pandey, OT 06/21/18  1507      Row Name 06/21/18 1348             Static Standing Balance    Level of Kay (Supported Standing, Static Balance) contact guard assist  -RP      Time Able to Maintain Position (Supported Standing, Static Balance) 2 to 3 minutes  -RP      Recorded by [RP] Leila Pandey, OT 06/21/18 1507      Row Name 06/21/18 1348 06/21/18 1300          Positioning and Restraints    Pre-Treatment Position in bed  -RP in bed  -CW     Post Treatment Position bed  -RP bed  -CW     In Bed fowlers;call light within reach;encouraged to call for assist;exit alarm on;with family/caregiver  -RP notified nsg;fowlers;call light within reach;encouraged to call for assist;exit alarm on;with family/caregiver  -CW     Recorded by [RP] Leila Pandey, OT 06/21/18 1507 [CW] Giacomo Ayala, [facility] 06/21/18 1400     Row Name 06/21/18 1348             Pain Assessment    Additional Documentation Pain Scale: Numbers Pre/Post-Treatment (Group)  -RP      Recorded by [RP] Leila Pandey, OT 06/21/18 1507      Row Name 06/21/18 1348             Pain Scale: Numbers Pre/Post-Treatment    Pain Scale: Numbers, Pretreatment 0/10 - no pain  -RP      Pain Scale: Numbers, Post-Treatment 0/10 - no pain  -RP      Recorded by [RP] Leila Pandey, OT 06/21/18 1507      Row Name 06/21/18 1348             Coping    Observed Emotional State accepting;calm;cooperative  -RP      Verbalized Emotional State acceptance  -RP      Recorded by [RP] Leila Pandey, OT 06/21/18 1507      Row Name 06/21/18 1348             Plan of Care Review    Plan of Care Reviewed With patient  -RP      Recorded by [RP] Leila Pandey, OT 06/21/18 1507      Row Name 06/21/18 1348             Outcome Summary/Treatment Plan (OT)    Anticipated Discharge Disposition (OT) home with home health  -RP      Recorded by [RP] Leila Pandey, OT 06/21/18 1507      Row Name 06/21/18 1300             Outcome Summary/Treatment Plan (PT)    Anticipated Discharge Disposition (PT) home with home  health  -CW      Recorded by [CW] Giacomo Ayala, PTA 06/21/18 1400        User Key  (r) = Recorded By, (t) = Taken By, (c) = Cosigned By    Initials Name Effective Dates Discipline    CW Giacomo Ayala, PTA 03/07/18 -  PT    RP Leila Pandey, OT 05/03/18 -  OT               Occupational Therapy Education     Title: PT OT SLP Therapies (Done)     Topic: Occupational Therapy (Done)     Point: ADL training (Done)     Description: Instruct learner(s) on proper safety adaptation and remediation techniques during self care or transfers.   Instruct in proper use of assistive devices.   Learning Progress Summary     Learner Status Readiness Method Response Comment Documented by    Patient Done Acceptance E,TB VU,DU ed pt and family on role of OT. ed on safety w. ADLs and tsf. pt and family verbally understand and demo act  06/20/18 1531    Family Done Acceptance E,TB VU,DU ed pt and family on role of OT. ed on safety w. ADLs and tsf. pt and family verbally understand and demo act  06/20/18 1531          Point: Precautions (Done)     Description: Instruct learner(s) on prescribed precautions during self-care and functional transfers.   Learning Progress Summary     Learner Status Readiness Method Response Comment Documented by    Patient Done Acceptance E,TB VU,DU ed pt and family on role of OT. ed on safety w. ADLs and tsf. pt and family verbally understand and demo act  06/20/18 1531    Family Done Acceptance E,TB VU,DU ed pt and family on role of OT. ed on safety w. ADLs and tsf. pt and family verbally understand and demo act  06/20/18 1531          Point: Body mechanics (Done)     Description: Instruct learner(s) on proper positioning and spine alignment during self-care, functional mobility activities and/or exercises.   Learning Progress Summary     Learner Status Readiness Method Response Comment Documented by    Patient Done Acceptance E,TB VU,DU ed pt and family on role of OT. ed on safety w. ADLs  and tsf. pt and family verbally understand and demo act  06/20/18 1531    Family Done Acceptance E,TB VU,DU ed pt and family on role of OT. ed on safety w. ADLs and tsf. pt and family verbally understand and demo act  06/20/18 1531                      User Key     Initials Effective Dates Name Provider Type Discipline     06/08/18 -  Catherine Adhikari, OTR Occupational Therapist OT                OT Recommendation and Plan  Outcome Summary/Treatment Plan (OT)  Anticipated Discharge Disposition (OT): home with home health  Plan of Care Review  Plan of Care Reviewed With: patient, spouse  Plan of Care Reviewed With: patient, spouse  Outcome Summary: Pt participated in OT treatment. Pt engaged grooming tasks and UE thera ex while seated at EOB w/ good sitting balance. Pt completed supine <> sit @ EOB transition w/ min A - SBA. Pt completed sit <> stand transfer w/ CGA. Pt wife present.         Outcome Measures     Row Name 06/21/18 1500 06/21/18 1400 06/20/18 1533       How much help from another person do you currently need...    Turning from your back to your side while in flat bed without using bedrails?  -- 4  -CW  --    Moving from lying on back to sitting on the side of a flat bed without bedrails?  -- 4  -CW  --    Moving to and from a bed to a chair (including a wheelchair)?  -- 3  -CW  --    Standing up from a chair using your arms (e.g., wheelchair, bedside chair)?  -- 3  -CW  --    Climbing 3-5 steps with a railing?  -- 2  -CW  --    To walk in hospital room?  -- 3  -CW  --    AM-PAC 6 Clicks Score  -- 19  -CW  --       How much help from another is currently needed...    Putting on and taking off regular lower body clothing? 3  -RP  -- 3  -KP    Bathing (including washing, rinsing, and drying) 3  -RP  -- 3  -KP    Toileting (which includes using toilet bed pan or urinal) 3  -RP  -- 3  -KP    Putting on and taking off regular upper body clothing 3  -RP  -- 3  -KP    Taking care of personal  grooming (such as brushing teeth) 3  -RP  -- 3  -KP    Eating meals 4  -RP  -- 3  -KP    Score 19  -RP  -- 18  -KP       Functional Assessment    Outcome Measure Options AM-PAC 6 Clicks Daily Activity (OT)  -RP AM-PAC 6 Clicks Basic Mobility (PT)  -CW AM-PAC 6 Clicks Daily Activity (OT)  -    Row Name 06/20/18 1100             How much help from another person do you currently need...    Turning from your back to your side while in flat bed without using bedrails? 4  -SV      Moving from lying on back to sitting on the side of a flat bed without bedrails? 4  -SV      Moving to and from a bed to a chair (including a wheelchair)? 3  -SV      Standing up from a chair using your arms (e.g., wheelchair, bedside chair)? 3  -SV      Climbing 3-5 steps with a railing? 2  -SV      To walk in hospital room? 3  -SV      AM-PAC 6 Clicks Score 19  -SV         Functional Assessment    Outcome Measure Options AM-PAC 6 Clicks Basic Mobility (PT)  -SV        User Key  (r) = Recorded By, (t) = Taken By, (c) = Cosigned By    Initials Name Provider Type     Catherine Adhikari, OTR Occupational Therapist    SV Bethanie Garcia, PT Physical Therapist    CW Giacomo Ayala, PTA Physical Therapy Assistant    RP Leila Pandey OT Occupational Therapist           Time Calculation:         Time Calculation- OT     Row Name 06/21/18 1511             Time Calculation- OT    OT Start Time 1324  -RP      OT Stop Time 1348  -RP      OT Time Calculation (min) 24 min  -RP      Total Timed Code Minutes- OT 24 minute(s)  -RP        User Key  (r) = Recorded By, (t) = Taken By, (c) = Cosigned By    Initials Name Provider Type    RP Leila Pandey OT Occupational Therapist           Therapy Suggested Charges     Code   Minutes Charges    None           Therapy Charges for Today     Code Description Service Date Service Provider Modifiers Qty    [id number]  OT THERAPEUTIC ACT EA 15 MIN 6/21/2018 Leila Pandey OT GO 1    [id number]  OT  THER PROC EA 15 MIN 6/21/2018 Leila Pandey, OT GO 1               Leila Pandey, OT  6/21/2018

## 2018-06-21 NOTE — PROGRESS NOTES
"DOS: 2018  NAME: Javier Marin   : 1935  PCP: Percy Em MD  Chief Complaint   Patient presents with   • Abdominal Pain     can't eat - no nvd     Stroke    Subjective: No events overnight.    Patient denies HA, double/blurred vision, dizziness, numbness or loss of sensation or any other new neurological complaints at this time.     Social History     Social History   • Marital status:      Occupational History   • Retired      Social History Main Topics   • Smoking status: Former Smoker     Types: Cigars   • Smokeless tobacco: Never Used      Comment: QUIT    • Alcohol use No   • Drug use: No   • Sexual activity: Not Currently     Partners: Female     Other Topics Concern   • Not on file       Objective:  Vital signs: /85 (BP Location: Right arm, Patient Position: Lying)   Pulse 79   Temp 98.1 °F (36.7 °C) (Oral)   Resp 18   Ht 172.7 cm (68\")   Wt 71.5 kg (157 lb 11.2 oz)   SpO2 96%   BMI 23.98 kg/m²       HEENT: Normocephalic, atraumatic   COR: RRR  Resp: Even and unlabored  Extremities: Equal pulses, non distal embolization    Neurological:   MS: AO. Language normal. No neglect. Higher integrative function normal  CN: II-XII normal except ; CN 1 impaired patient very [place]   Motor: 5/5, normal tone  Sensory: Intact  Coordination: Normal (finger to nose and heel to shin)     Assessment unchanged from yesterday.    Laboratory results:  Lab Results   Component Value Date    GLUCOSE 113 (H) 2018    CALCIUM 8.1 (L) 2018     2018    K 3.7 2018    CO2 25.6 2018     2018    BUN 19 2018    CREATININE 0.58 (L) 2018    EGFRIFNONA 134 2018    BCR 32.8 (H) 2018    ANIONGAP 7.4 2018     Lab Results   Component Value Date    WBC 8.24 2018    HGB 11.8 (L) 2018    HCT 36.8 (L) 2018    MCV 92.2 2018     (L) 2018     Lab Results   Component Value Date    CHOL 71 2018 "    CHOL 75 06/19/2018     Lab Results   Component Value Date    HDL 14 (L) 06/20/2018    HDL 12 (L) 06/19/2018     Lab Results   Component Value Date    LDL 29 06/20/2018    LDL 22 06/19/2018     Lab Results   Component Value Date    TRIG 138 06/20/2018    TRIG 207 (H) 06/19/2018     Lab Results   Component Value Date    TSH 0.661 06/19/2018     Lab Results   Component Value Date    SKQUANKC38 1,315 (H) 06/19/2018     Lab Results   Component Value Date    CHOL 71 06/20/2018    TRIG 138 06/20/2018    HDL 14 (L) 06/20/2018    LDL 29 06/20/2018     Lab Results   Component Value Date    HGBA1C 6.80 (H) 06/19/2018     Lab Results   Component Value Date    RPR Non-Reactive 06/20/2018         Review and interpretation of imaging per Dr. Lawler:   CT brain 6/14/18: There is diffuse cerebral atrophy, as an old right head of caudate stroke, there is severe concentric ossification of the intracranial vertebral arteries bilaterally there is severe calcification of the intracranial internal carotid arteries bilaterally  Carotid ultrasound 6/19/18: Is a mild stenosis of the proximal left internal carotid artery with a peak systolic velocity of 121 there is acoustic shadowing of the right ICA bulb consistent with dense calcification and turbulence with a peak systolic velocity of 230 and an EDV of 61, compared to carotid upstroke 2017 peak systolic velocity on the right was 212 and EDV was 27 Master the left ICA peak systolic velocity is unchanged     MRI Brain (perindependent review w/ Dr. Lawler)   DWI negative. Flair poor quality. Right head of caudate. Old laculnar infarct. Old left anterior centrum semioval. GRE sequence w/ questionable lesion right sub. Ventricle; other wise negative.   MRA Head and Neck   Moderate-possible severe ICA stenosis; imaging completed w/o contrast d/t concern for ARF.        New Results reviewed:   MRA Neck   IMPRESSION:  1. Moderately severe narrowing at the origin of the right  internal  carotid artery with some narrowing at the origin of the right external  carotid artery as well.  2. Minimal narrowing at the origin of the left internal carotid artery.  TTE     Interpretation Summary     · Moderate tricuspid valve regurgitation is present.  · Right ventricular cavity is mildly dilated.  · Left atrial cavity size is mildly dilated.  · Mildly reduced right ventricular systolic function noted.  · There is calcification of the aortic valve.  · Calculated EF = 64%.  · There is no evidence of pericardial effusion             Impression: This is a 83 yo male w/  diabetes mellitus, hypertension and new onset AF who our service was consulted to see d/t incidental finding on imaging of chronic CVA which was being completed d/t patient complaint of generalized weakness and difficulty eating. MRI brain was unremarkable. MRA head and neck revealed moderate to possible severe right ICA stenosis.     MRA Neck w/ contrast completed it revealed severe ICA stenosis which will require intervention. Given that patient has been and remains asymptomatic Dr. Lawler will plan to manage as an OP. TTE revealed an EF of 64%. Normal LV. LA mildly dilated. From a neurological perspective patient will need to be anticoagulated d/t new newly diagnosed AF and for stroke prevention along w/ antiplatelet (ASA 81mg daily. CHADVAS 6. CCP to assist w/ discharge planning. PT/OT/ST. Call RRT for acute neurological changes.     Plan:  Anticoagulation for AF per Cardiology   NIHSS q shift and with acute neurological changes.   MRA of aortic arch with contrast (results above)    Keep Hydrated  Neurochecks per protocol call RRT for any acute neurological chnages.   Check RPR, TSH, B12 (results above)   Non-pharmacological DVT prophylaxis  TTE (results above)   MRI/MRA (results above)   EKG Tele  PT/OT/ST  Stroke Education  Blood pressure control to <130/80  Goal LDL <70-recommend high dose statins-             Serum glucose <  140      Ideal targets for stroke prevention would be :  Blood pressure < 130/80; B12 > 500 TSH in normal range and LDL < 70; HbA1c < 6.5 and smoking cessation if applicable.      Case reviewed with Dr. Lawler and he agrees with plan above.

## 2018-06-21 NOTE — PROGRESS NOTES
"[facility]  Cardiology Progress Note    Patient Identification:  Name: Javier Marin  Age: 82 y.o.  Sex: male  :  1935  MRN: [id number]                 Follow Up / Chief Complaint: new onset atrial fib, anticoagulation recommmendations    Interval History:  82-year-old male admitted with multiple medical issues with newly diagnosed atrial fibrillation with rvr, generalized weakness with fall and possible syncope.  He has been started on oral Cardizem for rate control. He has a [id number] score of >/= 6 and a HAS BLED score of >/= 5 (hypertension, abnormal LFTs, stroke history, predisposition to bleeding [anemia, thrombocytopenia] and age >65) in addition to lower extremity weakness, gait disturbance and falls.       Subjective:  Denies chest pain, pressure or tightness.  Denies dizziness or palpitations.  \"Eating better\" per wife however, still having coughing episodes    In the setting of paroxysmal atrial fibrillation,I reviewed the risks and benefits of anticoagulation therapy which include increased risk of bleeding and decreased risk of systemic embolization such as stroke with the patient and spouse All questions were answered. Patient and spouse both verbalized understanding. Spouse is agreeable to low-dose aspirin only        Objective:  Afib -> SR 70-80's  -155 / 70's  Afeb      Past Medical History:  Past Medical History:   Diagnosis Date   • Arthritis    • Diabetes mellitus    • Hard of hearing    • Hypertension    • Ptosis of left eyelid      Past Surgical History:  Past Surgical History:   Procedure Laterality Date   • ENDOSCOPY N/A 2018    Procedure: ESOPHAGOGASTRODUODENOSCOPY WITH BIOPSY;  Surgeon: Toribio Waed MD;  Location: [facility] ENDOSCOPY;  Service: Gastroenterology   • EYE PTOSIS REPAIR Left 11/15/2016    Procedure: LT UPPER LID EYE PTOSIS REPAIR;  Surgeon: Anup Lancaster MD;  Location: [facility] [place];  Service:    • EYE SURGERY     • HIP " ARTHROPLASTY     • JOINT REPLACEMENT          Social History:   Social History   Substance Use Topics   • Smoking status: Former Smoker     Types: Cigars   • Smokeless tobacco: Never Used      Comment: QUIT 1994   • Alcohol use No      Family History:  History reviewed. No pertinent family history.       Allergies:  No Known Allergies  Scheduled Meds:    aspirin 81 mg Daily   atorvastatin 10 mg Daily   cholecalciferol 1,000 Units Daily   diltiaZEM 120 mg QAM   docusate sodium 100 mg Daily   enoxaparin 40 mg Q24H   insulin aspart 0-7 Units 4x Daily With Meals & Nightly   insulin aspart 5 Units TID With Meals   insulin detemir 15 Units Nightly   metoprolol tartrate 37.5 mg Q12H   pantoprazole 40 mg BID AC   sucralfate 1 g Q6H   tamsulosin 0.4 mg Daily           INTAKE AND OUTPUT:    Intake/Output Summary (Last 24 hours) at 06/21/18 0929  Last data filed at 06/21/18 0755   Gross per 24 hour   Intake             1255 ml   Output              525 ml   Net              730 ml       Review of Systems:  GI:  appetite improved,still with reported coughing episodes  Cardiac: No chest ain or palpitations  Pulmonary: No shortness of breath or PND       Constitutional:  Temp:  [98.1 °F (36.7 °C)-99 °F (37.2 °C)] 98.1 °F (36.7 °C)  Heart Rate:  [69-93] 79  Resp:  [18-20] 18  BP: (112-155)/(64-92) 152/85    Physical Exam:  General:  Awake, alert resting quietly Appears in no acute distress  Eyes: PERTL,  HEENT:  No JVD lying supine.  Oral mucosa moist, no cyanosis  Respiratory: Respirations regular and unlabored at rest. BBS with good air entry in all fields. No crackles, rubs or wheezes auscultated  Cardiovascular: S1S2 Regular rate and rhythm. No murmur. No pretibial pitting edema  Gastrointestinal: Abdomen soft, flat, non tender. Bowel sounds present.   Musculoskeletal: LOVELACE x4. No abnormal movements  Extremities: No digital clubbing or cyanosis  Skin: Color pink. Skin warm and dry to touch.  Neuro: AAO x3 Jackson otherwise, CN  II-XII grossly intact  Psych: Mood and affect normal, pleasant and cooperative        Cardiographics  Telemetry: SR 60-70's        ECG 6-21-18:       Echocardiogram:   · Moderate tricuspid valve regurgitation is present.  · Right ventricular cavity is mildly dilated.  · Left atrial cavity size is mildly dilated.  · Mildly reduced right ventricular systolic function noted.  · There is calcification of the aortic valve.  · Calculated EF = 64%.  · There is no evidence of pericardial effusion    MRI Neck IMPRESSION:  1. Moderately severe narrowing at the origin of the right internal carotid artery with some narrowing at the origin of the right external carotid artery as well.  2. Minimal narrowing at the origin of the left internal carotid artery.         Lab Review     Results from last 7 days  Lab Units 06/19/18  2357 06/19/18  1837 06/19/18  0645   TROPONIN T ng/mL <0.010 <0.010 0.012     Results from last 7 days  Lab Units 06/19/18  0645   MAGNESIUM mg/dL 2.1     Results from last 7 days  Lab Units 06/21/18  0553   SODIUM mmol/L 138   POTASSIUM mmol/L 3.7   BUN mg/dL 19   CREATININE mg/dL 0.58*   CALCIUM mg/dL 8.1*     Results from last 7 days  Lab Units 06/21/18  0553 06/20/18  0719 06/19/18  0643   WBC 10*3/mm3 8.24 9.13 13.01*   HEMOGLOBIN g/dL 11.8* 11.9* 13.0*   HEMATOCRIT % 36.8* 35.7* 38.0*   PLATELETS 10*3/mm3 124* 114* 91*             Assessment:  - New onset atrial fibrillation with RVR, unknown duration  - elevated [id number]   - HTN  - Generalized weakness with fall  - Esophagitis with distal bleeding, chronic gastritis  - Anorexia  - Nausea/vomiting  - acute on CKD  - Leukocytosis  - DM  - anemia  - thrombocytopenia  - Age indeterminate lumbar compression fractures  - Small, remote right lateral infarct   - mod-severe right carotid disease      Plan:  - New onset atrial fib with RVR, unknown duration -> SR on Lopressor and Cardizem    - elevated [id number] - >/= 6 and HAS BLED score of >/= 5  "(hypertension, abnormal LFTs, stroke history, predisposition to bleeding [anemia, thrombocytopenia] and age >65) in addition to lower extremity weakness, gait disturbance,  Falls and EGD=> esophagitis with evidence of bleeding in the distal third, chronic gastritis .    - HTN - 140-150s/80s on low-dose Lopressor and Cardizem       Continue Lopressor and Cardizem to maintain sinus rhythm.  In the setting of paroxysmal atrial fibrillation, (and newly documented moderate-severe carotid artery disease and age-indeterminate CVA) I have discussed with the patient and spouse the risks and benefits of anticoagulation therapy which include increased risk of bleeding and decreased risk of systemic embolization such as stroke.  All questions were answered and they both voice understanding.  Spouse is agreeable to low-dose aspirin only at this time       )6/21/2018  MD KADE Vlilareal/Transcription:   \"Dictated utilizing Dragon dictation\".     "

## 2018-06-21 NOTE — PLAN OF CARE
Problem: Patient Care Overview  Goal: Plan of Care Review  Outcome: Ongoing (interventions implemented as appropriate)   06/21/18 1252   Coping/Psychosocial   Plan of Care Reviewed With patient   Plan of Care Review   Progress no change   OTHER   Outcome Summary Pt completed VFSS w/limited view. Suspected asp w/mult consistencies, risk with all consistencies. rec NPO w/FEES 6/22. ice/water with oral care. meds alt route.

## 2018-06-22 VITALS
BODY MASS INDEX: 24.31 KG/M2 | OXYGEN SATURATION: 95 % | HEART RATE: 62 BPM | WEIGHT: 160.4 LBS | TEMPERATURE: 99 F | HEIGHT: 68 IN | RESPIRATION RATE: 20 BRPM | DIASTOLIC BLOOD PRESSURE: 65 MMHG | SYSTOLIC BLOOD PRESSURE: 108 MMHG

## 2018-06-22 LAB
CYTO UR: NORMAL
GLUCOSE BLDC GLUCOMTR-MCNC: 128 MG/DL (ref 70–130)
GLUCOSE BLDC GLUCOMTR-MCNC: 75 MG/DL (ref 70–130)
LAB AP CASE REPORT: NORMAL
PATH REPORT.ADDENDUM SPEC: NORMAL
PATH REPORT.FINAL DX SPEC: NORMAL
PATH REPORT.GROSS SPEC: NORMAL

## 2018-06-22 PROCEDURE — 82962 GLUCOSE BLOOD TEST: CPT

## 2018-06-22 PROCEDURE — 92612 ENDOSCOPY SWALLOW (FEES) VID: CPT

## 2018-06-22 PROCEDURE — 63710000001 INSULIN ASPART PER 5 UNITS: Performed by: INTERNAL MEDICINE

## 2018-06-22 PROCEDURE — 99232 SBSQ HOSP IP/OBS MODERATE 35: CPT | Performed by: INTERNAL MEDICINE

## 2018-06-22 RX ORDER — ACETAMINOPHEN 325 MG/1
650 TABLET ORAL EVERY 4 HOURS PRN
Start: 2018-06-22 | End: 2020-04-01 | Stop reason: HOSPADM

## 2018-06-22 RX ORDER — LANSOPRAZOLE 15 MG/1
15 CAPSULE, DELAYED RELEASE ORAL 2 TIMES DAILY
Qty: 60 CAPSULE | Refills: 3 | Status: SHIPPED | OUTPATIENT
Start: 2018-06-22

## 2018-06-22 RX ORDER — SUCRALFATE ORAL 1 G/10ML
1 SUSPENSION ORAL EVERY 6 HOURS SCHEDULED
Qty: 1200 ML | Refills: 2 | Status: SHIPPED | OUTPATIENT
Start: 2018-06-22 | End: 2018-08-15 | Stop reason: ALTCHOICE

## 2018-06-22 RX ORDER — NITROGLYCERIN 0.4 MG/1
0.4 TABLET SUBLINGUAL
Qty: 25 TABLET | Refills: 0 | Status: SHIPPED | OUTPATIENT
Start: 2018-06-22 | End: 2018-06-22

## 2018-06-22 RX ORDER — NITROGLYCERIN 0.4 MG/1
0.4 TABLET SUBLINGUAL
Qty: 25 TABLET | Refills: 0 | Status: SHIPPED | OUTPATIENT
Start: 2018-06-22 | End: 2020-08-03 | Stop reason: SDUPTHER

## 2018-06-22 RX ORDER — METOPROLOL TARTRATE 37.5 MG/1
37.5 TABLET, FILM COATED ORAL EVERY 12 HOURS SCHEDULED
Qty: 60 TABLET | Refills: 1 | Status: SHIPPED | OUTPATIENT
Start: 2018-06-22 | End: 2018-06-22

## 2018-06-22 RX ORDER — METOPROLOL TARTRATE 37.5 MG/1
37.5 TABLET, FILM COATED ORAL EVERY 12 HOURS SCHEDULED
Qty: 60 TABLET | Refills: 1 | Status: SHIPPED | OUTPATIENT
Start: 2018-06-22 | End: 2019-08-15

## 2018-06-22 RX ORDER — ASPIRIN 325 MG
325 TABLET ORAL DAILY
Qty: 30 TABLET | Refills: 11 | Status: ON HOLD | OUTPATIENT
Start: 2018-06-22 | End: 2020-03-31

## 2018-06-22 RX ADMIN — METOPROLOL TARTRATE 37.5 MG: 25 TABLET ORAL at 08:24

## 2018-06-22 RX ADMIN — SODIUM CHLORIDE 75 ML/HR: 9 INJECTION, SOLUTION INTRAVENOUS at 08:24

## 2018-06-22 RX ADMIN — DILTIAZEM HYDROCHLORIDE 120 MG: 120 TABLET, FILM COATED ORAL at 06:17

## 2018-06-22 RX ADMIN — ASPIRIN 81 MG: 81 TABLET, CHEWABLE ORAL at 08:24

## 2018-06-22 RX ADMIN — SUCRALFATE 1 G: 1 SUSPENSION ORAL at 06:18

## 2018-06-22 RX ADMIN — LANSOPRAZOLE 30 MG: KIT at 06:17

## 2018-06-22 RX ADMIN — INSULIN ASPART 5 UNITS: 100 INJECTION, SOLUTION INTRAVENOUS; SUBCUTANEOUS at 11:38

## 2018-06-22 RX ADMIN — TAMSULOSIN HYDROCHLORIDE 0.4 MG: 0.4 CAPSULE ORAL at 08:24

## 2018-06-22 RX ADMIN — DOCUSATE SODIUM 100 MG: 50 LIQUID ORAL at 08:24

## 2018-06-22 RX ADMIN — ATORVASTATIN CALCIUM 10 MG: 10 TABLET, FILM COATED ORAL at 08:24

## 2018-06-22 RX ADMIN — VITAMIN D, TAB 1000IU (100/BT) 1000 UNITS: 25 TAB at 08:24

## 2018-06-22 NOTE — DISCHARGE SUMMARY
CHRIS JEAN BAPTISTE [facility]  INTERNAL MEDICINE  KRIS SAMPSON MD  [address] [place] [address]  Phone [phone] Fax [phone]  E-mail:  [email or web address]    [facility]   DISCHARGE SUMMARY  KRIS SAMPSON MD      Date of Discharge:  6/22/2018    Discharge Diagnosis:         Esophagus disorder, thickened distal wall CT Scan 6/18/18    Esophagitis determined by endoscopy by Sheri 6/19/18 LA Grade D Lower 1/3    Acute gastritis    Diabetes mellitus type 2, uncontrolled    Acute hyponatremia    Acute kidney injury superimposed on chronic kidney disease    Elevated liver function tests    Leukocytosis due to viral syndrome now resolving    Compression fracture of lumbar spine, non-traumatic    Lumbar canal stenosis    Weakness generalized    Cough due to viral syndrome    Atrial fibrillation    Hiatal hernia with GERD and esophagitis    Advanced diabetic retinal disease    Limb swelling    Hypertension, essential    Anorexia    Granulomatous disease, chronic    Lung nodule seen on imaging study    Carotid stenosis, asymptomatic, bilateral 16-49% 2/24/14    Remote history of stroke, small caudate nucleus    Thrombocytopenia    Fall at home, subsequent encounter    Hyperlipidemia    Hard of hearing    Ptosis, left eyelid    Osteoarthritis of multiple joints        Procedures Performed    1.  Troponins less than 0.012 normal  2.  Blood sugars ranging from  during stay  3.  Total protein slightly low at 5.4  4.  Albumin low at 2.7 prior to discharge  5.  Hemoglobin A1c 6.8  6.  PTH level below normal range at 13.3  7.  Thyroid function tests within normal limits  8.  Lipid profile showing cholesterol 75 HDL 12 LDL 22 triglycerides 207  9.  WBC 13.01 on admission now down to 8.24 prior to discharge  10.  Hemoglobin ranging from 13.0-11.8 during hospital stay  11.  IV fluids for correction of mild dehydration on admission  12.  FEES swallowing study on day after  video swallow shows normal swallowing function no need for thin liquids  13.  Hepatitis A and B screens negative  14.  RPR test negative for syphilis  15.  UA normal  16.  Viral screen for respiratory viruses negative  17.  Tissue biopsy from EGD showing severe gastritis and esophagitis      Procedure(s):  ESOPHAGOGASTRODUODENOSCOPY WITH BIOPSY  06/19 1243 UPPER GI ENDOSCOPY  Procedures  Imaging Results (all)     Procedure Component Value Units Date/Time    FL Video Swallow [[id number]] Updated:  06/21/18 0945    MRI Angiogram Neck With & Without Contrast [[id number]] Collected:  06/20/18 2057     Updated:  06/21/18 0854    Narrative:       MR ANGIOGRAM OF THE NECK WITHOUT CONTRAST 06/20/2018     HISTORY: Stroke.     TECHNIQUE: 3-D time-of-flight MR angiography was performed through the  neck without contrast. Source images and maximum intensity projection  images were reviewed.     NASCET criteria was used.     FINDINGS: There is moderately severe narrowing at the origin of the  right internal carotid artery estimated at approximately 70% to 80%  diameter stenosis..     Moderate narrowing at the origin of the right external carotid artery is  seen.     There is minimal narrowing at the origin of the left internal carotid  artery estimated at approximately 20% diameter. Left external carotid  artery appears patent. The bilateral vertebral arteries demonstrate  normal flow signal except for some absent flow signal in the distal  vertebral artery approximately 2 cm from the basilar artery. This could  be at least partially related to artifact related to direction of flow.       Impression:       1. Moderately severe narrowing at the origin of the right internal  carotid artery with some narrowing at the origin of the right external  carotid artery as well.  2. Minimal narrowing at the origin of the left internal carotid artery.     This report was finalized on 6/21/2018 8:51 AM by Dr. Umesh Velasquez M.D.       MRI  Angiogram Head Without Contrast [[id number]] Collected:  06/19/18 2146     Updated:  06/21/18 0244    Narrative:       BRAIN MRA     HISTORY: Stroke; R10.10-Upper abdominal pain, unspecified;  E86.0-Dehydration; N17.9-Acute kidney failure, unspecified;  K22.9-Disease of esophagus, unspecified; K22.9-Disease of esophagus,  unspecified          FINDINGS: Axial 3-D time-of-flight images of the intracranial arterial  circulation centered at the level of the [place] were obtained  without contrast. Reconstruction images were supplemented.     Exam quality is degraded by excessive patient motion. Within these  limitations, the distal vertebrals are patent, left is dominant. Basilar  artery appears to be widely patent. Both posterior cerebrals originate  from the basilar and are grossly unremarkable. Internal carotid arteries  are widely patent bilaterally. Bifurcation regions are grossly  unremarkable. A1 and M1 segments appear to be unremarkable. MCA  trifurcations are grossly unremarkable. There is a small patent anterior  communicating artery present.       Impression:       1. Grossly unremarkable exam considering patient motion.        This report was finalized on 6/21/2018 2:40 AM by Clement Faulkner M.D.       MRI Angiogram Neck Without Contrast [[id number]] Collected:  06/19/18 2144     Updated:  06/21/18 0243    Narrative:       MRA NECK WITHOUT CONTRAST     CLINICAL HISTORY: Ischemia, stenosis.     COMPARISON: [<None>].     FINDINGS: Axial and coronal source imaging centered about the cervical  carotid bifurcation regions performed without gadolinium.  Multiprojection 3-D MIP reformatted images were supplemented and  reviewed.     Exam quality is compromised by excessive patient motion. No obvious  proximal great vessel stenosis is appreciated. The cervical carotid  arteries bilaterally without an obvious significant stenosis. On the  right side, there is suspicion of moderate narrowing of the  proximal  right internal carotid artery best seen on axial source images 113-119.  There is some motion but stenosis estimated at at least 60-70%. Milder  narrowing of the proximal left internal carotid artery, less than 50%.     Vertebral arteries are patent, the left is dominant. NASCET criteria was  employed.                   Impression:       1.  Limited study as above, there is suspicion of moderate narrowing of  the proximal right internal carotid artery. Consider either a repeat  examination or CT angiogram when the patient can fully cooperate or with  conscious sedation provided to better evaluate..     This report was finalized on 6/21/2018 2:40 AM by Clement Faulkner M.D.       MRI Brain Without Contrast [[id number]] Collected:  06/19/18 2143     Updated:  06/21/18 0243    Narrative:       BRAIN MRI WITHOUT GADOLINIUM     HISTORY: Stroke     COMPARISON: None     FINDINGS:  Multiplanar images of the head were obtained without the use  of intravenous contrast.     Motion artifact degrades image quality. Within these limitations, there  is diffuse cortical atrophy. Mild scattered areas of increased T2 and  FLAIR signal abnormality are noted in the periventricular white matter  predominantly and likely related to chronic ischemic gliotic changes.  There are multiple small and old appearing lacunar type infarcts, right  greater than left. No foci of restricted diffusion are demonstrated on  diffusion weighted images (DWI) to suggest acute ischemia. The  ventricular system is normal in caliber and configuration for age.  Midline structures corpus callosum, pituitary gland, and brainstem are  unremarkable. There is no large extra-axial mass or large extra-axial  fluid collection.          Impression:       1. Atrophy and chronic-appearing parenchymal changes as discussed,  grossly unremarkable exam otherwise considering extensive motion.     This report was finalized on 6/21/2018 2:40 AM by Clement Faulkner M.D.        XR Chest PA & Lateral [[id number]] Collected:  06/20/18 1141     Updated:  06/20/18 1237    Narrative:       XR CHEST PA AND LATERAL-     HISTORY: 82-year-old male with leukocytosis.     FINDINGS: There is underexpansion of both lung fields with secondary  crowding of lung markings. There is prominence of the central pulmonary  vascularity, but no definite interstitial edema is seen and there are no  effusions. There is a focal increase in markings at the medial aspect of  the left lower lobe which may represent atelectasis, but pneumonia  cannot be excluded.     This report was finalized on 6/20/2018 12:34 PM by Dr. Eleonora Whiting M.D.       US Abdomen Complete [[id number]] Collected:  06/19/18 1606     Updated:  06/19/18 1614    Narrative:       US ABDOMEN COMPLETE-     INDICATIONS: Elevated liver function tests, abdominal pain     TECHNIQUE: Ultrasound of the abdomen     COMPARISON: CT from 6/18/2018     FINDINGS:     The gallbladder is nontender, with minimal sludge but no shadowing  stones demonstrated. No gallbladder wall thickening or pericholecystic  fluid.     No intrahepatic or extrahepatic biliary ductal dilatation is  demonstrated. The common biliary duct caliber is measured at 4 mm.     Trace perihepatic ascites is suggested. No liver lesion is is  identified, but assessment of the liver is limited by partial  obscuration of the left hepatic lobe. Diffusely, the echogenicity of the  liver is otherwise in the range of normal relative to that of the right  kidney.     The pancreas is obscured.      The right kidney, 10.3 cm, left kidney, 10.5 cm, spleen, 10.8 cm, and  the pancreas appear unremarkable.     Bowel gas obscures the aorta, inferior vena cava.          Impression:          No evidence for acute cholecystitis. With persistent clinical  indication, hepatobiliary scintigraphy could be considered for further  evaluation.           This report was finalized on 6/19/2018 4:11 PM by Dr. Ron SIDHU  TEX Freitas       CT Abdomen Pelvis Without Contrast [[id number]] Collected:  06/18/18 1658     Updated:  06/18/18 1717    Narrative:       CT ABDOMEN PELVIS WO CONTRAST-     INDICATIONS: Epigastric pain     TECHNIQUE: Radiation dose reduction techniques were utilized, including  automated exposure control and exposure modulation based on body size.   Unenhanced ABDOMEN AND PELVIS CT     COMPARISON: None available     FINDINGS:      Assessment is limited at the level of the pelvis by hardware artifact,  which partly obscures the urinary bladder, distal ureters and bowel.  Motion artifact limits the assessment.     Pancreas is thinned.     Slight perinephric fat stranding may reflect chronic change motion  artifact, correlate clinically to exclude any possibility of urinary  infection. Urachal duct remnant is apparent, with calcifications,  similar in appearance to the prior exam, interval follow-up can  characterize change.     Otherwise unremarkable unenhanced appearance of the liver, gallbladder,  spleen, adrenal glands, pancreas, kidneys, bladder.     No bowel obstruction. Distal esophageal wall appears mildly thickened  that could be evidence of inflammation, for example 1 cm, image 11 of  series 1, endoscopy can be obtained to exclude the possibility of a  lesion. Moderate colonic fecal retention is apparent. The appendix does  not appear thickened. Small hiatal hernia.     No free intraperitoneal gas or free fluid.     Scattered small mesenteric and para-aortic lymph nodes are seen that are  not significant by size criteria.     Abdominal aorta is not aneurysmal. Aortic and other arterial  calcifications are present.     The lung bases show old granulomatous disease. Lateral and posterior  subpleural nodules of the right lower lobe, 2 to 3 mm on image 8 of  series 1, and another on image 17, and another in the left lower lobe,  image 12, are indeterminate, advise follow-up CT in 3 months to  characterize  change coronary artery calcification is present.     Degenerative changes are seen in the spine. Compression fractures at L4  (50% loss of height), and L5 appearance 40% loss of height) represent a  change from 5/23/2013, age-indeterminate, correlate clinically.  Compression fracture of L1 (25% loss of height) above the level of the  prior exam, likewise, is age-indeterminate. The posterior margins of the  upper aspect of the L4 and L5 vertebral bodies show posterior  retropulsion by about 4 mm, contributing to conspicuous central stenosis  (if further evaluation is indicated, MRI could be considered). Some  subcutaneous stranding density at the right anterior pelvis, for example  image 94 of series 1 could for example reflect contusion or sequela of  injection.             Impression:             1. Mild nonspecific wall thickening in the distal esophagus, as  described. Otherwise no abnormal wall thickening of bowel is identified.  2. No obstructive uropathy. Assessment at the level of the pelvis is  limited by hardware artifact.  3. Age-indeterminate lumbar compression fractures, correlate clinically.  4. Small nonspecific pulmonary nodules, follow-up recommended.     Discussed by telephone with Dr. Mosley at time of interpretation,  1708, 6/18/2018.     This report was finalized on 6/18/2018 5:14 PM by Dr. Ron Freitas M.D.               Treatment Team at Hospital  Treatment Team:   Attending Provider: Felix Rodriguez MD  Consulting Physician: Felix Rodriguez MD  Consulting Physician: Torbiio Wade MD  Consulting Physician: Kojo TITUS MD  Consulting Physician: Juliana Mccrary MD  Consulting Physician: Ramez Knowles MD  Surgeon: Toribio Wade MD  Consulting Physician: Pawan Lawler MD  Nurse Practitioner: JERRY Gallardo  Admitting Provider: Felix Rodriguez MD      Presenting Problem/History of Present Illness  Chief Complaint   Patient presents with   •  Abdominal Pain     can't eat - no nvd       Chief Complaint: Abdominal pain and weakness     History of Present Illness:     Subjective         Interval History: Patient is a 82 y.o.male who presented with abdominal pain and weakness to the [facility] ER or the third time in 3 days.  Patient is a pleasant 82-year-old white male who is regularly followed by Dr. Percy Em.  Patient's symptoms on each of the ER visits have been relatively stable.  He apparently developed weakness 3 days prior to the original ER presentation on 6/14/18.  In addition he had complaints of generalized malaise, low-grade fever, decreased appetite, and a mild cough.  Patient denies nausea vomiting diarrhea, chest pain, dyspnea.  He has developed a new symptom of upper abdominal pain and localizes pain to an area near the sternum.  He has had one small bowel movement in the last 24 hours and has not noticed any melena, blood, or diarrhea with the bowel movement.  He has been tachycardic at the time of his ER presentation and has had irregular heartbeat found on EKG to be atrial fibrillation.  Patient does have a long history of insulin-dependent diabetes mellitus and has been followed by Dr. David in the past for this condition.  His other medical problems include: hypertension for which he takes diltiazem, Accupril, and Lasix; hyperlipidemia for which he takes atorvastatin; diabetes mellitus2 for which she takes NovoLog and Lantus; BPH for which he takes Flomax, and vitamin D deficiency.     Patient was evaluated on his urgent visit to the ER by Dr. Anand Mosley.  Family and patient were complaining of ongoing   epigastric abdominal pain and difficulty swallowing, weakness, loss of appetite, watery emesis, increasing cough, and decreased output of urine and feces.  His ER history of present illness described the abdominal pain as being of several days' duration, gradual onset, constant timing, epigastric location, no  radiation, quality pain, moderate intensity, unchanged progression, associated symptoms of decreased appetite and difficulty swallowing emesis cough and decreased urine output, aggravating factors unknown, alleviating factors none, previous episodes 2 ER visits in the past week, treatment before arrival none.  Review of systems was positive for appetite loss, trouble swallowing, worsening cough, abdominal pain and vomiting, decreased urine volume, decreased fetal volume.  Physical exam in the ER showed temperature 98.2 pulse 87 respiratory rate 16 blood pressure 89/50 sat 95%.  Positives on exam included dry mucous membranes  Tenderness in the epigastric region.lab results showed blood sugar uncontrolled slightly at 211, BUN 56, creatinine 1.40, sodium 132, potassium 4.1, albumin 3.3, ALT 70, AST 72, alkaline phosphatase 139, lipase normal at 21, WBC 14.51, platelets 62,000.  CT scan of abdomen and pelvis showed nonspecific wall thickening of the distal esophagus, no obstructive uropathy, lumbar compression fractures, small nonspecific pulmonary nodule with three-month follow-up recommended by radiology.  Patient was discussed with me as the on-call doctor for Dr. Em and was admitted with diagnoses of acute kidney injury, volume depletion, and esophageal distal thickening.       6/18/2018.  I personally saw the patient for the first time on this date.  He was resting quietly in his bed on 6 S. room 601 with wife and son present at the bedside.  Patient appeared to be in no acute pain.  Nurse was in the middle of completing his history when I entered the room.  Patient reported to me that he was having ongoing pain in the mid epigastric area without radiation.  He stated that his appetite was quite poor and that he really had not been able to eat much of anything for the last 3 days.  The last thing he tried earlier today was of normal and he actually ended up vomiting that back up.  Patient has no other recent  medical issues and has been followed by only  since Dr. David's group home a few years ago.  He is unaware of the cardiac arrhythmia which has been seen on EKGs during his last 2 hospital visits and consisted of atrial fibrillation with right and left bundle branch block.  He also did not recall his previous history of carotid stenosis bilaterally.  He does not recall a history of stroke though a small infarct age undetermined was seen on his CT scan in the ER 4 days ago.  Wife does recall that patient had a previous upper GI endoscopy.  Records show that this was done by Dr. Villalobos in 2005 and was unremarkable.  Patient seems very comfortable at the present time.  He is on IV fluids that are infusing now at 75 cc per hour.  He did receive a bolus of normal saline in the emergency room.  I discussed the planned workup with the patient and his family.  I actually called Dr.L. Wade who will see the patient in the a.m. to consider a possible upper GI endoscopy.  I will also seek input from renal with respect to his volume status, from endocrine with respect to his poorly controlled diabetes, from cardiology with respect to his newly diagnosed atrial fibrillation, and from neurosurgery regarding new discovery of multiple compression fractures and lumbar spine age undetermined.     6/19/2018.  Patient in the midst of workup for multiple problems as outlined above.  My visit today with the patient was in the cardiology suite where he was in the midst of additional testing including an echocardiogram and ultrasound of abdomen.  Patient tells me that he still has ongoing discomfort and pain in the upper stomach and central chest areas.  He states that even liquids seem to still be hurting when he swallows them unless he warms them in his mouth for an extended period time before swallowing.  Speech therapy has not been able to meet with the patient at this point.  GI did take the patient to upper GI endoscopy and  found evidence of severe distal esophagitis.  Patient has been started on PPI and Carafate for treatment of these findings.  Neurology did see the patient and has initiated a neurologic workup for stroke which is felt to be old in nature.  Cardiology is in the process of evaluating atrial fibrillation which appears to be new in onset.  This obviously increases need for anticoagulation.  Endocrinology is helping with adjustment of the patient's insulin levels.  Dietitian has seen the patient and his landing a calorie count to start once the patient is no longer nothing by mouth.  PT and speech therapy have not been able to complete their evaluations at this point.  Neurosurgery did see the patient for compression fractures of the lower back and are continuing to follow-up on this but feel that unless patient has significant pain or difficulty walking with PT and that little else needs to be done for the issue at this point.  Speech therapy also has not been able to complete their consult on the patient will be trying to initiate this tomorrow.  Nephrology has helped with fluid balance.  Creatinine is improved on this date.  IV fluids are continued while patient has poor appetite, poor by mouth intake, and is continuing diagnostic testing.     6/20/2018.  Patient continues with test as part of his complete workup of multiple medical problems identified on admission.  Patient was out of the room and I spoke with the wife at length he was in the room today and reviewed results of yesterday's test.  Patient does have severe distal esophagitis and gastritis which were treating with PPIs twice a day and Carafate.  Patient also has been found to have a small stroke, old compression fractures of lumbar spine, some indicators of dysphagia, and a viral syndrome that has now apparently resolved.  Renal also happy with resolution of acute kidney injury and signing off the case.  PT and OT both no deficits in patient's ability to  ambulate without assistance and a walker.  Patient's efforts are somewhat uncoordinated and risk for fall remains high.  Patient still is taking little to none in the way of by mouth intake and continued work with dietitian to find appropriate means for patient's nutrition is needed.  I did see the patient while in the midst of an echocardiogram.  He seemed to be tolerating the procedure well and no complaints or problems were identified.  Speech is planning a video swallow tomorrow and we will then advance diet to see how patient tolerates oral intake.  We'll also try to find foods that the patient is able to tolerate more readily.     6/21/2018.  Patient resting quietly in bed in room at time of my visit just back from additional testing in radiology.  Patient states that he is slowly starting to feel a little better with his stomach now that he is on twice daily dosing of PPI and Carafate.  Speech therapy evaluated patient a little later in the day the video swallowing study.  Patient did have evidence of some significant aspiration with every texture he tried and final recommendation was for possible tube feedings.  I discussed this with the patient and his wife and they are adamantly opposed to the idea of tube feeding and wanted to try the safest possible diet and tolerated or dysphagia.  Neurology has recommended patient have anticoagulation because of the evidence of stroke in the past.  Cardiology points that such anticoagulation would be somewhat dangerous in this patient who has a history of recurrent falls.  Therefore decision was made to use aspirin 81 mg only for anticoagulation which is an agreeable decision with respect the patient and his wife.  We are working to progress the patient's diet with a dysphagia diet to see if he will be tolerant of this food.  I did discuss in home health with the patient and his family.  [employer] his company in the area where he lives that we will be able to offer  speech therapy PT OT and nursing care in the patient's home.    6/22/2018.  Patient without achieved maximum benefit from hospitalization and is anxious for discharge to home.  Patient has been resting peacefully in bed at the present time but is able to get up to the bathroom without complications or problems.  Speech therapy followed up yesterday's video swallow with a FEES study today that does show patient can take thin liquids and does safely handle his secretions.  His real swallow problem seems to be from the esophagitis which is distal one third of his esophagus.  We can leave him on a soft diet but will allow him to have thin liquids.  Patient will have follow-up on an outpatient basis with home health and the speech therapist there will reevaluate this at some point next week.  Cardiology does want to follow the patient up with respect to his atrial fibrillation.  They have put him on a full strength 325 aspirin once a day for anticoagulation because he is felt to be too high of a fall risk stronger anticoagulation.  Neurology had deferred anticoagulation choice to cardiologist but feels that we do need to continue working aggressively with the patient to control his lipid profiles and other issues.  Patient will follow-up in the office with Dr. Lucio next week tof      Review of Systems:               A comprehensive 14 point review of systems was negative except for:  Constitution:  positive for anorexia, fatigue, fevers and malaise  Eyes:  positive for blurriness and Ptosis left eye  Respiratory: positive for  cough, dry and pleuritic pain  Cardiovascular: positive for  irregular pulse and palpitations  Gastrointestinal: postitive for  constipation, difficulty / pain with swallowing, nausea, vomiting and Upper abdominal pain  Hematologic / Lymphatic: positive for  fatigue and Low platelet count  Musculoskeletal: positive for  back pain, muscle weakness and Difficulty walking  Neurological: positive for   difficulty walking and dizziness              Vital Signs  Temp:  [98.9 °F (37.2 °C)-99.1 °F (37.3 °C)] 99 °F (37.2 °C)  Heart Rate:  [62-91] 62  Resp:  [20] 20  BP: (108-154)/(64-79) 108/65    Physical Exam at Discharge    Constitutional:             Alert, cooperative,mild discomfort/ distress due to GI issues, AAOx3, resting comfortably on stretcher in cardiology   Head:                          Normocephalic, without obvious abnormality, atraumatic   Eyes:                          PERRLA, conjunctiva/corneas clear, no icterus, no conjunctival                                     pallor, EOM's intact, both eyes      ENT and Mouth:         Lips, tongue, gums normal; oral mucosa pink and moist   Neck:                          Supple, symmetrical, trachea midline, no JVD  Respiratory:                 Clear to auscultation bilaterally, respirations unlabored, occasional cough but no significant wheezing  Cardiovascular:           Irregular rate and rhythm, S1 and S2 normal, no murmur,                                       no  Rub or gallop.  Pulses normal.    Gastrointestinal:          BS present x 4 Soft, tender in midepigastric area, bowel sounds active,                                       no masses, no hepatosplenomegaly                                                      :                             No hernia.  Normal exam for sex.         Musculoskeletal:        Extremities normal, atraumatic, no cyanosis or edema                                      No arthropathy.  No deformity.  Gait poorly about                                                 Skin:                           Skin is warm and dry,  no rashes, swelling or palpable lesions   Neurologic:                 CN -XII intact, motor strength showing lower extremity weakness, sensation grossly intact to light touch, no focal reflex deficits noted    Psychiatric:                 Alert,oriented X3, no delusions, psychoses, depression or anxiety       Heme/Lymph/Imun:   No bruises, petechiae.  Lymph nodes normal in size/configuration             Results from last 7 days  Lab Units 06/21/18  0553 06/20/18  0720 06/19/18  0645   SODIUM mmol/L 138 139 137   POTASSIUM mmol/L 3.7 3.5 3.9   CHLORIDE mmol/L 105 102 98   CO2 mmol/L 25.6 27.5 24.2   BUN mg/dL 19 27* 45*   CREATININE mg/dL 0.58* 0.70* 0.90   GLUCOSE mg/dL 113* 120* 241*   CALCIUM mg/dL 8.1* 8.3* 8.3*         Results from last 7 days  Lab Units 06/21/18  0553 06/20/18  0719 06/19/18  0643   WBC 10*3/mm3 8.24 9.13 13.01*   HEMOGLOBIN g/dL 11.8* 11.9* 13.0*   HEMATOCRIT % 36.8* 35.7* 38.0*   PLATELETS 10*3/mm3 124* 114* 91*         Lab Results  Lab Value Date/Time   LIPASE 21 06/18/2018 1308                   Attending Physician Final Assessment and Plan    1.  Thickened distal esophageal wall on CT scan in 6/18/18 accompanied by dysphagia and generalized weakness.  Exact etiology unknown.  Patient had normal EGD 2005 by Dr. Villalobos.  Discussed with Dr. SHAWNA Wade.    EGD today showing distal esophagitis and evidence of hiatal hernia.  Speech therapy consulted for input on swallowing and dysphagia.  Added oral protonix and Carafate slurry  We will ask dietitian to see and do calorie count.  Despite some dysphagia we will place the patient on the [facility] hospital dysphagia diet at the request of the patient and his wife.   2.  Volume depletion with acute kidney injury superimposed on chronic kidney disease.  IV fluids were started in the ER and are continued on the floor.  Renal has been consulted for their input on volume status and correction of deficits.  Acute kidney injury resolving.  Continuing IV fluid until able to support self with by mouth.  At this point patient is independent of IV fluids and tolerating by mouth well.  3.  Generalized weakness with reported difficulty using legs and fall at home prior to first ER visit.  Workup at the time of the first ER visit did show a remote caudate nucleus  infarct.  MRI and MRA of brain  ordered .  Neurology to offer input on anticoagulation needs once studies are complete.  MRA of carotids also ordered along with carotid ultrasound.  Ultrasound does show moderate stenosis on the right and minimal stenosis on left sides  PT and OT evaluated patient and made recommendations on safety to prevent further falls.  May need use of walker.  Home PT and OT will help  4.  Upper abdominal pain reported on physical exam.  Etiology of this symptom would seem to be the esophagitis.  Ultrasound of abdomen is ordered to look for gallstones and other biliary duct abnormalities, but none were found other than minimal sludging.  .  Liver function tests noted to be elevated at present time.  Now stable  5.  Atrial fibrillation on EKGs from first 2 ER visits apparently new in onset.  Cardiology has been consulted for input on his finding.  Echocardiogram, EKG, and cardiac enzymes have been ordered.  We will discuss with cardiology need for anticoagulation in the future at the time of patient's discharge since CHRIS score is elevated.  Further work up as planned by cardiology.  Anticoagulation with aspirin recommended patient at to high risk for falls to use other types of anticoagulants  6.  Multiple compression fractures of lumbar spine, age undetermined.  Neurosurgery is consulted for their input on this finding.  At least 2 of the fracture showed greater than 50% compression.  Patient's symptomatology is relatively limited.  May need to consider MRI to further evaluate situation.  PT and OT to work with patient tomorrow as indicated above.  Decision on anticoagulation difficult because of patient's high fall risk.May want to consider calcium and vitamin D in the future for treatment of osteo-porosis.  7.  Leukocytosis with dry nonproductive cough and no other signs of acute infection.  Patient is afebrile at the present time with no other signs of sepsis.  We will check pro-calcitonin  and lactate levels with a.m. labs.  Holding at present time with respect to IV antibiotics as no signs of acute infection.ID consulted due to elevated WBC and procalcitonin.  Will repeat in am.  White count now normalized suspect viral infection was cause for the cough.  May want to consider calcium and vitamin D in the future for treatment of osteo-porosi  8.  Type 2 diabetes mellitus poorly controlled at times.  Input is requested from endocrinology with respect to diabetic management.  Thyroid profile is ordered.  Will check fingerstick glucoses and hemoglobin A1c.  Returning to regular regimen of insulin at time of discharge  9.  Acute hyponatremia.  Treating with IV fluids which should help with this problem.  Again, renal is seeing the patient and we will be addressing fluid and volume.  10.  Thrombocytopenia with platelet counts in the 60 and 70,000 range.  Etiology for this finding is unclear at the present time.  We will monitor and consider hematology consult if situation changes or worsens.  Could be result of mild viral infection.  11.  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  Patient certainly is at risk for strokes with the diabetic history.  Holding aspirin for now but will resume prior to discharge.  May need to anticoagulate or atrial fibrillation which also would be useful with respect to this diagnosis.  May need to consider MRI of brain. Neurologic consultation is planned.  Additional neurologic testing considered including MRI angiogram of neck and arch of aorta.  Workup relatively unremarkable planned to do aspirin 325 daily as anticoagulation 5 fall risk  12.  Essential hypertension.  This seems to be fairly well controlled at present time with current medications.  Cardiology to follow patient and adjust medications as they feel necessary.  13.  Hyperlipidemia. Patient is currently on Lipitor for treatment of this problem.  We will  continue to monitor and adjust diet as necessary to control.   14.  Granulomatous disease of lung with nodules noted on scan in the ER.  Follow-up suggested in 3 months and will be arranged at time of patient's discharge.  Patient is a follow-up CT scan in 3 months to reevaluate granulomatous lung  15.  Carotid stenosis bilateral asymptomatic in range of 16-49% on last scan in 2014.  We will repeat the carotid Doppler studies while patient is in the hospital and treat based upon findings from that study.  Will need follow-up carotid studies in 6 months.  Patient did have significant blockage on the right.  Moderate blockage on the blood studies    Condition on Discharge:  Stable    Discharge Disposition  Home or Self Care    Transport Plan  Family to transport     Hospital Treatments discontinued at time of Discharge  IV, Telemetry, Salazar Catheter, Deep Lines and PICC LInes    Discharge Medications     Discharge Medications      New Medications      Instructions Start Date   acetaminophen 325 MG tablet  Commonly known as:  TYLENOL   650 mg, Oral, Every 4 Hours PRN      lansoprazole 15 MG capsule  Commonly known as:  PREVACID   15 mg, Oral, 2 Times Daily      Metoprolol Tartrate 37.5 MG tablet   37.5 mg, Oral, Every 12 Hours Scheduled      nitroglycerin 0.4 MG SL tablet  Commonly known as:  NITROSTAT   0.4 mg, Sublingual, Every 5 Minutes PRN, Take no more than 3 doses in 15 minutes.      sucralfate 1 GM/10ML suspension  Commonly known as:  CARAFATE   1 g, Oral, Every 6 Hours Scheduled         Continue These Medications      Instructions Start Date   aspirin 325 MG tablet   325 mg, Oral, Daily      atorvastatin 10 MG tablet  Commonly known as:  LIPITOR   10 mg, Oral, Daily      cholecalciferol 1000 units tablet  Commonly known as:  VITAMIN D3   1,000 Units, Oral, Daily, PT HOLDING FOR SURGERY       diltiaZEM 120 MG tablet  Commonly known as:  CARDIZEM   120 mg, Oral, Every Morning      furosemide 20 MG  tablet  Commonly known as:  LASIX   20 mg, Oral, 2 Times Daily      insulin glargine 100 UNIT/ML injection  Commonly known as:  LANTUS   10 Units, Subcutaneous, Nightly      insulin lispro 100 UNIT/ML injection  Commonly known as:  humaLOG   8 Units, Subcutaneous, 3 Times Daily Before Meals      tamsulosin 0.4 MG capsule 24 hr capsule  Commonly known as:  FLOMAX   1 capsule, Oral, Daily         Stop These Medications    quinapril 10 MG tablet  Commonly known as:  ACCUPRIL            Home Medication List  Prior to Admission medications    Medication Sig Start Date End Date Taking? Authorizing Provider   atorvastatin (LIPITOR) 10 MG tablet Take 10 mg by mouth Daily.   Yes Historical Provider, MD   cholecalciferol (VITAMIN D3) 1000 UNITS tablet Take 1,000 Units by mouth Daily. PT HOLDING FOR SURGERY   Yes Historical Provider, MD   diltiaZEM (CARDIZEM) 120 MG tablet Take 120 mg by mouth Every Morning.   Yes Historical Provider, MD   furosemide (LASIX) 20 MG tablet Take 20 mg by mouth 2 (Two) Times a Day.   Yes Historical Provider, MD   insulin glargine (LANTUS) 100 UNIT/ML injection Inject 10 Units under the skin Every Night.   Yes Historical Provider, MD   insulin lispro (humaLOG) 100 UNIT/ML injection Inject 8 Units under the skin 3 (Three) Times a Day Before Meals.   Yes Historical Provider, MD   quinapril (ACCUPRIL) 10 MG tablet Take 10 mg by mouth Every Morning.   Yes Historical Provider, MD   tamsulosin (FLOMAX) 0.4 MG capsule 24 hr capsule Take 1 capsule by mouth Daily.   Yes Historical Provider, MD   aspirin 325 MG tablet Take 325 mg by mouth Daily.  6/22/18 Yes Historical Provider, MD   acetaminophen (TYLENOL) 325 MG tablet Take 2 tablets by mouth Every 4 (Four) Hours As Needed for Mild Pain . 6/22/18   Felix Rodriguez MD   aspirin 325 MG tablet Take 1 tablet by mouth Daily. 6/22/18   Felix Rodriguez MD   lansoprazole (PREVACID) 15 MG capsule Take 1 capsule by mouth 2 (Two) Times a Day. 6/22/18   Felix LOMAX  MD Michael   Metoprolol Tartrate 37.5 MG tablet Take 37.5 mg by mouth Every 12 (Twelve) Hours. 6/22/18   Felix Rodriguez MD   nitroglycerin (NITROSTAT) 0.4 MG SL tablet Place 1 tablet under the tongue Every 5 (Five) Minutes As Needed for Chest Pain. Take no more than 3 doses in 15 minutes. 6/22/18   Felix Rodriguez MD   sucralfate (CARAFATE) 1 GM/10ML suspension Take 10 mL by mouth Every 6 (Six) Hours. 6/22/18   Felix Rodriguez MD   metoprolol tartrate 37.5 MG tablet Take 37.5 mg by mouth Every 12 (Twelve) Hours. 6/22/18 6/22/18  JERRY Graham   nitroglycerin (NITROSTAT) 0.4 MG SL tablet Place 1 tablet under the tongue Every 5 (Five) Minutes As Needed for Chest Pain. Take no more than 3 doses in 15 minutes. 6/22/18 6/22/18  JERRY Graham       Discharge Diet   Diet Orders (active)     Start     Ordered    06/22/18 0956  Diet Soft Texture; Chopped; Thin  Diet Effective Now      06/22/18 0955          Activity at Discharge  Activity Instructions     Activity as Tolerated             Follow-up Appointments  Future Appointments  Date Time Provider Department Center   7/17/2018 11:00 AM Juliana Mccrary MD [facility] None     Additional Instructions for the Follow-ups that You Need to Schedule     Call MD With Problems / Concerns    As directed      Call office with any questions or concerns    Order Comments:  Call office with any questions or concerns          Discharge Follow-up with PCP    As directed      Currently Documented PCP:  Percy Em MD  PCP Phone Number:  [phone]    Follow Up Details:  Dr. Em 5-7 dys after DC         Discharge Follow-up with Specified Provider: Devaughn Mccrary July 17 as scheduled by NP    As directed      To:  Devaughn Mccrary July 17 as scheduled by NP         Discharge Follow-up with Specified Provider: DR Mccrary at the [facility] office July 17 at 11:00    As directed      To:  DR Mccrary at the [facility] [place] operated by [facility]  office July 17 at 11:00         Discharge Follow-up with Specified Provider: Dr. Aaron Wade=6 weeks for follow up EGD; 6 Weeks    As directed      To:  Dr. Aaron Wade=6 weeks for follow up EGD    Follow Up:  6 Weeks         Referral to Home Health    As directed      Face to Face Visit Date:  6/22/2018    Follow-up Provider for Plan of Care?:  I treated the patient in an acute care facility and will not continue treatment after discharge.    Follow-up Provider:  LOIS BARON [[id number]]    Reason/Clinical Findings:  Severe easophagitis, weakness, fall tendancy, poor appetite    Describe mobility limitations that make leaving home difficult:  weakness, old CVA, recovering from virus, needs assist 1:1 outside home    Nursing/Therapeutic Services Requested:  Skilled Nursing Physical Therapy Occupational Therapy Speech Therapy    Skilled nursing orders:  Medication education (Monitor healing from Esophagitis, check CBC, CMP next Mon or Tues and send to Dr. Baron) Cardiopulmonary assessments Other    PT orders:  Therapeutic exercise Gait Training Transfer training Strengthening Home safety assessment    Weight Bearing Status:  As Tolerated    Occupational orders:  Activities of daily living Energy conservation Strengthening Cognition Fine motor Home safety assessment    SLP orders:  Other (Monitor swallow with esophagitis)    Frequency:  1 Week 1               Therapy Orders  Physical therapy, Occupational Therapy, and Speech Therapy to evaluate and treat.  Nutrition/Dieticien to follow weights and determine further nutritional needs.    Test Results Pending at Discharge  1.  Need to schedule CT scan of lungs in 3 months for follow-up on granulomatous disease    2.  Patient needs follow up EGD with Dr. Wade in 6 to reevaluate the severe gastritis    Felix Rodriguez MD    Time: Discharge 65 min min

## 2018-06-22 NOTE — PROGRESS NOTES
[facility]  INTERNAL MEDICINE  FELIX RODRIGUEZ MD  [address] [place] [address]  Phone [phone] Fax [phone]  E-mail:  [email or web address]      INTERNAL MEDICINE DAILY PROGRESS NOTE  Felix Rodriguez M.D.  2018              Patient Identification:  Name: Javier Marin  Age: 82 y.o.  Sex: male  :  1935  MRN: [id number]         Primary Care Physician: Percy Em MD  LENGTH OF STAY 2 DAYS    Consults     Date and Time Order Name Status Description    2018 0220 Inpatient Infectious Diseases Consult Completed     2018 0145 Inpatient Neurology Consult      2018 0114 Inpatient Neurosurgery Consult Completed     2018 0114 Inpatient Nephrology Consult      2018 011 Inpatient Cardiology Consult Completed     2018 Inpatient Consult to Endocrinology Completed     2018 Inpatient Consult to Gastroenterology Completed     2018 171 Family Medicine Consult Completed     2018 1038 Family Medicine Consult Completed            Chief Complaint: Abdominal pain and weakness     History of Present Illness:     Subjective         Interval History: Patient is a 82 y.o.male who presented with abdominal pain and weakness to the [facility] ER or the third time in 3 days.  Patient is a pleasant 82-year-old white male who is regularly followed by Dr. Percy Em.  Patient's symptoms on each of the ER visits have been relatively stable.  He apparently developed weakness 3 days prior to the original ER presentation on 18.  In addition he had complaints of generalized malaise, low-grade fever, decreased appetite, and a mild cough.  Patient denies nausea vomiting diarrhea, chest pain, dyspnea.  He has developed a new symptom of upper abdominal pain and localizes pain to an area near the sternum.  He has had one small bowel movement in the last 24 hours and has not noticed any melena,  blood, or diarrhea with the bowel movement.  He has been tachycardic at the time of his ER presentation and has had irregular heartbeat found on EKG to be atrial fibrillation.  Patient does have a long history of insulin-dependent diabetes mellitus and has been followed by Dr. David in the past for this condition.  His other medical problems include: hypertension for which he takes diltiazem, Accupril, and Lasix; hyperlipidemia for which he takes atorvastatin; diabetes mellitus2 for which she takes NovoLog and Lantus; BPH for which he takes Flomax, and vitamin D deficiency.     Patient was evaluated on his urgent visit to the ER by Dr. Anand Mosley.  Family and patient were complaining of ongoing   epigastric abdominal pain and difficulty swallowing, weakness, loss of appetite, watery emesis, increasing cough, and decreased output of urine and feces.  His ER history of present illness described the abdominal pain as being of several days' duration, gradual onset, constant timing, epigastric location, no radiation, quality pain, moderate intensity, unchanged progression, associated symptoms of decreased appetite and difficulty swallowing emesis cough and decreased urine output, aggravating factors unknown, alleviating factors none, previous episodes 2 ER visits in the past week, treatment before arrival none.  Review of systems was positive for appetite loss, trouble swallowing, worsening cough, abdominal pain and vomiting, decreased urine volume, decreased fetal volume.  Physical exam in the ER showed temperature 98.2 pulse 87 respiratory rate 16 blood pressure 89/50 sat 95%.  Positives on exam included dry mucous membranes  Tenderness in the epigastric region.lab results showed blood sugar uncontrolled slightly at 211, BUN 56, creatinine 1.40, sodium 132, potassium 4.1, albumin 3.3, ALT 70, AST 72, alkaline phosphatase 139, lipase normal at 21, WBC 14.51, platelets 62,000.  CT scan of abdomen and pelvis showed  nonspecific wall thickening of the distal esophagus, no obstructive uropathy, lumbar compression fractures, small nonspecific pulmonary nodule with three-month follow-up recommended by radiology.  Patient was discussed with me as the on-call doctor for Dr. Em and was admitted with diagnoses of acute kidney injury, volume depletion, and esophageal distal thickening.       6/18/2018.  I personally saw the patient for the first time on this date.  He was resting quietly in his bed on 6 S. room 601 with wife and son present at the bedside.  Patient appeared to be in no acute pain.  Nurse was in the middle of completing his history when I entered the room.  Patient reported to me that he was having ongoing pain in the mid epigastric area without radiation.  He stated that his appetite was quite poor and that he really had not been able to eat much of anything for the last 3 days.  The last thing he tried earlier today was of normal and he actually ended up vomiting that back up.  Patient has no other recent medical issues and has been followed by only  [facility].  He is unaware of the cardiac arrhythmia which has been seen on EKGs during his last 2 hospital visits and consisted of atrial fibrillation with right and left bundle branch block.  He also did not recall his previous history of carotid stenosis bilaterally.  He does not recall a history of stroke though a small infarct age undetermined was seen on his CT scan in the ER 4 days ago.  Wife does recall that patient had a previous upper GI endoscopy.  Records show that this was done by Dr. Villalobos in 2005 and was unremarkable.  Patient seems very comfortable at the present time.  He is on IV fluids that are infusing now at 75 cc per hour.  He did receive a bolus of normal saline in the emergency room.  I discussed the planned workup with the patient and his family.  I actually called Dr.L. Wade who will see the patient  in the a.m. to consider a possible upper GI endoscopy.  I will also seek input from renal with respect to his volume status, from endocrine with respect to his poorly controlled diabetes, from cardiology with respect to his newly diagnosed atrial fibrillation, and from neurosurgery regarding new discovery of multiple compression fractures and lumbar spine age undetermined.    6/19/2018.  Patient in the midst of workup for multiple problems as outlined above.  My visit today with the patient was in the cardiology suite where he was in the midst of additional testing including an echocardiogram and ultrasound of abdomen.  Patient tells me that he still has ongoing discomfort and pain in the upper stomach and central chest areas.  He states that even liquids seem to still be hurting when he swallows them unless he warms them in his mouth for an extended period time before swallowing.  Speech therapy has not been able to meet with the patient at this point.  GI did take the patient to upper GI endoscopy and found evidence of severe distal esophagitis.  Patient has been started on PPI and Carafate for treatment of these findings.  Neurology did see the patient and has initiated a neurologic workup for stroke which is felt to be old in nature.  Cardiology is in the process of evaluating atrial fibrillation which appears to be new in onset.  This obviously increases need for anticoagulation.  Endocrinology is helping with adjustment of the patient's insulin levels.  Dietitian has seen the patient and his landing a calorie count to start once the patient is no longer nothing by mouth.  PT and speech therapy have not been able to complete their evaluations at this point.  Neurosurgery did see the patient for compression fractures of the lower back and are continuing to follow-up on this but feel that unless patient has significant pain or difficulty walking with PT and that little else needs to be done for the issue at this  point.  Speech therapy also has not been able to complete their consult on the patient will be trying to initiate this tomorrow.  Nephrology has helped with fluid balance.  Creatinine is improved on this date.  IV fluids are continued while patient has poor appetite, poor by mouth intake, and is continuing diagnostic testing.    6/20/2018.  Patient continues with test as part of his complete workup of multiple medical problems identified on admission.  Patient was out of the room and I spoke with the wife at length he was in the room today and reviewed results of yesterday's test.  Patient does have severe distal esophagitis and gastritis which were treating with PPIs twice a day and Carafate.  Patient also has been found to have a small stroke, old compression fractures of lumbar spine, some indicators of dysphagia, and a viral syndrome that has now apparently resolved.  Renal also happy with resolution of acute kidney injury and signing off the case.  PT and OT both no deficits in patient's ability to ambulate without assistance and a walker.  Patient's efforts are somewhat uncoordinated and risk for fall remains high.  Patient still is taking little to none in the way of by mouth intake and continued work with dietitian to find appropriate means for patient's nutrition is needed.  I did see the patient while in the midst of an echocardiogram.  He seemed to be tolerating the procedure well and no complaints or problems were identified.  Speech is planning a video swallow tomorrow and we will then advance diet to see how patient tolerates oral intake.  We'll also try to find foods that the patient is able to tolerate more readily.    6/21/2018.  Patient resting quietly in bed in room at time of my visit just back from additional testing in radiology.  Patient states that he is slowly starting to feel a little better with his stomach now that he is on twice daily dosing of PPI and Carafate.  Speech therapy evaluated  patient a little later in the day the video swallowing study.  Patient did have evidence of some significant aspiration with every texture he tried and final recommendation was for possible tube feedings.  I discussed this with the patient and his wife and they are adamantly opposed to the idea of tube feeding and wanted to try the safest possible diet and tolerated or dysphagia.  Neurology has recommended patient have anticoagulation because of the evidence of stroke in the past.  Cardiology points that such anticoagulation would be somewhat dangerous in this patient who has a history of recurrent falls.  Therefore decision was made to use aspirin 81 mg only for anticoagulation which is an agreeable decision with respect the patient and his wife.  We are working to progress the patient's diet with a dysphagia diet to see if he will be tolerant of this food.  I did discuss in home health with the patient and his family.  [employer] his company in the area where he lives that we will be able to offer speech therapy PT OT and nursing care in the patient's home.     Review of Systems:               A comprehensive 14 point review of systems was negative except for:  Constitution:  positive for anorexia, fatigue, fevers and malaise  Eyes:  positive for blurriness and Ptosis left eye  Respiratory: positive for  cough, dry and pleuritic pain  Cardiovascular: positive for  irregular pulse and palpitations  Gastrointestinal: postitive for  constipation, difficulty / pain with swallowing, nausea, vomiting and Upper abdominal pain  Hematologic / Lymphatic: positive for  fatigue and Low platelet count  Musculoskeletal: positive for  back pain, muscle weakness and Difficulty walking  Neurological: positive for  difficulty walking and dizziness      Past Medical History:   Diagnosis Date   • Arthritis    • Diabetes mellitus    • Hard of hearing    • Hypertension    • Ptosis of left eyelid      Past Surgical History:   Procedure  Laterality Date   • ENDOSCOPY N/A 6/19/2018    Procedure: ESOPHAGOGASTRODUODENOSCOPY WITH BIOPSY;  Surgeon: Toribio Wade MD;  Location: [facility] ENDOSCOPY;  Service: Gastroenterology   • EYE PTOSIS REPAIR Left 11/15/2016    Procedure: LT UPPER LID EYE PTOSIS REPAIR;  Surgeon: Anup Lancaster MD;  Location:  [facility] [place];  Service:    • EYE SURGERY     • HIP ARTHROPLASTY     • JOINT REPLACEMENT       No Known Allergies  History reviewed. No pertinent family history.    Social History     Social History   • Marital status:      Occupational History   • Retired      Social History Main Topics   • Smoking status: Former Smoker     Types: Cigars   • Smokeless tobacco: Never Used      Comment: QUIT 1994   • Alcohol use No   • Drug use: No   • Sexual activity: Not Currently     Partners: Female     Other Topics Concern   • Not on file       PMH, FH, SH and ROS completed with Admission History and Physical and updated in EPIC system.        Objective     Scheduled Meds:    [START ON 6/22/2018] aspirin 81 mg Oral Daily   atorvastatin 10 mg Oral Daily   cholecalciferol 1,000 Units Oral Daily   diltiaZEM 120 mg Oral QAM   docusate sodium 100 mg Oral BID   enoxaparin 40 mg Subcutaneous Q24H   insulin aspart 0-7 Units Subcutaneous 4x Daily With Meals & Nightly   insulin aspart 5 Units Subcutaneous TID With Meals   insulin detemir 12 Units Subcutaneous Nightly   [START ON 6/22/2018] lansoprazole 30 mg Oral QAM   metoprolol tartrate 37.5 mg Oral Q12H   sucralfate 1 g Oral Q6H   tamsulosin 0.4 mg Oral Daily     Continuous Infusions:    Pharmacy to Dose enoxaparin (LOVENOX)     sodium chloride 75 mL/hr Last Rate: 75 mL/hr (06/20/18 2154)       Vital signs in last 24 hours:  Temp:  [98.1 °F (36.7 °C)-99.1 °F (37.3 °C)] 99.1 °F (37.3 °C)  Heart Rate:  [64-91] 91  Resp:  [18-20] 20  BP: (112-154)/(64-85) 154/79    Intake/Output:    Intake/Output Summary (Last 24 hours) at 06/21/18 2120  Last data filed at 06/21/18 1900    "Gross per 24 hour   Intake             1255 ml   Output              350 ml   Net              905 ml       Exam:  /79 (BP Location: Left arm, Patient Position: Lying)   Pulse 91   Temp 99.1 °F (37.3 °C) (Oral)   Resp 20   Ht 172.7 cm (68\")   Wt 71.5 kg (157 lb 11.2 oz)   SpO2 100%   BMI 23.98 kg/m²     Constitutional:  Alert, cooperative,mild discomfort/ distress due to GI issues, AAOx3, resting comfortably on stretcher in cardiology   Head:      Normocephalic, without obvious abnormality, atraumatic   Eyes:     PERRLA, conjunctiva/corneas clear, no icterus, no conjunctival                                     pallor, EOM's intact, both eyes      ENT and Mouth: Lips, tongue, gums normal; oral mucosa pink and moist   Neck:     Supple, symmetrical, trachea midline, no JVD  Respiratory:     Clear to auscultation bilaterally, respirations unlabored, occasional cough but no significant wheezing  Cardiovascular:  Irregular rate and rhythm, S1 and S2 normal, no murmur,      no  Rub or gallop.  Pulses normal.    Gastrointestinal:   BS present x 4 Soft, tender in midepigastric area, bowel sounds active,      no masses, no hepatosplenomegaly                                                     :       No hernia.  Normal exam for sex.         Musculoskeletal: Extremities normal, atraumatic, no cyanosis or edema     No arthropathy.  No deformity.  Gait poorly about                                                 Skin:   Skin is warm and dry,  no rashes, swelling or palpable lesions   Neurologic:  CN -XII intact, motor strength showing lower extremity weakness, sensation grossly intact to light touch, no focal reflex deficits noted    Psychiatric:     Alert,oriented X3, no delusions, psychoses, depression or anxiety      Heme/Lymph/Imun:   No bruises, petechiae.  Lymph nodes normal in size/configuration       Data Review:  Lab Results   Component Value Date    CALCIUM 8.1 (L) 06/21/2018    PHOS 1.4 (L) 06/20/2018 "       Results from last 7 days  Lab Units 06/21/18  0553 06/20/18  0720 06/20/18  0719 06/19/18  0645 06/19/18  0643  06/18/18  1308   AST (SGOT) U/L 61* 58*  --   --   --   --  72*   MAGNESIUM mg/dL  --   --   --  2.1  --   --   --    SODIUM mmol/L 138 139  --  137  --   < > 132*   POTASSIUM mmol/L 3.7 3.5  --  3.9  --   < > 4.1   CHLORIDE mmol/L 105 102  --  98  --   < > 92*   CO2 mmol/L 25.6 27.5  --  24.2  --   < > 27.1   BUN mg/dL 19 27*  --  45*  --   < > 56*   CREATININE mg/dL 0.58* 0.70*  --  0.90  --   < > 1.40*   GLUCOSE mg/dL 113* 120*  --  241*  --   < > 211*   CALCIUM mg/dL 8.1* 8.3*  --  8.3*  --   < > 9.3   WBC 10*3/mm3 8.24  --  9.13  --  13.01*  < > 14.51*   HEMOGLOBIN g/dL 11.8*  --  11.9*  --  13.0*  < > 14.5   PLATELETS 10*3/mm3 124*  --  114*  --  91*  < > 62*   ALT (SGPT) U/L 64* 63*  --   --   --   --  70*   < > = values in this interval not displayed.  Lab Results   Component Value Date    TROPONINT <0.010 06/19/2018     Estimated Creatinine Clearance: 72 mL/min (A) (by C-G formula based on SCr of 0.58 mg/dL (L)).  WEIGHTS:     Wt Readings from Last 1 Encounters:   06/21/18 0500 71.5 kg (157 lb 11.2 oz)   06/20/18 0442 72.9 kg (160 lb 11.2 oz)   06/19/18 1112 73 kg (160 lb 15 oz)   06/19/18 0555 73.4 kg (161 lb 14.4 oz)   06/18/18 1912 71.6 kg (157 lb 14.4 oz)   06/18/18 1101 72.6 kg (160 lb)         Assessment:  Principal Problem:    Esophagus disorder, thickened distal wall CT Scan 6/18/18  Active Problems:    Acute gastritis    Diabetes mellitus type 2, uncontrolled    Acute hyponatremia    Acute kidney injury superimposed on chronic kidney disease    Elevated liver function tests    Leukocytosis    Compression fracture of lumbar spine, non-traumatic    Lumbar canal stenosis    Weakness generalized    Cough    Atrial fibrillation    Esophagitis determined by endoscopy by Sheri 6/19/18 LA Grade D Lower 1/3    Hiatal hernia with GERD and esophagitis    Advanced diabetic retinal disease     Limb swelling    Hypertension, essential    Anorexia    Granulomatous disease, chronic    Lung nodule seen on imaging study    Carotid stenosis, asymptomatic, bilateral 16-49% 2/24/14    Remote history of stroke, small caudate nucleus    Thrombocytopenia    Fall at home, subsequent encounter    Hyperlipidemia    Hard of hearing    Ptosis, left eyelid    Osteoarthritis of multiple joints         Attending Physician Assessment and Plan:     1.  Thickened distal esophageal wall on CT scan in 6/18/18 accompanied by dysphagia and generalized weakness.  Exact etiology unknown.  Patient had normal EGD 2005 by Dr. Villalobos.  Discussed with Dr. SHAWNA Wade.    EGD today showing distal esophagitis and evidence of hiatal hernia.  Speech therapy consulted for input on swallowing and dysphagia.  Added oral protonix and Carafate slurry  We will ask dietitian to see and do calorie count.  Despite some dysphagia we will place the patient on the [facility] dysphagia diet at the request of the patient and his wife.   2.  Volume depletion with acute kidney injury superimposed on chronic kidney disease.  IV fluids were started in the ER and are continued on the floor.  Renal has been consulted for their input on volume status and correction of deficits.  Acute kidney injury resolving.  Continuing IV fluid until able to support self with by mouth.  At this point patient is independent of IV fluids and tolerating by mouth well.  3.  Generalized weakness with reported difficulty using legs and fall at home prior to first ER visit.  Workup at the time of the first ER visit did show a remote caudate nucleus infarct.  MRI and MRA of brain  ordered .  Neurology to offer input on anticoagulation needs once studies are complete.  MRA of carotids also ordered along with carotid ultrasound.  Ultrasound does show moderate stenosis on the right and minimal stenosis on left sides  PT and OT evaluated patient and made recommendations on safety to  prevent further falls.  May need use of walker.  Home PT and OT will help  4.  Upper abdominal pain reported on physical exam.  Etiology of this symptom would seem to be the esophagitis.  Ultrasound of abdomen is ordered to look for gallstones and other biliary duct abnormalities, but none were found other than minimal sludging.  .  Liver function tests noted to be elevated at present time.  Now stable  5.  Atrial fibrillation on EKGs from first 2 ER visits apparently new in onset.  Cardiology has been consulted for input on his finding.  Echocardiogram, EKG, and cardiac enzymes have been ordered.  We will discuss with cardiology need for anticoagulation in the future at the time of patient's discharge since CHRIS score is elevated.  Further work up as planned by cardiology.  Anticoagulation with aspirin recommended patient at to high risk for falls to use other types of anticoagulants  6.  Multiple compression fractures of lumbar spine, age undetermined.  Neurosurgery is consulted for their input on this finding.  At least 2 of the fracture showed greater than 50% compression.  Patient's symptomatology is relatively limited.  May need to consider MRI to further evaluate situation.  PT and OT to work with patient tomorrow as indicated above.  Decision on anticoagulation difficult because of patient's high fall risk  7.  Leukocytosis with dry nonproductive cough and no other signs of acute infection.  Patient is afebrile at the present time with no other signs of sepsis.  We will check pro-calcitonin and lactate levels with a.m. labs.  Holding at present time with respect to IV antibiotics as no signs of acute infection.ID consulted due to elevated WBC and procalcitonin.  Will repeat in am.  8.  Type 2 diabetes mellitus poorly controlled at times.  Input is requested from endocrinology with respect to diabetic management.  Thyroid profile is ordered.  Will check fingerstick glucoses and hemoglobin A1c.  9.  Acute  hyponatremia.  Treating with IV fluids which should help with this problem.  Again, renal is seeing the patient and we will be addressing fluid and volume.  10.  Thrombocytopenia with platelet counts in the 60 and 70,000 range.  Etiology for this finding is unclear at the present time.  We will monitor and consider hematology consult if situation changes or worsens.  Could be result of mild viral infection.  11.  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  Patient certainly is at risk for strokes with the diabetic history.  Holding aspirin for now but will resume prior to discharge.  May need to anticoagulate or atrial fibrillation which also would be useful with respect to this diagnosis.  May need to consider MRI of brain. Neurologic consultation is planned.  Additional neurologic testing considered including MRI angiogram of neck and arch of aorta.  12.  Essential hypertension.  This seems to be fairly well controlled at present time with current medications.  Cardiology to follow patient and adjust medications as they feel necessary.  13.  Hyperlipidemia. Patient is currently on Lipitor for treatment of this problem.  We will continue to monitor and adjust diet as necessary to control.   14.  Granulomatous disease of lung with nodules noted on scan in the ER.  Follow-up suggested in 3 months and will be arranged at time of patient's discharge.  15.  Carotid stenosis bilateral asymptomatic in range of 16-49% on last scan in 2014.  We will repeat the carotid Doppler studies while patient is in the hospital and treat based upon findings from that study.  Will need follow-up carotid studies in 6 months.       Plan for disposition: Plan for disposition to home with home health in 1-2 more days.    Felix Rodriguez MD  6/21/2018  09:40 AM

## 2018-06-22 NOTE — PLAN OF CARE
Problem: Patient Care Overview  Goal: Plan of Care Review   06/22/18 0552   Coping/Psychosocial   Plan of Care Reviewed With patient   Plan of Care Review   Progress improving   OTHER   Outcome Summary Pt alert & oreinted. VSS. Pt able to make needs known, no needs voiced at this time. Pt being D/C'ed today. No acute distress noted. Denies pain or discomfort. Will continue to monitor.       Problem: Fall Risk (Adult)  Goal: Absence of Fall  Outcome: Ongoing (interventions implemented as appropriate)      Problem: Skin Injury Risk (Adult)  Goal: Skin Health and Integrity  Outcome: Ongoing (interventions implemented as appropriate)      Problem: Nutrition, Imbalanced: Inadequate Oral Intake (Pediatric)  Goal: Improved Oral Intake  Outcome: Ongoing (interventions implemented as appropriate)

## 2018-06-22 NOTE — PROGRESS NOTES
[facility]  Cardiology Progress Note    Patient Identification:  Name: Javier Marin  Age: 82 y.o.  Sex: male  :  1935  MRN: [id number]                 Follow Up / Chief Complaint: new onset atrial fib, anticoagulation recommmendations    Interval History:  82-year-old male admitted with multiple medical issues with newly diagnosed atrial fibrillation with rvr, generalized weakness with fall and possible syncope.  He has been started on oral Cardizem for rate control. He has a [id number] score of >/= 6 and a HAS BLED score of >/= 5 (hypertension, abnormal LFTs, stroke history, predisposition to bleeding [anemia, thrombocytopenia] and age >65) in addition to lower extremity weakness, gait disturbance and falls.       Subjective:  Eyes chest pain, palpitations or dizziness.  Denies shortness of breath.      Patient and spouse anticipate discharge today.  Discussed with patient and spouse, changes to home medications, outpatient follow-up.  Advised to return to ER for any recurrent symptoms including, but not limited to: chest pain/pressure/tightness, weakness, shortness of breath, dizziness, palpitations, near syncope or syncope.  All questions were answered      Objective:  Afib -> SR 70-80's  -155 / 70's  Afeb      Past Medical History:  Past Medical History:   Diagnosis Date   • Arthritis    • Diabetes mellitus    • Hard of hearing    • Hypertension    • Ptosis of left eyelid      Past Surgical History:  Past Surgical History:   Procedure Laterality Date   • ENDOSCOPY N/A 2018    Procedure: ESOPHAGOGASTRODUODENOSCOPY WITH BIOPSY;  Surgeon: Toribio Wade MD;  Location: [facility] ENDOSCOPY;  Service: Gastroenterology   • EYE PTOSIS REPAIR Left 11/15/2016    Procedure: LT UPPER LID EYE PTOSIS REPAIR;  Surgeon: Anup Lancaster MD;  Location: [facility] OR [facility] [place];  Service:    • EYE SURGERY     • HIP ARTHROPLASTY     • JOINT REPLACEMENT          Social History:   Social History    Substance Use Topics   • Smoking status: Former Smoker     Types: Cigars   • Smokeless tobacco: Never Used      Comment: QUIT 1994   • Alcohol use No      Family History:  History reviewed. No pertinent family history.       Allergies:  No Known Allergies  Scheduled Meds:    aspirin 81 mg Daily   atorvastatin 10 mg Daily   cholecalciferol 1,000 Units Daily   diltiaZEM 120 mg QAM   docusate sodium 100 mg BID   enoxaparin 40 mg Q24H   insulin aspart 0-7 Units 4x Daily With Meals & Nightly   insulin aspart 5 Units TID With Meals   insulin detemir 12 Units Nightly   lansoprazole 30 mg QAM   metoprolol tartrate 37.5 mg Q12H   sucralfate 1 g Q6H   tamsulosin 0.4 mg Daily           INTAKE AND OUTPUT:    Intake/Output Summary (Last 24 hours) at 06/22/18 0911  Last data filed at 06/22/18 0824   Gross per 24 hour   Intake             1160 ml   Output              625 ml   Net              535 ml       Review of Systems:  GI: No nausea or vomiting  Cardiac: No chest pain or palpitations  Pulmonary: No shortness of breath or PND       Constitutional:  Temp:  [98.9 °F (37.2 °C)-99.1 °F (37.3 °C)] 99 °F (37.2 °C)  Heart Rate:  [62-91] 62  Resp:  [20] 20  BP: (108-154)/(64-79) 108/65    Physical Exam:  General:  Awake, alert resting quietly Appears in no acute distress  Eyes: PERTL,  HEENT:  No JVD.  Oral mucosa moist, no cyanosis  Respiratory: Respirations regular and unlabored at rest. BBS with good air entry in all fields. No crackles or wheezes auscultated  Cardiovascular: S1S2 Regular rate and rhythm. No murmur. No pretibial pitting edema  Gastrointestinal: Abdomen soft, non tender. Bowel sounds present.   Musculoskeletal: LOVELACE x4. No abnormal movements  Extremities: No digital clubbing or cyanosis  Skin: Color pink. Skin warm and dry to touch.  Neuro: AAO x3 [place] otherwise, CN II-XII grossly intact  Psych: Mood and affect normal, pleasant and cooperative        Cardiographics  Telemetry: SR 60's        Echocardiogram:    · Moderate tricuspid valve regurgitation is present.  · Right ventricular cavity is mildly dilated.  · Left atrial cavity size is mildly dilated.  · Mildly reduced right ventricular systolic function noted.  · There is calcification of the aortic valve.  · Calculated EF = 64%.  · There is no evidence of pericardial effusion    MRI Neck IMPRESSION:  1. Moderately severe narrowing at the origin of the right internal carotid artery with some narrowing at the origin of the right external carotid artery as well.  2. Minimal narrowing at the origin of the left internal carotid artery.         Lab Review     Results from last 7 days  Lab Units 06/19/18  2357 06/19/18  1837 06/19/18  0645   TROPONIN T ng/mL <0.010 <0.010 0.012       Results from last 7 days  Lab Units 06/19/18  0645   MAGNESIUM mg/dL 2.1       Results from last 7 days  Lab Units 06/21/18  0553   SODIUM mmol/L 138   POTASSIUM mmol/L 3.7   BUN mg/dL 19   CREATININE mg/dL 0.58*   CALCIUM mg/dL 8.1*       Results from last 7 days  Lab Units 06/21/18  0553 06/20/18  0719 06/19/18  0643   WBC 10*3/mm3 8.24 9.13 13.01*   HEMOGLOBIN g/dL 11.8* 11.9* 13.0*   HEMATOCRIT % 36.8* 35.7* 38.0*   PLATELETS 10*3/mm3 124* 114* 91*             Assessment:  - New onset atrial fibrillation with RVR, unknown duration  - elevated [id number]   - HTN  - Generalized weakness with fall  - Esophagitis with distal bleeding, chronic gastritis  - Anorexia  - Nausea/vomiting  - acute on CKD  - Leukocytosis  - DM  - anemia  - thrombocytopenia  - Age indeterminate lumbar compression fractures  - Small, remote right lateral infarct   - mod-severe right carotid disease      Plan:  - New onset atrial fib with RVR, unknown duration -> SR on Lopressor and Cardizem    - elevated [id number] - >/= 6 and HAS BLED score of >/= 5 (hypertension, abnormal LFTs, stroke history, predisposition to bleeding [anemia, thrombocytopenia] and age >65) in addition to lower extremity weakness, gait  "disturbance,  Falls and EGD=> esophagitis with evidence of bleeding in the distal third, chronic gastritis .    Risks and benefits of anticoagulation therapy which include increased risk of bleeding and decreased risk of systemic embolization such as stroke.  All questions were answered and they both voice understanding.  Spouse is agreeable to low-dose aspirin only at this time       - HTN - controlled on low-dose Lopressor and Cardizem     In the setting of paroxysmal atrial fibrillation, (and newly documented moderate-severe carotid artery disease and age-indeterminate CVA) the risks and benefits of anticoagulation therapy which include increased risk of bleeding and decreased risk of systemic embolization such as stroke have been discussed with the patient and spouse.  All questions were answered and they both voice understanding.  Spouse is agreeable to low-dose aspirin only at this time .  Patient has been scheduled for outpatient follow-up next month      )6/22/2018  JERRY Canela/Transcription:   \"Dictated utilizing Dragon dictation\".     "

## 2018-06-22 NOTE — PLAN OF CARE
Problem: Patient Care Overview  Goal: Plan of Care Review  Outcome: Ongoing (interventions implemented as appropriate)   06/22/18 1015   Coping/Psychosocial   Plan of Care Reviewed With patient;spouse   OTHER   Outcome Summary FEES completed. Pt with functional swallow, recommend regular with thins as approved by MD. Meds whole with thin or puree. Per Dr. Rodriguez pt to remain on soft diet for a few more days. No penetration or aspiration with thins via cup and straw, nectar via cup and straw, puree, mech soft, and regular textures. Prespill to the vallecula with mech soft mixed. Cough X3 during evaluation, appeared unrelated to swallow function. No further ST warranted, ST to s/o at this time.

## 2018-06-22 NOTE — PROGRESS NOTES
82 y.o.   LOS: 4 days   Patient Care Team:  Percy Em MD as PCP - General  Percy Em MD as PCP - Family Medicine    Chief Complaint:  Elevated BG    Chief Complaint   Patient presents with   • Abdominal Pain     can't eat - no nvd     Subjective   Remains on soft diet.  Everyday he has some improvement in his appetite.  Blood sugars are decently controlled.    Interval History:    Review of Systems:   Review of Systems   Constitutional: Positive for appetite change and fatigue. Negative for fever.   Eyes: Negative for visual disturbance.   Respiratory: Negative for shortness of breath.    Cardiovascular: Negative for palpitations and leg swelling.   Gastrointestinal: Negative for abdominal pain and vomiting.   Endocrine: Negative for polydipsia and polyuria.   Musculoskeletal: Negative for joint swelling and neck pain.   Skin: Negative for rash.   Neurological: Negative for weakness and numbness.   Psychiatric/Behavioral: Negative for behavioral problems.     Objective     Vital Signs   Temp:  [98.9 °F (37.2 °C)-99.1 °F (37.3 °C)] 99 °F (37.2 °C)  Heart Rate:  [62-91] 62  Resp:  [20] 20  BP: (108-154)/(65-79) 108/65    Physical Exam:  Physical Exam   Constitutional: He is oriented to person, place, and time. He appears well-nourished.   HENT:   Head: Normocephalic and atraumatic.   Eyes: Conjunctivae and EOM are normal. No scleral icterus.   Neck: No thyromegaly present.   Cardiovascular: Normal rate.    Murmur heard.  Pulmonary/Chest: Effort normal and breath sounds normal. No stridor. No respiratory distress. He has no wheezes.   Abdominal: Soft. Bowel sounds are normal. He exhibits no distension. There is no tenderness.   Musculoskeletal: He exhibits no edema or deformity.   Lymphadenopathy:     He has no cervical adenopathy.   Neurological: He is alert and oriented to person, place, and time.   Skin: Skin is warm and dry. No rash noted. He is not diaphoretic.   Psychiatric: He has a normal mood  and affect.   Vitals reviewed.  Results Review:     I reviewed the patient's new clinical results and summarized them in subjective and in plan.      Glucose   Date/Time Value Ref Range Status   06/21/2018 0553 113 (H) 65 - 99 mg/dL Final   06/20/2018 0720 120 (H) 65 - 99 mg/dL Final     Lab Results (last 24 hours)     Procedure Component Value Units Date/Time    POC Glucose Once [[id number]]  (Normal) Collected:  06/22/18 1128    Specimen:  Blood Updated:  06/22/18 1133     Glucose 128 mg/dL     Narrative:       Meter: [id number] : [id number] Dwaine Beavers NA    POC Glucose Once [[id number]]  (Normal) Collected:  06/22/18 0629    Specimen:  Blood Updated:  06/22/18 0633     Glucose 75 mg/dL     Narrative:       Meter: [id number] : [id number] Jose Leona NA    POC Glucose Once [[id number]]  (Abnormal) Collected:  06/21/18 2105    Specimen:  Blood Updated:  06/21/18 2120     Glucose 138 (H) mg/dL     Narrative:       Meter: [id number] : [id number] Jose Leona NA    POC Glucose Once [[id number]]  (Normal) Collected:  06/21/18 1619    Specimen:  Blood Updated:  06/21/18 1620     Glucose 107 mg/dL     Narrative:       Meter: [id number] : [id number] Nevaeh Karlo NA        Imaging Results (last 24 hours)     ** No results found for the last 24 hours. **          Medication Review:  done    No current facility-administered medications for this encounter.     Current Outpatient Prescriptions:   •  atorvastatin (LIPITOR) 10 MG tablet, Take 10 mg by mouth Daily., Disp: , Rfl:   •  cholecalciferol (VITAMIN D3) 1000 UNITS tablet, Take 1,000 Units by mouth Daily. PT HOLDING FOR SURGERY, Disp: , Rfl:   •  diltiaZEM (CARDIZEM) 120 MG tablet, Take 120 mg by mouth Every Morning., Disp: , Rfl:   •  furosemide (LASIX) 20 MG tablet, Take 20 mg by mouth 2 (Two) Times a Day., Disp: , Rfl:   •  insulin glargine (LANTUS) 100 UNIT/ML injection, Inject 10 Units under the skin Every Night., Disp: , Rfl:   •  insulin lispro  (humaLOG) 100 UNIT/ML injection, Inject 8 Units under the skin 3 (Three) Times a Day Before Meals., Disp: , Rfl:   •  tamsulosin (FLOMAX) 0.4 MG capsule 24 hr capsule, Take 1 capsule by mouth Daily., Disp: , Rfl:   •  acetaminophen (TYLENOL) 325 MG tablet, Take 2 tablets by mouth Every 4 (Four) Hours As Needed for Mild Pain ., Disp: , Rfl:   •  aspirin 325 MG tablet, Take 1 tablet by mouth Daily., Disp: 30 tablet, Rfl: 11  •  lansoprazole (PREVACID) 15 MG capsule, Take 1 capsule by mouth 2 (Two) Times a Day., Disp: 60 capsule, Rfl: 3  •  Metoprolol Tartrate 37.5 MG tablet, Take 37.5 mg by mouth Every 12 (Twelve) Hours., Disp: 60 tablet, Rfl: 1  •  nitroglycerin (NITROSTAT) 0.4 MG SL tablet, Place 1 tablet under the tongue Every 5 (Five) Minutes As Needed for Chest Pain. Take no more than 3 doses in 15 minutes., Disp: 25 tablet, Rfl: 0  •  sucralfate (CARAFATE) 1 GM/10ML suspension, Take 10 mL by mouth Every 6 (Six) Hours., Disp: 1200 mL, Rfl: 2    Assessment/Plan     Active Hospital Problems (** Indicates Principal Problem)    Diagnosis Date Noted   • **Esophagus disorder, thickened distal wall CT Scan 6/18/18 [K22.9] 06/18/2018   • Esophagitis determined by endoscopy by Sheri 6/19/18 LA Grade D Lower 1/3 [K20.9] 06/20/2018   • Hiatal hernia with GERD and esophagitis [K44.9, K21.0] 06/20/2018   • Hard of hearing [H91.90] 06/19/2018   • Ptosis, left eyelid [H02.402] 06/19/2018   • Osteoarthritis of multiple joints [M15.9] 06/19/2018   • Atrial fibrillation [I48.91] 06/19/2018   • Carotid stenosis, asymptomatic, bilateral 16-49% 2/24/14 [I65.23] 06/19/2018   • Remote history of stroke, small caudate nucleus [Z86.73] 06/19/2018   • Thrombocytopenia [D69.6] 06/19/2018   • Fall at home, subsequent encounter [W19.XXXD, Y92.099] 06/19/2018   • Hyperlipidemia [E78.5] 06/19/2018   • Acute gastritis [K29.00] 06/18/2018   • Hypertension, essential [I10] 06/18/2018   • Acute hyponatremia [E87.1] 06/18/2018   • Acute kidney  "injury superimposed on chronic kidney disease [N17.9, N18.9] 06/18/2018   • Elevated liver function tests [R79.89] 06/18/2018   • Leukocytosis [D72.829] 06/18/2018   • Anorexia [R63.0] 06/18/2018   • Granulomatous disease, chronic [D71] 06/18/2018   • Lung nodule seen on imaging study [R91.1] 06/18/2018   • Compression fracture of lumbar spine, non-traumatic [M48.56XA] 06/18/2018   • Lumbar canal stenosis [M48.061] 06/18/2018   • Weakness generalized [R53.1] 06/18/2018   • Cough [R05] 06/18/2018   • Diabetes mellitus type 2, uncontrolled [E11.65] 06/18/2018   • Limb swelling [M79.89] 06/18/2018   • 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     Type 2 diabetes mellitus decently controlled  Continue levemir 12 units at bedtime.  Continue NovoLog 5 units with each meal, will place holding parameters.  Continue NovoLog sliding scale 3 times a day before meals and at bedtime.    Hyperlipidemia  Continue Lipitor 10 mg oral daily.  LDL at goal.    TSH levels noted to be within normal limits.    Discharge instructions from endocrine  Patient will resume his home insulin regimen upon discharge  He will follow-up with his endocrinologist Dr. Pollock.   Advised the patient to take half the dose of insulin if he has a blood sugar less than 100  And to hold his NovoLog if he is not eating.    Albertina Daniel MD.  06/22/18  2:31 PM      EMR Dragon / transcription disclaimer:    \"Dictated utilizing Dragon dictation\".   "

## 2018-06-22 NOTE — DISCHARGE INSTR - APPOINTMENTS
Tried to make a follow up appointment to PCP, Dr. Em  But as per Bernice in his office to have the wife to call and schedule the appointment. [phone]

## 2018-06-22 NOTE — PROGRESS NOTES
Continued Stay Note  [facility]     Patient Name: Javier Marin  MRN: [id number]  Today's Date: 6/22/2018    Admit Date: 6/18/2018          Discharge Plan     Row Name 06/22/18 1057       Plan    Plan Home with [facility] AND FAMILY    Patient/Family in Agreement with Plan yes    Plan Comments DC orders noted. CCP called Lorena/[facility] for dc today. No additional dc needs. thelma allred/ccp              Discharge Codes    No documentation.       Expected Discharge Date and Time     Expected Discharge Date Expected Discharge Time    Jun 22, 2018             Lauren Batista, RN

## 2018-06-22 NOTE — MBS/VFSS/FEES
Acute Care - Speech Language Pathology   Swallow Re-Evaluation [facility]     Patient Name: Javier Marin  : 1935  MRN: [id number]  Today's Date: 2018  Onset of Illness/Injury or Date of Surgery: 18     Referring Physician: Michael      Admit Date: 2018    Visit Dx:     ICD-10-CM ICD-9-CM   1. Pain of upper abdomen R10.10 789.09   2. Dehydration E86.0 276.51   3. ROSA (acute kidney injury) N17.9 584.9   4. Esophagus disorder, thickened distal wall CT Scan 18 K22.9 530.9   5. Esophagus disorder K22.9 530.9   6. General weakness R53.1 780.79   7. Hypertension, essential I10 401.9   8. Acute kidney injury superimposed on chronic kidney disease N17.9 866.00    N18.9 585.9   9. Elevated liver function tests R79.89 790.6   10. Lung nodule seen on imaging study R91.1 793.11   11. Spinal stenosis of lumbar region without neurogenic claudication M48.061 724.02   12. Paroxysmal atrial fibrillation I48.0 427.31   13. Esophagitis determined by endoscopy by Sheri 18 LA Grade D Lower 1/3 K20.9 530.10     Patient Active Problem List   Diagnosis   • Acute gastritis   • Advanced diabetic retinal disease   • Limb swelling   • Diabetes mellitus type 2, uncontrolled   • Hypertension, essential   • Acute hyponatremia   • Acute kidney injury superimposed on chronic kidney disease   • Elevated liver function tests   • Leukocytosis   • Anorexia   • Esophagus disorder, thickened distal wall CT Scan 18   • Granulomatous disease, chronic   • Lung nodule seen on imaging study   • Compression fracture of lumbar spine, non-traumatic   • Lumbar canal stenosis   • Weakness generalized   • Cough   • Hard of hearing   • Ptosis, left eyelid   • Osteoarthritis of multiple joints   • Atrial fibrillation   • Carotid stenosis, asymptomatic, bilateral 16-49% 14   • Remote history of stroke, small caudate nucleus   • Thrombocytopenia   • Fall at home, subsequent encounter   • Hyperlipidemia   • Esophagitis  determined by endoscopy by Sheri 6/19/18 LA Grade D Lower 1/3   • Hiatal hernia with GERD and esophagitis     Past Medical History:   Diagnosis Date   • Arthritis    • Diabetes mellitus    • Hard of hearing    • Hypertension    • Ptosis of left eyelid      Past Surgical History:   Procedure Laterality Date   • ENDOSCOPY N/A 6/19/2018    Procedure: ESOPHAGOGASTRODUODENOSCOPY WITH BIOPSY;  Surgeon: Toribio Wade MD;  Location:  [facility];  Service: Gastroenterology   • EYE PTOSIS REPAIR Left 11/15/2016    Procedure: LT UPPER LID EYE PTOSIS REPAIR;  Surgeon: Anup Lancaster MD;  Location:  [facility];  Service:    • EYE SURGERY     • HIP ARTHROPLASTY     • JOINT REPLACEMENT            SWALLOW EVALUATION (last 72 hours)      SLP Adult Swallow Evaluation     Row Name 06/22/18 1015 06/21/18 1000 06/20/18 1430          Document Type evaluation  -OC evaluation  -JT evaluation  -SA    Subjective Information no complaints  -OC no complaints  -JT  --    Patient Observations alert;cooperative;agree to therapy  -OC alert;cooperative;agree to therapy  -JT alert;cooperative  -SA    Patient/Family Observations  -- pt upright on stretcher  -JT  --    Patient Effort good  -OC good  -JT good  -SA    Comment [place]  -OC pt [place]  -JT  --    Symptoms Noted During/After Treatment none  -OC none  -JT none  -SA          Patient Profile Reviewed yes  -OC  -- yes  -SA    Pertinent History Of Current Problem  --  -- reflux esophagitis, medium hiatal hernia, CXR negative for PNA, incidental finding of old stroke on CT  -SA    Current Method of Nutrition soft textures;nectar/syrup-thick liquids  -OC  -- full liquids   per GI  -SA    Precautions/Limitations, Hearing hearing impairment, bilaterally  -OC  --  --    Prior Level of Function-Swallowing no diet consistency restrictions  -OC  -- no diet consistency restrictions  -SA    Plans/Goals Discussed with patient and family;agreed upon  -OC  -- patient and family  -SA    Barriers to  Rehab none identified  -OC  -- none identified  -    Patient's Goals for Discharge return home  -OC  -- return home  -          Additional Documentation Pain Scale: Numbers Pre/Post-Treatment (Group)  -OC  -- Pain Scale: Numbers Pre/Post-Treatment (Group)  -SA          Pain Scale: Numbers, Pretreatment 0/10 - no pain  -OC 0/10 - no pain  -JT 0/10 - no pain  -    Pain Scale: Numbers, Post-Treatment 0/10 - no pain  -OC 0/10 - no pain  -JT 0/10 - no pain  -          Dentition Assessment upper dentures/partial in place;lower dentures/partial in place  -OC  --  --    Secretion Management WNL/WFL  -OC  --  --    Mucosal Quality moist, healthy  -OC  --  --    Volitional Swallow WFL  -OC  --  --    Volitional Cough WFL  -OC  --  --          Oral Motor General Assessment WFL  -OC  --  --          Clinical Swallow Evaluation Summary  --  -- Adequate laryngeal elevation and oral manipulation.  Pt demonstrated delayed cough with all consistencies.  Can not r/o cough as related to aspiration. REC VFSS to assess safety of diet.  -          Utensils Used  -- spoon;cup;straw  -JT  --    Consistencies Trialed  -- regular textures;soft textures;chopped;pureed;thin liquids;nectar/syrup-thick liquids;barium pill  -JT  --          Oral Prep Phase  -- impaired oral phase of swallowing  -JT  --    Oral Transit Phase  -- impaired  -JT  --    Oral Residue  -- impaired  -JT  --    VFSS Summary  -- Pt w/reduced bolus prep, weak hyolaryngeal function, decreased airway protectioin, mild-mod diffuse pharyngeal residue, and pen w/suspected aspiration of most consistencies. Shoulder obs throughout study limiting view. Pt at risk of asp with all consistencies.   -JT  --          Oral Preparatory Phase  -- prolonged manipulation;inadequate manipulation;bolus removed from mouth manually  -JT  --    Prolonged Manipulation  -- mixed consistency;mechanical soft;regular textures;pudding/puree;secondary to reduced lingual strength;secondary to  reduced lingual range of motion;other (see comments)   edentulous, dentures not present/not used  -JT  --    Inadequate Manipulation  -- mixed consistency;mechanical soft;regular textures;secondary to reduced lingual range of motion;secondary to reduced lingual strength;secondary to reduced labial seal  -JT  --    Bolus Removed from Mouth Manually  -- other (see comments)   barium pill; could not swallow w/thin or puree  -JT  --          Impaired Oral Transit Phase  -- tongue pumping;premature spillage of liquids into pharynx;other (see comments)   incoordination  -JT  --    Tongue Pumping  -- thin liquids;nectar-thick liquids  -JT  --    Premature Spillage of Liquids into Pharynx  -- all consistencies tested;discoordination of lingual movement  -JT  --          Impaired Oral Residue  -- floor of mouth residue;diffuse residue throughout oral cavity  -JT  --    Floor of Mouth Residue  -- all consistencies tested  -JT  --    Diffuse Residue throughout Oral Cavity  -- all consistencies tested  -JT  --    Response to Oral Residue  -- cleared residue;with cued swallow;with liquid wash  -JT  --          Initiation of Pharyngeal Swallow  -- bolus in valleculae;bolus in pyriform sinuses  -JT  --    Pharyngeal Phase  -- impaired pharyngeal phase of swallowing  -JT  --    Penetration During the Swallow  -- thin liquids;nectar-thick liquids;pudding/puree;mixed consistency;mechanical soft;regular textures;all consistencies tested;secondary to reduced vestibular closure;secondary to delayed swallow initiation or mistiming   posterior pen   -JT  --    Aspiration After the Swallow  -- thin liquids;pudding/puree;mixed consistency;regular textures;secondary to residue;in pyriform sinuses   suspected w/all; difficult to view due to shoulder obs  -JT  --    Response to Penetration  -- inconsistent response;cough  -JT  --    Response to Aspiration  -- cough  -JT  --    Pharyngeal Residue  -- all consistencies tested;valleculae;pyriform  sinuses;posterior pharyngeal wall;secondary to reduced posterior pharyngeal wall stripping;secondary to reduced laryngeal elevation;secondary to reduced hyolaryngeal excursion;secondary to reduced base of tongue retraction;diffuse within pharynx  -JT  --    Response to Residue  -- cleared residue with cued swallow;cleared residue with liquid wash  -JT  --    Attempted Compensatory Maneuvers  -- bolus size;additional subsequent swallow;multiple swallows;alternative liquids/solids  -JT  --    Response to Attempted Compensatory Maneuvers  -- reduced residue  -JT  --          Esophageal Phase  -- esophageal retention;esophageal retention with retrograde flow below PES;esophageal retention with retrograde flow through PES;minimal to no esophageal clearance;irregular barium column;see radiology report for further details  -JT  --          Severity Level of Dysphagia  -- mod-severe dysphagia;oral dysfunction;pharyngeal dysfunction;suspected esophageal dysfunction  -JT  --    Consistencies Aspirated/Penetrated  -- penetrated;nectar-thick liquids;penetrated and aspirated;thin liquids;mixed consistency;pudding/puree;regular textures  -JT  --    Summary Statement  -- Pt w/mod-severe oropharyngeal dysphagia with esophageal component. Difficult view due to shoulder obs. Post pen noted during swallow w/all consistencies, and trace asp suspected with thin, puree, mixed, and reg consistencies, from pyriform residue. Pt w/esophageal dysmotility with reduced clearance, backflow into pharynx, retrograde movement throughout esophagus, and irregular appearance of esophagus. Pt at risk to asp all consistencies.   -JT  --          Risks/Benefits Reviewed risks/benefits explained  -OC  --  --    Nasal Entry right:;other (see comments)   nostril entered using no topical anesthetic  -OC  --  --          Anatomic Considerations no anatomic structural deviation  -OC  --  --    Velopharyngeal WFL  -OC  --  --    Base of Tongue symmetrical  -OC   --  --    Epiglottis WFL  -OC  --  --    Laryngeal Function Breathing symmetrical  -OC  --  --    Laryngeal Function Phonation symmetrical  -OC  --  --    Laryngeal Function to Breath Hold TVT/FVF/Arytenoid;sustained closure  -OC  --  --    Secretion Rating Scale (Yonatan et al. 1996) 0- normal, no visible secretions  -OC  --  --    Secretion Description thin;clear  -OC  --  --    Ice Chips elicited swallow;not aspirated  -OC  --  --    Spontaneous Swallow frequency adequate  -OC  --  --    Sensory reduced sensation  -OC  --  --    Utensils Used Spoon;Cup;Straw  -OC  --  --    Consistencies Trialed thin liquids;nectar-thick liquids;pudding/puree;mixed consistency;mechanical soft;Regular textures  -OC  --  --          Oral Phase WFL  -OC  --  --          Initiation of Pharyngeal Swallow WFL  -OC  --  --    FEES Summary Velopharyngeal function was WFL and Characterized by symmetrical movement.  Epiglottis was Adequate in appearance. Tongue base movement was symmetrical with adequate range of motion. Laryngeal function was characterized by movement of the vocal folds that was symmetrical and adequate in range during quiet breathing and phonation. Secretion rating scale score was 0 - No visible secretions. Pt presents with functional oropharyngeal swallow. Pt with timely swallow and minimal pharyngeal residuals this date. No penetration or aspiration with thins via cup and straw, nectar via cup and straw, puree, mech soft, and regular textures. Prespill to the vallecula with mech soft mixed. Cough X3 during evaluation, appeared unrelated to swallow function.  -OC  --  --          SLP Swallowing Diagnosis functional oral phase;functional pharyngeal phase  -OC  -- suspected pharyngeal dysfunction  -SA    Functional Impact risk of aspiration/pneumonia  -OC  -- risk of aspiration/pneumonia  -SA    Rehab Potential/Prognosis, Swallowing good, to achieve stated therapy goals  -OC  -- good, to achieve stated therapy goals  -SA     Criteria for Skilled Therapeutic Interventions Met no problems identified which require skilled intervention  -OC  -- demonstrates skilled criteria  -SA          Therapy Frequency (Swallow) evaluation only  -OC PRN  -JT PRN  -SA    Predicted Duration Therapy Intervention (Days)  -- until discharge  -JT until discharge  -SA    SLP Diet Recommendation regular textures;thin liquids;other (see comments)   as approved to advance by MD  -OC NPO;temporary alternate methods of nutrition/hydration;water between meals after oral care, with supervision;ice chips between meals after oral care, with supervision   if PO preferred, rec mech soft, no mixed, w/NTL; meds crushe  -JT full liquid diet  -SA    Recommended Diagnostics  -- FEES  -JT VFSS (MBS)  -SA    Recommended Precautions and Strategies upright posture during/after eating;small bites of food and sips of liquid  -OC  -- upright posture during/after eating;small bites of food and sips of liquid  -SA    SLP Rec. for Method of Medication Administration meds whole;with thin liquids;with pudding or applesauce  -OC meds via alternate route;meds crushed;with thick liquids;as tolerated  -JT as tolerated  -SA    Monitor for Signs of Aspiration yes;notify SLP if any concerns  -OC yes;notify SLP if any concerns  -JT yes;notify SLP if any concerns;gurgly voice;cough;pneumonia;right lower lobe infiltrates  -SA    Anticipated Dischage Disposition home with assist  -OC unknown;anticipate therapy at next level of care  -JT unknown  -SA    Demonstrates Need for Referral to Another Service other (see comments)   N/A  -OC otolaryngology (ENT)   cricopharyngeal dysfunction  -JT  --          Oral Nutrition/Hydration Goal Selection (SLP)  -- oral nutrition/hydration, SLP goal 1  -JT  --          Oral Nutrition/Hydration Goal 1, SLP  -- Improve swallowing to be able to take some PO.  -JT  --    Time Frame (Oral Nutrition/Hydration Goal 1, SLP)  -- by discharge  -JT  --      User Key  (r) =  Recorded By, (t) = Taken By, (c) = Cosigned By    Initials Name Effective Dates    SA Arely Neno, MS CCC-SLP 03/07/18 -     OC Romina Hays MA,[facility]-SLP 06/08/18 -     LAURA GuillermoAlexandra Trujillo Nick 03/07/18 -         EDUCATION  The patient has been educated in the following areas:   Dysphagia (Swallowing Impairment).    SLP Recommendation and Plan  SLP Swallowing Diagnosis: functional oral phase, functional pharyngeal phase  SLP Diet Recommendation: regular textures, thin liquids, other (see comments) (as approved to advance by MD)  Recommended Precautions and Strategies: upright posture during/after eating, small bites of food and sips of liquid     Monitor for Signs of Aspiration: yes, notify SLP if any concerns     Criteria for Skilled Therapeutic Interventions Met: no problems identified which require skilled intervention  Anticipated Dischage Disposition: home with assist  Rehab Potential/Prognosis, Swallowing: good, to achieve stated therapy goals  Therapy Frequency (Swallow): evaluation only     Demonstrates Need for Referral to Another Service: other (see comments) (N/A)    Plan of Care Reviewed With: patient, spouse  Plan of Care Review  Plan of Care Reviewed With: patient, spouse  Outcome Summary: FEES completed. Pt with functional swallow, recommend regular with thins as approved by MD. Per Dr. Rodriguez pt to remain on soft diet for a few more days. Velopharyngeal function was WFL and Characterized by symmetrical movement.  Epiglottis was Adequate in appearance. Tongue base movement was symmetrical with adequate range of motion. Laryngeal function was characterized by movement of the vocal folds that was symmetrical and adequate in range during quiet breathing and phonation. Secretion rating scale score was 0 - No visible secretions. Pt presents with functional oropharyngeal swallow. Pt with timely swallow and minimal pharyngeal residuals this date. No penetration or aspiration with thins via cup and straw,  nectar via cup and straw, puree, mech soft, and regular textures. Prespill to the vallecula with mech soft mixed. Cough X3 during evaluation, appeared unrelated to swallow function.          SLP GOALS     Row Name 06/21/18 1000             Oral Nutrition/Hydration Goal 1 (SLP)    Oral Nutrition/Hydration Goal 1, SLP Improve swallowing to be able to take some PO.  -JT      Time Frame (Oral Nutrition/Hydration Goal 1, SLP) by discharge  -JT        User Key  (r) = Recorded By, (t) = Taken By, (c) = Cosigned By    Initials Name Provider Type    LAURA Romero Speech and Language Pathologist             SLP Outcome Measures (last 72 hours)      SLP Outcome Measures     Row Name 06/22/18 1015 06/21/18 1300 06/20/18 1600       SLP Outcome Measures    Outcome Measure Used? Adult NOMS  -OC Adult NOMS  -JT Adult NOMS  -SA       FCM Scores    FCM Chosen Swallowing  -OC Swallowing  -JT  --    Swallowing FCM Score 7  -OC 1  -JT 3  -SA      User Key  (r) = Recorded By, (t) = Taken By, (c) = Cosigned By    Initials Name Effective Dates    SA Arely Lazcano, MS GONSALVES-SLP 03/07/18 -     HERNESTO Hays MA,[facility]-SLP 06/08/18 -     LAURA Romero 03/07/18 -            Time Calculation:         Time Calculation- SLP     Row Name 06/22/18 1212             Time Calculation- SLP    SLP Start Time 0900  -OC      SLP Received On 06/22/18  -OC        User Key  (r) = Recorded By, (t) = Taken By, (c) = Cosigned By    Initials Name Provider Type    OC Romina Hays MA,[facility]-SLP Speech and Language Pathologist          Therapy Charges for Today     Code Description Service Date Service Provider Modifiers Qty    43206890927 HC ST FIBEROPTIC ENDO EVAL SWALL 5 6/22/2018 Romina Hays MA,[facility]-SLP GN 1               Romina Hays MA,[facility]-JOSE F  6/22/2018

## 2018-06-22 NOTE — SIGNIFICANT NOTE
06/22/18 1019   Rehab Treatment   Discipline physical therapy assistant   Reason Treatment Not Performed (Pt to d/c home today)

## 2018-06-25 NOTE — PROGRESS NOTES
Case Management Discharge Note    Final Note: HOME WITH INTREPID  AND FAMILY    Destination     No service coordination in this encounter.      Durable Medical Equipment     No service coordination in this encounter.      Dialysis/Infusion     No service coordination in this encounter.      Home Medical Care - Selection Complete     Service Request Status Selected Specialties Address Phone Number Fax Number    [email or web address] [place] [facility] [address] [place] [facility] [address] [phone]      Social Care     No service coordination in this encounter.        Other: Other (FAMILY)    Final Discharge Disposition Code: 06 - home with home health care

## 2018-07-17 ENCOUNTER — OFFICE VISIT (OUTPATIENT)
Dept: CARDIOLOGY | Facility: CLINIC | Age: 83
End: 2018-07-17

## 2018-07-17 VITALS
HEART RATE: 59 BPM | HEIGHT: 71 IN | WEIGHT: 157 LBS | SYSTOLIC BLOOD PRESSURE: 93 MMHG | DIASTOLIC BLOOD PRESSURE: 53 MMHG | BODY MASS INDEX: 21.98 KG/M2

## 2018-07-17 DIAGNOSIS — I48.0 PAROXYSMAL ATRIAL FIBRILLATION (HCC): Primary | ICD-10-CM

## 2018-07-17 DIAGNOSIS — R94.31 ABNORMAL EKG: ICD-10-CM

## 2018-07-17 PROCEDURE — 93000 ELECTROCARDIOGRAM COMPLETE: CPT | Performed by: INTERNAL MEDICINE

## 2018-07-17 PROCEDURE — 99213 OFFICE O/P EST LOW 20 MIN: CPT | Performed by: INTERNAL MEDICINE

## 2018-07-17 NOTE — PROGRESS NOTES
Subjective:       Javier Marin is a 82 y.o. male who here for follow up    CC  HOSP FOLLOW UP  HPI    82-year-old male recently discharged from the hospital with the following diagnosis    Esophagus disorder, thickened distal wall CT Scan 6/18/18    Esophagitis determined by endoscopy by Sheri 6/19/18 LA Grade D Lower 1/3    Acute gastritis    Diabetes mellitus type 2, uncontrolled    Acute hyponatremia    Acute kidney injury superimposed on chronic kidney disease    Elevated liver function tests    Leukocytosis due to viral syndrome now resolving    Compression fracture of lumbar spine, non-traumatic    Lumbar canal stenosis    Weakness generalized    Cough due to viral syndrome    Atrial fibrillation    Hiatal hernia with GERD and esophagitis    Advanced diabetic retinal disease    Limb swelling    Hypertension, essential    Anorexia    Granulomatous disease, chronic    Lung nodule seen on imaging study    Carotid stenosis, asymptomatic, bilateral 16-49% 2/24/14    Remote history of stroke, small caudate nucleus    Thrombocytopenia    Fall at home, subsequent encounter    Hyperlipidemia    Hard of hearing    Ptosis, left eyelid    Osteoarthritis of multiple joints     Problem List Items Addressed This Visit     None        .    The following portions of the patient's history were reviewed and updated as appropriate: allergies, current medications, past family history, past medical history, past social history, past surgical history and problem list.    Past Medical History:   Diagnosis Date   • Arthritis    • Diabetes mellitus (CMS/HCC)    • Hard of hearing    • Hypertension    • Ptosis of left eyelid     reports that he has quit smoking. His smoking use included Cigars. He has never used smokeless tobacco. He reports that he does not drink alcohol or use drugs.  Family History   Problem Relation Age of Onset   • Hypertension Neg Hx    • Hyperlipidemia Neg Hx    • Heart failure Neg Hx    • Heart disease Neg Hx  "   • Heart attack Neg Hx        Review of Systems  Constitutional: No wt loss, fever, fatigue  Gastrointestinal: No nausea, abdominal pain  Behavioral/Psych: No insomnia or anxiety   Cardiovascular no chest pains or tightness in chest  Objective:                 Physical Exam  BP 93/53   Pulse 59   Ht 180.3 cm (71\")   Wt 71.2 kg (157 lb)   BMI 21.90 kg/m²     General appearance: NAD, conversant   Eyes: anicteric sclerae, moist conjunctivae; no lid-lag; PERRLA   HENT: Atraumatic; oropharynx clear with moist mucous membranes and no mucosal ulcerations;  normal hard and soft palate   Neck: Trachea midline; FROM, supple, no thyromegaly or lymphadenopathy   Lungs: CTA, with normal respiratory effort and no intercostal retractions   CV: S1-S2 regular, no murmurs, no rub, no gallop   Abdomen: Soft, non-tender; no masses or HSM   Extremities: No peripheral edema or extremity lymphadenopathy  Skin: Normal temperature, turgor and texture; no rash, ulcers or subcutaneous nodules   Psych: Appropriate affect, alert and oriented to person, place and time           Cardiographics  @  ECG 12 Lead  Date/Time: 7/17/2018 11:37 AM  Performed by: CAROLYN WALTON  Authorized by: CAROLYN WALTON   Comparison: compared with previous ECG   Similar to previous ECG  Rhythm: sinus bradycardia  ST Flattening: all  Clinical impression: abnormal ECG            Echocardiogram:        Current Outpatient Prescriptions:   •  aspirin 325 MG tablet, Take 1 tablet by mouth Daily., Disp: 30 tablet, Rfl: 11  •  atorvastatin (LIPITOR) 10 MG tablet, Take 10 mg by mouth Daily., Disp: , Rfl:   •  cholecalciferol (VITAMIN D3) 1000 UNITS tablet, Take 1,000 Units by mouth Daily. PT HOLDING FOR SURGERY, Disp: , Rfl:   •  diltiaZEM (CARDIZEM) 120 MG tablet, Take 120 mg by mouth Every Morning., Disp: , Rfl:   •  furosemide (LASIX) 20 MG tablet, Take 20 mg by mouth 2 (Two) Times a Day., Disp: , Rfl:   •  insulin glargine (LANTUS) 100 UNIT/ML " injection, Inject 10 Units under the skin Every Night., Disp: , Rfl:   •  insulin lispro (humaLOG) 100 UNIT/ML injection, Inject 8 Units under the skin 3 (Three) Times a Day Before Meals., Disp: , Rfl:   •  lansoprazole (PREVACID) 15 MG capsule, Take 1 capsule by mouth 2 (Two) Times a Day., Disp: 60 capsule, Rfl: 3  •  Metoprolol Tartrate 37.5 MG tablet, Take 37.5 mg by mouth Every 12 (Twelve) Hours., Disp: 60 tablet, Rfl: 1  •  sucralfate (CARAFATE) 1 GM/10ML suspension, Take 10 mL by mouth Every 6 (Six) Hours., Disp: 1200 mL, Rfl: 2  •  tamsulosin (FLOMAX) 0.4 MG capsule 24 hr capsule, Take 1 capsule by mouth Daily., Disp: , Rfl:   •  acetaminophen (TYLENOL) 325 MG tablet, Take 2 tablets by mouth Every 4 (Four) Hours As Needed for Mild Pain ., Disp: , Rfl:   •  nitroglycerin (NITROSTAT) 0.4 MG SL tablet, Place 1 tablet under the tongue Every 5 (Five) Minutes As Needed for Chest Pain. Take no more than 3 doses in 15 minutes., Disp: 25 tablet, Rfl: 0   Assessment:        Patient Active Problem List   Diagnosis   • Acute gastritis   • 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CMS/HCC)   • Limb swelling   • Diabetes mellitus type 2, uncontrolled (CMS/HCC)   • Hypertension, essential   • Acute hyponatremia   • Acute kidney injury superimposed on chronic kidney disease (CMS/HCC)   • Elevated liver function tests   • Leukocytosis   • Anorexia   • Esophagus disorder, thickened distal wall CT Scan 6/18/18   • Granulomatous disease, chronic (CMS/HCC)   • Lung nodule seen on imaging study   • Compression fracture of lumbar spine, non-traumatic (CMS/HCC)   • Lumbar canal stenosis   • Weakness generalized   • Cough   • Hard of hearing   • Ptosis, left eyelid   • Osteoarthritis of multiple joints   • Atrial fibrillation (CMS/HCC)   • Carotid stenosis, asymptomatic, bilateral 16-49% 2/24/14   • Remote history of stroke, small caudate nucleus   • Thrombocytopenia (CMS/HCC)   • Fall at home, subsequent encounter   • Hyperlipidemia   •  Esophagitis determined by endoscopy by Sheri 6/19/18 LA Grade D Lower 1/3   • Hiatal hernia with GERD and esophagitis               Plan:            ICD-10-CM ICD-9-CM   1. Paroxysmal atrial fibrillation (CMS/HCC) I48.0 427.31   2. Abnormal EKG R94.31 794.31     1. Paroxysmal atrial fibrillation (CMS/HCC)  Heart rate under control  - ECG 12 Lead  - Stress Test With Myocardial Perfusion One Day    2. Abnormal EKG  Considering the patient's symptoms as well as clinical situation and  EKG findings, along with cardiac risk factors, ischemic workup is necessary to rule out ischemic cardiomyopathy, stress induced arrhythmias, and functional capacity for diagnosis as well as prognostic consideration    - Stress Test With Myocardial Perfusion One Day    3.  HypOtension     BP RUNNING LOW    DC CARDIAZEM    LEXISCAN CARDIOLYTE    SEE US 2-4 WKS  COUNSELING:    Javier Brennan was given to patient for the following topics: diagnostic results, risk factor reductions, impressions, risks and benefits of treatment options and importance of treatment compliance .       SMOKING COUNSELING:    Counseling given: Not Answered      EMR Dragon/Transcription disclaimer:   Much of this encounter note is an electronic transcription/translation of spoken language to printed text. The electronic translation of spoken language may permit erroneous, or at times, nonsensical words or phrases to be inadvertently transcribed; Although I have reviewed the note for such errors, some may still exist.

## 2018-08-07 ENCOUNTER — HOSPITAL ENCOUNTER (OUTPATIENT)
Dept: CARDIOLOGY | Facility: HOSPITAL | Age: 83
Discharge: HOME | End: 2018-08-07
Attending: INTERNAL MEDICINE

## 2018-08-07 VITALS
BODY MASS INDEX: 23.4 KG/M2 | DIASTOLIC BLOOD PRESSURE: 68 MMHG | RESPIRATION RATE: 18 BRPM | HEART RATE: 67 BPM | WEIGHT: 158 LBS | SYSTOLIC BLOOD PRESSURE: 131 MMHG | OXYGEN SATURATION: 100 % | HEIGHT: 69 IN

## 2018-08-07 PROCEDURE — 78452 HT MUSCLE IMAGE SPECT MULT: CPT | Performed by: INTERNAL MEDICINE

## 2018-08-07 PROCEDURE — A9500 TC99M SESTAMIBI: HCPCS | Performed by: INTERNAL MEDICINE

## 2018-08-07 PROCEDURE — 25010000002 REGADENOSON 0.4 MG/5ML SOLUTION: Performed by: INTERNAL MEDICINE

## 2018-08-07 PROCEDURE — 93018 CV STRESS TEST I&R ONLY: CPT | Performed by: INTERNAL MEDICINE

## 2018-08-07 PROCEDURE — 93016 CV STRESS TEST SUPVJ ONLY: CPT | Performed by: INTERNAL MEDICINE

## 2018-08-07 PROCEDURE — 0 TECHNETIUM SESTAMIBI: Performed by: INTERNAL MEDICINE

## 2018-08-07 PROCEDURE — 93017 CV STRESS TEST TRACING ONLY: CPT

## 2018-08-07 PROCEDURE — 78452 HT MUSCLE IMAGE SPECT MULT: CPT

## 2018-08-07 RX ADMIN — TECHNETIUM TC 99M SESTAMIBI 1 DOSE: 1 INJECTION INTRAVENOUS at 07:55

## 2018-08-07 RX ADMIN — TECHNETIUM TC 99M SESTAMIBI 1 DOSE: 1 INJECTION INTRAVENOUS at 10:35

## 2018-08-07 RX ADMIN — REGADENOSON 0.4 MG: 0.08 INJECTION, SOLUTION INTRAVENOUS at 10:35

## 2018-08-08 LAB
BH CV STRESS BP STAGE 1: NORMAL
BH CV STRESS COMMENTS STAGE 1: NORMAL
BH CV STRESS DOSE REGADENOSON STAGE 1: 0.4
BH CV STRESS DURATION MIN STAGE 1: 2
BH CV STRESS DURATION SEC STAGE 1: 21
BH CV STRESS GRADE STAGE 1: 0
BH CV STRESS HR STAGE 1: 89
BH CV STRESS METS STAGE 1: 1.6
BH CV STRESS PROTOCOL 1: NORMAL
BH CV STRESS RECOVERY BP: NORMAL MMHG
BH CV STRESS RECOVERY HR: 78 BPM
BH CV STRESS RECOVERY O2: 99 %
BH CV STRESS SPEED STAGE 1: 0.8
BH CV STRESS STAGE 1: 1
LV EF NUC BP: 60 %
MAXIMAL PREDICTED HEART RATE: 138 BPM
PERCENT MAX PREDICTED HR: 64.49 %
STRESS BASELINE BP: NORMAL MMHG
STRESS BASELINE HR: 73 BPM
STRESS O2 SAT REST: 100 %
STRESS PERCENT HR: 76 %
STRESS POST ESTIMATED WORKLOAD: 1.6 METS
STRESS POST EXERCISE DUR MIN: 2 MIN
STRESS POST EXERCISE DUR SEC: 21 SEC
STRESS POST PEAK BP: NORMAL MMHG
STRESS POST PEAK HR: 89 BPM
STRESS TARGET HR: 117 BPM

## 2018-08-15 ENCOUNTER — OFFICE VISIT (OUTPATIENT)
Dept: CARDIOLOGY | Facility: CLINIC | Age: 83
End: 2018-08-15

## 2018-08-15 VITALS
SYSTOLIC BLOOD PRESSURE: 132 MMHG | BODY MASS INDEX: 23.99 KG/M2 | WEIGHT: 162 LBS | HEIGHT: 69 IN | HEART RATE: 64 BPM | DIASTOLIC BLOOD PRESSURE: 68 MMHG

## 2018-08-15 DIAGNOSIS — I48.0 PAROXYSMAL ATRIAL FIBRILLATION (HCC): ICD-10-CM

## 2018-08-15 DIAGNOSIS — E78.5 HYPERLIPIDEMIA, UNSPECIFIED HYPERLIPIDEMIA TYPE: ICD-10-CM

## 2018-08-15 DIAGNOSIS — I10 HYPERTENSION, ESSENTIAL: Primary | ICD-10-CM

## 2018-08-15 PROCEDURE — 99213 OFFICE O/P EST LOW 20 MIN: CPT | Performed by: INTERNAL MEDICINE

## 2018-08-15 NOTE — PROGRESS NOTES
" Subjective:       Javier Marin is a 82 y.o. male who here for follow up    CC  The follow-up of the benign essential arterial hypertension atrial fibrillation and hyperlipidemia  HPI  82-year-old male with known history of the benign essential arterial hypertension, atrial fibrillation and hyperlipidemia here for the follow-up with no complaints of chest pains or tightness in chest no heaviness with a pressure sensation     Problem List Items Addressed This Visit        Cardiovascular and Mediastinum    Hypertension, essential - Primary    Atrial fibrillation (CMS/HCC)    Hyperlipidemia        .    The following portions of the patient's history were reviewed and updated as appropriate: allergies, current medications, past family history, past medical history, past social history, past surgical history and problem list.    Past Medical History:   Diagnosis Date   • Arthritis    • Diabetes mellitus (CMS/HCC)    • Hard of hearing    • Hypertension    • Ptosis of left eyelid     reports that he has quit smoking. His smoking use included Cigars. He has never used smokeless tobacco. He reports that he does not drink alcohol or use drugs.  Family History   Problem Relation Age of Onset   • Pancreatic cancer Mother    • Hypertension Neg Hx    • Hyperlipidemia Neg Hx    • Heart failure Neg Hx    • Heart disease Neg Hx    • Heart attack Neg Hx        Review of Systems  Constitutional: No wt loss, fever, fatigue  Gastrointestinal: No nausea, abdominal pain  Behavioral/Psych: No insomnia or anxiety   Cardiovascular no chest pains or tightness in chest  Objective:                 Physical Exam  /68 (BP Location: Left arm, Patient Position: Sitting)   Pulse 64   Ht 175.3 cm (69\")   Wt 73.5 kg (162 lb)   BMI 23.92 kg/m²     General appearance: NAD, conversant   Eyes: anicteric sclerae, moist conjunctivae; no lid-lag; PERRLA   HENT: Atraumatic; oropharynx clear with moist mucous membranes and no mucosal " ulcerations;  normal hard and soft palate   Neck: Trachea midline; FROM, supple, no thyromegaly or lymphadenopathy   Lungs: CTA, with normal respiratory effort and no intercostal retractions   CV: S1-S2 regular, no murmurs, no rub, no gallop   Abdomen: Soft, non-tender; no masses or HSM   Extremities: No peripheral edema or extremity lymphadenopathy  Skin: Normal temperature, turgor and texture; no rash, ulcers or subcutaneous nodules   Psych: Appropriate affect, alert and oriented to person, place and time           Cardiographics  @Procedures    Echocardiogram:        Current Outpatient Prescriptions:   •  acetaminophen (TYLENOL) 325 MG tablet, Take 2 tablets by mouth Every 4 (Four) Hours As Needed for Mild Pain ., Disp: , Rfl:   •  aspirin 325 MG tablet, Take 1 tablet by mouth Daily., Disp: 30 tablet, Rfl: 11  •  atorvastatin (LIPITOR) 10 MG tablet, Take 10 mg by mouth Daily., Disp: , Rfl:   •  cholecalciferol (VITAMIN D3) 1000 UNITS tablet, Take 1,000 Units by mouth Daily. PT HOLDING FOR SURGERY, Disp: , Rfl:   •  furosemide (LASIX) 20 MG tablet, Take 20 mg by mouth 2 (Two) Times a Day., Disp: , Rfl:   •  insulin glargine (LANTUS) 100 UNIT/ML injection, Inject 10 Units under the skin Every Night., Disp: , Rfl:   •  insulin lispro (humaLOG) 100 UNIT/ML injection, Inject 8 Units under the skin 3 (Three) Times a Day Before Meals., Disp: , Rfl:   •  lansoprazole (PREVACID) 15 MG capsule, Take 1 capsule by mouth 2 (Two) Times a Day., Disp: 60 capsule, Rfl: 3  •  Metoprolol Tartrate 37.5 MG tablet, Take 37.5 mg by mouth Every 12 (Twelve) Hours. (Patient taking differently: Take 25 mg by mouth Every 12 (Twelve) Hours.), Disp: 60 tablet, Rfl: 1  •  nitroglycerin (NITROSTAT) 0.4 MG SL tablet, Place 1 tablet under the tongue Every 5 (Five) Minutes As Needed for Chest Pain. Take no more than 3 doses in 15 minutes., Disp: 25 tablet, Rfl: 0  •  tamsulosin (FLOMAX) 0.4 MG capsule 24 hr capsule, Take 1 capsule by mouth Daily.,  Disp: , Rfl:    Assessment:        Patient Active Problem List   Diagnosis   • Acute gastritis   • 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CMS/HCC)   • Limb swelling   • Diabetes mellitus type 2, uncontrolled (CMS/HCC)   • Hypertension, essential   • Acute hyponatremia   • Acute kidney injury superimposed on chronic kidney disease (CMS/HCC)   • Elevated liver function tests   • Leukocytosis   • Anorexia   • Esophagus disorder, thickened distal wall CT Scan 6/18/18   • Granulomatous disease, chronic (CMS/HCC)   • Lung nodule seen on imaging study   • Compression fracture of lumbar spine, non-traumatic (CMS/HCC)   • Lumbar canal stenosis   • Weakness generalized   • Cough   • Hard of hearing   • Ptosis, left eyelid   • Osteoarthritis of multiple joints   • Atrial fibrillation (CMS/HCC)   • Carotid stenosis, asymptomatic, bilateral 16-49% 2/24/14   • Remote history of stroke, small caudate nucleus   • Thrombocytopenia (CMS/HCC)   • Fall at home, subsequent encounter   • Hyperlipidemia   • Esophagitis determined by endoscopy by Sheri 6/19/18 LA Grade D Lower 1/3   • Hiatal hernia with GERD and esophagitis     Interpretation Summary        · Findings consistent with an equivocal ECG stress test.  · Diaphragmatic attenuation artifact is present.  · Left ventricular ejection fraction is normal (Calculated EF = 60%).  · Myocardial perfusion imaging indicates a normal myocardial perfusion study with no evidence of ischemia.  · Impressions are consistent with a low risk study.               Plan:            ICD-10-CM ICD-9-CM   1. Hypertension, essential I10 401.9   2. Hyperlipidemia, unspecified hyperlipidemia type E78.5 272.4   3. Paroxysmal atrial fibrillation (CMS/HCC) I48.0 427.31     1. Hypertension, essential  Importance of controlling hypertension and blood pressure  checkup on the regular basis has been explained  Hypertension as a silent killer has been discussed  Risk reduction of the weight and regular exercises to  control the hypertension has been explained      2. Hyperlipidemia, unspecified hyperlipidemia type  Risk of the hyperlipidemia, importance of the treatment has been explained    Pros and cons of the antilipemic drugs has been explained    Regular blood workup as well as side effects including the liver failure, myelopathy death has been explained          3. Paroxysmal atrial fibrillation (CMS/HCC)         CV STABLE    SEE US 6 MONTHS  COUNSELING:    Javier Brennan was given to patient for the following topics: diagnostic results, risk factor reductions, impressions, risks and benefits of treatment options and importance of treatment compliance .       SMOKING COUNSELING:    Counseling given: Yes      EMR Dragon/Transcription disclaimer:   Much of this encounter note is an electronic transcription/translation of spoken language to printed text. The electronic translation of spoken language may permit erroneous, or at times, nonsensical words or phrases to be inadvertently transcribed; Although I have reviewed the note for such errors, some may still exist.

## 2018-08-17 ENCOUNTER — OFFICE (OUTPATIENT)
Dept: URBAN - METROPOLITAN AREA CLINIC 65 | Facility: CLINIC | Age: 83
End: 2018-08-17

## 2018-08-17 VITALS
WEIGHT: 169 LBS | DIASTOLIC BLOOD PRESSURE: 76 MMHG | HEART RATE: 68 BPM | SYSTOLIC BLOOD PRESSURE: 120 MMHG | HEIGHT: 71 IN

## 2018-08-17 DIAGNOSIS — K22.10 ULCER OF ESOPHAGUS WITHOUT BLEEDING: ICD-10-CM

## 2018-08-17 PROCEDURE — 99212 OFFICE O/P EST SF 10 MIN: CPT | Performed by: INTERNAL MEDICINE

## 2019-02-15 ENCOUNTER — OFFICE VISIT (OUTPATIENT)
Dept: CARDIOLOGY | Facility: CLINIC | Age: 84
End: 2019-02-15

## 2019-02-15 VITALS
HEART RATE: 71 BPM | BODY MASS INDEX: 24.44 KG/M2 | HEIGHT: 69 IN | DIASTOLIC BLOOD PRESSURE: 72 MMHG | WEIGHT: 165 LBS | SYSTOLIC BLOOD PRESSURE: 120 MMHG

## 2019-02-15 DIAGNOSIS — I10 HYPERTENSION, ESSENTIAL: ICD-10-CM

## 2019-02-15 DIAGNOSIS — E78.5 HYPERLIPIDEMIA, UNSPECIFIED HYPERLIPIDEMIA TYPE: ICD-10-CM

## 2019-02-15 DIAGNOSIS — R06.02 SOB (SHORTNESS OF BREATH): ICD-10-CM

## 2019-02-15 DIAGNOSIS — I48.0 PAROXYSMAL ATRIAL FIBRILLATION (HCC): Primary | ICD-10-CM

## 2019-02-15 PROCEDURE — 93000 ELECTROCARDIOGRAM COMPLETE: CPT | Performed by: NURSE PRACTITIONER

## 2019-02-15 PROCEDURE — 99214 OFFICE O/P EST MOD 30 MIN: CPT | Performed by: NURSE PRACTITIONER

## 2019-02-15 RX ORDER — RANITIDINE 150 MG/1
150 TABLET ORAL NIGHTLY
Status: ON HOLD | COMMUNITY
End: 2020-03-31

## 2019-02-15 NOTE — PROGRESS NOTES
Subjective:        Javier Marin is a 83 y.o. male who here for follow up    Chief Complaint   Patient presents with   • Hypertension     6 MO FOLLOW UP   • Atrial Fibrillation       HPI     Mr. Javier Marin is an 83-year-old white male is new to me he has a known history of benign essential arterial hypertension, atrial fibrillation, hyperlipidemia, diabetes mellitus type 2, and history of carotid stenosis.  He is here for his 6 months follow-up.    His echo on 6/2018 showed moderate TV regurgitation.  RV mildly dilated.  LAC mildly dilated.  Mildly reduced RV systolic function.  There is calcification of the aortic valve.  LV EF 64%.  Last stress test on 8/2018 was negative with LV EF 60%.    He denies shortness of breath, chest pain, pressure, palpitations, vomiting, and nausea.         The following portions of the patient's history were reviewed and updated as appropriate: allergies, current medications, past family history, past medical history, past social history, past surgical history and problem list.    Past Medical History:   Diagnosis Date   • Arthritis    • Diabetes mellitus (CMS/HCC)    • Hard of hearing    • Hypertension    • Ptosis of left eyelid          reports that he has quit smoking. His smoking use included cigars. he has never used smokeless tobacco. He reports that he does not drink alcohol or use drugs.     Family History   Problem Relation Age of Onset   • Pancreatic cancer Mother    • Hypertension Neg Hx    • Hyperlipidemia Neg Hx    • Heart failure Neg Hx    • Heart disease Neg Hx    • Heart attack Neg Hx        ROS     Review of Systems  Constitutional: No wt loss, fever, fatigue  Gastrointestinal: No nausea, abdominal pain  Behavioral/Psych: No insomnia or anxiety  Cardiovascular: Denies chest pain, pressure, and palpitations      Objective:           Physical Exam   Constitutional: He is oriented to person, place, and time. He appears well-developed and well-nourished.   HENT:    Head: Normocephalic.   Right Ear: External ear normal.   Left Ear: External ear normal.   Eyes: EOM are normal.   Neck: Normal range of motion. No JVD present.   Cardiovascular: Normal rate, regular rhythm, normal heart sounds and intact distal pulses. Exam reveals no gallop and no friction rub.   No murmur heard.  Pulmonary/Chest: Effort normal and breath sounds normal. No stridor. No respiratory distress. He has no rales.   Abdominal: Soft. Bowel sounds are normal. He exhibits no distension. There is no tenderness. There is no guarding.   Musculoskeletal: Normal range of motion. He exhibits no edema or tenderness.   Neurological: He is alert and oriented to person, place, and time. He has normal reflexes.   Skin: Skin is warm.   Psychiatric: He has a normal mood and affect. Judgment normal.   Nursing note and vitals reviewed.        ECG 12 Lead  Date/Time: 2/15/2019 11:39 AM  Performed by: Denisse Miranda APRN  Authorized by: Denisse Miranda APRN   Comparison: compared with previous ECG   Similar to previous ECG  Rhythm: sinus rhythm               Current Outpatient Medications:   •  acetaminophen (TYLENOL) 325 MG tablet, Take 2 tablets by mouth Every 4 (Four) Hours As Needed for Mild Pain ., Disp: , Rfl:   •  aspirin 325 MG tablet, Take 1 tablet by mouth Daily., Disp: 30 tablet, Rfl: 11  •  atorvastatin (LIPITOR) 10 MG tablet, Take 10 mg by mouth Daily., Disp: , Rfl:   •  cholecalciferol (VITAMIN D3) 1000 UNITS tablet, Take 1,000 Units by mouth Daily. PT HOLDING FOR SURGERY, Disp: , Rfl:   •  furosemide (LASIX) 20 MG tablet, Take 20 mg by mouth 2 (Two) Times a Day., Disp: , Rfl:   •  insulin glargine (LANTUS) 100 UNIT/ML injection, Inject 10 Units under the skin Every Night., Disp: , Rfl:   •  insulin lispro (humaLOG) 100 UNIT/ML injection, Inject 8 Units under the skin 3 (Three) Times a Day Before Meals., Disp: , Rfl:   •  lansoprazole (PREVACID) 15 MG capsule, Take 1 capsule by mouth 2 (Two) Times a  Day., Disp: 60 capsule, Rfl: 3  •  Metoprolol Tartrate 37.5 MG tablet, Take 37.5 mg by mouth Every 12 (Twelve) Hours. (Patient taking differently: Take 25 mg by mouth Every 12 (Twelve) Hours.), Disp: 60 tablet, Rfl: 1  •  raNITIdine (ZANTAC) 150 MG tablet, Take 150 mg by mouth Every Night., Disp: , Rfl:   •  tamsulosin (FLOMAX) 0.4 MG capsule 24 hr capsule, Take 1 capsule by mouth Daily., Disp: , Rfl:   •  nitroglycerin (NITROSTAT) 0.4 MG SL tablet, Place 1 tablet under the tongue Every 5 (Five) Minutes As Needed for Chest Pain. Take no more than 3 doses in 15 minutes., Disp: 25 tablet, Rfl: 0     Assessment:        Patient Active Problem List   Diagnosis   • Acute gastritis   • 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CMS/HCC)   • Limb swelling   • Diabetes mellitus type 2, uncontrolled (CMS/HCC)   • Hypertension, essential   • Acute hyponatremia   • Acute kidney injury superimposed on chronic kidney disease (CMS/HCC)   • Elevated liver function tests   • Leukocytosis   • Anorexia   • Esophagus disorder, thickened distal wall CT Scan 6/18/18   • Granulomatous disease, chronic (CMS/HCC)   • Lung nodule seen on imaging study   • Compression fracture of lumbar spine, non-traumatic (CMS/HCC)   • Lumbar canal stenosis   • Weakness generalized   • Cough   • Hard of hearing   • Ptosis, left eyelid   • Osteoarthritis of multiple joints   • Atrial fibrillation (CMS/HCC)   • Carotid stenosis, asymptomatic, bilateral 16-49% 2/24/14   • Remote history of stroke, small caudate nucleus   • Thrombocytopenia (CMS/HCC)   • Fall at home, subsequent encounter   • Hyperlipidemia   • Esophagitis determined by endoscopy by Sheri 6/19/18 LA Grade D Lower 1/3   • Hiatal hernia with GERD and esophagitis               Plan:    1.  Essential hypertension:   Stable on current medications. Importance of controlling hypertension and blood pressure  checkup on the regular basis has been explained  Hypertension as a silent killer has been discussed.  Risk reduction of the weight and regular exercises to control the hypertension has been explained.    2. Hyperlipidemia:   Currently on Lipitor.Risk of the hyperlipidemia, importance of the treatment has been explained. Pros and cons of the statins has been explained. Regular blood workup as well as side effects including the liver failure, myelopathy death has been explained.     3.  Paroxysmal atrial fibrillation:  Currently SR. On  for anticoagulation due to him not being a candidate for anticoagulation due to the high risk for falls.    Ordered echo    No diagnosis found.    There are no diagnoses linked to this encounter.    COUNSELING:    Javier Brennan was given to patient for the following topics: diagnostic results, risk factor reductions, impressions, risks and benefits of treatment options and importance of treatment compliance .       SMOKING COUNSELING:    Counseling given: Not Answered  Comment: [id number] [phone]      Sincerely,   JERRY Oneill  [facility]  02/15/19  11:34 AM

## 2019-08-15 ENCOUNTER — OFFICE VISIT (OUTPATIENT)
Dept: CARDIOLOGY | Facility: CLINIC | Age: 84
End: 2019-08-15

## 2019-08-15 ENCOUNTER — HOSPITAL ENCOUNTER (OUTPATIENT)
Dept: CARDIOLOGY | Facility: HOSPITAL | Age: 84
Discharge: HOME | End: 2019-08-15
Admitting: NURSE PRACTITIONER

## 2019-08-15 VITALS
BODY MASS INDEX: 24.88 KG/M2 | SYSTOLIC BLOOD PRESSURE: 132 MMHG | HEART RATE: 64 BPM | WEIGHT: 168 LBS | HEIGHT: 69 IN | DIASTOLIC BLOOD PRESSURE: 77 MMHG

## 2019-08-15 VITALS — DIASTOLIC BLOOD PRESSURE: 62 MMHG | SYSTOLIC BLOOD PRESSURE: 106 MMHG

## 2019-08-15 DIAGNOSIS — I48.0 PAROXYSMAL ATRIAL FIBRILLATION (HCC): Primary | ICD-10-CM

## 2019-08-15 DIAGNOSIS — I48.0 PAROXYSMAL ATRIAL FIBRILLATION (HCC): ICD-10-CM

## 2019-08-15 DIAGNOSIS — E78.5 HYPERLIPIDEMIA, UNSPECIFIED HYPERLIPIDEMIA TYPE: ICD-10-CM

## 2019-08-15 DIAGNOSIS — I10 HYPERTENSION, ESSENTIAL: ICD-10-CM

## 2019-08-15 DIAGNOSIS — R06.02 SOB (SHORTNESS OF BREATH): ICD-10-CM

## 2019-08-15 LAB
BH CV ECHO MEAS - ACS: 1.8 CM
BH CV ECHO MEAS - AO MAX PG (FULL): 2.9 MMHG
BH CV ECHO MEAS - AO MAX PG: 4.2 MMHG
BH CV ECHO MEAS - AO MEAN PG (FULL): 1 MMHG
BH CV ECHO MEAS - AO MEAN PG: 2 MMHG
BH CV ECHO MEAS - AO ROOT AREA (BSA CORRECTED): 1.6
BH CV ECHO MEAS - AO ROOT AREA: 7.5 CM^2
BH CV ECHO MEAS - AO ROOT DIAM: 3.1 CM
BH CV ECHO MEAS - AO V2 MAX: 103 CM/SEC
BH CV ECHO MEAS - AO V2 MEAN: 62.9 CM/SEC
BH CV ECHO MEAS - AO V2 VTI: 25.4 CM
BH CV ECHO MEAS - AVA(I,A): 1.8 CM^2
BH CV ECHO MEAS - AVA(I,D): 1.8 CM^2
BH CV ECHO MEAS - AVA(V,A): 2 CM^2
BH CV ECHO MEAS - AVA(V,D): 2 CM^2
BH CV ECHO MEAS - BSA(HAYCOCK): 1.9 M^2
BH CV ECHO MEAS - BSA: 1.9 M^2
BH CV ECHO MEAS - BZI_BMI: 24.4 KILOGRAMS/M^2
BH CV ECHO MEAS - BZI_METRIC_HEIGHT: 175.3 CM
BH CV ECHO MEAS - BZI_METRIC_WEIGHT: 74.8 KG
BH CV ECHO MEAS - EDV(CUBED): 59.3 ML
BH CV ECHO MEAS - EDV(MOD-SP2): 86 ML
BH CV ECHO MEAS - EDV(MOD-SP4): 86 ML
BH CV ECHO MEAS - EDV(TEICH): 65.9 ML
BH CV ECHO MEAS - EF(CUBED): 63 %
BH CV ECHO MEAS - EF(MOD-BP): 54 %
BH CV ECHO MEAS - EF(MOD-SP2): 52.3 %
BH CV ECHO MEAS - EF(MOD-SP4): 54.7 %
BH CV ECHO MEAS - EF(TEICH): 55.2 %
BH CV ECHO MEAS - ESV(CUBED): 22 ML
BH CV ECHO MEAS - ESV(MOD-SP2): 41 ML
BH CV ECHO MEAS - ESV(MOD-SP4): 39 ML
BH CV ECHO MEAS - ESV(TEICH): 29.6 ML
BH CV ECHO MEAS - FS: 28.2 %
BH CV ECHO MEAS - IVS/LVPW: 1.1
BH CV ECHO MEAS - IVSD: 1.2 CM
BH CV ECHO MEAS - LAT PEAK E' VEL: 7 CM/SEC
BH CV ECHO MEAS - LV DIASTOLIC VOL/BSA (35-75): 45.2 ML/M^2
BH CV ECHO MEAS - LV MASS(C)D: 149.5 GRAMS
BH CV ECHO MEAS - LV MASS(C)DI: 78.6 GRAMS/M^2
BH CV ECHO MEAS - LV MAX PG: 1.4 MMHG
BH CV ECHO MEAS - LV MEAN PG: 1 MMHG
BH CV ECHO MEAS - LV SYSTOLIC VOL/BSA (12-30): 20.5 ML/M^2
BH CV ECHO MEAS - LV V1 MAX: 58.3 CM/SEC
BH CV ECHO MEAS - LV V1 MEAN: 38.9 CM/SEC
BH CV ECHO MEAS - LV V1 VTI: 13.2 CM
BH CV ECHO MEAS - LVIDD: 3.9 CM
BH CV ECHO MEAS - LVIDS: 2.8 CM
BH CV ECHO MEAS - LVLD AP2: 7.3 CM
BH CV ECHO MEAS - LVLD AP4: 7.1 CM
BH CV ECHO MEAS - LVLS AP2: 6.3 CM
BH CV ECHO MEAS - LVLS AP4: 6.2 CM
BH CV ECHO MEAS - LVOT AREA (M): 3.5 CM^2
BH CV ECHO MEAS - LVOT AREA: 3.5 CM^2
BH CV ECHO MEAS - LVOT DIAM: 2.1 CM
BH CV ECHO MEAS - LVPWD: 1.1 CM
BH CV ECHO MEAS - MED PEAK E' VEL: 4 CM/SEC
BH CV ECHO MEAS - MR MAX PG: 78.9 MMHG
BH CV ECHO MEAS - MR MAX VEL: 444 CM/SEC
BH CV ECHO MEAS - MV A DUR: 0.16 SEC
BH CV ECHO MEAS - MV A MAX VEL: 51.4 CM/SEC
BH CV ECHO MEAS - MV DEC SLOPE: 358 CM/SEC^2
BH CV ECHO MEAS - MV DEC TIME: 174 SEC
BH CV ECHO MEAS - MV E MAX VEL: 65.5 CM/SEC
BH CV ECHO MEAS - MV E/A: 1.3
BH CV ECHO MEAS - MV MAX PG: 2 MMHG
BH CV ECHO MEAS - MV MEAN PG: 1 MMHG
BH CV ECHO MEAS - MV P1/2T MAX VEL: 78.2 CM/SEC
BH CV ECHO MEAS - MV P1/2T: 64 MSEC
BH CV ECHO MEAS - MV V2 MAX: 70.7 CM/SEC
BH CV ECHO MEAS - MV V2 MEAN: 44.5 CM/SEC
BH CV ECHO MEAS - MV V2 VTI: 21.5 CM
BH CV ECHO MEAS - MVA P1/2T LCG: 2.8 CM^2
BH CV ECHO MEAS - MVA(P1/2T): 3.4 CM^2
BH CV ECHO MEAS - MVA(VTI): 2.1 CM^2
BH CV ECHO MEAS - PA ACC TIME: 0.12 SEC
BH CV ECHO MEAS - PA MAX PG (FULL): 2.1 MMHG
BH CV ECHO MEAS - PA MAX PG: 3.3 MMHG
BH CV ECHO MEAS - PA PR(ACCEL): 23.2 MMHG
BH CV ECHO MEAS - PA V2 MAX: 90.9 CM/SEC
BH CV ECHO MEAS - PULM DIAS VEL: 56.1 CM/SEC
BH CV ECHO MEAS - PULM S/D: 0.67
BH CV ECHO MEAS - PULM SYS VEL: 37.4 CM/SEC
BH CV ECHO MEAS - PVA(V,A): 4 CM^2
BH CV ECHO MEAS - PVA(V,D): 4 CM^2
BH CV ECHO MEAS - QP/QS: 1.9
BH CV ECHO MEAS - RAP SYSTOLE: 8 MMHG
BH CV ECHO MEAS - RV MAX PG: 1.2 MMHG
BH CV ECHO MEAS - RV MEAN PG: 1 MMHG
BH CV ECHO MEAS - RV V1 MAX: 55.3 CM/SEC
BH CV ECHO MEAS - RV V1 MEAN: 38 CM/SEC
BH CV ECHO MEAS - RV V1 VTI: 13.3 CM
BH CV ECHO MEAS - RVOT AREA: 6.6 CM^2
BH CV ECHO MEAS - RVOT DIAM: 2.9 CM
BH CV ECHO MEAS - RVSP: 46.7 MMHG
BH CV ECHO MEAS - SI(AO): 100.7 ML/M^2
BH CV ECHO MEAS - SI(CUBED): 19.6 ML/M^2
BH CV ECHO MEAS - SI(LVOT): 24 ML/M^2
BH CV ECHO MEAS - SI(MOD-SP2): 23.6 ML/M^2
BH CV ECHO MEAS - SI(MOD-SP4): 24.7 ML/M^2
BH CV ECHO MEAS - SI(TEICH): 19.1 ML/M^2
BH CV ECHO MEAS - SV(AO): 191.7 ML
BH CV ECHO MEAS - SV(CUBED): 37.4 ML
BH CV ECHO MEAS - SV(LVOT): 45.7 ML
BH CV ECHO MEAS - SV(MOD-SP2): 45 ML
BH CV ECHO MEAS - SV(MOD-SP4): 47 ML
BH CV ECHO MEAS - SV(RVOT): 87.8 ML
BH CV ECHO MEAS - SV(TEICH): 36.4 ML
BH CV ECHO MEAS - TAPSE (>1.6): 1.7 CM2
BH CV ECHO MEAS - TR MAX VEL: 311 CM/SEC
BH CV ECHO MEASUREMENTS AVERAGE E/E' RATIO: 11.91
BH CV VAS BP RIGHT ARM: NORMAL MMHG
BH CV XLRA - RV BASE: 3.6 CM
BH CV XLRA - TDI S': 9 CM/SEC
LEFT ATRIUM VOLUME INDEX: 31 ML/M2
MAXIMAL PREDICTED HEART RATE: 137 BPM
STRESS TARGET HR: 116 BPM

## 2019-08-15 PROCEDURE — 99213 OFFICE O/P EST LOW 20 MIN: CPT | Performed by: INTERNAL MEDICINE

## 2019-08-15 PROCEDURE — 93306 TTE W/DOPPLER COMPLETE: CPT

## 2019-08-15 PROCEDURE — 93306 TTE W/DOPPLER COMPLETE: CPT | Performed by: INTERNAL MEDICINE

## 2019-08-15 NOTE — PROGRESS NOTES
" Subjective:        Javier Marin is a 83 y.o. male who here for follow up    CC  The follow-up hypertension atrial fibrillation hyperlipidemia  HPI  83-year-old male with known history of the benign essential arterial hypertension, atrial fibrillation and hyperlipidemia here for the follow-up denies any chest pains or tightness no chest no heaviness of the pressure sensation     Problem List Items Addressed This Visit        Cardiovascular and Mediastinum    Hypertension, essential    Relevant Medications    metoprolol tartrate (LOPRESSOR) 25 MG tablet    Atrial fibrillation (CMS/HCC) - Primary    Relevant Medications    metoprolol tartrate (LOPRESSOR) 25 MG tablet    Hyperlipidemia        .    The following portions of the patient's history were reviewed and updated as appropriate: allergies, current medications, past family history, past medical history, past social history, past surgical history and problem list.    Past Medical History:   Diagnosis Date   • Arthritis    • Diabetes mellitus (CMS/HCC)    • Hard of hearing    • Hypertension    • Ptosis of left eyelid      reports that he has quit smoking. His smoking use included cigars. He has never used smokeless tobacco. He reports that he does not drink alcohol or use drugs.   Family History   Problem Relation Age of Onset   • Pancreatic cancer Mother    • Hypertension Neg Hx    • Hyperlipidemia Neg Hx    • Heart failure Neg Hx    • Heart disease Neg Hx    • Heart attack Neg Hx        Review of Systems  Constitutional: No wt loss, fever, fatigue  Gastrointestinal: No nausea, abdominal pain  Behavioral/Psych: No insomnia or anxiety   Cardiovascular no chest pains or tightness no chest  Objective:       Physical Exam  /77 (BP Location: Left arm, Patient Position: Sitting)   Pulse 64   Ht 175.3 cm (69\")   Wt 76.2 kg (168 lb)   BMI 24.81 kg/m²   General appearance: No acute changes   Neck: Trachea midline; NECK, supple, no thyromegaly or " lymphadenopathy   Lungs: Normal size and shape, normal breath sounds, equal distribution of air, no rales and rhonchi   CV: S1-S2 irregular, no murmurs, no rub, no gallop   Abdomen: Soft, non-tender; no masses , no abnormal abdominal sounds   Extremities: No deformity , normal color , no peripheral edema   Skin: Normal temperature, turgor and texture; no rash, ulcers          Procedures      Echocardiogram:        Current Outpatient Medications:   •  acetaminophen (TYLENOL) 325 MG tablet, Take 2 tablets by mouth Every 4 (Four) Hours As Needed for Mild Pain ., Disp: , Rfl:   •  aspirin 325 MG tablet, Take 1 tablet by mouth Daily., Disp: 30 tablet, Rfl: 11  •  atorvastatin (LIPITOR) 10 MG tablet, Take 10 mg by mouth Daily., Disp: , Rfl:   •  cholecalciferol (VITAMIN D3) 1000 UNITS tablet, Take 1,000 Units by mouth Daily. PT HOLDING FOR SURGERY, Disp: , Rfl:   •  furosemide (LASIX) 20 MG tablet, Take 20 mg by mouth Daily., Disp: , Rfl:   •  insulin glargine (LANTUS) 100 UNIT/ML injection, Inject 10 Units under the skin Every Night., Disp: , Rfl:   •  insulin lispro (humaLOG) 100 UNIT/ML injection, Inject 8 Units under the skin 3 (Three) Times a Day Before Meals., Disp: , Rfl:   •  lansoprazole (PREVACID) 15 MG capsule, Take 1 capsule by mouth 2 (Two) Times a Day., Disp: 60 capsule, Rfl: 3  •  metoprolol tartrate (LOPRESSOR) 25 MG tablet, Take 25 mg by mouth Daily., Disp: , Rfl:   •  nitroglycerin (NITROSTAT) 0.4 MG SL tablet, Place 1 tablet under the tongue Every 5 (Five) Minutes As Needed for Chest Pain. Take no more than 3 doses in 15 minutes., Disp: 25 tablet, Rfl: 0  •  raNITIdine (ZANTAC) 150 MG tablet, Take 150 mg by mouth Every Night., Disp: , Rfl:   •  tamsulosin (FLOMAX) 0.4 MG capsule 24 hr capsule, Take 1 capsule by mouth Daily., Disp: , Rfl:    Assessment:        Patient Active Problem List   Diagnosis   • Acute gastritis   • 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CMS/HCC)   • Limb swelling   • Diabetes  mellitus type 2, uncontrolled (CMS/HCC)   • Hypertension, essential   • Acute hyponatremia   • Acute kidney injury superimposed on chronic kidney disease (CMS/HCC)   • Elevated liver function tests   • Leukocytosis   • Anorexia   • Esophagus disorder, thickened distal wall CT Scan 6/18/18   • Granulomatous disease, chronic (CMS/HCC)   • Lung nodule seen on imaging study   • Compression fracture of lumbar spine, non-traumatic (CMS/HCC)   • Lumbar canal stenosis   • Weakness generalized   • Cough   • Hard of hearing   • Ptosis, left eyelid   • Osteoarthritis of multiple joints   • Atrial fibrillation (CMS/HCC)   • Carotid stenosis, asymptomatic, bilateral 16-49% 2/24/14   • Remote history of stroke, small caudate nucleus   • Thrombocytopenia (CMS/HCC)   • Fall at home, subsequent encounter   • Hyperlipidemia   • Esophagitis determined by endoscopy by Sheri 6/19/18 LA Grade D Lower 1/3   • Hiatal hernia with GERD and esophagitis               Plan:            ICD-10-CM ICD-9-CM   1. Paroxysmal atrial fibrillation (CMS/HCC) I48.0 427.31   2. Hyperlipidemia, unspecified hyperlipidemia type E78.5 272.4   3. Hypertension, essential I10 401.9     1. Paroxysmal atrial fibrillation (CMS/HCC)  Atrial fibrillation controlled    2. Hyperlipidemia, unspecified hyperlipidemia type  Counseling done    3. Hypertension, essential  Blood pressure controlled       See in 1 yr  COUNSELING:    Javier Brennan was given to patient for the following topics: diagnostic results, risk factor reductions, impressions, risks and benefits of treatment options and importance of treatment compliance .       SMOKING COUNSELING:    Counseling given: Yes  Comment: QUIT 1994      Dictated using Dragon dictation

## 2020-03-28 ENCOUNTER — APPOINTMENT (OUTPATIENT)
Dept: CT IMAGING | Facility: HOSPITAL | Age: 85
End: 2020-03-28

## 2020-03-28 ENCOUNTER — HOSPITAL ENCOUNTER (EMERGENCY)
Facility: HOSPITAL | Age: 85
Discharge: HOME | End: 2020-03-28
Attending: EMERGENCY MEDICINE | Admitting: EMERGENCY MEDICINE

## 2020-03-28 VITALS
WEIGHT: 166 LBS | RESPIRATION RATE: 16 BRPM | DIASTOLIC BLOOD PRESSURE: 81 MMHG | SYSTOLIC BLOOD PRESSURE: 141 MMHG | TEMPERATURE: 97.7 F | BODY MASS INDEX: 25.16 KG/M2 | HEIGHT: 68 IN | HEART RATE: 84 BPM | OXYGEN SATURATION: 97 %

## 2020-03-28 DIAGNOSIS — S02.609A BILATERAL CLOSED FRACTURE OF MANDIBLE, INITIAL ENCOUNTER (HCC): Primary | ICD-10-CM
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
## 2020-03-28 PROCEDURE — 70486 CT MAXILLOFACIAL W/O DYE: CPT

## 2020-03-28 PROCEDURE — 99284 EMERGENCY DEPT VISIT MOD MDM: CPT

## 2020-03-28 PROCEDURE — 96374 THER/PROPH/DIAG INJ IV PUSH: CPT

## 2020-03-28 PROCEDURE — 96375 TX/PRO/DX INJ NEW DRUG ADDON: CPT

## 2020-03-28 PROCEDURE — 25010000002 HYDROMORPHONE PER 4 MG: Performed by: EMERGENCY MEDICINE

## 2020-03-28 PROCEDURE — 25010000002 ONDANSETRON PER 1 MG: Performed by: EMERGENCY MEDICINE

## 2020-03-28 RX ORDER — HYDROCODONE BITARTRATE AND ACETAMINOPHEN 5; 325 MG/1; MG/1
1 TABLET ORAL EVERY 8 HOURS PRN
Qty: 9 TABLET | Refills: 0 | Status: SHIPPED | OUTPATIENT
Start: 2020-03-28 | End: 2021-08-26

## 2020-03-28 RX ORDER — HYDROMORPHONE HYDROCHLORIDE 1 MG/ML
0.25 INJECTION, SOLUTION INTRAMUSCULAR; INTRAVENOUS; SUBCUTANEOUS ONCE
Status: COMPLETED | OUTPATIENT
Start: 2020-03-28 | End: 2020-03-28

## 2020-03-28 RX ORDER — HYDROCODONE BITARTRATE AND ACETAMINOPHEN 5; 325 MG/1; MG/1
1 TABLET ORAL EVERY 6 HOURS PRN
Status: DISCONTINUED | OUTPATIENT
Start: 2020-03-28 | End: 2020-03-28 | Stop reason: HOSPADM

## 2020-03-28 RX ORDER — ONDANSETRON 2 MG/ML
4 INJECTION INTRAMUSCULAR; INTRAVENOUS ONCE
Status: COMPLETED | OUTPATIENT
Start: 2020-03-28 | End: 2020-03-28

## 2020-03-28 RX ADMIN — HYDROCODONE BITARTRATE AND ACETAMINOPHEN 1 TABLET: 5; 325 TABLET ORAL at 14:42

## 2020-03-28 RX ADMIN — HYDROMORPHONE HYDROCHLORIDE 0.25 MG: 1 INJECTION, SOLUTION INTRAMUSCULAR; INTRAVENOUS; SUBCUTANEOUS at 13:22

## 2020-03-28 RX ADMIN — ONDANSETRON 4 MG: 2 INJECTION INTRAMUSCULAR; INTRAVENOUS at 13:22

## 2020-03-31 ENCOUNTER — ANESTHESIA EVENT (OUTPATIENT)
Dept: PERIOP | Facility: HOSPITAL | Age: 85
End: 2020-03-31

## 2020-03-31 ENCOUNTER — HOSPITAL ENCOUNTER (OUTPATIENT)
Facility: HOSPITAL | Age: 85
Discharge: HOME | End: 2020-04-01
Attending: DENTIST | Admitting: DENTIST

## 2020-03-31 ENCOUNTER — APPOINTMENT (OUTPATIENT)
Dept: GENERAL RADIOLOGY | Facility: HOSPITAL | Age: 85
End: 2020-03-31

## 2020-03-31 ENCOUNTER — ANESTHESIA (OUTPATIENT)
Dept: PERIOP | Facility: HOSPITAL | Age: 85
End: 2020-03-31
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
## 2020-03-31 PROBLEM — S02.609A MANDIBLE FRACTURE: Status: ACTIVE | Noted: 2020-03-31

## 2020-03-31 LAB
ANION GAP SERPL CALCULATED.3IONS-SCNC: 14.8 MMOL/L (ref 5–15)
BUN BLD-MCNC: 14 MG/DL (ref 8–23)
BUN/CREAT SERPL: 14.4 (ref 7–25)
CALCIUM SPEC-SCNC: 9.2 MG/DL (ref 8.6–10.5)
CHLORIDE SERPL-SCNC: 99 MMOL/L (ref 98–107)
CO2 SERPL-SCNC: 24.2 MMOL/L (ref 22–29)
CREAT BLD-MCNC: 0.97 MG/DL (ref 0.76–1.27)
DEPRECATED RDW RBC AUTO: 42.4 FL (ref 37–54)
ERYTHROCYTE [DISTWIDTH] IN BLOOD BY AUTOMATED COUNT: 12.8 % (ref 12.3–15.4)
GFR SERPL CREATININE-BSD FRML MDRD: 74 ML/MIN/1.73
GLUCOSE BLD-MCNC: 221 MG/DL (ref 65–99)
GLUCOSE BLDC GLUCOMTR-MCNC: 189 MG/DL (ref 70–130)
GLUCOSE BLDC GLUCOMTR-MCNC: 200 MG/DL (ref 70–130)
GLUCOSE BLDC GLUCOMTR-MCNC: 255 MG/DL (ref 70–130)
HCT VFR BLD AUTO: 41.7 % (ref 37.5–51)
HGB BLD-MCNC: 13.8 G/DL (ref 13–17.7)
MCH RBC QN AUTO: 29.7 PG (ref 26.6–33)
MCHC RBC AUTO-ENTMCNC: 33.1 G/DL (ref 31.5–35.7)
MCV RBC AUTO: 89.9 FL (ref 79–97)
PLATELET # BLD AUTO: 163 10*3/MM3 (ref 140–450)
PMV BLD AUTO: 10.3 FL (ref 6–12)
POTASSIUM BLD-SCNC: 4.4 MMOL/L (ref 3.5–5.2)
RBC # BLD AUTO: 4.64 10*6/MM3 (ref 4.14–5.8)
SODIUM BLD-SCNC: 138 MMOL/L (ref 136–145)
WBC NRBC COR # BLD: 10.64 10*3/MM3 (ref 3.4–10.8)

## 2020-03-31 PROCEDURE — 25010000002 ONDANSETRON PER 1 MG: Performed by: NURSE ANESTHETIST, CERTIFIED REGISTERED

## 2020-03-31 PROCEDURE — 25010000002 PROPOFOL 10 MG/ML EMULSION: Performed by: NURSE ANESTHETIST, CERTIFIED REGISTERED

## 2020-03-31 PROCEDURE — 25010000003 CEFAZOLIN 1-4 GM/50ML-% SOLUTION: Performed by: DENTIST

## 2020-03-31 PROCEDURE — C1713 ANCHOR/SCREW BN/BN,TIS/BN: HCPCS | Performed by: DENTIST

## 2020-03-31 PROCEDURE — 63710000001 FAMOTIDINE 20 MG TABLET: Performed by: DENTIST

## 2020-03-31 PROCEDURE — 82962 GLUCOSE BLOOD TEST: CPT

## 2020-03-31 PROCEDURE — 80048 BASIC METABOLIC PNL TOTAL CA: CPT | Performed by: DENTIST

## 2020-03-31 PROCEDURE — G0378 HOSPITAL OBSERVATION PER HR: HCPCS

## 2020-03-31 PROCEDURE — 85027 COMPLETE CBC AUTOMATED: CPT | Performed by: DENTIST

## 2020-03-31 PROCEDURE — 25010000003 AMPICILLIN-SULBACTAM PER 1.5 G: Performed by: DENTIST

## 2020-03-31 PROCEDURE — 25010000002 HYDROMORPHONE PER 4 MG: Performed by: NURSE ANESTHETIST, CERTIFIED REGISTERED

## 2020-03-31 PROCEDURE — 25010000002 NEOSTIGMINE PER 0.5 MG: Performed by: ANESTHESIOLOGY

## 2020-03-31 PROCEDURE — 25010000002 SUCCINYLCHOLINE PER 20 MG: Performed by: ANESTHESIOLOGY

## 2020-03-31 PROCEDURE — 25010000002 FENTANYL CITRATE (PF) 100 MCG/2ML SOLUTION: Performed by: ANESTHESIOLOGY

## 2020-03-31 PROCEDURE — 25010000002 FENTANYL CITRATE (PF) 100 MCG/2ML SOLUTION: Performed by: NURSE ANESTHETIST, CERTIFIED REGISTERED

## 2020-03-31 PROCEDURE — A9270 NON-COVERED ITEM OR SERVICE: HCPCS | Performed by: DENTIST

## 2020-03-31 DEVICE — IMPLANTABLE DEVICE: Type: IMPLANTABLE DEVICE | Site: MANDIBLE | Status: FUNCTIONAL

## 2020-03-31 RX ORDER — HYDROCODONE BITARTRATE AND ACETAMINOPHEN 7.5; 325 MG/1; MG/1
1 TABLET ORAL ONCE AS NEEDED
Status: DISCONTINUED | OUTPATIENT
Start: 2020-03-31 | End: 2020-03-31 | Stop reason: HOSPADM

## 2020-03-31 RX ORDER — FUROSEMIDE 20 MG/1
20 TABLET ORAL DAILY
Status: DISCONTINUED | OUTPATIENT
Start: 2020-04-01 | End: 2020-04-01 | Stop reason: HOSPADM

## 2020-03-31 RX ORDER — PROMETHAZINE HYDROCHLORIDE 25 MG/1
25 SUPPOSITORY RECTAL ONCE AS NEEDED
Status: DISCONTINUED | OUTPATIENT
Start: 2020-03-31 | End: 2020-03-31 | Stop reason: HOSPADM

## 2020-03-31 RX ORDER — PROMETHAZINE HYDROCHLORIDE 25 MG/ML
6.25 INJECTION, SOLUTION INTRAMUSCULAR; INTRAVENOUS
Status: DISCONTINUED | OUTPATIENT
Start: 2020-03-31 | End: 2020-03-31 | Stop reason: HOSPADM

## 2020-03-31 RX ORDER — FAMOTIDINE 10 MG/ML
20 INJECTION, SOLUTION INTRAVENOUS ONCE
Status: COMPLETED | OUTPATIENT
Start: 2020-03-31 | End: 2020-03-31

## 2020-03-31 RX ORDER — DIPHENHYDRAMINE HYDROCHLORIDE 50 MG/ML
12.5 INJECTION INTRAMUSCULAR; INTRAVENOUS
Status: DISCONTINUED | OUTPATIENT
Start: 2020-03-31 | End: 2020-03-31 | Stop reason: HOSPADM

## 2020-03-31 RX ORDER — FLUMAZENIL 0.1 MG/ML
0.2 INJECTION INTRAVENOUS AS NEEDED
Status: DISCONTINUED | OUTPATIENT
Start: 2020-03-31 | End: 2020-03-31 | Stop reason: HOSPADM

## 2020-03-31 RX ORDER — HYDROCODONE BITARTRATE AND ACETAMINOPHEN 5; 325 MG/1; MG/1
1 TABLET ORAL EVERY 4 HOURS PRN
Status: DISCONTINUED | OUTPATIENT
Start: 2020-03-31 | End: 2020-04-01 | Stop reason: HOSPADM

## 2020-03-31 RX ORDER — HYDROCODONE BITARTRATE AND ACETAMINOPHEN 5; 325 MG/1; MG/1
1 TABLET ORAL ONCE AS NEEDED
Status: DISCONTINUED | OUTPATIENT
Start: 2020-03-31 | End: 2020-03-31 | Stop reason: HOSPADM

## 2020-03-31 RX ORDER — LIDOCAINE HYDROCHLORIDE 20 MG/ML
INJECTION, SOLUTION INFILTRATION; PERINEURAL AS NEEDED
Status: DISCONTINUED | OUTPATIENT
Start: 2020-03-31 | End: 2020-03-31 | Stop reason: SURG

## 2020-03-31 RX ORDER — SODIUM CHLORIDE 0.9 % (FLUSH) 0.9 %
3-10 SYRINGE (ML) INJECTION AS NEEDED
Status: DISCONTINUED | OUTPATIENT
Start: 2020-03-31 | End: 2020-03-31 | Stop reason: HOSPADM

## 2020-03-31 RX ORDER — SODIUM CHLORIDE 0.9 % (FLUSH) 0.9 %
3 SYRINGE (ML) INJECTION EVERY 12 HOURS SCHEDULED
Status: DISCONTINUED | OUTPATIENT
Start: 2020-03-31 | End: 2020-03-31 | Stop reason: HOSPADM

## 2020-03-31 RX ORDER — OXYCODONE AND ACETAMINOPHEN 7.5; 325 MG/1; MG/1
1 TABLET ORAL ONCE AS NEEDED
Status: DISCONTINUED | OUTPATIENT
Start: 2020-03-31 | End: 2020-03-31 | Stop reason: HOSPADM

## 2020-03-31 RX ORDER — ASPIRIN 81 MG/1
81 TABLET ORAL DAILY
COMMUNITY

## 2020-03-31 RX ORDER — PROMETHAZINE HYDROCHLORIDE 25 MG/1
25 TABLET ORAL ONCE AS NEEDED
Status: DISCONTINUED | OUTPATIENT
Start: 2020-03-31 | End: 2020-03-31 | Stop reason: HOSPADM

## 2020-03-31 RX ORDER — HYDROMORPHONE HYDROCHLORIDE 1 MG/ML
0.25 INJECTION, SOLUTION INTRAMUSCULAR; INTRAVENOUS; SUBCUTANEOUS
Status: DISCONTINUED | OUTPATIENT
Start: 2020-03-31 | End: 2020-03-31 | Stop reason: HOSPADM

## 2020-03-31 RX ORDER — SODIUM CHLORIDE, SODIUM LACTATE, POTASSIUM CHLORIDE, CALCIUM CHLORIDE 600; 310; 30; 20 MG/100ML; MG/100ML; MG/100ML; MG/100ML
9 INJECTION, SOLUTION INTRAVENOUS CONTINUOUS
Status: DISCONTINUED | OUTPATIENT
Start: 2020-03-31 | End: 2020-03-31

## 2020-03-31 RX ORDER — CHLORHEXIDINE GLUCONATE 0.12 MG/ML
15 RINSE ORAL EVERY 12 HOURS SCHEDULED
Status: DISCONTINUED | OUTPATIENT
Start: 2020-04-01 | End: 2020-04-01 | Stop reason: HOSPADM

## 2020-03-31 RX ORDER — CHLORHEXIDINE GLUCONATE 0.12 MG/ML
15 RINSE ORAL 2 TIMES DAILY
Qty: 473 ML | Refills: 0 | Status: SHIPPED | OUTPATIENT
Start: 2020-03-31 | End: 2022-03-21

## 2020-03-31 RX ORDER — GLYCOPYRROLATE 0.2 MG/ML
INJECTION INTRAMUSCULAR; INTRAVENOUS AS NEEDED
Status: DISCONTINUED | OUTPATIENT
Start: 2020-03-31 | End: 2020-03-31 | Stop reason: SURG

## 2020-03-31 RX ORDER — PROPOFOL 10 MG/ML
VIAL (ML) INTRAVENOUS AS NEEDED
Status: DISCONTINUED | OUTPATIENT
Start: 2020-03-31 | End: 2020-03-31 | Stop reason: SURG

## 2020-03-31 RX ORDER — ACETAMINOPHEN 325 MG/1
650 TABLET ORAL ONCE AS NEEDED
Status: DISCONTINUED | OUTPATIENT
Start: 2020-03-31 | End: 2020-03-31 | Stop reason: HOSPADM

## 2020-03-31 RX ORDER — ONDANSETRON 2 MG/ML
4 INJECTION INTRAMUSCULAR; INTRAVENOUS ONCE AS NEEDED
Status: DISCONTINUED | OUTPATIENT
Start: 2020-03-31 | End: 2020-03-31 | Stop reason: HOSPADM

## 2020-03-31 RX ORDER — CEFAZOLIN SODIUM 1 G/50ML
1 INJECTION, SOLUTION INTRAVENOUS ONCE
Status: COMPLETED | OUTPATIENT
Start: 2020-03-31 | End: 2020-03-31

## 2020-03-31 RX ORDER — NALOXONE HCL 0.4 MG/ML
0.2 VIAL (ML) INJECTION AS NEEDED
Status: DISCONTINUED | OUTPATIENT
Start: 2020-03-31 | End: 2020-03-31 | Stop reason: HOSPADM

## 2020-03-31 RX ORDER — MAGNESIUM HYDROXIDE 1200 MG/15ML
LIQUID ORAL AS NEEDED
Status: DISCONTINUED | OUTPATIENT
Start: 2020-03-31 | End: 2020-03-31 | Stop reason: HOSPADM

## 2020-03-31 RX ORDER — EPHEDRINE SULFATE 50 MG/ML
5 INJECTION, SOLUTION INTRAVENOUS ONCE AS NEEDED
Status: DISCONTINUED | OUTPATIENT
Start: 2020-03-31 | End: 2020-03-31 | Stop reason: HOSPADM

## 2020-03-31 RX ORDER — NICOTINE POLACRILEX 4 MG
15 LOZENGE BUCCAL
Status: DISCONTINUED | OUTPATIENT
Start: 2020-03-31 | End: 2020-04-01 | Stop reason: HOSPADM

## 2020-03-31 RX ORDER — FENTANYL CITRATE 50 UG/ML
25 INJECTION, SOLUTION INTRAMUSCULAR; INTRAVENOUS
Status: DISCONTINUED | OUTPATIENT
Start: 2020-03-31 | End: 2020-03-31 | Stop reason: HOSPADM

## 2020-03-31 RX ORDER — DEXTROSE MONOHYDRATE 25 G/50ML
25 INJECTION, SOLUTION INTRAVENOUS
Status: DISCONTINUED | OUTPATIENT
Start: 2020-03-31 | End: 2020-04-01 | Stop reason: HOSPADM

## 2020-03-31 RX ORDER — ROCURONIUM BROMIDE 10 MG/ML
INJECTION, SOLUTION INTRAVENOUS AS NEEDED
Status: DISCONTINUED | OUTPATIENT
Start: 2020-03-31 | End: 2020-03-31 | Stop reason: SURG

## 2020-03-31 RX ORDER — ATORVASTATIN CALCIUM 20 MG/1
10 TABLET, FILM COATED ORAL DAILY
Status: DISCONTINUED | OUTPATIENT
Start: 2020-04-01 | End: 2020-04-01 | Stop reason: HOSPADM

## 2020-03-31 RX ORDER — SUCCINYLCHOLINE CHLORIDE 20 MG/ML
INJECTION INTRAMUSCULAR; INTRAVENOUS AS NEEDED
Status: DISCONTINUED | OUTPATIENT
Start: 2020-03-31 | End: 2020-03-31 | Stop reason: SURG

## 2020-03-31 RX ORDER — SODIUM CHLORIDE 9 MG/ML
INJECTION, SOLUTION INTRAVENOUS AS NEEDED
Status: DISCONTINUED | OUTPATIENT
Start: 2020-03-31 | End: 2020-03-31 | Stop reason: HOSPADM

## 2020-03-31 RX ORDER — AMOXICILLIN 500 MG/1
500 CAPSULE ORAL 3 TIMES DAILY
Qty: 30 CAPSULE | Refills: 0 | Status: SHIPPED | OUTPATIENT
Start: 2020-03-31 | End: 2020-04-10

## 2020-03-31 RX ORDER — DIPHENHYDRAMINE HCL 25 MG
25 CAPSULE ORAL
Status: DISCONTINUED | OUTPATIENT
Start: 2020-03-31 | End: 2020-03-31 | Stop reason: HOSPADM

## 2020-03-31 RX ORDER — FAMOTIDINE 20 MG/1
20 TABLET, FILM COATED ORAL
Status: DISCONTINUED | OUTPATIENT
Start: 2020-04-01 | End: 2020-04-01 | Stop reason: HOSPADM

## 2020-03-31 RX ORDER — HYDRALAZINE HYDROCHLORIDE 20 MG/ML
5 INJECTION INTRAMUSCULAR; INTRAVENOUS
Status: DISCONTINUED | OUTPATIENT
Start: 2020-03-31 | End: 2020-03-31 | Stop reason: HOSPADM

## 2020-03-31 RX ORDER — FENTANYL CITRATE 50 UG/ML
50 INJECTION, SOLUTION INTRAMUSCULAR; INTRAVENOUS
Status: DISCONTINUED | OUTPATIENT
Start: 2020-03-31 | End: 2020-03-31 | Stop reason: HOSPADM

## 2020-03-31 RX ORDER — TAMSULOSIN HYDROCHLORIDE 0.4 MG/1
0.4 CAPSULE ORAL DAILY
Status: DISCONTINUED | OUTPATIENT
Start: 2020-04-01 | End: 2020-04-01 | Stop reason: HOSPADM

## 2020-03-31 RX ORDER — FAMOTIDINE 20 MG/1
20 TABLET, FILM COATED ORAL NIGHTLY
Status: DISCONTINUED | OUTPATIENT
Start: 2020-03-31 | End: 2020-03-31

## 2020-03-31 RX ORDER — BUPIVACAINE HYDROCHLORIDE AND EPINEPHRINE 2.5; 5 MG/ML; UG/ML
INJECTION, SOLUTION EPIDURAL; INFILTRATION; INTRACAUDAL; PERINEURAL AS NEEDED
Status: DISCONTINUED | OUTPATIENT
Start: 2020-03-31 | End: 2020-03-31 | Stop reason: HOSPADM

## 2020-03-31 RX ORDER — HYDROCODONE BITARTRATE AND ACETAMINOPHEN 5; 325 MG/1; MG/1
1-2 TABLET ORAL EVERY 4 HOURS PRN
Qty: 24 TABLET | Refills: 0 | Status: SHIPPED | OUTPATIENT
Start: 2020-03-31 | End: 2021-08-26

## 2020-03-31 RX ORDER — CHLORHEXIDINE GLUCONATE 0.12 MG/ML
RINSE ORAL AS NEEDED
Status: DISCONTINUED | OUTPATIENT
Start: 2020-03-31 | End: 2020-04-01 | Stop reason: HOSPADM

## 2020-03-31 RX ORDER — ONDANSETRON 2 MG/ML
INJECTION INTRAMUSCULAR; INTRAVENOUS AS NEEDED
Status: DISCONTINUED | OUTPATIENT
Start: 2020-03-31 | End: 2020-03-31 | Stop reason: SURG

## 2020-03-31 RX ORDER — PROMETHAZINE HYDROCHLORIDE 25 MG/ML
12.5 INJECTION, SOLUTION INTRAMUSCULAR; INTRAVENOUS ONCE AS NEEDED
Status: DISCONTINUED | OUTPATIENT
Start: 2020-03-31 | End: 2020-03-31 | Stop reason: HOSPADM

## 2020-03-31 RX ORDER — LIDOCAINE HYDROCHLORIDE 10 MG/ML
0.5 INJECTION, SOLUTION EPIDURAL; INFILTRATION; INTRACAUDAL; PERINEURAL ONCE AS NEEDED
Status: DISCONTINUED | OUTPATIENT
Start: 2020-03-31 | End: 2020-03-31 | Stop reason: HOSPADM

## 2020-03-31 RX ORDER — FAMOTIDINE 20 MG/1
20 TABLET, FILM COATED ORAL NIGHTLY
COMMUNITY
End: 2021-08-26 | Stop reason: ALTCHOICE

## 2020-03-31 RX ORDER — OXYMETAZOLINE HYDROCHLORIDE 0.05 G/100ML
2 SPRAY NASAL
Status: COMPLETED | OUTPATIENT
Start: 2020-03-31 | End: 2020-03-31

## 2020-03-31 RX ADMIN — SODIUM CHLORIDE, POTASSIUM CHLORIDE, SODIUM LACTATE AND CALCIUM CHLORIDE 9 ML/HR: 600; 310; 30; 20 INJECTION, SOLUTION INTRAVENOUS at 11:36

## 2020-03-31 RX ADMIN — ONDANSETRON HYDROCHLORIDE 4 MG: 2 SOLUTION INTRAMUSCULAR; INTRAVENOUS at 16:24

## 2020-03-31 RX ADMIN — FENTANYL CITRATE 25 MCG: 50 INJECTION INTRAMUSCULAR; INTRAVENOUS at 16:29

## 2020-03-31 RX ADMIN — SUCCINYLCHOLINE CHLORIDE 140 MG: 20 INJECTION, SOLUTION INTRAMUSCULAR; INTRAVENOUS; PARENTERAL at 13:20

## 2020-03-31 RX ADMIN — FENTANYL CITRATE 25 MCG: 50 INJECTION INTRAMUSCULAR; INTRAVENOUS at 15:26

## 2020-03-31 RX ADMIN — SODIUM CHLORIDE, POTASSIUM CHLORIDE, SODIUM LACTATE AND CALCIUM CHLORIDE: 600; 310; 30; 20 INJECTION, SOLUTION INTRAVENOUS at 15:54

## 2020-03-31 RX ADMIN — ROCURONIUM BROMIDE 30 MG: 10 INJECTION, SOLUTION INTRAVENOUS at 13:34

## 2020-03-31 RX ADMIN — HYDROMORPHONE HYDROCHLORIDE 0.25 MG: 1 INJECTION, SOLUTION INTRAMUSCULAR; INTRAVENOUS; SUBCUTANEOUS at 17:05

## 2020-03-31 RX ADMIN — ONDANSETRON HYDROCHLORIDE 4 MG: 2 SOLUTION INTRAMUSCULAR; INTRAVENOUS at 13:41

## 2020-03-31 RX ADMIN — FAMOTIDINE 20 MG: 20 TABLET, FILM COATED ORAL at 23:04

## 2020-03-31 RX ADMIN — FENTANYL CITRATE 25 MCG: 50 INJECTION INTRAMUSCULAR; INTRAVENOUS at 16:25

## 2020-03-31 RX ADMIN — NEOSTIGMINE METHYLSULFATE 3 MG: 1 INJECTION INTRAMUSCULAR; INTRAVENOUS; SUBCUTANEOUS at 16:23

## 2020-03-31 RX ADMIN — FENTANYL CITRATE 25 MCG: 50 INJECTION INTRAMUSCULAR; INTRAVENOUS at 19:00

## 2020-03-31 RX ADMIN — ROCURONIUM BROMIDE 5 MG: 10 INJECTION, SOLUTION INTRAVENOUS at 13:15

## 2020-03-31 RX ADMIN — CEFAZOLIN SODIUM 1 G: 1 INJECTION, SOLUTION INTRAVENOUS at 13:21

## 2020-03-31 RX ADMIN — HYDROMORPHONE HYDROCHLORIDE 0.25 MG: 1 INJECTION, SOLUTION INTRAMUSCULAR; INTRAVENOUS; SUBCUTANEOUS at 17:45

## 2020-03-31 RX ADMIN — PROPOFOL 120 MG: 10 INJECTION, EMULSION INTRAVENOUS at 13:15

## 2020-03-31 RX ADMIN — PROPOFOL 30 MG: 10 INJECTION, EMULSION INTRAVENOUS at 13:34

## 2020-03-31 RX ADMIN — AMPICILLIN SODIUM AND SULBACTAM SODIUM 1.5 G: 1; .5 INJECTION, POWDER, FOR SOLUTION INTRAMUSCULAR; INTRAVENOUS at 23:04

## 2020-03-31 RX ADMIN — FAMOTIDINE 20 MG: 10 INJECTION, SOLUTION INTRAVENOUS at 12:41

## 2020-03-31 RX ADMIN — FENTANYL CITRATE 25 MCG: 50 INJECTION INTRAMUSCULAR; INTRAVENOUS at 17:00

## 2020-03-31 RX ADMIN — GLYCOPYRROLATE 0.6 MG: 0.2 INJECTION INTRAMUSCULAR; INTRAVENOUS at 16:23

## 2020-03-31 RX ADMIN — FENTANYL CITRATE 100 MCG: 50 INJECTION INTRAMUSCULAR; INTRAVENOUS at 13:15

## 2020-03-31 RX ADMIN — OXYMETAZOLINE HCL 2 SPRAY: 0.05 SPRAY NASAL at 12:41

## 2020-03-31 RX ADMIN — FENTANYL CITRATE 25 MCG: 50 INJECTION INTRAMUSCULAR; INTRAVENOUS at 17:10

## 2020-03-31 RX ADMIN — FENTANYL CITRATE 25 MCG: 50 INJECTION INTRAMUSCULAR; INTRAVENOUS at 15:22

## 2020-03-31 RX ADMIN — LIDOCAINE HYDROCHLORIDE 60 MG: 20 INJECTION, SOLUTION INFILTRATION; PERINEURAL at 13:15

## 2020-03-31 NOTE — ANESTHESIA PREPROCEDURE EVALUATION
Anesthesia Evaluation     Patient summary reviewed   no history of anesthetic complications:  NPO Solid Status: > 6 hours             Airway   Mallampati: II  TM distance: >3 FB  Neck ROM: full  No difficulty expected  Dental    (+) edentulous    Pulmonary - normal exam   (+) a smoker Former,   Cardiovascular - normal exam    ECG reviewed    (+) hypertension, dysrhythmias Paroxysmal Atrial Fib,   (-) angina    ROS comment: 8/2019: · Right ventricular cavity is borderline dilated.  · Left atrial cavity size is mildly dilated.  · There is calcification of the aortic valve.  · Calculated EF = 54.0%  · There is no evidence of pericardial effusion.       Neuro/Psych  GI/Hepatic/Renal/Endo    (+)  GERD,  diabetes mellitus,     Musculoskeletal     Abdominal    Substance History      OB/GYN          Other                        Anesthesia Plan    ASA 3     general       Anesthetic plan, all risks, benefits, and alternatives have been provided, discussed and informed consent has been obtained with: patient.

## 2020-03-31 NOTE — ANESTHESIA POSTPROCEDURE EVALUATION
"Patient: Javier Marin    Procedure Summary     Date:  03/31/20 Room / Location:  [facility] OR [address] [place] [address] MAIN OR    Anesthesia Start:  1307 Anesthesia Stop:  1650    Procedure:  OPEN REDUCTION AND INTERNAL FIXATION OF BILATERAL MANDIBLE FRACTURES (Bilateral ) Diagnosis:      Surgeon:  Percy Jeronimo MD Provider:  Luis Jaimes MD    Anesthesia Type:  general ASA Status:  3          Anesthesia Type: general    Vitals  Vitals Value Taken Time   /78 3/31/2020  6:30 PM   Temp 36.8 °C (98.2 °F) 3/31/2020  4:42 PM   Pulse 105 3/31/2020  6:46 PM   Resp 16 3/31/2020  6:15 PM   SpO2 83 % 3/31/2020  6:46 PM   Vitals shown include unvalidated device data.        Post Anesthesia Care and Evaluation    Patient location during evaluation: bedside  Patient participation: complete - patient participated  Level of consciousness: awake and alert  Pain management: adequate  Airway patency: patent  Anesthetic complications: No anesthetic complications  PONV Status: none  Cardiovascular status: acceptable  Respiratory status: acceptable  Hydration status: acceptable    Comments: /73   Pulse 91   Temp 36.8 °C (98.2 °F) (Oral)   Resp 16   Ht 182.9 cm (72\")   Wt 73.3 kg (161 lb 9 oz)   SpO2 95%   BMI 21.91 kg/m²         "

## 2020-03-31 NOTE — ANESTHESIA PROCEDURE NOTES
Airway  Urgency: elective    Date/Time: 3/31/2020 1:19 PM  Airway not difficult    General Information and Staff    Patient location during procedure: OR  CRNA: Geraldine Cook CRNA    Indications and Patient Condition  Indications for airway management: airway protection    Preoxygenated: yes  Mask difficulty assessment: 0 - not attempted    Final Airway Details  Final airway type: endotracheal airway      Successful airway: ETT and MARIO tube  Cuffed: yes   Successful intubation technique: direct laryngoscopy  Endotracheal tube insertion site: left nare  Blade: Yady  Blade size: 4  ETT size (mm): 7.0  Cormack-Lehane Classification: grade I - full view of glottis  Placement verified by: chest auscultation and capnometry   Cuff volume (mL): 7  Measured from: nares  ETT/EBT  to nares (cm): 28  Number of attempts at approach: 1  Assessment: lips, teeth, and gum same as pre-op and atraumatic intubation    Additional Comments  Smooth IV/RSI. Trachea intubated. Cuff up. Dentition intact without injury.

## 2020-04-01 ENCOUNTER — APPOINTMENT (OUTPATIENT)
Dept: GENERAL RADIOLOGY | Facility: HOSPITAL | Age: 85
End: 2020-04-01

## 2020-04-01 VITALS
SYSTOLIC BLOOD PRESSURE: 161 MMHG | BODY MASS INDEX: 21.88 KG/M2 | HEIGHT: 72 IN | OXYGEN SATURATION: 96 % | TEMPERATURE: 98.3 F | RESPIRATION RATE: 16 BRPM | WEIGHT: 161.56 LBS | DIASTOLIC BLOOD PRESSURE: 78 MMHG | HEART RATE: 77 BPM

## 2020-04-01 PROBLEM — I48.0 PAROXYSMAL ATRIAL FIBRILLATION (HCC): Status: ACTIVE | Noted: 2018-06-19

## 2020-04-01 LAB
ANION GAP SERPL CALCULATED.3IONS-SCNC: 11.8 MMOL/L (ref 5–15)
BUN BLD-MCNC: 17 MG/DL (ref 8–23)
BUN/CREAT SERPL: 16.7 (ref 7–25)
CALCIUM SPEC-SCNC: 8.7 MG/DL (ref 8.6–10.5)
CHLORIDE SERPL-SCNC: 95 MMOL/L (ref 98–107)
CO2 SERPL-SCNC: 25.2 MMOL/L (ref 22–29)
CREAT BLD-MCNC: 1.02 MG/DL (ref 0.76–1.27)
DEPRECATED RDW RBC AUTO: 41.1 FL (ref 37–54)
ERYTHROCYTE [DISTWIDTH] IN BLOOD BY AUTOMATED COUNT: 12.6 % (ref 12.3–15.4)
GFR SERPL CREATININE-BSD FRML MDRD: 70 ML/MIN/1.73
GLUCOSE BLD-MCNC: 272 MG/DL (ref 65–99)
GLUCOSE BLDC GLUCOMTR-MCNC: 274 MG/DL (ref 70–130)
HBA1C MFR BLD: 7.45 % (ref 4.8–5.6)
HCT VFR BLD AUTO: 41.4 % (ref 37.5–51)
HGB BLD-MCNC: 13.9 G/DL (ref 13–17.7)
MCH RBC QN AUTO: 29.7 PG (ref 26.6–33)
MCHC RBC AUTO-ENTMCNC: 33.6 G/DL (ref 31.5–35.7)
MCV RBC AUTO: 88.5 FL (ref 79–97)
PLATELET # BLD AUTO: 159 10*3/MM3 (ref 140–450)
PMV BLD AUTO: 10.2 FL (ref 6–12)
POTASSIUM BLD-SCNC: 4.3 MMOL/L (ref 3.5–5.2)
RBC # BLD AUTO: 4.68 10*6/MM3 (ref 4.14–5.8)
SODIUM BLD-SCNC: 132 MMOL/L (ref 136–145)
WBC NRBC COR # BLD: 16.02 10*3/MM3 (ref 3.4–10.8)

## 2020-04-01 PROCEDURE — A9270 NON-COVERED ITEM OR SERVICE: HCPCS | Performed by: HOSPITALIST

## 2020-04-01 PROCEDURE — 82962 GLUCOSE BLOOD TEST: CPT

## 2020-04-01 PROCEDURE — 80048 BASIC METABOLIC PNL TOTAL CA: CPT | Performed by: NURSE PRACTITIONER

## 2020-04-01 PROCEDURE — 25010000003 AMPICILLIN-SULBACTAM PER 1.5 G: Performed by: DENTIST

## 2020-04-01 PROCEDURE — 63710000001 CHLORHEXIDINE 0.12 % SOLUTION: Performed by: DENTIST

## 2020-04-01 PROCEDURE — A9270 NON-COVERED ITEM OR SERVICE: HCPCS | Performed by: DENTIST

## 2020-04-01 PROCEDURE — 63710000001 METOPROLOL TARTRATE 25 MG TABLET: Performed by: HOSPITALIST

## 2020-04-01 PROCEDURE — 63710000001 HYDROCODONE-ACETAMINOPHEN 5-325 MG TABLET: Performed by: DENTIST

## 2020-04-01 PROCEDURE — 63710000001 INSULIN LISPRO (HUMAN) PER 5 UNITS: Performed by: HOSPITALIST

## 2020-04-01 PROCEDURE — 63710000001 FAMOTIDINE 20 MG TABLET: Performed by: HOSPITALIST

## 2020-04-01 PROCEDURE — 63710000001 FUROSEMIDE 20 MG TABLET: Performed by: HOSPITALIST

## 2020-04-01 PROCEDURE — 83036 HEMOGLOBIN GLYCOSYLATED A1C: CPT | Performed by: NURSE PRACTITIONER

## 2020-04-01 PROCEDURE — G0378 HOSPITAL OBSERVATION PER HR: HCPCS

## 2020-04-01 PROCEDURE — 85027 COMPLETE CBC AUTOMATED: CPT | Performed by: HOSPITALIST

## 2020-04-01 PROCEDURE — 63710000001 TAMSULOSIN 0.4 MG CAPSULE: Performed by: DENTIST

## 2020-04-01 PROCEDURE — 63710000001 ATORVASTATIN 20 MG TABLET: Performed by: DENTIST

## 2020-04-01 PROCEDURE — 70355 PANORAMIC X-RAY OF JAWS: CPT

## 2020-04-01 RX ADMIN — HYDROCODONE BITARTRATE AND ACETAMINOPHEN 1 TABLET: 5; 325 TABLET ORAL at 05:21

## 2020-04-01 RX ADMIN — AMPICILLIN SODIUM AND SULBACTAM SODIUM 1.5 G: 1; .5 INJECTION, POWDER, FOR SOLUTION INTRAMUSCULAR; INTRAVENOUS at 11:38

## 2020-04-01 RX ADMIN — METOPROLOL TARTRATE 25 MG: 25 TABLET ORAL at 08:33

## 2020-04-01 RX ADMIN — FUROSEMIDE 20 MG: 20 TABLET ORAL at 08:33

## 2020-04-01 RX ADMIN — HYDROCODONE BITARTRATE AND ACETAMINOPHEN 1 TABLET: 5; 325 TABLET ORAL at 13:09

## 2020-04-01 RX ADMIN — FAMOTIDINE 20 MG: 20 TABLET, FILM COATED ORAL at 06:39

## 2020-04-01 RX ADMIN — TAMSULOSIN HYDROCHLORIDE 0.4 MG: 0.4 CAPSULE ORAL at 08:34

## 2020-04-01 RX ADMIN — AMPICILLIN SODIUM AND SULBACTAM SODIUM 1.5 G: 1; .5 INJECTION, POWDER, FOR SOLUTION INTRAMUSCULAR; INTRAVENOUS at 04:38

## 2020-04-01 RX ADMIN — INSULIN LISPRO 4 UNITS: 100 INJECTION, SOLUTION INTRAVENOUS; SUBCUTANEOUS at 12:51

## 2020-04-01 RX ADMIN — INSULIN LISPRO 4 UNITS: 100 INJECTION, SOLUTION INTRAVENOUS; SUBCUTANEOUS at 08:23

## 2020-04-01 RX ADMIN — ATORVASTATIN CALCIUM 10 MG: 20 TABLET, FILM COATED ORAL at 08:33

## 2020-04-01 RX ADMIN — CHLORHEXIDINE GLUCONATE 15 ML: 1.2 RINSE ORAL at 08:33

## 2020-04-01 NOTE — CONSULTS
CONSULT NOTE    INTERNAL MEDICINE   [facility]       Patient Identification:  Name: Javier Marin  Age: 84 y.o.  Sex: male  :  1935  MRN: [id number]             Date of Consultation: 3/31/2020      Primary Care Physician: Percy Em MD                               Requesting Physician: Dr. Jeronimo  Reason for Consultation: Diabetes/medical management/    Chief Complaint: Jaw pain status post fall    History of Present Illness:   Mr Marin is a pleasant 84-year-old male who apparently had been in his usual health preceding a mechanical fall.  He states he had a slip and fall and unfortunately fell into his jaw which he broke his mandible.  He had been evaluated by  in an outpatient setting in the medical plan was to proceed with ORIF here at [facility].  Patient underwent the surgery today and I was notified when the patient was in PACU about the consult.  He had some mild postoperative confusion at that time secondary to anesthesia that is all resolved since.  He denies any current nausea or postoperative vomiting.  He states his pain is decently controlled and he feels mentally at baseline.  Answered all orientation questions collectively though no family present at bedside currently.  He is very proactive to undergo home tomorrow.  We discussed his insulin regimen as he is on a basal bolus regimen and is normally well controlled on that regimen.  He claims other comorbidities have remained stable and he denies any current issues with chest pain palpitations shortness of breath.  Also denies any syncope or focal loss of function.      Past Medical History:  Past Medical History:   Diagnosis Date   • A-fib (CMS/[facility])    • 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CMS/[facility])    • Anorexia    • Arthritis    • Carotid stenosis, asymptomatic, bilateral 2014    16-49%   • Closed right arm fracture    • Compression fracture of lumbar spine, non-traumatic (CMS/[facility])    •  Diabetes mellitus (CMS/HCC)    • Diabetes mellitus, type 2 (CMS/HCC)    • Elevated LFTs    • Esophagitis 06/19/2018    DR MARTINEZ-LA GRADE D LOWER   • Esophagus disorder     THICKENED DISTAL WALL-CT SCAN 6/18/18   • Garbled speech    • Granulomatous disease, chronic (CMS/HCC)    • Hard of hearing    • Hiatal hernia with GERD and esophagitis    • History of acute gastritis    • Hyperlipidemia    • Hypertension    • Hyponatremia    • Leukocytosis    • Lumbar canal stenosis    • Lung nodule seen on imaging study    • Mandible fracture (CMS/HCC) 03/28/2020    FROM FALL   • Osteoarthritis of multiple joints    • Ptosis of left eyelid    • Remote history of stroke     SMALL CAUDATE NECLEUS   • Thrombocytopenia (CMS/HCC)    • Weakness generalized      Past Surgical History:  Past Surgical History:   Procedure Laterality Date   • CATARACT EXTRACTION, BILATERAL     • COLONOSCOPY     • ENDOSCOPY N/A 6/19/2018    Procedure: ESOPHAGOGASTRODUODENOSCOPY WITH BIOPSY;  Surgeon: Toribio Martinez MD;  Location: [facility] ENDOSCOPY;  Service: Gastroenterology   • EYE PTOSIS REPAIR Left 11/15/2016    Procedure: LT UPPER LID EYE PTOSIS REPAIR;  Surgeon: Anup Lancaster MD;  Location: [facility] OR [facility];  Service:    • EYE SURGERY     • HIP ARTHROPLASTY Right    • JOINT REPLACEMENT        Home Meds:  Medications Prior to Admission   Medication Sig Dispense Refill Last Dose   • aspirin 81 MG EC tablet Take 81 mg by mouth Daily.   3/30/2020 at 0730   • atorvastatin (LIPITOR) 10 MG tablet Take 10 mg by mouth Daily.   3/30/2020 at 0730   • cholecalciferol (VITAMIN D3) 1000 UNITS tablet Take 1,000 Units by mouth Daily. PT HOLDING FOR SURGERY   3/30/2020 at 0730   • famotidine (PEPCID) 20 MG tablet Take 20 mg by mouth Every Night.   3/30/2020 at 2200   • furosemide (LASIX) 20 MG tablet Take 20 mg by mouth Daily.   3/30/2020 at 0730   • HYDROcodone-acetaminophen (NORCO) 5-325 MG per tablet Take 1 tablet by mouth Every 8 (Eight) Hours As Needed for  Moderate Pain . 9 tablet 0 3/29/2020   • insulin glargine (LANTUS) 100 UNIT/ML injection Inject 10 Units under the skin Every Night.   3/30/2020 at 2200   • insulin lispro (humaLOG) 100 UNIT/ML injection Inject 8 Units under the skin 3 (Three) Times a Day Before Meals.   3/30/2020 at 1800   • lansoprazole (PREVACID) 15 MG capsule Take 1 capsule by mouth 2 (Two) Times a Day. 60 capsule 3 3/30/2020 at 1800   • metoprolol tartrate (LOPRESSOR) 25 MG tablet Take 25 mg by mouth Daily.   3/30/2020 at 0730   • tamsulosin (FLOMAX) 0.4 MG capsule 24 hr capsule Take 1 capsule by mouth Daily.   3/30/2020 at 0730   • acetaminophen (TYLENOL) 325 MG tablet Take 2 tablets by mouth Every 4 (Four) Hours As Needed for Mild Pain .   More than a month at Unknown time   • nitroglycerin (NITROSTAT) 0.4 MG SL tablet Place 1 tablet under the tongue Every 5 (Five) Minutes As Needed for Chest Pain. Take no more than 3 doses in 15 minutes. 25 tablet 0 NEVER USED     Current Meds:     Current Facility-Administered Medications:   •  ampicillin-sulbactam 1.5g/100 mL 0.9% NS (MBP), 1.5 g, Intravenous, Q6H, Percy Jeronimo MD, 1.5 g at 03/31/20 2304  •  [START ON 4/1/2020] atorvastatin (LIPITOR) tablet 10 mg, 10 mg, Oral, Daily, Percy Jeronimo MD  •  [START ON 4/1/2020] chlorhexidine (PERIDEX) 0.12 % solution 15 mL, 15 mL, Mouth/Throat, Q12H, Percy Jeronimo MD  •  chlorhexidine (PERIDEX) 0.12 % solution, , , PRN, Percy Jeronimo MD, 45 mL at 03/31/20 1307  •  famotidine (PEPCID) tablet 20 mg, 20 mg, Oral, Nightly, Percy Jeronimo MD, 20 mg at 03/31/20 2304  •  [START ON 4/1/2020] furosemide (LASIX) tablet 20 mg, 20 mg, Oral, Daily, Percy Jeronimo MD  •  HYDROcodone-acetaminophen (NORCO) 5-325 MG per tablet 1 tablet, 1 tablet, Oral, Q4H PRN, Percy Jeronimo MD  •  [START ON 4/1/2020] metoprolol tartrate (LOPRESSOR) tablet 25 mg, 25 mg, Oral, Daily, Percy Jeronimo MD  •  [START ON 4/1/2020] tamsulosin (FLOMAX) 24 hr capsule 0.4  "mg, 0.4 mg, Oral, Daily, Percy Jeronimo MD  Allergies:  No Known Allergies  Social History:   Social History     Socioeconomic History   • Marital status:      Spouse name: Not on file   • Number of children: Not on file   • Years of education: Not on file   • Highest education level: Not on file   Occupational History   • Occupation: Retired   Tobacco Use   • Smoking status: Former Smoker     Years: 5.00     Types: Cigars     Last attempt to quit:      Years since quittin.2   • Smokeless tobacco: Never Used   Substance and Sexual Activity   • Alcohol use: No   • Drug use: No   • Sexual activity: Not Currently     Partners: Female     Family History:  Family History   Problem Relation Age of Onset   • Pancreatic cancer Mother    • Hypertension Neg Hx    • Hyperlipidemia Neg Hx    • Heart failure Neg Hx    • Heart disease Neg Hx    • Heart attack Neg Hx    • Malig Hyperthermia Neg Hx           Review of Systems  See history of present illness and past medical history.  Patient denies any focal changes to smell taste or sound.  Admits to mechanical fall with resulting jaw pain and fracture.  Denies any current problems swallowing chest pain palpitation cough shortness of breath abdominal pain dysuria.  Denies any had significant joint pain status post fall.  Denies any focal loss of function or any loss of consciousness.  Remainder of ROS is negative.      Vitals:   /91 (BP Location: Left arm, Patient Position: Lying)   Pulse 114   Temp 99.5 °F (37.5 °C) (Oral)   Resp 16   Ht 182.9 cm (72\")   Wt 73.3 kg (161 lb 9 oz)   SpO2 96%   BMI 21.91 kg/m²   I/O:     Intake/Output Summary (Last 24 hours) at 3/31/2020 8843  Last data filed at 3/31/2020 2000  Gross per 24 hour   Intake 1600 ml   Output 50 ml   Net 1550 ml     Exam:  General Appearance:    Alert, cooperative, no distress, AO x3, nontoxic, conversational   Head:    Normocephalic, extensive bruising around jawline with ecchymosis to " upper portion of chest   Eyes:    PERRL, conjunctiva/corneas clear, EOM's intact, both eyes   Ears:    Normal external ear canals, both ears   Nose:   Nares normal, septum midline, mucosa normal, no drainage    or sinus tenderness       Neck:   Supple, see above       Lungs:     Clear to auscultation bilaterally, respirations unlabored   Chest Wall:    No tenderness or deformity    Heart:    Regular rate and rhythm, S1 and S2 normal   Abdomen:     Soft, non-tender, bowel sounds active all four quadrants,     no masses   Extremities:  Moving all, no cyanosis or edema   Pulses:   Pulses palpable in all extremities; symmetric all extremities       Neurologic:   CNII-XII intact       Data Review:  Labs in chart were reviewed.            Imaging Results (Last 7 Days)     ** No results found for the last 168 hours. **            Assessment:  Active Hospital Problems    Diagnosis  POA   • Mandible fracture (CMS/[facility]) [S02.609A]  Yes      Resolved Hospital Problems   No resolved problems to display.       Plan:    Diabetes mellitus type 2 with CAD   -Since postoperative from mandibular surgical repair, diet at this time is only full liquids, I will change to no concentrated sweets and hold his basal insulin for now.  I would also suggest holding scheduled mealtime fast acting insulin as well and implement just sliding scale to observe his trends first    HTN/CAD -parameters written    BPH on Flomax    GERD/past history of esophagitis on Pepcid and substitution for lansoprazole    POD 0 ORIF from mandibular fracture per oral facial surgery currently has on Unasyn and cefazolin   -Mild postoperative toxic encephalopathy noted in PACU though all seems to be resolved at this point time and patient appears to be at baseline   -Monitor for any postoperative acute blood loss    Home med rec further addended      Thank you much for the consult Dr. Jeronimo, A will be happy to follow and give medical recommendations accordingly  during this observation admission    Gilles Mittal MD   3/31/2020  23:23

## 2020-04-01 NOTE — PROGRESS NOTES
Discharge Planning Assessment  [facility]     Patient Name: Javier Marin  MRN: [id number]  Today's Date: 4/1/2020    Admit Date: 3/31/2020    Discharge Needs Assessment    No documentation.       Discharge Plan     Row Name 04/01/20 0924       Plan    Plan Comments  Transferred to [facility] from surgery on 3/31/2020 @ 21:00. Discharged to home on 4/1/2020. JACQUELYN Colorado RN, CCP.     Final Discharge Disposition Code  01 - home or self-care    Final Note  Discharged to home 4/1/2020. JACQUELYN Colorado Rn, CCP.         Destination      Coordination has not been started for this encounter.      Durable Medical Equipment      Coordination has not been started for this encounter.      Dialysis/Infusion      Coordination has not been started for this encounter.      Home Medical Care      Coordination has not been started for this encounter.      Therapy      Coordination has not been started for this encounter.      Community Resources      Coordination has not been started for this encounter.        Expected Discharge Date and Time     Expected Discharge Date Expected Discharge Time    Apr 1, 2020         Demographic Summary    No documentation.       Functional Status    No documentation.       Psychosocial    No documentation.       Abuse/Neglect    No documentation.       Legal    No documentation.       Substance Abuse    No documentation.       Patient Forms    No documentation.           Ayse Colorado RN

## 2020-04-01 NOTE — NURSING NOTE
Notified Dr. Jeronimo unable to complete XR Panore[facility] per Radiology, attempted to obtain x 20 minutes per x-ray tech, contact Radiology for additional details [phone].

## 2020-04-01 NOTE — PLAN OF CARE
Pt arrived to the unit at 2040. Oriented to room, unit schedule and staff. Very [place]. Alert and oriented x4. Forgetful at times. Unsteady on feet. Assist of 1-2 staff for pt to stand at side of bed and use urinal. No falls. Frequent urination, small amounts at a time. IVF. IV abx. Medicated with Norco x1 for jaw pain and pt states it was effective. Sleeping for periods in between care. Tolerating PO fluids, denies n/v. Accuchecks monitored ac and hs.

## 2020-04-01 NOTE — PROGRESS NOTES
S: no acute events overnight; doing much better with orientation; slight elevated temperature however resolved this AM  O: 98.3 100.9 77 161/78 16 96 RA  s/s acute infection  Wounds intact and hemostatic  Expected post op edema/ecchymosis  V3 paresthesia  RRR, CTA b/l  AAOx3  Voiding, tolerating PO  XRAY: pending  A/P: 85yo M 1d s/p ORIF b/l nancy fxs  - will anticipate d/c after xray; rx on chart  - POIG to wife; will f/up in office next week  - appreciate input from medicine while admitted

## 2020-04-01 NOTE — PROGRESS NOTES
Case Management Discharge Note      Final Note: Discharged to home 4/1/2020. JACQUELYN Colorado Rn, CCP.          Destination      No service has been selected for the patient.      Durable Medical Equipment      No service has been selected for the patient.      Dialysis/Infusion      No service has been selected for the patient.      Home Medical Care      No service has been selected for the patient.      Therapy      No service has been selected for the patient.      Community Resources      No service has been selected for the patient.        Transportation Services  Private: Car    Final Discharge Disposition Code: 01 - home or self-care

## 2020-04-01 NOTE — PROGRESS NOTES
Name: Javier Marin ADMIT: 3/31/2020   : 1935  PCP: Percy Em MD    MRN: [id number] LOS: 0 days   AGE/SEX: 84 y.o. male  ROOM: [facility]/1     Subjective   Subjective   Lying in bed. Denies any new complaints. Still not eating much due to his surgery. Did eat some yogurt this morning. Reports he checks his sugars over 4 times a day. Denies low sugars. He may be discharged today. Denies nausea, vomiting, diarrhea, chest pain, dyspnea or cough.      Objective   Objective   Vital Signs  Temp:  [98.2 °F (36.8 °C)-100.9 °F (38.3 °C)] 98.3 °F (36.8 °C)  Heart Rate:  [] 77  Resp:  [16-18] 16  BP: (132-167)/() 161/78  SpO2:  [91 %-100 %] 96 %  on  Flow (L/min):  [4] 4;   Device (Oxygen Therapy): room air  Body mass index is 21.91 kg/m².     Physical Exam   Constitutional: He is oriented to person, place, and time. He appears well-developed. No distress.   [place]   HENT:   Head: Normocephalic.   Nose: Nose normal.   Mouth/Throat: Oropharynx is clear and moist.   Bilateral jaw bruising, mild edema.   Eyes: Conjunctivae are normal. Right eye exhibits no discharge. Left eye exhibits no discharge.   Neck: Normal range of motion. Neck supple.   Cardiovascular: Normal rate, regular rhythm, normal heart sounds and intact distal pulses.   Pulmonary/Chest: Effort normal and breath sounds normal. No respiratory distress.   Abdominal: Soft. Bowel sounds are normal. He exhibits no distension. There is no tenderness.   Musculoskeletal: Normal range of motion. He exhibits edema (bilateral jaw).   Neurological: He is alert and oriented to person, place, and time. He exhibits normal muscle tone. Coordination normal.   Skin: Skin is warm and dry. He is not diaphoretic. No erythema.   Bruising.   Psychiatric: He has a normal mood and affect. His behavior is normal.   Nursing note and vitals reviewed.     Results Review:       I reviewed the patient's new clinical results.  Results from last 7 days   Lab Units  04/01/20  0528 03/31/20  1136   WBC 10*3/mm3 16.02* 10.64   HEMOGLOBIN g/dL 13.9 13.8   PLATELETS 10*3/mm3 159 163     Results from last 7 days   Lab Units 04/01/20  1027 03/31/20  1136   SODIUM mmol/L 132* 138   POTASSIUM mmol/L 4.3 4.4   CHLORIDE mmol/L 95* 99   CO2 mmol/L 25.2 24.2   BUN mg/dL 17 14   CREATININE mg/dL 1.02 0.97   GLUCOSE mg/dL 272* 221*   Estimated Creatinine Clearance: 55.9 mL/min (by C-G formula based on SCr of 1.02 mg/dL).    Results from last 7 days   Lab Units 04/01/20  1027 03/31/20  1136   CALCIUM mg/dL 8.7 9.2       Hemoglobin A1C   Date/Time Value Ref Range Status   04/01/2020 1027 7.45 (H) 4.80 - 5.60 % Final     Glucose   Date/Time Value Ref Range Status   04/01/2020 0608 274 (H) 70 - 130 mg/dL Final   03/31/2020 2352 255 (H) 70 - 130 mg/dL Final   03/31/2020 1931 200 (H) 70 - 130 mg/dL Final   03/31/2020 1113 189 (H) 70 - 130 mg/dL Final         ampicillin-sulbactam 1.5 g Intravenous Q6H   atorvastatin 10 mg Oral Daily   chlorhexidine 15 mL Mouth/Throat Q12H   famotidine 20 mg Oral BID AC   furosemide 20 mg Oral Daily   insulin lispro 0-7 Units Subcutaneous 4x Daily With Meals & Nightly   metoprolol tartrate 25 mg Oral Daily   tamsulosin 0.4 mg Oral Daily      Diet Full Liquid; Consistent Carbohydrate       Assessment/Plan     Active Hospital Problems    Diagnosis  POA   • Mandible fracture (CMS/[facility]) [S02.609A]  Yes   • Hyperlipidemia [E78.5]  Yes   • Hard of hearing [H91.90]  Yes   • Hypertension, essential [I10]  Yes   • Diabetes mellitus type 2, uncontrolled (CMS/[facility]) [E11.65]  Yes      Resolved Hospital Problems   No resolved problems to display.     Diabetes mellitus type 2   -Hemoglobin A1c with great control for age at 7.45.  -Patient well-versed on his insulin management. Reports checking his sugars four times a day or more. Denies hypoglycemia.  -Still with diminished oral intake due to his surgery and on full liquids. Given he is likely going to be discharged today, have  advised him to only give himself half of his long-acting insulin if not eating (Lantus 10 units nightly- decrease to 5 units).   -Advised him to check sugar before giving his meal-time insulin of 8 units. If less than 150, would only give half-dose and monitor sugars. Resume home regimen once oral intake improved. However, his sugars have been running in the mid to high 200s here.  He would tolerate home dosing if these continue.  -F/U with his regular provider for further monitoring at discharge.     HTN/CAD/PAF  -Stable. Follows with Dr. Mccrary for Cardiology.  -Not on A/C for his PAF due to high falls risk.     BPH  -On Flomax     GERD/past history of esophagitis   -On Pepcid and substitution for lansoprazole    Mandible fracture   -POD 1 ORIF per oral surgery, attending.  -Hemoglobin stable today. Some leukocytosis and likely reactive from surgery. Did have mild temperature elevation overnight. Encourage IS. On antibiotics per attending.    Hyponatremia  -Mild on labs today. Likely due to volume loss from surgery.  -He is on Lasix at home. Encourage PO fluids while taking this.  -Repeat labs with PCP when safe to do so.        Thank you much for the consult Dr. Jeronimo, [facility] will be happy to follow and give medical recommendations accordingly during this observation admission    VTE Prophylaxis - SCDs  Code Status - Full code  Disposition - Per attending.      JERRY Villar  [facility]  04/01/20  12:08

## 2020-04-01 NOTE — PROGRESS NOTES
Continued Stay Note  [facility]     Patient Name: Javier Marin  MRN: [id number]  Today's Date: 4/1/2020    Admit Date: 3/31/2020    Discharge Plan     Row Name 04/01/20 0924       Plan    Plan Comments  Transferred to [facility] from surgery on 3/31/2020 @ 21:00. Discharged to home on 4/1/2020. JACQUELYN Colorado RN, CCP.     Final Discharge Disposition Code  01 - home or self-care    Final Note  Discharged to home 4/1/2020. JACQUELYN Colorado Rn, CCP.         Discharge Codes    No documentation.       Expected Discharge Date and Time     Expected Discharge Date Expected Discharge Time    Apr 1, 2020             Ayse Colorado RN

## 2020-08-03 ENCOUNTER — OFFICE VISIT (OUTPATIENT)
Dept: CARDIOLOGY | Facility: CLINIC | Age: 85
End: 2020-08-03

## 2020-08-03 VITALS
BODY MASS INDEX: 23.34 KG/M2 | DIASTOLIC BLOOD PRESSURE: 83 MMHG | WEIGHT: 163 LBS | HEART RATE: 88 BPM | HEIGHT: 70 IN | SYSTOLIC BLOOD PRESSURE: 144 MMHG

## 2020-08-03 DIAGNOSIS — I10 HYPERTENSION, ESSENTIAL: ICD-10-CM

## 2020-08-03 DIAGNOSIS — I48.0 PAROXYSMAL ATRIAL FIBRILLATION (HCC): ICD-10-CM

## 2020-08-03 DIAGNOSIS — E78.5 HYPERLIPIDEMIA, UNSPECIFIED HYPERLIPIDEMIA TYPE: Primary | ICD-10-CM

## 2020-08-03 PROCEDURE — 93000 ELECTROCARDIOGRAM COMPLETE: CPT | Performed by: INTERNAL MEDICINE

## 2020-08-03 PROCEDURE — 99213 OFFICE O/P EST LOW 20 MIN: CPT | Performed by: INTERNAL MEDICINE

## 2020-08-03 RX ORDER — NITROGLYCERIN 0.4 MG/1
0.4 TABLET SUBLINGUAL
Qty: 25 TABLET | Refills: 0 | Status: SHIPPED | OUTPATIENT
Start: 2020-08-03 | End: 2020-08-05 | Stop reason: SDUPTHER

## 2020-08-03 NOTE — PROGRESS NOTES
Subjective:        Javier Marin is a 84 y.o. male who here for follow up    CC  Follow-up for the hypertension atrial fibrillation hyperlipidemia  HPI  84-year-old male with known history of the benign essential arterial hypertension paroxysmal atrial fibrillation and hyperlipidemia here for the follow-up with no complaints of chest pains tightness heaviness or the pressure sensation     Problem List Items Addressed This Visit        Cardiovascular and Mediastinum    Hypertension, essential    Paroxysmal atrial fibrillation (CMS/HCC)    Hyperlipidemia - Primary        .    The following portions of the patient's history were reviewed and updated as appropriate: allergies, current medications, past family history, past medical history, past social history, past surgical history and problem list.    Past Medical History:   Diagnosis Date   • A-fib (CMS/HCC)    • 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CMS/HCC)    • Anorexia    • Arthritis    • Carotid stenosis, asymptomatic, bilateral 02/24/2014    16-49%   • Closed right arm fracture 1994   • Compression fracture of lumbar spine, non-traumatic (CMS/HCC)    • Diabetes mellitus (CMS/HCC)    • Diabetes mellitus, type 2 (CMS/HCC)    • Elevated LFTs    • Esophagitis 06/19/2018    DR RASHID GRADE D LOWER   • Esophagus disorder     THICKENED DISTAL WALL-CT SCAN 6/18/18   • Garbled speech    • Granulomatous disease, chronic (CMS/HCC)    • Hard of hearing    • Hiatal hernia with GERD and esophagitis    • History of acute gastritis    • Hyperlipidemia    • Hypertension    • Hyponatremia    • Leukocytosis    • Lumbar canal stenosis    • Lung nodule seen on imaging study    • Mandible fracture (CMS/HCC) 03/28/2020    FROM FALL   • Osteoarthritis of multiple joints    • Ptosis of left eyelid    • Remote history of stroke     SMALL CAUDATE NECLEUS   • Thrombocytopenia (CMS/HCC)    • Weakness generalized      reports that he quit smoking about 26 years ago. His smoking use  "included cigars. He quit after 5.00 years of use. He has never used smokeless tobacco. He reports that he does not drink alcohol or use drugs.   Family History   Problem Relation Age of Onset   • Pancreatic cancer Mother    • Hypertension Neg Hx    • Hyperlipidemia Neg Hx    • Heart failure Neg Hx    • Heart disease Neg Hx    • Heart attack Neg Hx    • Malig Hyperthermia Neg Hx        Review of Systems  Constitutional: No wt loss, fever, fatigue  Gastrointestinal: No nausea, abdominal pain  Behavioral/Psych: No insomnia or anxiety   Cardiovascular no chest pains or tightness in the chest  Objective:       Physical Exam  /83   Pulse 88   Ht 177.8 cm (70\")   Wt 73.9 kg (163 lb)   BMI 23.39 kg/m²   General appearance: No acute changes   Neck: Trachea midline; NECK, supple, no thyromegaly or lymphadenopathy   Lungs: Normal size and shape, normal breath sounds, equal distribution of air, no rales and rhonchi   CV: S1-S2 regular, no murmurs, no rub, no gallop   Abdomen: Soft, non-tender; no masses , no abnormal abdominal sounds   Extremities: No deformity , normal color , no peripheral edema   Skin: Normal temperature, turgor and texture; no rash, ulcers            ECG 12 Lead  Date/Time: 8/3/2020 10:39 AM  Performed by: Juliana Mccrary MD  Authorized by: Juliana Mccrary MD   Comparison: compared with previous ECG   Similar to previous ECG  Rhythm: sinus rhythm  ST Flattening: all    Clinical impression: non-specific ECG              Echocardiogram:        Current Outpatient Medications:   •  aspirin 81 MG EC tablet, Take 81 mg by mouth Daily., Disp: , Rfl:   •  atorvastatin (LIPITOR) 10 MG tablet, Take 10 mg by mouth Daily., Disp: , Rfl:   •  cholecalciferol (VITAMIN D3) 1000 UNITS tablet, Take 1,000 Units by mouth Daily. PT HOLDING FOR SURGERY, Disp: , Rfl:   •  famotidine (PEPCID) 20 MG tablet, Take 20 mg by mouth Every Night., Disp: , Rfl:   •  furosemide (LASIX) 20 MG tablet, Take 20 mg by " mouth Daily., Disp: , Rfl:   •  insulin glargine (LANTUS) 100 UNIT/ML injection, Inject 10 Units under the skin Every Night., Disp: , Rfl:   •  insulin lispro (humaLOG) 100 UNIT/ML injection, Inject 8 Units under the skin 3 (Three) Times a Day Before Meals., Disp: , Rfl:   •  lansoprazole (PREVACID) 15 MG capsule, Take 1 capsule by mouth 2 (Two) Times a Day., Disp: 60 capsule, Rfl: 3  •  metoprolol tartrate (LOPRESSOR) 25 MG tablet, Take 25 mg by mouth Daily., Disp: , Rfl:   •  nitroglycerin (NITROSTAT) 0.4 MG SL tablet, Place 1 tablet under the tongue Every 5 (Five) Minutes As Needed for Chest Pain. Take no more than 3 doses in 15 minutes., Disp: 25 tablet, Rfl: 0  •  tamsulosin (FLOMAX) 0.4 MG capsule 24 hr capsule, Take 1 capsule by mouth Daily., Disp: , Rfl:   •  chlorhexidine (Peridex) 0.12 % solution, Apply 15 mL to the mouth or throat 2 (Two) Times a Day., Disp: 473 mL, Rfl: 0  •  HYDROcodone-acetaminophen (NORCO) 5-325 MG per tablet, Take 1 tablet by mouth Every 8 (Eight) Hours As Needed for Moderate Pain ., Disp: 9 tablet, Rfl: 0  •  HYDROcodone-acetaminophen (NORCO) 5-325 MG per tablet, Take 1-2 tablets by mouth Every 4 (Four) Hours As Needed (Pain)., Disp: 24 tablet, Rfl: 0   Assessment:        Patient Active Problem List   Diagnosis   • Acute gastritis   • 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CMS/HCC)   • Limb swelling   • Diabetes mellitus type 2, uncontrolled (CMS/HCC)   • Hypertension, essential   • Acute hyponatremia   • Acute kidney injury superimposed on chronic kidney disease (CMS/HCC)   • Elevated liver function tests   • Leukocytosis   • Anorexia   • Esophagus disorder, thickened distal wall CT Scan 6/18/18   • Granulomatous disease, chronic (CMS/HCC)   • Lung nodule seen on imaging study   • Compression fracture of lumbar spine, non-traumatic (CMS/HCC)   • Lumbar canal stenosis   • Weakness generalized   • Cough   • Hard of hearing   • Ptosis, left eyelid   • Osteoarthritis of multiple joints   •  Paroxysmal atrial fibrillation (CMS/[facility])   • Carotid stenosis, asymptomatic, bilateral 16-49% 2/24/14   • Remote history of stroke, small caudate nucleus   • Thrombocytopenia (CMS/[facility])   • Fall at home, subsequent encounter   • Hyperlipidemia   • Esophagitis determined by endoscopy by Sheri 6/19/18 LA Grade D Lower 1/3   • Hiatal hernia with GERD and esophagitis   • Mandible fracture (CMS/[facility])               Plan:            ICD-10-CM ICD-9-CM   1. Hyperlipidemia, unspecified hyperlipidemia type E78.5 272.4   2. Hypertension, essential I10 401.9   3. Paroxysmal atrial fibrillation (CMS/[facility]) I48.0 427.31     1. Hyperlipidemia, unspecified hyperlipidemia type  Continue current treatment    2. Hypertension, essential  Blood pressure under control    3. Paroxysmal atrial fibrillation (CMS/HCC)  Heart rate under control       CV STABLE    SEE IN 1YR   COUNSELING:    Javier Brennan was given to patient for the following topics: diagnostic results, risk factor reductions, impressions, risks and benefits of treatment options and importance of treatment compliance .       SMOKING COUNSELING:    [unfilled]    Dictated using Dragon dictation

## 2020-08-05 RX ORDER — NITROGLYCERIN 0.4 MG/1
0.4 TABLET SUBLINGUAL
Qty: 25 TABLET | Refills: 0 | Status: SHIPPED | OUTPATIENT
Start: 2020-08-05

## 2020-11-24 ENCOUNTER — TRANSCRIBE ORDERS (OUTPATIENT)
Dept: ADMINISTRATIVE | Facility: HOSPITAL | Age: 85
End: 2020-11-24

## 2020-11-24 DIAGNOSIS — R09.89 BRUIT: Primary | ICD-10-CM

## 2020-11-30 ENCOUNTER — APPOINTMENT (OUTPATIENT)
Dept: CARDIOLOGY | Facility: HOSPITAL | Age: 85
End: 2020-11-30

## 2020-12-02 ENCOUNTER — HOSPITAL ENCOUNTER (OUTPATIENT)
Dept: CARDIOLOGY | Facility: HOSPITAL | Age: 85
Discharge: HOME | End: 2020-12-02
Admitting: INTERNAL MEDICINE

## 2020-12-02 DIAGNOSIS — R09.89 BRUIT: ICD-10-CM

## 2020-12-02 LAB
BH CV XLRA MEAS LEFT DIST CCA EDV: 13.4 CM/SEC
BH CV XLRA MEAS LEFT DIST CCA PSV: 82.8 CM/SEC
BH CV XLRA MEAS LEFT DIST ICA EDV: -16.3 CM/SEC
BH CV XLRA MEAS LEFT DIST ICA PSV: -82.1 CM/SEC
BH CV XLRA MEAS LEFT ICA/CCA RATIO: 1.4
BH CV XLRA MEAS LEFT MID ICA EDV: -19.8 CM/SEC
BH CV XLRA MEAS LEFT MID ICA PSV: -90.6 CM/SEC
BH CV XLRA MEAS LEFT PROX CCA EDV: 12 CM/SEC
BH CV XLRA MEAS LEFT PROX CCA PSV: 96.9 CM/SEC
BH CV XLRA MEAS LEFT PROX ECA EDV: -9.2 CM/SEC
BH CV XLRA MEAS LEFT PROX ECA PSV: -99.1 CM/SEC
BH CV XLRA MEAS LEFT PROX ICA EDV: -21.2 CM/SEC
BH CV XLRA MEAS LEFT PROX ICA PSV: -114 CM/SEC
BH CV XLRA MEAS LEFT PROX SCLA PSV: 98.4 CM/SEC
BH CV XLRA MEAS LEFT VERTEBRAL A EDV: 15.6 CM/SEC
BH CV XLRA MEAS LEFT VERTEBRAL A PSV: 60.9 CM/SEC
BH CV XLRA MEAS RIGHT DIST CCA EDV: -8.5 CM/SEC
BH CV XLRA MEAS RIGHT DIST CCA PSV: -49.5 CM/SEC
BH CV XLRA MEAS RIGHT DIST ICA EDV: -17.1 CM/SEC
BH CV XLRA MEAS RIGHT DIST ICA PSV: -65 CM/SEC
BH CV XLRA MEAS RIGHT ICA/CCA RATIO: 6.4
BH CV XLRA MEAS RIGHT MID ICA EDV: -10.9 CM/SEC
BH CV XLRA MEAS RIGHT MID ICA PSV: -51.7 CM/SEC
BH CV XLRA MEAS RIGHT PROX CCA EDV: 12 CM/SEC
BH CV XLRA MEAS RIGHT PROX CCA PSV: 80 CM/SEC
BH CV XLRA MEAS RIGHT PROX ECA EDV: 40.6 CM/SEC
BH CV XLRA MEAS RIGHT PROX ECA PSV: 368 CM/SEC
BH CV XLRA MEAS RIGHT PROX ICA EDV: 67.7 CM/SEC
BH CV XLRA MEAS RIGHT PROX ICA PSV: 316 CM/SEC
BH CV XLRA MEAS RIGHT PROX SCLA PSV: 59.7 CM/SEC
BH CV XLRA MEAS RIGHT VERTEBRAL A EDV: -4.9 CM/SEC
BH CV XLRA MEAS RIGHT VERTEBRAL A PSV: -30.2 CM/SEC
LEFT ARM BP: NORMAL MMHG
RIGHT ARM BP: NORMAL MMHG

## 2020-12-02 PROCEDURE — 93880 EXTRACRANIAL BILAT STUDY: CPT

## 2021-02-03 ENCOUNTER — HOSPITAL ENCOUNTER (OUTPATIENT)
Dept: GENERAL RADIOLOGY | Facility: HOSPITAL | Age: 86
Discharge: HOME | End: 2021-02-03
Admitting: INTERNAL MEDICINE

## 2021-02-03 DIAGNOSIS — R05.9 COUGH: ICD-10-CM

## 2021-02-03 PROCEDURE — 71046 X-RAY EXAM CHEST 2 VIEWS: CPT

## 2021-08-26 ENCOUNTER — OFFICE VISIT (OUTPATIENT)
Dept: CARDIOLOGY | Facility: CLINIC | Age: 86
End: 2021-08-26

## 2021-08-26 VITALS
BODY MASS INDEX: 21.56 KG/M2 | DIASTOLIC BLOOD PRESSURE: 77 MMHG | WEIGHT: 154 LBS | SYSTOLIC BLOOD PRESSURE: 169 MMHG | HEART RATE: 71 BPM | HEIGHT: 71 IN

## 2021-08-26 DIAGNOSIS — I10 HYPERTENSION, ESSENTIAL: ICD-10-CM

## 2021-08-26 DIAGNOSIS — E78.5 HYPERLIPIDEMIA, UNSPECIFIED HYPERLIPIDEMIA TYPE: ICD-10-CM

## 2021-08-26 DIAGNOSIS — I48.0 PAROXYSMAL ATRIAL FIBRILLATION (HCC): Primary | ICD-10-CM

## 2021-08-26 PROCEDURE — 93000 ELECTROCARDIOGRAM COMPLETE: CPT | Performed by: INTERNAL MEDICINE

## 2021-08-26 PROCEDURE — 99214 OFFICE O/P EST MOD 30 MIN: CPT | Performed by: INTERNAL MEDICINE

## 2021-08-26 NOTE — PROGRESS NOTES
1 YR FOLLOW UP   Subjective:        Javier Marin is a 85 y.o. male who here for follow up    CC  Follow-up atrial fibrillation hypertension hyperlipidemia  HPI  85-year-old male here for the follow-up with known history of the benign essential arterial hypertension paroxysmal atrial fibrillation and hyperlipidemia with no chest pains or tightness in the chest     Problems Addressed this Visit        Cardiac and Vasculature    Hypertension, essential    Paroxysmal atrial fibrillation (CMS/HCC) - Primary    Relevant Orders    Adult Transthoracic Echo Complete W/ Cont if Necessary Per Protocol    Hyperlipidemia      Diagnoses       Codes Comments    Paroxysmal atrial fibrillation (CMS/HCC)    -  Primary ICD-10-CM: I48.0  ICD-9-CM: 427.31     Hypertension, essential     ICD-10-CM: I10  ICD-9-CM: 401.9     Hyperlipidemia, unspecified hyperlipidemia type     ICD-10-CM: E78.5  ICD-9-CM: 272.4         .    The following portions of the patient's history were reviewed and updated as appropriate: allergies, current medications, past family history, past medical history, past social history, past surgical history and problem list.    Past Medical History:   Diagnosis Date   • A-fib (CMS/HCC)    • 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CMS/HCC)    • Anorexia    • Arthritis    • Carotid stenosis, asymptomatic, bilateral 02/24/2014    16-49%   • Closed right arm fracture 1994   • Compression fracture of lumbar spine, non-traumatic (CMS/HCC)    • Diabetes mellitus (CMS/HCC)    • Diabetes mellitus, type 2 (CMS/HCC)    • Elevated LFTs    • Esophagitis 06/19/2018    DR RAHSID GRADE D LOWER   • Esophagus disorder     THICKENED DISTAL WALL-CT SCAN 6/18/18   • Garbled speech    • Granulomatous disease, chronic (CMS/HCC)    • Hard of hearing    • Hiatal hernia with GERD and esophagitis    • History of acute gastritis    • Hyperlipidemia    • Hypertension    • Hyponatremia    • Leukocytosis    • Lumbar canal stenosis    • Lung nodule  "seen on imaging study    • Mandible fracture (CMS/HCC) 03/28/2020    FROM FALL   • Osteoarthritis of multiple joints    • Ptosis of left eyelid    • Remote history of stroke     SMALL CAUDATE NECLEUS   • Thrombocytopenia (CMS/HCC)    • Weakness generalized      reports that he quit smoking about 27 years ago. His smoking use included cigars. He quit after 5.00 years of use. He has never used smokeless tobacco. He reports that he does not drink alcohol and does not use drugs.   Family History   Problem Relation Age of Onset   • Pancreatic cancer Mother    • Hypertension Neg Hx    • Hyperlipidemia Neg Hx    • Heart failure Neg Hx    • Heart disease Neg Hx    • Heart attack Neg Hx    • Malig Hyperthermia Neg Hx        Review of Systems  Constitutional: No wt loss, fever, fatigue  Gastrointestinal: No nausea, abdominal pain  Behavioral/Psych: No insomnia or anxiety   Cardiovascular no chest pains or tightness in the chest  Objective:       Physical Exam  /77   Pulse 71   Ht 180.3 cm (71\")   Wt 69.9 kg (154 lb)   BMI 21.48 kg/m²   General appearance: No acute changes   Neck: Trachea midline; NECK, supple, no thyromegaly or lymphadenopathy   Lungs: Normal size and shape, normal breath sounds, equal distribution of air, no rales and rhonchi   CV: S1-S2 regular, no murmurs, no rub, no gallop   Abdomen: Soft, non-tender; no masses , no abnormal abdominal sounds   Extremities: No deformity , normal color , no peripheral edema   Skin: Normal temperature, turgor and texture; no rash, ulcers            ECG 12 Lead    Date/Time: 8/26/2021 11:40 AM  Performed by: Juliana Mccrary MD  Authorized by: Juliana Mccrary MD   Comparison: compared with previous ECG   Similar to previous ECG  Rhythm: sinus rhythm  Ectopy: atrial premature contractions    Clinical impression: abnormal EKG              Echocardiogram:        Current Outpatient Medications:   •  aspirin 81 MG EC tablet, Take 81 mg by mouth Daily., Disp: " , Rfl:   •  atorvastatin (LIPITOR) 10 MG tablet, Take 10 mg by mouth Daily., Disp: , Rfl:   •  cholecalciferol (VITAMIN D3) 1000 UNITS tablet, Take 1,000 Units by mouth Daily. PT HOLDING FOR SURGERY, Disp: , Rfl:   •  insulin glargine (LANTUS) 100 UNIT/ML injection, Inject 10 Units under the skin Every Night., Disp: , Rfl:   •  insulin lispro (humaLOG) 100 UNIT/ML injection, Inject 8 Units under the skin 3 (Three) Times a Day Before Meals., Disp: , Rfl:   •  lansoprazole (PREVACID) 15 MG capsule, Take 1 capsule by mouth 2 (Two) Times a Day., Disp: 60 capsule, Rfl: 3  •  chlorhexidine (Peridex) 0.12 % solution, Apply 15 mL to the mouth or throat 2 (Two) Times a Day., Disp: 473 mL, Rfl: 0  •  nitroglycerin (NITROSTAT) 0.4 MG SL tablet, Place 1 tablet under the tongue Every 5 (Five) Minutes As Needed for Chest Pain. Take no more than 3 doses in 15 minutes., Disp: 25 tablet, Rfl: 0   Assessment:        Patient Active Problem List   Diagnosis   • Acute gastritis   • 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CMS/HCC)   • Limb swelling   • Diabetes mellitus type 2, uncontrolled (CMS/HCC)   • Hypertension, essential   • Acute hyponatremia   • Acute kidney injury superimposed on chronic kidney disease (CMS/HCC)   • Elevated liver function tests   • Leukocytosis   • Anorexia   • Esophagus disorder, thickened distal wall CT Scan 6/18/18   • Granulomatous disease, chronic (CMS/HCC)   • Lung nodule seen on imaging study   • Compression fracture of lumbar spine, non-traumatic (CMS/HCC)   • Lumbar canal stenosis   • Weakness generalized   • Cough   • Hard of hearing   • Ptosis, left eyelid   • Osteoarthritis of multiple joints   • Paroxysmal atrial fibrillation (CMS/HCC)   • Carotid stenosis, asymptomatic, bilateral 16-49% 2/24/14   • Remote history of stroke, small caudate nucleus   • Thrombocytopenia (CMS/HCC)   • Fall at home, subsequent encounter   • Hyperlipidemia   • Esophagitis determined by endoscopy by Sheri 6/19/18 LA Grade D  Lower 1/3   • Hiatal hernia with GERD and esophagitis   • Mandible fracture (CMS/HCC)               Plan:            ICD-10-CM ICD-9-CM   1. Paroxysmal atrial fibrillation (CMS/HCC)  I48.0 427.31   2. Hypertension, essential  I10 401.9   3. Hyperlipidemia, unspecified hyperlipidemia type  E78.5 272.4     1. Paroxysmal atrial fibrillation (CMS/HCC)  Considering patient's medical condition as well as the risk factors, patient will require echocardiogram for further evaluation for the LV function, four-chamber evaluation, including the pressures, valvular function and  pericardial disease and pericardial effusion    - Adult Transthoracic Echo Complete W/ Cont if Necessary Per Protocol; Future    2. Hypertension, essential  Blood pressure under control    3. Hyperlipidemia, unspecified hyperlipidemia type  Continue current treatment     PCP FOLLOWING CAROTID    BP BETTER AT HOME    1 YR WITH ECHO  COUNSELING:    Javier Brennan was given to patient for the following topics: diagnostic results, risk factor reductions, impressions, risks and benefits of treatment options and importance of treatment compliance .       SMOKING COUNSELING:    [unfilled]    Dictated using Dragon dictation

## 2021-12-20 ENCOUNTER — TRANSCRIBE ORDERS (OUTPATIENT)
Dept: ADMINISTRATIVE | Facility: HOSPITAL | Age: 86
End: 2021-12-20

## 2021-12-20 DIAGNOSIS — R06.02 SOB (SHORTNESS OF BREATH): ICD-10-CM

## 2021-12-20 DIAGNOSIS — I48.91 ATRIAL FIBRILLATION, UNSPECIFIED TYPE (HCC): Primary | ICD-10-CM

## 2022-01-02 ENCOUNTER — APPOINTMENT (OUTPATIENT)
Dept: GENERAL RADIOLOGY | Facility: HOSPITAL | Age: 87
End: 2022-01-02

## 2022-01-02 ENCOUNTER — HOSPITAL ENCOUNTER (EMERGENCY)
Facility: HOSPITAL | Age: 87
Discharge: HOME | End: 2022-01-02
Attending: EMERGENCY MEDICINE | Admitting: EMERGENCY MEDICINE

## 2022-01-02 ENCOUNTER — APPOINTMENT (OUTPATIENT)
Dept: CT IMAGING | Facility: HOSPITAL | Age: 87
End: 2022-01-02

## 2022-01-02 VITALS
BODY MASS INDEX: 24.12 KG/M2 | TEMPERATURE: 98.2 F | RESPIRATION RATE: 18 BRPM | HEART RATE: 83 BPM | HEIGHT: 67 IN | SYSTOLIC BLOOD PRESSURE: 165 MMHG | OXYGEN SATURATION: 96 % | DIASTOLIC BLOOD PRESSURE: 97 MMHG

## 2022-01-02 DIAGNOSIS — S42.202A CLOSED FRACTURE OF PROXIMAL END OF LEFT HUMERUS, UNSPECIFIED FRACTURE MORPHOLOGY, INITIAL ENCOUNTER: ICD-10-CM

## 2022-01-02 DIAGNOSIS — R55 SYNCOPE, UNSPECIFIED SYNCOPE TYPE: Primary | ICD-10-CM

## 2022-01-02 DIAGNOSIS — D64.9 ANEMIA, UNSPECIFIED TYPE: ICD-10-CM

## 2022-01-02 DIAGNOSIS — S00.81XA ABRASION OF FACE, INITIAL ENCOUNTER: ICD-10-CM

## 2022-01-02 LAB
ALBUMIN SERPL-MCNC: 4.4 G/DL (ref 3.5–5.2)
ALBUMIN/GLOB SERPL: 1.8 G/DL
ALP SERPL-CCNC: 134 U/L (ref 39–117)
ALT SERPL W P-5'-P-CCNC: 24 U/L (ref 1–41)
ANION GAP SERPL CALCULATED.3IONS-SCNC: 9.6 MMOL/L (ref 5–15)
AST SERPL-CCNC: 24 U/L (ref 1–40)
BILIRUB SERPL-MCNC: 0.5 MG/DL (ref 0–1.2)
BUN SERPL-MCNC: 17 MG/DL (ref 8–23)
BUN/CREAT SERPL: 16.7 (ref 7–25)
CALCIUM SPEC-SCNC: 9.2 MG/DL (ref 8.6–10.5)
CHLORIDE SERPL-SCNC: 105 MMOL/L (ref 98–107)
CO2 SERPL-SCNC: 25.4 MMOL/L (ref 22–29)
CREAT SERPL-MCNC: 1.02 MG/DL (ref 0.76–1.27)
GFR SERPL CREATININE-BSD FRML MDRD: 69 ML/MIN/1.73
GLOBULIN UR ELPH-MCNC: 2.4 GM/DL
GLUCOSE BLDC GLUCOMTR-MCNC: 113 MG/DL (ref 70–130)
GLUCOSE SERPL-MCNC: 119 MG/DL (ref 65–99)
HOLD SPECIMEN: NORMAL
HOLD SPECIMEN: NORMAL
MAGNESIUM SERPL-MCNC: 1.8 MG/DL (ref 1.6–2.4)
NT-PROBNP SERPL-MCNC: 2473 PG/ML (ref 0–1800)
POTASSIUM SERPL-SCNC: 4.8 MMOL/L (ref 3.5–5.2)
PROT SERPL-MCNC: 6.8 G/DL (ref 6–8.5)
QT INTERVAL: 443 MS
SODIUM SERPL-SCNC: 140 MMOL/L (ref 136–145)
TROPONIN T SERPL-MCNC: <0.01 NG/ML (ref 0–0.03)
WHOLE BLOOD HOLD SPECIMEN: NORMAL
WHOLE BLOOD HOLD SPECIMEN: NORMAL

## 2022-01-02 PROCEDURE — 36415 COLL VENOUS BLD VENIPUNCTURE: CPT

## 2022-01-02 PROCEDURE — 84484 ASSAY OF TROPONIN QUANT: CPT | Performed by: EMERGENCY MEDICINE

## 2022-01-02 PROCEDURE — 80053 COMPREHEN METABOLIC PANEL: CPT | Performed by: EMERGENCY MEDICINE

## 2022-01-02 PROCEDURE — 82962 GLUCOSE BLOOD TEST: CPT

## 2022-01-02 PROCEDURE — 73030 X-RAY EXAM OF SHOULDER: CPT

## 2022-01-02 PROCEDURE — 70450 CT HEAD/BRAIN W/O DYE: CPT

## 2022-01-02 PROCEDURE — 85025 COMPLETE CBC W/AUTO DIFF WBC: CPT | Performed by: EMERGENCY MEDICINE

## 2022-01-02 PROCEDURE — 93010 ELECTROCARDIOGRAM REPORT: CPT | Performed by: INTERNAL MEDICINE

## 2022-01-02 PROCEDURE — 93005 ELECTROCARDIOGRAM TRACING: CPT

## 2022-01-02 PROCEDURE — 83735 ASSAY OF MAGNESIUM: CPT | Performed by: EMERGENCY MEDICINE

## 2022-01-02 PROCEDURE — 99284 EMERGENCY DEPT VISIT MOD MDM: CPT

## 2022-01-02 PROCEDURE — 83880 ASSAY OF NATRIURETIC PEPTIDE: CPT | Performed by: EMERGENCY MEDICINE

## 2022-01-02 RX ORDER — HYDROCODONE BITARTRATE AND ACETAMINOPHEN 5; 325 MG/1; MG/1
1 TABLET ORAL EVERY 6 HOURS PRN
Status: DISCONTINUED | OUTPATIENT
Start: 2022-01-02 | End: 2022-01-02 | Stop reason: HOSPADM

## 2022-01-02 RX ORDER — SODIUM CHLORIDE 0.9 % (FLUSH) 0.9 %
10 SYRINGE (ML) INJECTION AS NEEDED
Status: DISCONTINUED | OUTPATIENT
Start: 2022-01-02 | End: 2022-01-02 | Stop reason: HOSPADM

## 2022-01-02 RX ORDER — HYDROCODONE BITARTRATE AND ACETAMINOPHEN 5; 325 MG/1; MG/1
1 TABLET ORAL EVERY 8 HOURS PRN
Qty: 9 TABLET | Refills: 0 | Status: SHIPPED | OUTPATIENT
Start: 2022-01-02 | End: 2022-03-21

## 2022-01-02 RX ADMIN — HYDROCODONE BITARTRATE AND ACETAMINOPHEN 1 TABLET: 5; 325 TABLET ORAL at 12:56

## 2022-01-02 NOTE — DISCHARGE INSTRUCTIONS
Follow-up with Dr. Em.  Call Dr. Goodman's office tomorrow.  Wear sling on left arm whenever you are awake.  Apply ice to affected area as needed.  Take medication as prescribed.  Return to the emergency department for dizziness, fainting, chest pain, headache, nausea, vomiting, shortness of breath, numbness/tingling in your left arm, or other concern.

## 2022-01-02 NOTE — ED NOTES
Pt wearing mask. Myself had mask, and eye wear/PPE when present with pt. Hand hygiene performed before and after pt care.     Ruthann Perez, PCT  01/02/22 1134

## 2022-01-02 NOTE — ED PROVIDER NOTES
" EMERGENCY DEPARTMENT ENCOUNTER    Room Number:  17/17  Date of encounter:  1/2/2022  PCP: Percy Em MD  Historian: Patient, wife, EMS    I used full protective equipment while examining this patient.  This includes face mask, gloves and protective eyewear.  I washed my hands before entering the room and immediately upon leaving the room.  Patient was wearing a surgical mask.      HPI:  Chief Complaint: Syncope  A complete HPI/ROS/PMH/PSH/SH/FH are unobtainable due to: None    Context: Javier Marin is a 86 y.o. male who presents to the ED from [facility] by EMS after having a reported syncopal episode.  Patient remembers walking down the ash at [facility] and feeling \"tired\".  The next thing he remembers is being on the floor \"surrounded by a bunch of people asking me if I was okay\".  He denies preceding chest pain, headache, palpitations, dizziness, lightheadedness, abdominal pain, nausea, sweating, or vision changes.  Currently complains of left shoulder pain that is worse with movement.  Denies headache, neck pain, back pain, abdominal pain, extremity pain, or numbness/tingling/weakness in his extremities.  Patient is not on anticoagulants.  He has a history of diabetes but ate a normal breakfast this morning.  Denies recent illness, fever, cough, vomiting, diarrhea, or dysuria.      PAST MEDICAL HISTORY  Active Ambulatory Problems     Diagnosis Date Noted   • Acute gastritis 06/18/2018   • 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HCC) 06/18/2018   • Limb swelling 06/18/2018   • Diabetes mellitus type 2, uncontrolled (Former[facility]) 06/18/2018   • Hypertension, essential 06/18/2018   • Acute hyponatremia 06/18/2018   • Acute kidney injury superimposed on chronic kidney disease (HCC) 06/18/2018   • Elevated liver function tests 06/18/2018   • Leukocytosis 06/18/2018   • Anorexia 06/18/2018   • Esophagus disorder, thickened distal wall CT Scan 6/18/18 06/18/2018   • Granulomatous disease, chronic (HCC) 06/18/2018   • Lung " nodule seen on imaging study 06/18/2018   • Compression fracture of lumbar spine, non-traumatic (Formerly [facility]) 06/18/2018   • Lumbar canal stenosis 06/18/2018   • Weakness generalized 06/18/2018   • Cough 06/18/2018   • Hard of hearing 06/19/2018   • Ptosis, left eyelid 06/19/2018   • Osteoarthritis of multiple joints 06/19/2018   • Paroxysmal atrial fibrillation (Formerly [facility]) 06/19/2018   • Carotid stenosis, asymptomatic, bilateral 16-49% 2/24/14 06/19/2018   • Remote history of stroke, small caudate nucleus 06/19/2018   • Thrombocytopenia (Formerly [facility]) 06/19/2018   • Fall at home, subsequent encounter 06/19/2018   • Hyperlipidemia 06/19/2018   • Esophagitis determined by endoscopy by Sheri 6/19/18 LA Grade D Lower 1/3 06/20/2018   • Hiatal hernia with GERD and esophagitis 06/20/2018   • Mandible fracture (Formerly [facility]) 03/31/2020     Resolved Ambulatory Problems     Diagnosis Date Noted   • Diabetes (Formerly [facility]) 06/18/2018     Past Medical History:   Diagnosis Date   • A-fib (Formerly [facility])    • Arthritis    • Closed right arm fracture 1994   • Diabetes mellitus (Formerly [facility])    • Diabetes mellitus, type 2 (Formerly [facility])    • Elevated LFTs    • Esophagitis 06/19/2018   • Garbled speech    • History of acute gastritis    • Hypertension    • Hyponatremia    • Ptosis of left eyelid          PAST SURGICAL HISTORY  Past Surgical History:   Procedure Laterality Date   • CATARACT EXTRACTION, BILATERAL     • COLONOSCOPY     • ENDOSCOPY N/A 6/19/2018    Procedure: ESOPHAGOGASTRODUODENOSCOPY WITH BIOPSY;  Surgeon: Toribio Wade MD;  Location: [facility] ENDOSCOPY;  Service: Gastroenterology   • EYE PTOSIS REPAIR Left 11/15/2016    Procedure: LT UPPER LID EYE PTOSIS REPAIR;  Surgeon: Anup Lancaster MD;  Location: [facility] OR [facility];  Service:    • EYE SURGERY     • HIP ARTHROPLASTY Right    • JOINT REPLACEMENT     • ORIF MANDIBULAR FRACTURE Bilateral 3/31/2020    Procedure: OPEN REDUCTION AND INTERNAL FIXATION OF BILATERAL MANDIBLE FRACTURES;  Surgeon: Percy Jeronimo MD;  Location: [facility]  MAIN OR;  Service: Maxillofacial;  Laterality: Bilateral;         FAMILY HISTORY  Family History   Problem Relation Age of Onset   • Pancreatic cancer Mother    • Hypertension Neg Hx    • Hyperlipidemia Neg Hx    • Heart failure Neg Hx    • Heart disease Neg Hx    • Heart attack Neg Hx    • Malig Hyperthermia Neg Hx          SOCIAL HISTORY  Social History     Socioeconomic History   • Marital status:    Tobacco Use   • Smoking status: Former Smoker     Years: 5.00     Types: Cigars     Quit date:      Years since quittin.0   • Smokeless tobacco: Never Used   Substance and Sexual Activity   • Alcohol use: No   • Drug use: No   • Sexual activity: Not Currently     Partners: Female         ALLERGIES  Patient has no known allergies.       REVIEW OF SYSTEMS  Review of Systems      All systems have been reviewed and are negative except as as discussed in the HPI    PHYSICAL EXAM    I have reviewed the triage vital signs and nursing notes.    ED Triage Vitals [22 1055]   Temp Heart Rate Resp BP SpO2   98.2 °F (36.8 °C) 72 18 170/98 98 %      Temp src Heart Rate Source Patient Position BP Location FiO2 (%)   -- -- -- -- --       Physical Exam  GENERAL: Awake, alert, oriented x3, resting comfortably, no acute distress  HENT: There are small abrasions on the left brow and on the left infraorbital area.  Midface is stable.  No bony tenderness of the face.  Nares patent, moist mucous membranes  NECK: supple, C-spine is nontender  EYES: Extraocular muscles intact, pupils reactive to light bilaterally  CV: regular rhythm, regular rate  RESPIRATORY: normal effort, clear to auscultation bilaterally  ABDOMEN: soft, nontender  MUSCULOSKELETAL: There is tenderness and swelling over the anterior left shoulder.  Decreased range of motion of the left shoulder secondary to pain.  Remainder of the left arm, right arm, and both legs are nontender.  T and L-spine are nontender.  Chest wall is nontender.  There is 2+  pedal edema bilaterally (chronic and unchanged per patient and his wife)  NEURO: Strength, sensation, and coordination are grossly intact.  Speech and mentation are unremarkable.  No facial droop.  GCS 15  SKIN: warm, dry, no rash  PSYCH: Normal mood and affect      LAB RESULTS  Recent Results (from the past 24 hour(s))   ECG 12 Lead    Collection Time: 01/02/22 10:59 AM   Result Value Ref Range    QT Interval 443 ms   Green Top (Gel)    Collection Time: 01/02/22 11:14 AM   Result Value Ref Range    Extra Tube Hold for add-ons.    Lavender Top    Collection Time: 01/02/22 11:14 AM   Result Value Ref Range    Extra Tube hold for add-on    Gold Top - SST    Collection Time: 01/02/22 11:14 AM   Result Value Ref Range    Extra Tube Hold for add-ons.    Light Blue Top    Collection Time: 01/02/22 11:14 AM   Result Value Ref Range    Extra Tube hold for add-on    Comprehensive Metabolic Panel    Collection Time: 01/02/22 11:14 AM    Specimen: Blood   Result Value Ref Range    Glucose 119 (H) 65 - 99 mg/dL    BUN 17 8 - 23 mg/dL    Creatinine 1.02 0.76 - 1.27 mg/dL    Sodium 140 136 - 145 mmol/L    Potassium 4.8 3.5 - 5.2 mmol/L    Chloride 105 98 - 107 mmol/L    CO2 25.4 22.0 - 29.0 mmol/L    Calcium 9.2 8.6 - 10.5 mg/dL    Total Protein 6.8 6.0 - 8.5 g/dL    Albumin 4.40 3.50 - 5.20 g/dL    ALT (SGPT) 24 1 - 41 U/L    AST (SGOT) 24 1 - 40 U/L    Alkaline Phosphatase 134 (H) 39 - 117 U/L    Total Bilirubin 0.5 0.0 - 1.2 mg/dL    eGFR Non African Amer 69 >60 mL/min/1.73    Globulin 2.4 gm/dL    A/G Ratio 1.8 g/dL    BUN/Creatinine Ratio 16.7 7.0 - 25.0    Anion Gap 9.6 5.0 - 15.0 mmol/L   BNP    Collection Time: 01/02/22 11:14 AM    Specimen: Blood   Result Value Ref Range    proBNP 2,473.0 (H) 0.0-1,800.0 pg/mL   Troponin    Collection Time: 01/02/22 11:14 AM    Specimen: Blood   Result Value Ref Range    Troponin T <0.010 0.000 - 0.030 ng/mL   Magnesium    Collection Time: 01/02/22 11:14 AM    Specimen: Blood   Result  Value Ref Range    Magnesium 1.8 1.6 - 2.4 mg/dL   CBC Auto Differential    Collection Time: 01/02/22 11:14 AM    Specimen: Blood   Result Value Ref Range    WBC 9.78 3.40 - 10.80 10*3/mm3    RBC 4.01 (L) 4.14 - 5.80 10*6/mm3    Hemoglobin 10.9 (L) 13.0 - 17.7 g/dL    Hematocrit 34.2 (L) 37.5 - 51.0 %    MCV 85.3 79.0 - 97.0 fL    MCH 27.2 26.6 - 33.0 pg    MCHC 31.9 31.5 - 35.7 g/dL    RDW 13.1 12.3 - 15.4 %    RDW-SD 41.0 37.0 - 54.0 fl    MPV 10.8 6.0 - 12.0 fL    Platelets 119 (L) 140 - 450 10*3/mm3   POC Glucose Once    Collection Time: 01/02/22  1:02 PM    Specimen: Blood   Result Value Ref Range    Glucose 113 70 - 130 mg/dL       Ordered the above labs and independently reviewed the results.      RADIOLOGY  XR Shoulder 2+ View Left    Result Date: 1/2/2022  LEFT SHOULDER: INTERNAL AND EXTERNAL ROTATION  HISTORY: Fall this morning with left shoulder pain  COMPARISON: None  FINDINGS: There is an impacted proximal humeral metaphyseal fracture. The fracture appears to extend laterally into the lateral aspect of the humeral head into the greater tuberosity. No clear articular extension is demonstrated. There is no dislocation.      Impacted proximal humeral metaphyseal fracture. Fracture appears to extend laterally into the greater tuberosity without displacement.  This report was finalized on 1/2/2022 11:40 AM by Dr. Abiel Quinones M.D.      CT Head Without Contrast    Result Date: 1/2/2022  CT HEAD WO CONTRAST-  INDICATIONS: Head injury  TECHNIQUE: Radiation dose reduction techniques were utilized, including automated exposure control and exposure modulation based on body size. Noncontrast head CT  COMPARISON: Brain MRI from 06/19/2018  FINDINGS:    No acute intracranial hemorrhage, midline shift or mass effect. No acute territorial infarct is identified.  Mild to moderate periventricular hypodensities suggest chronic small vessel ischemic change in a patient this age.  Arterial calcifications are seen at the  base of the brain.  Ventricles, cisterns, cerebral sulci are unremarkable for patient age.  The visualized paranasal sinuses, orbits, mastoid air cells are unremarkable.           No acute intracranial hemorrhage or hydrocephalus. If there is further clinical concern, MRI could be considered for further evaluation.  This report was finalized on 1/2/2022 11:54 AM by Dr. Ron Freitas M.D.        I ordered the above noted radiological studies. Reviewed by me and discussed with radiologist.  See dictation for official radiology interpretation.      PROCEDURES  Procedures      MEDICATIONS GIVEN IN ER    Medications - No data to display      PROGRESS, DATA ANALYSIS, CONSULTS, AND MEDICAL DECISION MAKING    All labs have been independently reviewed by me.  All radiology studies have been reviewed by me and discussed with radiologist dictating the report.   EKG's independently viewed and interpreted by me.  I have reviewed the nurse's notes, vital signs, past medical history, and medication list.  Discussion below represents my analysis of pertinent findings related to patient's condition, differential diagnosis, treatment plan and final disposition.      ED Course as of 01/02/22 1757   Sun Jan 02, 2022   1105 Old records reviewed.  Patient was last admitted here in June 2018 for esophagitis, acute kidney injury, and acute hyponatremia.  He was last seen here in the ED in March 2020 after falling.  He sustained bilateral mandible fractures at that time. [WH]   1117 EKG          EKG time: 10:59 AM  Rhythm/Rate: Accelerated junctional rhythm  QRS, axis: LAD, RBBB  ST and T waves: Nonspecific ST/T wave changes in the anterior leads    Interpreted Contemporaneously by me at 11:02 AM, independently viewed  EKG is changed compared to prior EKG done on 6/21/2018.  Sinus rhythm was present at that time.     [WH]   1156 Case discussed with Dr. Em who is here in the ED.  He is going to evaluate the patient. [WH]   1206 CT  shows chronic changes but is negative acute.  X-ray of the left shoulder shows an impacted, nondisplaced proximal humeral metaphyseal fracture. [WH]   1230 Dr. Em states that the patient can be discharged and follow-up with him in the office.  He agrees with the plan for orthopedic follow-up for the patient's left humerus fracture. [WH]   1244 13.9 one year ago [WH]   1303 proBNP(!): 2,473.0 [WH]   1308 Test results discussed with the patient and his wife.  He is resting comfortably and eating a snack.  Glucose is 113.  Patient will be placed in a sling and referred to orthopedics for follow-up.  He has seen Dr. Art Goodman in the past.  He was also advised to follow-up with Dr. Em.  Return precautions were discussed.  I will give him a prescription for Norco. [WH]   1332 Patient was found to have a proximal left humerus fracture.  He also had mild anemia.  Patient was placed in a sling.  Case was discussed with Dr. Em, the patient's PCP.  Dr. Em came and saw the patient in the ED.  Dr. Em believes that the patient may have just fallen and not actually passed out.  Patient does not really remember falling.  Denies chest pain.  Troponin is negative.  Patient will be discharged. [WH]      ED Course User Index  [WH] Felix King MD       AS OF 17:57 EST VITALS:    BP - 165/97  HR - 83  TEMP - 98.2 °F (36.8 °C)  O2 SATS - 96%      DIAGNOSIS  Final diagnoses:   Syncope, unspecified syncope type   Closed fracture of proximal end of left humerus, unspecified fracture morphology, initial encounter   Abrasion of face, initial encounter   Anemia, unspecified type         DISPOSITION  Discharge    DISCHARGE    Patient discharged in stable condition.    Reviewed implications of results, diagnosis, meds, responsibility to follow up, warning signs and symptoms of possible worsening, potential complications and reasons to return to ER, including recurrent syncope, dizziness, chest pain, shortness of breath,  headache, numbness/tingling in your left arm, or other concern.    Patient/Family voiced understanding of above instructions.    Discussed plan for discharge, as there is no emergent indication for admission. Patient referred to primary care provider for BP management due to today's BP. Pt/family is agreeable and understands need for follow up and repeat testing.  Pt is aware that discharge does not mean that nothing is wrong but it indicates no emergency is present that requires admission and they must continue care with follow-up as given below or physician of their choice.     FOLLOW-UP  Percy Em MD  [address] [facility] [address]  [place] [address]  [phone]    Schedule an appointment as soon as possible for a visit       Julio C Goodman MD  [address] [place] [address] [address]  [place] [address]  [phone]    Call in 1 day  Proximal left humerus fracture         Medication List      New Prescriptions    HYDROcodone-acetaminophen 5-325 MG per tablet  Commonly known as: NORCO  Take 1 tablet by mouth Every 8 (Eight) Hours As Needed for Moderate Pain .           Where to Get Your Medications      These medications were sent to [facility] - [place], [place] - [address] [place] [address] [phone] [facility] [phone] [address] [address]    Phone: [phone]   · HYDROcodone-acetaminophen 5-325 MG per tablet           Dictated utilizing Dragon dictation:  Much of this encounter note is an electronic transcription/translation of spoken language to printed text. The electronic translation of spoken language may permit erroneous, or at times, nonsensical words or phrases to be inadvertently transcribed; Although I have reviewed the note for such errors, some may still exist.     Felix King MD  01/02/22 0708

## 2022-01-02 NOTE — ED TRIAGE NOTES
Pt arrived via ems from [facility] with complaints of a syncopal episode. Pt reports left shoulder pain.  Pt denies blood thinners.     Pt was wearing a mask during assessment.  This RN wore appropriate PPE

## 2022-01-02 NOTE — ED NOTES
Syncopal episode at [facility], notes feeling weak prior to fall.  Patient denies hitting head with fall, denies any LOC.  Patient only complaints is left shoulder pain, given toradol en route via ems.  Patient is alert and oriented  X4, no blood thinners. Mild swelling noted to left shoulder.  Pulses are strong and equal bilaterally to bilateral upper extremities.  VSS.  ABC's intact.  Breathing is even and unlabored.  Wife at bedside. NAD noted.  Patient wearing mask, nurse wearing mask, n 95 and protective eyewear during care and assessment.  Hand hygiene performed prior to and post care.      Michael Gleason RN  01/02/22 3144

## 2022-01-03 ENCOUNTER — OFFICE VISIT (OUTPATIENT)
Dept: ORTHOPEDIC SURGERY | Facility: CLINIC | Age: 87
End: 2022-01-03

## 2022-01-03 VITALS — BODY MASS INDEX: 24.33 KG/M2 | HEIGHT: 67 IN | WEIGHT: 155 LBS | TEMPERATURE: 98.2 F

## 2022-01-03 DIAGNOSIS — S42.295A OTHER CLOSED NONDISPLACED FRACTURE OF PROXIMAL END OF LEFT HUMERUS, INITIAL ENCOUNTER: Primary | ICD-10-CM
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
## 2022-01-03 LAB
BASOPHILS # BLD AUTO: 0.02 10*3/MM3 (ref 0–0.2)
BASOPHILS NFR BLD AUTO: 0.2 % (ref 0–1.5)
DEPRECATED RDW RBC AUTO: 41 FL (ref 37–54)
EOSINOPHIL # BLD AUTO: 0.08 10*3/MM3 (ref 0–0.4)
EOSINOPHIL NFR BLD AUTO: 0.8 % (ref 0.3–6.2)
ERYTHROCYTE [DISTWIDTH] IN BLOOD BY AUTOMATED COUNT: 13.1 % (ref 12.3–15.4)
HCT VFR BLD AUTO: 34.2 % (ref 37.5–51)
HGB BLD-MCNC: 10.9 G/DL (ref 13–17.7)
IMM GRANULOCYTES # BLD AUTO: 0.05 10*3/MM3 (ref 0–0.05)
IMM GRANULOCYTES NFR BLD AUTO: 0.5 % (ref 0–0.5)
LYMPHOCYTES # BLD AUTO: 4.61 10*3/MM3 (ref 0.7–3.1)
LYMPHOCYTES NFR BLD AUTO: 47.1 % (ref 19.6–45.3)
MCH RBC QN AUTO: 27.2 PG (ref 26.6–33)
MCHC RBC AUTO-ENTMCNC: 31.9 G/DL (ref 31.5–35.7)
MCV RBC AUTO: 85.3 FL (ref 79–97)
MONOCYTES # BLD AUTO: 0.56 10*3/MM3 (ref 0.1–0.9)
MONOCYTES NFR BLD AUTO: 5.7 % (ref 5–12)
NEUTROPHILS NFR BLD AUTO: 4.46 10*3/MM3 (ref 1.7–7)
NEUTROPHILS NFR BLD AUTO: 45.7 % (ref 42.7–76)
NRBC BLD AUTO-RTO: 0 /100 WBC (ref 0–0.2)
PLATELET # BLD AUTO: 119 10*3/MM3 (ref 140–450)
PMV BLD AUTO: 10.8 FL (ref 6–12)
RBC # BLD AUTO: 4.01 10*6/MM3 (ref 4.14–5.8)
WBC NRBC COR # BLD: 9.78 10*3/MM3 (ref 3.4–10.8)

## 2022-01-03 PROCEDURE — 99213 OFFICE O/P EST LOW 20 MIN: CPT | Performed by: NURSE PRACTITIONER

## 2022-01-03 RX ORDER — FEXOFENADINE HCL 180 MG/1
180 TABLET ORAL DAILY PRN
COMMUNITY

## 2022-01-03 NOTE — PROGRESS NOTES
Patient Name: Javier Marin   YOB: 1935  Referring Primary Care Physician: Percy mE MD  BMI: Body mass index is 24.28 kg/m².    Chief Complaint:    Chief Complaint   Patient presents with   • Left Shoulder - Pain        HPI: New patient to me presents today with his wife he had a fall at home had a humerus fracture was seen in the emergency room and here for follow-up he is not on blood thinners did not hit his head had a CT.  He ambulates with a cane at home    Javier Marin is a 86 y.o. male who presents today for evaluation of   Chief Complaint   Patient presents with   • Left Shoulder - Pain       Subjective   Medications:   Home Medications:  Current Outpatient Medications on File Prior to Visit   Medication Sig   • aspirin 81 MG EC tablet Take 81 mg by mouth Daily.   • atorvastatin (LIPITOR) 10 MG tablet Take 10 mg by mouth Daily.   • cholecalciferol (VITAMIN D3) 1000 UNITS tablet Take 1,000 Units by mouth Daily. PT HOLDING FOR SURGERY   • FAMOTIDINE PO Take  by mouth.   • fexofenadine (ALLEGRA) 180 MG tablet Take 180 mg by mouth Daily.   • Insulin Aspart (NOVOLOG SC) Inject  under the skin into the appropriate area as directed.   • lansoprazole (PREVACID) 15 MG capsule Take 1 capsule by mouth 2 (Two) Times a Day.   • nitroglycerin (NITROSTAT) 0.4 MG SL tablet Place 1 tablet under the tongue Every 5 (Five) Minutes As Needed for Chest Pain. Take no more than 3 doses in 15 minutes.   • chlorhexidine (Peridex) 0.12 % solution Apply 15 mL to the mouth or throat 2 (Two) Times a Day.   • HYDROcodone-acetaminophen (NORCO) 5-325 MG per tablet Take 1 tablet by mouth Every 8 (Eight) Hours As Needed for Moderate Pain .   • insulin glargine (LANTUS) 100 UNIT/ML injection Inject 10 Units under the skin Every Night.   • insulin lispro (humaLOG) 100 UNIT/ML injection Inject 8 Units under the skin 3 (Three) Times a Day Before Meals.     No current facility-administered medications on file prior  to visit.     Current Medications:  Scheduled Meds:  Continuous Infusions:No current facility-administered medications for this visit.    PRN Meds:.    I have reviewed the patient's medical history in detail and updated the computerized patient record.  Review and summarization of old records includes:    Past Medical History:   Diagnosis Date   • A-fib (HCC)    • 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HCC)    • Anorexia    • Arthritis    • Carotid stenosis, asymptomatic, bilateral 02/24/2014    16-49%   • Closed right arm fracture 1994   • Compression fracture of lumbar spine, non-traumatic (HCC)    • Diabetes mellitus (HCC)    • Diabetes mellitus, type 2 (HCC)    • Elevated LFTs    • Esophagitis 06/19/2018    DR MARTINEZ-LA GRADE D LOWER   • Esophagus disorder     THICKENED DISTAL WALL-CT SCAN 6/18/18   • Garbled speech    • Granulomatous disease, chronic (HCC)    • Hard of hearing    • Hiatal hernia with GERD and esophagitis    • History of acute gastritis    • Hyperlipidemia    • Hypertension    • Hyponatremia    • Leukocytosis    • Lumbar canal stenosis    • Lung nodule seen on imaging study    • Mandible fracture (HCC) 03/28/2020    FROM FALL   • Osteoarthritis of multiple joints    • Ptosis of left eyelid    • Remote history of stroke     SMALL CAUDATE NECLEUS   • Thrombocytopenia (HCC)    • Weakness generalized         Past Surgical History:   Procedure Laterality Date   • CATARACT EXTRACTION, BILATERAL     • COLONOSCOPY     • ENDOSCOPY N/A 6/19/2018    Procedure: ESOPHAGOGASTRODUODENOSCOPY WITH BIOPSY;  Surgeon: Toribio Martinez MD;  Location: [facility] ENDOSCOPY;  Service: Gastroenterology   • EYE PTOSIS REPAIR Left 11/15/2016    Procedure: LT UPPER LID EYE PTOSIS REPAIR;  Surgeon: Anup Lancaster MD;  Location: [facility] OR [facility];  Service:    • EYE SURGERY     • HIP ARTHROPLASTY Right    • JOINT REPLACEMENT     • ORIF MANDIBULAR FRACTURE Bilateral 3/31/2020    Procedure: OPEN REDUCTION AND INTERNAL FIXATION OF  "BILATERAL MANDIBLE FRACTURES;  Surgeon: Percy Jeronimo MD;  Location: [facility] OR;  Service: Maxillofacial;  Laterality: Bilateral;        Social History     Occupational History   • Occupation: Retired   Tobacco Use   • Smoking status: Former Smoker     Years: 5.00     Types: Cigars     Quit date:      Years since quittin.0   • Smokeless tobacco: Never Used   Substance and Sexual Activity   • Alcohol use: No   • Drug use: No   • Sexual activity: Not Currently     Partners: Female      Social History     Social History Narrative   • Not on file        Family History   Problem Relation Age of Onset   • Pancreatic cancer Mother    • Hypertension Neg Hx    • Hyperlipidemia Neg Hx    • Heart failure Neg Hx    • Heart disease Neg Hx    • Heart attack Neg Hx    • Malig Hyperthermia Neg Hx        ROS: 14 point review of systems was performed and all other systems were reviewed and are negative except for documented findings in HPI and today's encounter.     Allergies: No Known Allergies  Constitutional:  Denies fever, shaking or chills   Eyes:  Denies change in visual acuity   HENT:  Denies nasal congestion or sore throat   Respiratory:  Denies cough or shortness of breath   Cardiovascular:  Denies chest pain or severe LE edema   GI:  Denies abdominal pain, nausea, vomiting, bloody stools or diarrhea   Musculoskeletal:  Numbness, tingling, pain, or loss of motor function only as noted above in history of present illness.  : Denies painful urination or hematuria  Integument:  Denies rash, lesion or ulceration   Neurologic:  Denies headache or focal weakness  Endocrine:  Denies lymphadenopathy  Psych:  Denies confusion or change in mental status   Hem:  Denies active bleeding    OBJECTIVE:  Physical Exam: 86 y.o. male  Wt Readings from Last 3 Encounters:   22 70.3 kg (155 lb)   21 69.9 kg (154 lb)   20 73.9 kg (163 lb)     Ht Readings from Last 1 Encounters:   22 170.2 cm (67\") "     Body mass index is 24.28 kg/m².  Vitals:    01/03/22 1328   Temp: 98.2 °F (36.8 °C)     Vital signs reviewed.     General Appearance:    Alert, cooperative, in no acute distress                  Eyes: conjunctiva clear  ENT: external ears and nose atraumatic contusion to left forehead and I had a CT that was negative  CV: no peripheral edema  Resp: normal respiratory effort  Skin: no rashes or wounds; normal turgor  Psych: mood and affect appropriate  Lymph: no nodes appreciated  Neuro: gross sensation intact  Vascular:  Palpable peripheral pulse in noted extremity  Musculoskeletal Extremities: Skin is warm dry intact with good pulses mood sensation he is point tender over the humeral head with ecchymosis extending from the humeral head down to elbow.  Pulses are intact hand is not swollen elbow and wrist are nontender     Radiology:   LEFT SHOULDER: INTERNAL AND EXTERNAL ROTATION     HISTORY: Fall this morning with left shoulder pain     COMPARISON: None     FINDINGS: There is an impacted proximal humeral metaphyseal fracture.  The fracture appears to extend laterally into the lateral aspect of the  humeral head into the greater tuberosity. No clear articular extension  is demonstrated. There is no dislocation.     IMPRESSION:  Impacted proximal humeral metaphyseal fracture. Fracture  appears to extend laterally into the greater tuberosity without  displacement.     This report was finalized on 1/2/2022 11:40 AM by Dr. Abiel Quinones M.D.        Assessment:     ICD-10-CM ICD-9-CM   1. Other closed nondisplaced fracture of proximal end of left humerus, initial encounter  S42.295A 812.09   2. Fall from slip, trip, or stumble, initial encounter  W01.0XXA E885.9        MDM/Plan:   The diagnosis(es), natural history, pathophysiology and treatment for diagnosis(es) were discussed. Opportunity given and questions answered.  Biomechanics of pertinent body areas discussed.  When appropriate, the use of ambulatory  aids discussed.    The diagnosis(es), natural history, pathophysiology and treatment for diagnosis(es) were discussed. Opportunity given and questions answered.  Biomechanics of pertinent body areas discussed.  When appropriate, the use of ambulatory aids discussed.  MEDICATIONS:  The risks, benefits, warnings,side effects and alternatives of medications discussed.  Inflammation/pain control; with cold, heat, elevation and/or liniments discussed as appropriate  SPECIALTY REFERRAL  MEDICAL RECORDS reviewed from other provider(s) for past and current medical history pertinent to this complaint.      1/3/2022    Dictated utilizing Dragon dictation

## 2022-01-10 ENCOUNTER — OFFICE VISIT (OUTPATIENT)
Dept: ORTHOPEDIC SURGERY | Facility: CLINIC | Age: 87
End: 2022-01-10

## 2022-01-10 VITALS — BODY MASS INDEX: 23.49 KG/M2 | WEIGHT: 155 LBS | HEIGHT: 68 IN | TEMPERATURE: 97 F

## 2022-01-10 DIAGNOSIS — Z09 FOLLOW UP: ICD-10-CM

## 2022-01-10 DIAGNOSIS — S42.295A OTHER CLOSED NONDISPLACED FRACTURE OF PROXIMAL END OF LEFT HUMERUS, INITIAL ENCOUNTER: Primary | ICD-10-CM

## 2022-01-10 PROCEDURE — 99213 OFFICE O/P EST LOW 20 MIN: CPT | Performed by: ORTHOPAEDIC SURGERY

## 2022-01-10 PROCEDURE — 73030 X-RAY EXAM OF SHOULDER: CPT | Performed by: ORTHOPAEDIC SURGERY

## 2022-01-10 RX ORDER — ACETAMINOPHEN 500 MG
500 TABLET ORAL EVERY 6 HOURS PRN
COMMUNITY

## 2022-01-10 NOTE — PROGRESS NOTES
Javier Marin : 1935 MRN: [id number] DATE: 1/10/2022    CC: Closed treatment of left proximal humerus fracture    HPI: Patient returns to clinic today stating pain is improved.  Denies any new concerns or issues.  Patient was seen 1 week ago after a fall does not recall how he did it.  Is been in sling and following restrictions as indicated.    Vitals:    01/10/22 1038   Temp: 97 °F (36.1 °C)       Exam:  Skin intact and benign.  Ecchymosis involving the shoulder upper extremity and flank.  Arm and forearm are both soft but there is some swelling of the forearm and hand compressible pitting edema.  Shoulder moves fluidly with pendulum exercises.  Good motor and sensory function distally.  Palpable radial pulse with good cap refill.  AIN, PIN ulnar nerve intact distally.    Imaginv xrays including AP and scapular Y are ordered and reviewed by me to evaluate alignment and for comparison purposes.  No new or concerning findings noted. Fracture appears in unchanged alignment    Impression:   1 weeks s/p closed treatment of left proximal humerus fracture    Plan:    Continue plan nonoperative treatment this time.    1.  Continue use of sling.  2.  Begin working on pendulum exercises.  3.  Follow-up in 2 weeks with repeat 2v xrays.  4.  Counseled the patient about appropriate activity modifications and restrictions, including no driving.    I told him to move his elbow wrist 3-5 times a day.  As well as wiggles fingers of his hand okay to massage his hand and forearm and do some elevation for the swelling.  Ice and heat as needed shoulder.  Avoid anti-inflammatories at this time.  Take vitamin D to help promote bone healing.    Master Castro MD

## 2022-01-24 ENCOUNTER — HOSPITAL ENCOUNTER (OUTPATIENT)
Dept: CARDIOLOGY | Facility: HOSPITAL | Age: 87
Discharge: HOME | End: 2022-01-24
Admitting: ORTHOPAEDIC SURGERY

## 2022-01-24 ENCOUNTER — OFFICE VISIT (OUTPATIENT)
Dept: ORTHOPEDIC SURGERY | Facility: CLINIC | Age: 87
End: 2022-01-24

## 2022-01-24 VITALS — WEIGHT: 160 LBS | HEIGHT: 69 IN | BODY MASS INDEX: 23.7 KG/M2 | TEMPERATURE: 97.8 F

## 2022-01-24 DIAGNOSIS — M79.602 LEFT ARM PAIN: ICD-10-CM

## 2022-01-24 DIAGNOSIS — S42.295A OTHER CLOSED NONDISPLACED FRACTURE OF PROXIMAL END OF LEFT HUMERUS, INITIAL ENCOUNTER: ICD-10-CM

## 2022-01-24 DIAGNOSIS — R52 PAIN: ICD-10-CM

## 2022-01-24 DIAGNOSIS — S42.202D CLOSED FRACTURE OF PROXIMAL END OF LEFT HUMERUS WITH ROUTINE HEALING, UNSPECIFIED FRACTURE MORPHOLOGY, SUBSEQUENT ENCOUNTER: Primary | ICD-10-CM

## 2022-01-24 LAB
BH CV UPPER VENOUS LEFT AXILLARY AUGMENT: NORMAL
BH CV UPPER VENOUS LEFT AXILLARY COMPETENT: NORMAL
BH CV UPPER VENOUS LEFT AXILLARY COMPRESS: NORMAL
BH CV UPPER VENOUS LEFT AXILLARY PHASIC: NORMAL
BH CV UPPER VENOUS LEFT AXILLARY SPONT: NORMAL
BH CV UPPER VENOUS LEFT BASILIC FOREARM COMPRESS: NORMAL
BH CV UPPER VENOUS LEFT BASILIC UPPER COMPRESS: NORMAL
BH CV UPPER VENOUS LEFT BRACHIAL COMPRESS: NORMAL
BH CV UPPER VENOUS LEFT CEPHALIC FOREARM COMPRESS: NORMAL
BH CV UPPER VENOUS LEFT CEPHALIC UPPER COMPRESS: NORMAL
BH CV UPPER VENOUS LEFT INTERNAL JUGULAR AUGMENT: NORMAL
BH CV UPPER VENOUS LEFT INTERNAL JUGULAR COMPETENT: NORMAL
BH CV UPPER VENOUS LEFT INTERNAL JUGULAR COMPRESS: NORMAL
BH CV UPPER VENOUS LEFT INTERNAL JUGULAR PHASIC: NORMAL
BH CV UPPER VENOUS LEFT INTERNAL JUGULAR SPONT: NORMAL
BH CV UPPER VENOUS LEFT RADIAL COMPRESS: NORMAL
BH CV UPPER VENOUS LEFT SUBCLAVIAN AUGMENT: NORMAL
BH CV UPPER VENOUS LEFT SUBCLAVIAN COMPETENT: NORMAL
BH CV UPPER VENOUS LEFT SUBCLAVIAN COMPRESS: NORMAL
BH CV UPPER VENOUS LEFT SUBCLAVIAN PHASIC: NORMAL
BH CV UPPER VENOUS LEFT SUBCLAVIAN SPONT: NORMAL
BH CV UPPER VENOUS LEFT ULNAR COMPRESS: NORMAL
BH CV UPPER VENOUS RIGHT INTERNAL JUGULAR AUGMENT: NORMAL
BH CV UPPER VENOUS RIGHT INTERNAL JUGULAR COMPETENT: NORMAL
BH CV UPPER VENOUS RIGHT INTERNAL JUGULAR COMPRESS: NORMAL
BH CV UPPER VENOUS RIGHT INTERNAL JUGULAR PHASIC: NORMAL
BH CV UPPER VENOUS RIGHT INTERNAL JUGULAR SPONT: NORMAL
BH CV UPPER VENOUS RIGHT SUBCLAVIAN AUGMENT: NORMAL
BH CV UPPER VENOUS RIGHT SUBCLAVIAN COMPETENT: NORMAL
BH CV UPPER VENOUS RIGHT SUBCLAVIAN COMPRESS: NORMAL
BH CV UPPER VENOUS RIGHT SUBCLAVIAN PHASIC: NORMAL
BH CV UPPER VENOUS RIGHT SUBCLAVIAN SPONT: NORMAL
MAXIMAL PREDICTED HEART RATE: 134 BPM
STRESS TARGET HR: 114 BPM

## 2022-01-24 PROCEDURE — 93971 EXTREMITY STUDY: CPT

## 2022-01-24 PROCEDURE — 73060 X-RAY EXAM OF HUMERUS: CPT | Performed by: ORTHOPAEDIC SURGERY

## 2022-01-24 PROCEDURE — 73070 X-RAY EXAM OF ELBOW: CPT | Performed by: ORTHOPAEDIC SURGERY

## 2022-01-24 PROCEDURE — 99212 OFFICE O/P EST SF 10 MIN: CPT | Performed by: ORTHOPAEDIC SURGERY

## 2022-01-26 NOTE — PROGRESS NOTES
Javier Marin : 1935 MRN: [id number] DATE: 2022    CC: Closed treatment of left proximal humerus fracture    HPI: Patient returns to clinic today stating pain is improved.  Denies any new concerns or issues.  Does continue have some swelling of the forearm and hand.    Vitals:    22 1037   Temp: 97.8 °F (36.6 °C)       Exam:  Skin intact and benign.  Arm is soft forearm is soft with some swelling about 2+ edema.  No overt pain.  Shoulder moves fluidly with pendulum exercises.  Good motor and sensory function distally.  Palpable radial pulse with good cap refill.      Imaginv xrays including AP and scapular Y are ordered and reviewed by me to evaluate alignment and for comparison purposes.  No new or concerning findings noted. Fracture appears in unchanged alignment    Impression:   3 weeks s/p closed treatment of left proximal humerus fracture    Plan:    1.  Continue use of sling.  2.  Begin working on pendulum exercises.  We will send for Doppler given swelling however this is likely dependent edema and from immobility.  Recommend moving elbow and forearm and hand, as well as ice and elevation to work on swelling.  Some massage therapy.  3.  Follow-up in 2 weeks with repeat 2v xrays.  4.  Counseled the patient about appropriate activity modifications and restrictions, including no driving.    Master Castro MD

## 2022-01-27 ENCOUNTER — APPOINTMENT (OUTPATIENT)
Dept: NUCLEAR MEDICINE | Facility: HOSPITAL | Age: 87
End: 2022-01-27

## 2022-02-07 ENCOUNTER — OFFICE VISIT (OUTPATIENT)
Dept: ORTHOPEDIC SURGERY | Facility: CLINIC | Age: 87
End: 2022-02-07

## 2022-02-07 VITALS — TEMPERATURE: 97.7 F | RESPIRATION RATE: 18 BRPM | WEIGHT: 165 LBS | HEIGHT: 69 IN | BODY MASS INDEX: 24.44 KG/M2

## 2022-02-07 DIAGNOSIS — S42.295A OTHER CLOSED NONDISPLACED FRACTURE OF PROXIMAL END OF LEFT HUMERUS, INITIAL ENCOUNTER: Primary | ICD-10-CM

## 2022-02-07 PROCEDURE — 99212 OFFICE O/P EST SF 10 MIN: CPT | Performed by: ORTHOPAEDIC SURGERY

## 2022-02-07 PROCEDURE — 73060 X-RAY EXAM OF HUMERUS: CPT | Performed by: ORTHOPAEDIC SURGERY

## 2022-02-07 NOTE — PROGRESS NOTES
"Javier Marin : 1935 MRN: [id number] DATE: 2022    CC: Closed treatment of left proximal humerus fracture    HPI: Pt. returns to clinic today for follow-up.  Reports that the pain continues to improve.  Denies any new concerns or issues.  Patient said swelling of his elbow and forearm has improved.    Vitals:    22 1032   Resp: 18   Temp: 97.7 °F (36.5 °C)   Weight: 74.8 kg (165 lb)   Height: 175.3 cm (69\")       Exam:  Contour of shoulder appears normal.  Skin intact and benign.  Arm and forearm soft.  Shoulder motion is limited but well tolerated.  Good motor and sensory function distally.  Palpable pulse with good cap refill.      Elbow and forearm swelling has improved.    Imaging  AP and scapular Y views of the shoulder are ordered and reviewed by me to evaluate alignment and for comparison purposes.  No new or concerning findings noted.  Fracture appears to be healing.  No change in alignment.    Doppler of left upper extremity was negative for any DVTs.    Impression:   5 weeks s/p closed treatment of left proximal humerus fracture    Plan:    1.  Continue to work on progressive range of motion.  Patient to be no sling in 1 week.  Recommend continue to work on elbow range of motion and wrist range of motion as well.  Ice as needed for as well.  2.  F/u in 6 weeks with repeat 2v xrays.  3.  Counseled the patient about appropriate activity modifications and restrictions, including no heavy lifting, pushing or pulling  Continue calcium and vitamin D for bone healing well.  4.  Follow-up in 6 weeks.    Patient and wife stated the like avoid surgery all possible we will see how he is doing with therapy and function upon follow-up.    Master Castro MD      "

## 2022-02-17 ENCOUNTER — TRANSCRIBE ORDERS (OUTPATIENT)
Dept: ADMINISTRATIVE | Facility: HOSPITAL | Age: 87
End: 2022-02-17

## 2022-02-17 DIAGNOSIS — I20.8 SYNCOPE ANGINOSA: ICD-10-CM

## 2022-02-17 DIAGNOSIS — D48.1 ABDOMINAL FIBROMATOSIS: Primary | ICD-10-CM

## 2022-02-25 ENCOUNTER — HOSPITAL ENCOUNTER (OUTPATIENT)
Dept: CARDIOLOGY | Facility: HOSPITAL | Age: 87
Discharge: HOME | End: 2022-02-25

## 2022-02-25 ENCOUNTER — APPOINTMENT (OUTPATIENT)
Dept: CARDIOLOGY | Facility: HOSPITAL | Age: 87
End: 2022-02-25

## 2022-02-25 VITALS — SYSTOLIC BLOOD PRESSURE: 113 MMHG | DIASTOLIC BLOOD PRESSURE: 42 MMHG

## 2022-02-25 DIAGNOSIS — I20.8 SYNCOPE ANGINOSA: ICD-10-CM

## 2022-02-25 LAB
BH CV ECHO MEAS - ACS: 1.76 CM
BH CV ECHO MEAS - AO MAX PG: 4.1 MMHG
BH CV ECHO MEAS - AO MEAN PG: 2.41 MMHG
BH CV ECHO MEAS - AO V2 MAX: 101.1 CM/SEC
BH CV ECHO MEAS - AO V2 VTI: 21.7 CM
BH CV ECHO MEAS - AVA(I,D): 1.76 CM2
BH CV ECHO MEAS - EDV(CUBED): 84.1 ML
BH CV ECHO MEAS - EDV(MOD-SP2): 129 ML
BH CV ECHO MEAS - EDV(MOD-SP4): 117 ML
BH CV ECHO MEAS - EF(MOD-BP): 28.9 %
BH CV ECHO MEAS - EF(MOD-SP2): 31.8 %
BH CV ECHO MEAS - EF(MOD-SP4): 24.8 %
BH CV ECHO MEAS - ESV(CUBED): 47.6 ML
BH CV ECHO MEAS - ESV(MOD-SP2): 88 ML
BH CV ECHO MEAS - ESV(MOD-SP4): 88 ML
BH CV ECHO MEAS - FS: 17.3 %
BH CV ECHO MEAS - IVS/LVPW: 0.98 CM
BH CV ECHO MEAS - IVSD: 0.88 CM
BH CV ECHO MEAS - LV DIASTOLIC VOL/BSA (35-75): 61.5 CM2
BH CV ECHO MEAS - LV MASS(C)D: 124.8 GRAMS
BH CV ECHO MEAS - LV MAX PG: 1.2 MMHG
BH CV ECHO MEAS - LV MEAN PG: 0.63 MMHG
BH CV ECHO MEAS - LV SYSTOLIC VOL/BSA (12-30): 46.2 CM2
BH CV ECHO MEAS - LV V1 MAX: 54.8 CM/SEC
BH CV ECHO MEAS - LV V1 VTI: 11.4 CM
BH CV ECHO MEAS - LVIDD: 4.4 CM
BH CV ECHO MEAS - LVIDS: 3.6 CM
BH CV ECHO MEAS - LVOT AREA: 3.4 CM2
BH CV ECHO MEAS - LVOT DIAM: 2.07 CM
BH CV ECHO MEAS - LVPWD: 0.89 CM
BH CV ECHO MEAS - MR MAX PG: 65.9 MMHG
BH CV ECHO MEAS - MR MAX VEL: 405.8 CM/SEC
BH CV ECHO MEAS - MV DEC SLOPE: 241.3 CM/SEC2
BH CV ECHO MEAS - MV DEC TIME: 0.23 MSEC
BH CV ECHO MEAS - MV E MAX VEL: 84.8 CM/SEC
BH CV ECHO MEAS - MV MAX PG: 3 MMHG
BH CV ECHO MEAS - MV MEAN PG: 1.23 MMHG
BH CV ECHO MEAS - MV V2 VTI: 24.1 CM
BH CV ECHO MEAS - MVA(VTI): 1.58 CM2
BH CV ECHO MEAS - PA ACC TIME: 0.13 SEC
BH CV ECHO MEAS - PA PR(ACCEL): 22 MMHG
BH CV ECHO MEAS - PA V2 MAX: 80.7 CM/SEC
BH CV ECHO MEAS - PI END-D VEL: 154.7 CM/SEC
BH CV ECHO MEAS - RAP SYSTOLE: 3 MMHG
BH CV ECHO MEAS - RV MAX PG: 1.28 MMHG
BH CV ECHO MEAS - RV V1 MAX: 56.6 CM/SEC
BH CV ECHO MEAS - RV V1 VTI: 11.1 CM
BH CV ECHO MEAS - RVSP: 40.6 MMHG
BH CV ECHO MEAS - SI(MOD-SP2): 21.5 ML/M2
BH CV ECHO MEAS - SI(MOD-SP4): 15.2 ML/M2
BH CV ECHO MEAS - SV(LVOT): 38.3 ML
BH CV ECHO MEAS - SV(MOD-SP2): 41 ML
BH CV ECHO MEAS - SV(MOD-SP4): 29 ML
BH CV ECHO MEAS - TR MAX PG: 37.6 MMHG
BH CV ECHO MEAS - TR MAX VEL: 306.7 CM/SEC
BH CV XLRA - RV BASE: 3.8 CM
LEFT ATRIUM VOLUME INDEX: 34.2 ML/M2
LV EF 2D ECHO EST: 29 %
MAXIMAL PREDICTED HEART RATE: 134 BPM
STRESS TARGET HR: 114 BPM

## 2022-02-25 PROCEDURE — 93225 XTRNL ECG REC<48 HRS REC: CPT

## 2022-02-25 PROCEDURE — 93306 TTE W/DOPPLER COMPLETE: CPT | Performed by: INTERNAL MEDICINE

## 2022-02-25 PROCEDURE — 93226 XTRNL ECG REC<48 HR SCAN A/R: CPT

## 2022-02-25 PROCEDURE — 93306 TTE W/DOPPLER COMPLETE: CPT

## 2022-02-25 PROCEDURE — 25010000002 PERFLUTREN (DEFINITY) 8.476 MG IN SODIUM CHLORIDE (PF) 0.9 % 10 ML INJECTION: Performed by: INTERNAL MEDICINE

## 2022-02-25 RX ADMIN — SODIUM CHLORIDE 2 ML: 9 INJECTION INTRAMUSCULAR; INTRAVENOUS; SUBCUTANEOUS at 08:34

## 2022-02-28 LAB
MAXIMAL PREDICTED HEART RATE: 134 BPM
STRESS TARGET HR: 114 BPM

## 2022-02-28 PROCEDURE — 93227 XTRNL ECG REC<48 HR R&I: CPT | Performed by: INTERNAL MEDICINE

## 2022-03-21 ENCOUNTER — OFFICE VISIT (OUTPATIENT)
Dept: ORTHOPEDIC SURGERY | Facility: CLINIC | Age: 87
End: 2022-03-21

## 2022-03-21 VITALS — HEIGHT: 71 IN | TEMPERATURE: 97.3 F | WEIGHT: 157 LBS | BODY MASS INDEX: 21.98 KG/M2

## 2022-03-21 DIAGNOSIS — S42.202D CLOSED FRACTURE OF PROXIMAL END OF LEFT HUMERUS WITH ROUTINE HEALING, UNSPECIFIED FRACTURE MORPHOLOGY, SUBSEQUENT ENCOUNTER: Primary | ICD-10-CM

## 2022-03-21 DIAGNOSIS — Z09 FOLLOW UP: ICD-10-CM

## 2022-03-21 PROCEDURE — 73030 X-RAY EXAM OF SHOULDER: CPT | Performed by: ORTHOPAEDIC SURGERY

## 2022-03-21 PROCEDURE — 99212 OFFICE O/P EST SF 10 MIN: CPT | Performed by: ORTHOPAEDIC SURGERY

## 2022-03-21 RX ORDER — TAMSULOSIN HYDROCHLORIDE 0.4 MG/1
1 CAPSULE ORAL DAILY
COMMUNITY
Start: 2022-02-28

## 2022-03-21 RX ORDER — FUROSEMIDE 40 MG/1
40 TABLET ORAL DAILY
COMMUNITY
Start: 2022-02-17

## 2022-03-21 RX ORDER — METOPROLOL SUCCINATE 25 MG/1
25 TABLET, EXTENDED RELEASE ORAL DAILY
COMMUNITY
Start: 2022-02-17

## 2022-03-21 NOTE — PROGRESS NOTES
"Shoulder Follow Up      Patient: Javier Marin        YOB: 1935            Chief Complaints: Shoulder pain      History of Present Illness: Here for follow-up 11 weeks status post left proximal humerus fracture.  Patient is here with his wife.  States he is doing well.  He is in physical therapy.  Minimal pain in the morning but during the day denies have really any pain and function is improved with therapy.      Physical Exam: 86 y.o. male  General Appearance:    Alert, cooperative, in no acute distress                   Vitals:    03/21/22 1032   Temp: 97.3 °F (36.3 °C)   Weight: 71.2 kg (157 lb)   Height: 180.3 cm (71\")        Patient is alert and read ×3 no acute distress appears her above-listed at height weight and age.  Affect is normal respiratory rate is normal unlabored. Heart rate regular rate rhythm, sclera, dentition and hearing are normal for the purpose of this exam.      Ortho Exam  Left upper extremity: No tenderness palpation.  Gentle range of motion is painless.  Patient can forward flex about 90 degrees abduction 70 degrees.  Patient can touch the back of his head and reach his mouth.  Sensation tact distally light touch.      Radiographs:    2 views left shoulder taken reviewed AP and scapular Y.  Imaging shows evidence of healing proximal humerus fracture with maintenance of alignment no significant changes compared to recent films.    Assessment/Plan:      11 weeks status post left proximal wrist fracture    Okay to start progressing back to normal activity limit weight to no more than 5 to 10 pounds over the next few weeks continue physical therapy.  Continue calcium and vitamin D for bone healing and health.  Patient to follow-up in 4 weeks.  All questions were answered.  Patient understands and agrees the plan.            "

## 2022-04-18 ENCOUNTER — OFFICE VISIT (OUTPATIENT)
Dept: ORTHOPEDIC SURGERY | Facility: CLINIC | Age: 87
End: 2022-04-18

## 2022-04-18 VITALS — TEMPERATURE: 96.1 F | HEIGHT: 71 IN | RESPIRATION RATE: 18 BRPM | WEIGHT: 163.4 LBS | BODY MASS INDEX: 22.88 KG/M2

## 2022-04-18 DIAGNOSIS — R52 PAIN: ICD-10-CM

## 2022-04-18 DIAGNOSIS — S42.202D CLOSED FRACTURE OF PROXIMAL END OF LEFT HUMERUS WITH ROUTINE HEALING, UNSPECIFIED FRACTURE MORPHOLOGY, SUBSEQUENT ENCOUNTER: Primary | ICD-10-CM

## 2022-04-18 PROCEDURE — 73030 X-RAY EXAM OF SHOULDER: CPT | Performed by: ORTHOPAEDIC SURGERY

## 2022-04-18 PROCEDURE — 99212 OFFICE O/P EST SF 10 MIN: CPT | Performed by: ORTHOPAEDIC SURGERY

## 2022-04-18 NOTE — PROGRESS NOTES
"Shoulder Follow Up      Patient: Javier Marin        YOB: 1935            Chief Complaints: Shoulder pain      History of Present Illness: Patient presents 4 months status post nonoperative treatment left proximal femur fracture.  Is here with his wife.  States he been doing physical therapy and doing well.  Does not have any pain.  Does have some decreased motion but has been functional.      Physical Exam: 86 y.o. male  General Appearance:    Alert, cooperative, in no acute distress                   Vitals:    04/18/22 1042   Resp: 18   Temp: 96.1 °F (35.6 °C)   Weight: 74.1 kg (163 lb 6.4 oz)   Height: 180.3 cm (71\")        Patient is alert and read ×3 no acute distress appears her above-listed at height weight and age.  Affect is normal respiratory rate is normal unlabored. Heart rate regular rate rhythm, sclera, dentition and hearing are normal for the purpose of this exam.      Ortho Exam    Left upper extremity; no tenderness palpation.  Range of motion is limited but is painless.  Forward flexion 90 degrees, abduction about 75 degrees.  Patient can reach his mouth back of his head.  Sensation intact distally    Radiographs    3 views left proximal humerus/shoulder taken reviewed AP, axillary and scapular Y show evidence of healed proximal humerus fracture, some malunion as would be expected.  No other significant changes          Assessment/Plan:      Left proximal humerus fracture      Discussed findings with the patient his wife he feels that overall he is not have any pain and he can perform the activities he needs to and is functional.  He will continue work on some therapy.  Instructed that if he starts develop any pain or function becomes a problem would recommend him seeing her shoulder specialist Dr. Recinos.  Other than that may follow-up as needed.  All questions answered.  They understand agree with plan.            " normal...

## 2022-04-26 ENCOUNTER — OFFICE VISIT (OUTPATIENT)
Dept: CARDIOLOGY | Facility: CLINIC | Age: 87
End: 2022-04-26

## 2022-04-26 VITALS
BODY MASS INDEX: 22.4 KG/M2 | WEIGHT: 160 LBS | SYSTOLIC BLOOD PRESSURE: 136 MMHG | DIASTOLIC BLOOD PRESSURE: 80 MMHG | HEART RATE: 64 BPM | HEIGHT: 71 IN

## 2022-04-26 DIAGNOSIS — I65.23 CAROTID STENOSIS, ASYMPTOMATIC, BILATERAL: ICD-10-CM

## 2022-04-26 DIAGNOSIS — I73.9 PAD (PERIPHERAL ARTERY DISEASE): ICD-10-CM

## 2022-04-26 DIAGNOSIS — R06.02 SOB (SHORTNESS OF BREATH): ICD-10-CM

## 2022-04-26 DIAGNOSIS — R55 SYNCOPE AND COLLAPSE: Primary | ICD-10-CM

## 2022-04-26 DIAGNOSIS — R07.89 OTHER CHEST PAIN: ICD-10-CM

## 2022-04-26 DIAGNOSIS — E78.2 MIXED HYPERLIPIDEMIA: ICD-10-CM

## 2022-04-26 DIAGNOSIS — I50.22 CHRONIC SYSTOLIC CHF (CONGESTIVE HEART FAILURE): ICD-10-CM

## 2022-04-26 DIAGNOSIS — I70.0 ARTERIOSCLEROSIS OF ABDOMINAL AORTA: Chronic | ICD-10-CM

## 2022-04-26 DIAGNOSIS — I42.0 CARDIOMYOPATHY, DILATED: ICD-10-CM

## 2022-04-26 PROBLEM — R94.31 ABNORMAL ECG: Status: ACTIVE | Noted: 2022-04-26

## 2022-04-26 PROCEDURE — 99214 OFFICE O/P EST MOD 30 MIN: CPT | Performed by: INTERNAL MEDICINE

## 2022-04-26 PROCEDURE — 93000 ELECTROCARDIOGRAM COMPLETE: CPT | Performed by: INTERNAL MEDICINE

## 2022-04-26 RX ORDER — LOSARTAN POTASSIUM 25 MG/1
25 TABLET ORAL DAILY
Qty: 30 TABLET | Refills: 5 | Status: SHIPPED | OUTPATIENT
Start: 2022-04-26 | End: 2022-06-23 | Stop reason: SDUPTHER

## 2022-04-26 NOTE — PROGRESS NOTES
Subjective:     Encounter Date:04/26/22      Patient ID: Javier Marin is a 86 y.o. male.    Chief Complaint:  History of Present Illness    Dear Dr. Em,    I had the pleasure of seeing this patient in the office today for initial evaluation and consultation.  I appreciate that you sent him in to see us.  They come in today to be seen for evaluation of cardiomyopathy and nonsustained ventricular tachycardia with syncope.    Patient typically is followed with Dr. Mccrary.  He is evaluate this patient in the past for paroxysmal atrial fibrillation.  Patient was scheduled follow-up with him in a few months for his annual follow-up.    Patient was seen in the emergency room January 2022 after an episode of syncope while at [employer].  The patient had just walked about 200 feet.  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  He decided that there must of been something that was bothering him although he does not recall it.  He remembers reaching the door to his classroom in the next thing he remembers several paramedics were standing around him.  He apparently had bruising posteriorly.  He was seen in the emergency room but was not hospitalized.  Patient was not found to have any particular abnormality in the emergency room.  Cardiology was not consulted.  He was found to have a shoulder fracture and Ortho was involved.      You arrange for a Holter monitor to be obtained on February 25.  This showed 3 episodes of nonsustained monomorphic ventricular tachycardia, longest 11 beats in duration.    Patient was then started on metoprolol succinate 25 mg once daily by yourself.    Patient also had an echocardiogram performed that showed new global hypokinesis with ejection fraction of 29%.  Not mentioned in the report when I review the images right ventricular function is also diminished.    They felt they were good  to be seen their usual cardiologist Dr. Mccrary but then they got a message from the office that he had been scheduled to see us today.    He denies any chest pain or chest discomfort.  He does sometimes have some epigastric discomfort which he thinks is reflux.  He notes that he gets dyspneic if he walks any distance but states this has been present for a while.  He does have some lower extremity edema at times.  No prior history coronary disease that is known.  He had a prior stress test in 2018 that showed no ischemia.  He did have an abdominal and pelvic CT scan performed June 2018 that I reviewed and on very limited images there appears to be some coronary artery calcification but the vast majority the heart is not included in the images on that study.  Atherosclerotic disease of the abdominal aorta is present.    Patient has a history of bilateral carotid artery disease, greater on the right than the left.  He had a duplex scan performed December 2020 that showed a 70 to 80% stenosis in the right intracarotid artery, mild stenosis on the left internal carotid artery.  His understanding is that you plan to repeat this soon but I do not yet have records from your office.    The following portions of the patient's history were reviewed and updated as appropriate: allergies, current medications, past family history, past medical history, past social history, past surgical history and problem list.      ECG 12 Lead    Date/Time: 4/26/2022 1:08 PM  Performed by: Fadi Alvarez III, MD  Authorized by: Fadi Alvarez III, MD   Comparison: compared with previous ECG   Similar to previous ECG  Rhythm: sinus rhythm  Rate: normal  Conduction: right bundle branch block, left anterior fascicular block and 1st degree AV block  QRS axis: normal  Other findings: non-specific ST-T wave changes    Clinical impression: abnormal EKG               Objective:     Vitals:    04/26/22 1228 04/26/22 1255   BP: 136/80    Pulse: 71  "64   Weight: 72.6 kg (160 lb)    Height: 180.3 cm (71\")      Body mass index is 22.32 kg/m².      Vitals reviewed.   Constitutional:       General: Not in acute distress.     Appearance: Well-developed. Not diaphoretic.   Eyes:      General:         Right eye: No discharge.         Left eye: No discharge.      Conjunctiva/sclera: Conjunctivae normal.      Pupils: Pupils are equal, round, and reactive to light.   HENT:      Head: Normocephalic and atraumatic.      Nose: Nose normal.   Neck:      Thyroid: No thyromegaly.      Vascular: Carotid bruit present.      Trachea: No tracheal deviation.      Lymphadenopathy: No cervical adenopathy.      Comments: Right carotid bruit, possible soft left carotid bruit  Pulmonary:      Effort: Pulmonary effort is normal. No respiratory distress.      Breath sounds: Normal breath sounds. No stridor.   Chest:      Chest wall: Not tender to palpatation.   Cardiovascular:      Normal rate. Regular rhythm.      Murmurs: There is no murmur.      . No S3 gallop. No click. No rub.   Pulses:     Intact distal pulses.   Edema:     Peripheral edema present.     Ankle: bilateral 1+ edema of the ankle.     Feet: bilateral 1+ edema of the feet.  Abdominal:      General: Bowel sounds are normal. There is no distension.      Palpations: Abdomen is soft. There is no abdominal mass.      Tenderness: There is no abdominal tenderness. There is no guarding or rebound.   Musculoskeletal: Normal range of motion.         General: No tenderness or deformity.      Cervical back: Normal range of motion and neck supple. Skin:     General: Skin is warm and dry.      Findings: No erythema or rash.   Neurological:      Mental Status: Alert and oriented to person, place, and time.      Deep Tendon Reflexes: Reflexes are normal and symmetric.   Psychiatric:         Thought Content: Thought content normal.         Data and records reviewed:     Lab Results   Component Value Date    GLUCOSE 119 (H) 01/02/2022    " BUN 17 01/02/2022    CREATININE 1.02 01/02/2022    EGFRIFNONA 69 01/02/2022    BCR 16.7 01/02/2022    K 4.8 01/02/2022    CO2 25.4 01/02/2022    CALCIUM 9.2 01/02/2022    ALBUMIN 4.40 01/02/2022    AST 24 01/02/2022    ALT 24 01/02/2022     Lab Results   Component Value Date    CHOL 71 06/20/2018    CHOL 75 06/19/2018     Lab Results   Component Value Date    TRIG 138 06/20/2018    TRIG 207 (H) 06/19/2018     Lab Results   Component Value Date    HDL 14 (L) 06/20/2018    HDL 12 (L) 06/19/2018     Lab Results   Component Value Date    LDL 29 06/20/2018    LDL 22 06/19/2018     Lab Results   Component Value Date    VLDL 27.6 06/20/2018    VLDL 41.4 (H) 06/19/2018     Lab Results   Component Value Date    LDLHDL 2.10 06/20/2018    LDLHDL 1.80 06/19/2018     CBC    CBC 1/2/22   WBC 9.78   RBC 4.01 (A)   Hemoglobin 10.9 (A)   Hematocrit 34.2 (A)   MCV 85.3   MCH 27.2   MCHC 31.9   RDW 13.1   Platelets 119 (A)   (A) Abnormal value            XR Shoulder 2+ View Left    Result Date: 4/18/2022  Ordering physician's impression is located in the Encounter Note dated 04/18/22. X-ray performed in the DR room.     XR Humerus Left    Result Date: 2/7/2022  Ordering physician's impression is located in the Encounter Note dated 02/07/22. X-ray performed in the DR room.     Results for orders placed during the hospital encounter of 02/25/22    Adult Transthoracic Echo Complete W/ Cont if Necessary Per Protocol    Interpretation Summary  · Estimated left ventricular EF = 29% Left ventricular systolic function is moderately decreased.  · Estimated right ventricular systolic pressure from tricuspid regurgitation is mildly elevated (35-45 mmHg). Calculated right ventricular systolic pressure from tricuspid regurgitation is 40.6 mmHg.  · There is calcification of the aortic valve.  · The right ventricular cavity is borderline dilated.  · The right atrial cavity is borderline dilated.  · The following left ventricular wall segments are  hypokinetic: mid anterior, apical anterior, mid anterolateral and apical lateral.  · There is no evidence of pericardial effusion. .          Assessment:          Diagnosis Plan   1. Syncope and collapse  losartan (Cozaar) 25 MG tablet    CT Angiogram Coronary    ECG 12 Lead   2. Cardiomyopathy, dilated ([facility])  losartan (Cozaar) 25 MG tablet    CT Angiogram Coronary    ECG 12 Lead   3. SOB (shortness of breath)  CT Angiogram Coronary    ECG 12 Lead   4. Chronic systolic CHF (congestive heart failure) ([facility])  CT Angiogram Coronary    ECG 12 Lead   5. Other chest pain  CT Angiogram Coronary    ECG 12 Lead   6. Carotid stenosis, asymptomatic, bilateral     7. PAD (peripheral artery disease) ([facility])     8. Mixed hyperlipidemia     9. Arteriosclerosis of abdominal aorta ([facility])            Plan:       1.  Syncope and collapse, occurred in January, Holter monitor February showed nonsustained monomorphic ventricular tachycardia we will start metoprolol succinate 25 mg at that time.  Patient has not had any feeling of palpitations or tachycardia, no further episodes of dizziness, presyncope or syncope.  2.  Cardiomyopathy-patient has global hypokinesis with left nuclear systolic function that measured 29% on his echo in February.  Right ventricular function also appears to be declined.  We will continue the metoprolol succinate 25 mg once daily, add losartan 25 mg once daily, and then titrate medical therapy as appropriate  3.  I am concerned about underlying coronary artery disease given presence of atherosclerotic disease elsewhere and what appears to be coronary calcification on his abdominal CT scan in 2018.  We can arrange for a CT coronary angiogram to be performed.  Heart rate is 64 at rest on his current beta-blockade, he should not require additional beta-blockade at the time of the study.  He does have occasional epigastric discomfort that he thinks is reflux.  He does have dyspnea with activity.  4.  Cerebrovascular  disease-patient has a right carotid bruit, carotid duplex December 2020 showed 70-80% stenosis in the right ICA, patient thinks that you checked it since and have plans to check it again, I do not have any records from your office yet, will we have called to get that to follow-up on that  5.  Abdominal aortic arteriosclerosis, continue risk factor modification.    Thank you very much for allowing us to participate in the care of this pleasant patient.  Please don't hesitate to call if I can be of assistance in any way.      Current Outpatient Medications:   •  acetaminophen (TYLENOL) 500 MG tablet, Take 500 mg by mouth Every 6 (Six) Hours As Needed for Mild Pain ., Disp: , Rfl:   •  aspirin 81 MG EC tablet, Take 81 mg by mouth Daily., Disp: , Rfl:   •  atorvastatin (LIPITOR) 10 MG tablet, Take 10 mg by mouth Daily., Disp: , Rfl:   •  cholecalciferol (VITAMIN D3) 1000 UNITS tablet, Take 1,000 Units by mouth Daily. PT HOLDING FOR SURGERY, Disp: , Rfl:   •  FAMOTIDINE PO, Take 20 mg by mouth Every Night., Disp: , Rfl:   •  fexofenadine (ALLEGRA) 180 MG tablet, Take 180 mg by mouth As Needed., Disp: , Rfl:   •  furosemide (LASIX) 40 MG tablet, Take 40 mg by mouth Daily., Disp: , Rfl:   •  Insulin Aspart (NOVOLOG SC), Inject  under the skin into the appropriate area as directed 4 (Four) Times a Day. Inject 6 units as instructed, Disp: , Rfl:   •  insulin glargine (LANTUS) 100 UNIT/ML injection, Inject 10 Units under the skin Every Night., Disp: , Rfl:   •  lansoprazole (PREVACID) 15 MG capsule, Take 1 capsule by mouth 2 (Two) Times a Day., Disp: 60 capsule, Rfl: 3  •  metoprolol succinate XL (TOPROL-XL) 25 MG 24 hr tablet, Take 25 mg by mouth Daily., Disp: , Rfl:   •  nitroglycerin (NITROSTAT) 0.4 MG SL tablet, Place 1 tablet under the tongue Every 5 (Five) Minutes As Needed for Chest Pain. Take no more than 3 doses in 15 minutes., Disp: 25 tablet, Rfl: 0  •  tamsulosin (FLOMAX) 0.4 MG capsule 24 hr capsule, 1 capsule  Daily., Disp: , Rfl:   •  losartan (Cozaar) 25 MG tablet, Take 1 tablet by mouth Daily., Disp: 30 tablet, Rfl: 5         No follow-ups on file.

## 2022-05-27 ENCOUNTER — HOSPITAL ENCOUNTER (OUTPATIENT)
Dept: CT IMAGING | Facility: HOSPITAL | Age: 87
Discharge: HOME | End: 2022-05-27
Admitting: INTERNAL MEDICINE

## 2022-05-27 ENCOUNTER — DOCUMENTATION (OUTPATIENT)
Dept: CARDIOLOGY | Facility: CLINIC | Age: 87
End: 2022-05-27

## 2022-05-27 VITALS
SYSTOLIC BLOOD PRESSURE: 128 MMHG | OXYGEN SATURATION: 99 % | TEMPERATURE: 98 F | RESPIRATION RATE: 16 BRPM | DIASTOLIC BLOOD PRESSURE: 91 MMHG | HEART RATE: 67 BPM

## 2022-05-27 DIAGNOSIS — I50.22 CHRONIC SYSTOLIC CHF (CONGESTIVE HEART FAILURE): ICD-10-CM

## 2022-05-27 DIAGNOSIS — I42.0 CARDIOMYOPATHY, DILATED: ICD-10-CM

## 2022-05-27 DIAGNOSIS — R07.89 OTHER CHEST PAIN: ICD-10-CM

## 2022-05-27 DIAGNOSIS — R55 SYNCOPE AND COLLAPSE: ICD-10-CM

## 2022-05-27 DIAGNOSIS — R06.02 SOB (SHORTNESS OF BREATH): ICD-10-CM

## 2022-05-27 LAB
CREAT BLDA-MCNC: 1.1 MG/DL (ref 0.6–1.3)
QT INTERVAL: 466 MS

## 2022-05-27 PROCEDURE — 82565 ASSAY OF CREATININE: CPT

## 2022-05-27 PROCEDURE — 75574 CT ANGIO HRT W/3D IMAGE: CPT | Performed by: INTERNAL MEDICINE

## 2022-05-27 PROCEDURE — 0 IOPAMIDOL PER 1 ML: Performed by: INTERNAL MEDICINE

## 2022-05-27 PROCEDURE — 93005 ELECTROCARDIOGRAM TRACING: CPT | Performed by: INTERNAL MEDICINE

## 2022-05-27 PROCEDURE — 75574 CT ANGIO HRT W/3D IMAGE: CPT

## 2022-05-27 RX ORDER — METOPROLOL TARTRATE 5 MG/5ML
10 INJECTION INTRAVENOUS ONCE
Status: COMPLETED | OUTPATIENT
Start: 2022-05-27 | End: 2022-05-27

## 2022-05-27 RX ADMIN — METOPROLOL TARTRATE 10 MG: 1 INJECTION, SOLUTION INTRAVENOUS at 10:54

## 2022-05-27 RX ADMIN — IOPAMIDOL 95 ML: 755 INJECTION, SOLUTION INTRAVENOUS at 11:28

## 2022-05-27 NOTE — NURSING NOTE
Patient arrived in radiology triage for CTA. Wife at bedside.    Protective goggles and mask in place with all patient interactions today

## 2022-05-27 NOTE — NURSING NOTE
This RN spoke with FARAZ Vanegas with [facility] r/t patient HR of 78. Order for 10 mg IV metoprolol per Dr. Estrella administered per MAR.

## 2022-05-27 NOTE — PROGRESS NOTES
Cardiac CTA with morphology  05/27/22  Reason for the exam: arrythmia    Calcium score is 6059 Agatston units.  This is between the 90-100th percentile for men between the ages of over age 74 years old.    The right atrium is normal in size.  The right ventricle is normal in size.  There is grossly normal right ventricular systolic function.  The pulmonary artery is normal in size.  There are 4 pulmonary veins which enter the left atrium in their expected location.  The left atrial appendage was visualized and is without thrombus.  The intra-atrial septum appeared to be intact.  The left ventricle is normal in size with normal systolic function.  There was no evidence of a left ventricular thrombus.  The intraventricular septum appeared to be intact.  The mitral valve appeared structurally normal.  The aortic valve was trileaflet and appears structurally and functionally normal.  The pulmonic valve appeared structurally normal.  The tricuspid valve appeared structurally normal.  There was no pericardial effusion.  The pericardium appeared normal.    The left main coronary artery came off the left coronary cusp in its anticipated location.  It bifurcated into the left anterior descending artery and the circumflex coronary artery.  The left main coronary artery was severely calcified.  The left anterior descending artery with severely calcified throughout.  The circumflex coronary artery was severely calcified throughout.  The right coronary artery was severely calcified throughout.    Conclusions:  1.  Calcium score is 6059 Agatston units.  This is between the 90-100th percentile for men between the ages of over age 74 years old.  2.  Structurally normal heart.  3.  Due to the severe nature of the coronary artery calcification, this test is nondiagnostic for obstructive coronary artery disease.    Jigna Estrella MD  05/27/22

## 2022-06-07 ENCOUNTER — OFFICE VISIT (OUTPATIENT)
Dept: CARDIOLOGY | Facility: CLINIC | Age: 87
End: 2022-06-07

## 2022-06-07 ENCOUNTER — LAB (OUTPATIENT)
Dept: LAB | Facility: HOSPITAL | Age: 87
End: 2022-06-07

## 2022-06-07 ENCOUNTER — TRANSCRIBE ORDERS (OUTPATIENT)
Dept: CARDIOLOGY | Facility: CLINIC | Age: 87
End: 2022-06-07

## 2022-06-07 VITALS
SYSTOLIC BLOOD PRESSURE: 126 MMHG | DIASTOLIC BLOOD PRESSURE: 80 MMHG | WEIGHT: 157 LBS | BODY MASS INDEX: 21.98 KG/M2 | HEART RATE: 70 BPM | HEIGHT: 71 IN

## 2022-06-07 DIAGNOSIS — I25.118 CORONARY ARTERY DISEASE OF NATIVE ARTERY OF NATIVE HEART WITH STABLE ANGINA PECTORIS: ICD-10-CM

## 2022-06-07 DIAGNOSIS — I73.9 PAD (PERIPHERAL ARTERY DISEASE): ICD-10-CM

## 2022-06-07 DIAGNOSIS — I48.0 PAROXYSMAL ATRIAL FIBRILLATION: ICD-10-CM

## 2022-06-07 DIAGNOSIS — Z01.818 OTHER SPECIFIED PRE-OPERATIVE EXAMINATION: ICD-10-CM

## 2022-06-07 DIAGNOSIS — Z13.6 SCREENING FOR ISCHEMIC HEART DISEASE: ICD-10-CM

## 2022-06-07 DIAGNOSIS — I50.22 CHRONIC SYSTOLIC CHF (CONGESTIVE HEART FAILURE): Primary | ICD-10-CM

## 2022-06-07 DIAGNOSIS — Z01.810 PRE-OPERATIVE CARDIOVASCULAR EXAMINATION: ICD-10-CM

## 2022-06-07 DIAGNOSIS — Z01.810 PRE-OPERATIVE CARDIOVASCULAR EXAMINATION: Primary | ICD-10-CM

## 2022-06-07 LAB
ANION GAP SERPL CALCULATED.3IONS-SCNC: 11 MMOL/L (ref 5–15)
BUN SERPL-MCNC: 20 MG/DL (ref 8–23)
BUN/CREAT SERPL: 19.8 (ref 7–25)
CALCIUM SPEC-SCNC: 9.2 MG/DL (ref 8.6–10.5)
CHLORIDE SERPL-SCNC: 104 MMOL/L (ref 98–107)
CO2 SERPL-SCNC: 22 MMOL/L (ref 22–29)
CREAT SERPL-MCNC: 1.01 MG/DL (ref 0.76–1.27)
DEPRECATED RDW RBC AUTO: 52.6 FL (ref 37–54)
EGFRCR SERPLBLD CKD-EPI 2021: 72.4 ML/MIN/1.73
EOSINOPHIL # BLD MANUAL: 0.3 10*3/MM3 (ref 0–0.4)
EOSINOPHIL NFR BLD MANUAL: 2 % (ref 0.3–6.2)
ERYTHROCYTE [DISTWIDTH] IN BLOOD BY AUTOMATED COUNT: 18 % (ref 12.3–15.4)
GLUCOSE SERPL-MCNC: 153 MG/DL (ref 65–99)
HCT VFR BLD AUTO: 36.7 % (ref 37.5–51)
HGB BLD-MCNC: 12.1 G/DL (ref 13–17.7)
LYMPHOCYTES # BLD MANUAL: 8.62 10*3/MM3 (ref 0.7–3.1)
LYMPHOCYTES NFR BLD MANUAL: 5 % (ref 5–12)
MCH RBC QN AUTO: 27.1 PG (ref 26.6–33)
MCHC RBC AUTO-ENTMCNC: 33 G/DL (ref 31.5–35.7)
MCV RBC AUTO: 82.3 FL (ref 79–97)
MONOCYTES # BLD: 0.74 10*3/MM3 (ref 0.1–0.9)
NEUTROPHILS # BLD AUTO: 5.2 10*3/MM3 (ref 1.7–7)
NEUTROPHILS NFR BLD MANUAL: 35 % (ref 42.7–76)
PLAT MORPH BLD: NORMAL
PLATELET # BLD AUTO: 146 10*3/MM3 (ref 140–450)
PMV BLD AUTO: 9.6 FL (ref 6–12)
POTASSIUM SERPL-SCNC: 4.9 MMOL/L (ref 3.5–5.2)
RBC # BLD AUTO: 4.46 10*6/MM3 (ref 4.14–5.8)
RBC MORPH BLD: NORMAL
SARS-COV-2 ORF1AB RESP QL NAA+PROBE: NOT DETECTED
SMUDGE CELLS BLD QL SMEAR: ABNORMAL
SODIUM SERPL-SCNC: 137 MMOL/L (ref 136–145)
VARIANT LYMPHS NFR BLD MANUAL: 58 % (ref 19.6–45.3)
WBC NRBC COR # BLD: 14.87 10*3/MM3 (ref 3.4–10.8)

## 2022-06-07 PROCEDURE — 85007 BL SMEAR W/DIFF WBC COUNT: CPT

## 2022-06-07 PROCEDURE — U0004 COV-19 TEST NON-CDC HGH THRU: HCPCS

## 2022-06-07 PROCEDURE — 85025 COMPLETE CBC W/AUTO DIFF WBC: CPT

## 2022-06-07 PROCEDURE — 36415 COLL VENOUS BLD VENIPUNCTURE: CPT

## 2022-06-07 PROCEDURE — 99214 OFFICE O/P EST MOD 30 MIN: CPT | Performed by: INTERNAL MEDICINE

## 2022-06-07 PROCEDURE — 80048 BASIC METABOLIC PNL TOTAL CA: CPT

## 2022-06-07 RX ORDER — ISOSORBIDE MONONITRATE 30 MG/1
30 TABLET, EXTENDED RELEASE ORAL DAILY
Qty: 30 TABLET | Refills: 11 | Status: SHIPPED | OUTPATIENT
Start: 2022-06-07 | End: 2022-06-23

## 2022-06-07 NOTE — H&P (VIEW-ONLY)
Subjective:     Encounter Date:06/07/22      Patient ID: Javier Marin is a 86 y.o. male.    Chief Complaint:  History of Present Illness    Dear Dr. Em,    I had the pleasure of seeing this patient in the office today for follow-up after CT angiogram was performed.  He referred him recently to see us because of a newly discovered cardiomyopathy and nonsustained ventricular tachycardia with syncope.    We performed a CT angiogram and he comes in today so we could review those results and images-it was hard because of his hearing to be able to discuss with both he and his wife.    CT angiogram showed profound heavy coronary artery calcification.  The severity of the coronary artery calcification precluded an assessment of the degree, if any, of stenosis of his coronary arteries.  However his coronary artery CAC score was greater than 6000.    Conclusions:  1.  Calcium score is 6059 Agatston units.  This is between the 90-100th percentile for men between the ages of over age 74 years old.  2.  Structurally normal heart.  3.  Due to the severe nature of the coronary artery calcification, this test is nondiagnostic for obstructive coronary artery disease.    Patient is having dyspnea with walking.  He gets dyspneic with any walking and distance.  Symptoms he has some lower precordial chest pressure but he thinks this is reflux.  Sometimes it occurs on its own without any dyspnea.  He does note that he has been feeling the dyspnea somewhat more lately.  No dyspnea at rest.  No orthopnea or PND.     Patient typically was followed with Dr. Mccrary.  He is evaluate this patient in the past for paroxysmal atrial fibrillation.  Patient was scheduled follow-up with him in a few months for his annual follow-up.  When I saw him on the initial visit I offered to have him seen by Dr. Mccrary but the patient preferred to be seen here since he was already here and I went along with his wishes.    Patient was seen  in the emergency room January 2022 after an episode of syncope while at [employer].  The patient had just walked about 200 feet.  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  He decided that there must of been something that was bothering him although he does not recall it.  He remembers reaching the door to his classroom in the next thing he remembers several paramedics were standing around him.  He apparently had bruising posteriorly.  He was seen in the emergency room but was not hospitalized.  Patient was not found to have any particular abnormality in the emergency room.  Cardiology was not consulted.  He was found to have a shoulder fracture and Ortho was involved.    You arranged for a Holter monitor to be obtained on February 25.  This showed 3 episodes of nonsustained monomorphic ventricular tachycardia, longest 11 beats in duration.    Patient was then started on metoprolol succinate 25 mg once daily by yourself.    Patient also had an echocardiogram performed that showed new global hypokinesis with ejection fraction of 29%.  Not mentioned in the report when I review the images right ventricular function is also diminished.     No prior history coronary disease that is known.  He had a prior stress test in 2018 that showed no ischemia.  He did have an abdominal and pelvic CT scan performed June 2018 that I reviewed and on very limited images there appears to be some coronary artery calcification but the vast majority the heart is not included in the images on that study.  Atherosclerotic disease of the abdominal aorta is present.    Patient has a history of bilateral carotid artery disease, greater on the right than the left.  He had a duplex scan performed December 2020 that showed a 70 to 80% stenosis in the right intracarotid artery, mild stenosis on the left internal carotid artery.  His understanding is that  "you plan to repeat this soon but I do not yet have records from your office.    The following portions of the patient's history were reviewed and updated as appropriate: allergies, current medications, past family history, past medical history, past social history, past surgical history and problem list.    Procedures       Objective:     Vitals:    06/07/22 1118   BP: 126/80   Pulse: 70   Weight: 71.2 kg (157 lb)   Height: 180.3 cm (71\")     Body mass index is 21.9 kg/m².      Vitals reviewed.   Constitutional:       General: Not in acute distress.     Appearance: Well-developed. Not diaphoretic.   Eyes:      General:         Right eye: No discharge.         Left eye: No discharge.      Conjunctiva/sclera: Conjunctivae normal.      Pupils: Pupils are equal, round, and reactive to light.   HENT:      Head: Normocephalic and atraumatic.      Nose: Nose normal.   Neck:      Thyroid: No thyromegaly.      Vascular: Carotid bruit present.      Trachea: No tracheal deviation.      Lymphadenopathy: No cervical adenopathy.      Comments: Right carotid bruit, possible soft left carotid bruit  Pulmonary:      Effort: Pulmonary effort is normal. No respiratory distress.      Breath sounds: Normal breath sounds. No stridor.   Chest:      Chest wall: Not tender to palpatation.   Cardiovascular:      Normal rate. Regular rhythm.      Murmurs: There is no murmur.      . No S3 gallop. No click. No rub.   Pulses:     Intact distal pulses.   Edema:     Peripheral edema present.     Ankle: bilateral 1+ edema of the ankle.     Feet: bilateral 1+ edema of the feet.  Abdominal:      General: Bowel sounds are normal. There is no distension.      Palpations: Abdomen is soft. There is no abdominal mass.      Tenderness: There is no abdominal tenderness. There is no guarding or rebound.   Musculoskeletal: Normal range of motion.         General: No tenderness or deformity.      Cervical back: Normal range of motion and neck supple. Skin:     " General: Skin is warm and dry.      Findings: No erythema or rash.   Neurological:      Mental Status: Alert and oriented to person, place, and time.      Deep Tendon Reflexes: Reflexes are normal and symmetric.   Psychiatric:         Thought Content: Thought content normal.         Data and records reviewed:     Lab Results   Component Value Date    GLUCOSE 153 (H) 06/07/2022    BUN 20 06/07/2022    CREATININE 1.01 06/07/2022    EGFRIFNONA 69 01/02/2022    BCR 19.8 06/07/2022    K 4.9 06/07/2022    CO2 22.0 06/07/2022    CALCIUM 9.2 06/07/2022    ALBUMIN 4.40 01/02/2022    AST 24 01/02/2022    ALT 24 01/02/2022     Lab Results   Component Value Date    CHOL 71 06/20/2018    CHOL 75 06/19/2018     Lab Results   Component Value Date    TRIG 138 06/20/2018    TRIG 207 (H) 06/19/2018     Lab Results   Component Value Date    HDL 14 (L) 06/20/2018    HDL 12 (L) 06/19/2018     Lab Results   Component Value Date    LDL 29 06/20/2018    LDL 22 06/19/2018     Lab Results   Component Value Date    VLDL 27.6 06/20/2018    VLDL 41.4 (H) 06/19/2018     Lab Results   Component Value Date    LDLHDL 2.10 06/20/2018    LDLHDL 1.80 06/19/2018     CBC    CBC 1/2/22 6/7/22   WBC 9.78 14.87 (A)   RBC 4.01 (A) 4.46   Hemoglobin 10.9 (A) 12.1 (A)   Hematocrit 34.2 (A) 36.7 (A)   MCV 85.3 82.3   MCH 27.2 27.1   MCHC 31.9 33.0   RDW 13.1 18.0 (A)   Platelets 119 (A) 146   (A) Abnormal value            XR Shoulder 2+ View Left    Result Date: 4/18/2022  Ordering physician's impression is located in the Encounter Note dated 04/18/22. X-ray performed in the DR room.     Results for orders placed during the hospital encounter of 02/25/22    Adult Transthoracic Echo Complete W/ Cont if Necessary Per Protocol    Interpretation Summary  · Estimated left ventricular EF = 29% Left ventricular systolic function is moderately decreased.  · Estimated right ventricular systolic pressure from tricuspid regurgitation is mildly elevated (35-45 mmHg).  Calculated right ventricular systolic pressure from tricuspid regurgitation is 40.6 mmHg.  · There is calcification of the aortic valve.  · The right ventricular cavity is borderline dilated.  · The right atrial cavity is borderline dilated.  · The following left ventricular wall segments are hypokinetic: mid anterior, apical anterior, mid anterolateral and apical lateral.  · There is no evidence of pericardial effusion. .          Assessment:          Diagnosis Plan   1. Chronic systolic CHF (congestive heart failure) ([facility])  Case Request Cath Lab: Left Heart Cath    isosorbide mononitrate (IMDUR) 30 MG 24 hr tablet   2. PAD (peripheral artery disease) ([facility])  Case Request Cath Lab: Left Heart Cath    isosorbide mononitrate (IMDUR) 30 MG 24 hr tablet   3. Paroxysmal atrial fibrillation ([facility])  Case Request Cath Lab: Left Heart Cath    isosorbide mononitrate (IMDUR) 30 MG 24 hr tablet   4. Coronary artery disease of native artery of native heart with stable angina pectoris ([facility])  Case Request Cath Lab: Left Heart Cath    isosorbide mononitrate (IMDUR) 30 MG 24 hr tablet          Plan:       1.  Syncope and collapse, occurred in January, Holter monitor February showed nonsustained monomorphic ventricular tachycardia we will start metoprolol succinate 25 mg at that time.  Patient has not had any feeling of palpitations or tachycardia, no further episodes of dizziness, presyncope or syncope.   2.  Cardiomyopathy-patient has global hypokinesis with left nuclear systolic function that measured 29% on his echo in February.  Right ventricular function also appears to be declined.  Currently on metoprolol and losartan.  3.  Coronary artery disease- coronary artery CAC score of greater than 6000.  The severity of the coronary artery calcification precluded an accurate assessment of any luminal narrowing, but posttest probability now is extremely high that he has hemodynamically significant CAD.  I will add isosorbide and we  will arrange for cardiac catheterization.  He is having symptoms of dyspnea as well as some chest discomfort (which he thinks is reflux).  4.  Cerebrovascular disease-patient has a right carotid bruit, carotid duplex December 2020 showed 70-80% stenosis in the right ICA, patient thinks that you checked it since and have plans to check it again, I do not have any records from your office yet, will we have called to get that to follow-up on that  5.  Abdominal aortic arteriosclerosis, continue risk factor modification.    Thank you very much for allowing us to participate in the care of this pleasant patient.  Please don't hesitate to call if I can be of assistance in any way.      Current Outpatient Medications:   •  acetaminophen (TYLENOL) 500 MG tablet, Take 500 mg by mouth Every 6 (Six) Hours As Needed for Mild Pain ., Disp: , Rfl:   •  aspirin 81 MG EC tablet, Take 81 mg by mouth Daily., Disp: , Rfl:   •  atorvastatin (LIPITOR) 10 MG tablet, Take 10 mg by mouth Daily., Disp: , Rfl:   •  cholecalciferol (VITAMIN D3) 1000 UNITS tablet, Take 1,000 Units by mouth Daily. PT HOLDING FOR SURGERY, Disp: , Rfl:   •  FAMOTIDINE PO, Take 20 mg by mouth Every Night., Disp: , Rfl:   •  fexofenadine (ALLEGRA) 180 MG tablet, Take 180 mg by mouth As Needed., Disp: , Rfl:   •  furosemide (LASIX) 40 MG tablet, Take 40 mg by mouth Daily., Disp: , Rfl:   •  Insulin Aspart (NOVOLOG SC), Inject  under the skin into the appropriate area as directed 4 (Four) Times a Day. Inject 6 units as instructed, Disp: , Rfl:   •  insulin glargine (LANTUS) 100 UNIT/ML injection, Inject 10 Units under the skin Every Night., Disp: , Rfl:   •  lansoprazole (PREVACID) 15 MG capsule, Take 1 capsule by mouth 2 (Two) Times a Day., Disp: 60 capsule, Rfl: 3  •  losartan (Cozaar) 25 MG tablet, Take 1 tablet by mouth Daily., Disp: 30 tablet, Rfl: 5  •  metoprolol succinate XL (TOPROL-XL) 25 MG 24 hr tablet, Take 25 mg by mouth Daily., Disp: , Rfl:   •   nitroglycerin (NITROSTAT) 0.4 MG SL tablet, Place 1 tablet under the tongue Every 5 (Five) Minutes As Needed for Chest Pain. Take no more than 3 doses in 15 minutes., Disp: 25 tablet, Rfl: 0  •  tamsulosin (FLOMAX) 0.4 MG capsule 24 hr capsule, 1 capsule Daily., Disp: , Rfl:   •  isosorbide mononitrate (IMDUR) 30 MG 24 hr tablet, Take 1 tablet by mouth Daily., Disp: 30 tablet, Rfl: 11         No follow-ups on file.

## 2022-06-07 NOTE — PROGRESS NOTES
Subjective:     Encounter Date:06/07/22      Patient ID: Javier Marin is a 86 y.o. male.    Chief Complaint:  History of Present Illness    Dear Dr. Em,    I had the pleasure of seeing this patient in the office today for follow-up after CT angiogram was performed.  He referred him recently to see us because of a newly discovered cardiomyopathy and nonsustained ventricular tachycardia with syncope.    We performed a CT angiogram and he comes in today so we could review those results and images-it was hard because of his hearing to be able to discuss with both he and his wife.    CT angiogram showed profound heavy coronary artery calcification.  The severity of the coronary artery calcification precluded an assessment of the degree, if any, of stenosis of his coronary arteries.  However his coronary artery CAC score was greater than 6000.    Conclusions:  1.  Calcium score is 6059 Agatston units.  This is between the 90-100th percentile for men between the ages of over age 74 years old.  2.  Structurally normal heart.  3.  Due to the severe nature of the coronary artery calcification, this test is nondiagnostic for obstructive coronary artery disease.    Patient is having dyspnea with walking.  He gets dyspneic with any walking and distance.  Symptoms he has some lower precordial chest pressure but he thinks this is reflux.  Sometimes it occurs on its own without any dyspnea.  He does note that he has been feeling the dyspnea somewhat more lately.  No dyspnea at rest.  No orthopnea or PND.     Patient typically was followed with Dr. Mccrary.  He is evaluate this patient in the past for paroxysmal atrial fibrillation.  Patient was scheduled follow-up with him in a few months for his annual follow-up.  When I saw him on the initial visit I offered to have him seen by Dr. Mccrary but the patient preferred to be seen here since he was already here and I went along with his wishes.    Patient was seen  in the emergency room January 2022 after an episode of syncope while at [facility].  The patient had just walked about 200 feet.  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  He decided that there must of been something that was bothering him although he does not recall it.  He remembers reaching the door to his classroom in the next thing he remembers several paramedics were standing around him.  He apparently had bruising posteriorly.  He was seen in the emergency room but was not hospitalized.  Patient was not found to have any particular abnormality in the emergency room.  Cardiology was not consulted.  He was found to have a shoulder fracture and Ortho was involved.    You arranged for a Holter monitor to be obtained on February 25.  This showed 3 episodes of nonsustained monomorphic ventricular tachycardia, longest 11 beats in duration.    Patient was then started on metoprolol succinate 25 mg once daily by yourself.    Patient also had an echocardiogram performed that showed new global hypokinesis with ejection fraction of 29%.  Not mentioned in the report when I review the images right ventricular function is also diminished.     No prior history coronary disease that is known.  He had a prior stress test in 2018 that showed no ischemia.  He did have an abdominal and pelvic CT scan performed June 2018 that I reviewed and on very limited images there appears to be some coronary artery calcification but the vast majority the heart is not included in the images on that study.  Atherosclerotic disease of the abdominal aorta is present.    Patient has a history of bilateral carotid artery disease, greater on the right than the left.  He had a duplex scan performed December 2020 that showed a 70 to 80% stenosis in the right intracarotid artery, mild stenosis on the left internal carotid artery.  His understanding is that  "you plan to repeat this soon but I do not yet have records from your office.    The following portions of the patient's history were reviewed and updated as appropriate: allergies, current medications, past family history, past medical history, past social history, past surgical history and problem list.    Procedures       Objective:     Vitals:    06/07/22 1118   BP: 126/80   Pulse: 70   Weight: 71.2 kg (157 lb)   Height: 180.3 cm (71\")     Body mass index is 21.9 kg/m².      Vitals reviewed.   Constitutional:       General: Not in acute distress.     Appearance: Well-developed. Not diaphoretic.   Eyes:      General:         Right eye: No discharge.         Left eye: No discharge.      Conjunctiva/sclera: Conjunctivae normal.      Pupils: Pupils are equal, round, and reactive to light.   HENT:      Head: Normocephalic and atraumatic.      Nose: Nose normal.   Neck:      Thyroid: No thyromegaly.      Vascular: Carotid bruit present.      Trachea: No tracheal deviation.      Lymphadenopathy: No cervical adenopathy.      Comments: Right carotid bruit, possible soft left carotid bruit  Pulmonary:      Effort: Pulmonary effort is normal. No respiratory distress.      Breath sounds: Normal breath sounds. No stridor.   Chest:      Chest wall: Not tender to palpatation.   Cardiovascular:      Normal rate. Regular rhythm.      Murmurs: There is no murmur.      . No S3 gallop. No click. No rub.   Pulses:     Intact distal pulses.   Edema:     Peripheral edema present.     Ankle: bilateral 1+ edema of the ankle.     Feet: bilateral 1+ edema of the feet.  Abdominal:      General: Bowel sounds are normal. There is no distension.      Palpations: Abdomen is soft. There is no abdominal mass.      Tenderness: There is no abdominal tenderness. There is no guarding or rebound.   Musculoskeletal: Normal range of motion.         General: No tenderness or deformity.      Cervical back: Normal range of motion and neck supple. Skin:     " General: Skin is warm and dry.      Findings: No erythema or rash.   Neurological:      Mental Status: Alert and oriented to person, place, and time.      Deep Tendon Reflexes: Reflexes are normal and symmetric.   Psychiatric:         Thought Content: Thought content normal.         Data and records reviewed:     Lab Results   Component Value Date    GLUCOSE 153 (H) 06/07/2022    BUN 20 06/07/2022    CREATININE 1.01 06/07/2022    EGFRIFNONA 69 01/02/2022    BCR 19.8 06/07/2022    K 4.9 06/07/2022    CO2 22.0 06/07/2022    CALCIUM 9.2 06/07/2022    ALBUMIN 4.40 01/02/2022    AST 24 01/02/2022    ALT 24 01/02/2022     Lab Results   Component Value Date    CHOL 71 06/20/2018    CHOL 75 06/19/2018     Lab Results   Component Value Date    TRIG 138 06/20/2018    TRIG 207 (H) 06/19/2018     Lab Results   Component Value Date    HDL 14 (L) 06/20/2018    HDL 12 (L) 06/19/2018     Lab Results   Component Value Date    LDL 29 06/20/2018    LDL 22 06/19/2018     Lab Results   Component Value Date    VLDL 27.6 06/20/2018    VLDL 41.4 (H) 06/19/2018     Lab Results   Component Value Date    LDLHDL 2.10 06/20/2018    LDLHDL 1.80 06/19/2018     CBC    CBC 1/2/22 6/7/22   WBC 9.78 14.87 (A)   RBC 4.01 (A) 4.46   Hemoglobin 10.9 (A) 12.1 (A)   Hematocrit 34.2 (A) 36.7 (A)   MCV 85.3 82.3   MCH 27.2 27.1   MCHC 31.9 33.0   RDW 13.1 18.0 (A)   Platelets 119 (A) 146   (A) Abnormal value            XR Shoulder 2+ View Left    Result Date: 4/18/2022  Ordering physician's impression is located in the Encounter Note dated 04/18/22. X-ray performed in the DR room.     Results for orders placed during the hospital encounter of 02/25/22    Adult Transthoracic Echo Complete W/ Cont if Necessary Per Protocol    Interpretation Summary  · Estimated left ventricular EF = 29% Left ventricular systolic function is moderately decreased.  · Estimated right ventricular systolic pressure from tricuspid regurgitation is mildly elevated (35-45 mmHg).  Calculated right ventricular systolic pressure from tricuspid regurgitation is 40.6 mmHg.  · There is calcification of the aortic valve.  · The right ventricular cavity is borderline dilated.  · The right atrial cavity is borderline dilated.  · The following left ventricular wall segments are hypokinetic: mid anterior, apical anterior, mid anterolateral and apical lateral.  · There is no evidence of pericardial effusion. .          Assessment:          Diagnosis Plan   1. Chronic systolic CHF (congestive heart failure) ([facility])  Case Request Cath Lab: Left Heart Cath    isosorbide mononitrate (IMDUR) 30 MG 24 hr tablet   2. PAD (peripheral artery disease) ([facility])  Case Request Cath Lab: Left Heart Cath    isosorbide mononitrate (IMDUR) 30 MG 24 hr tablet   3. Paroxysmal atrial fibrillation ([facility])  Case Request Cath Lab: Left Heart Cath    isosorbide mononitrate (IMDUR) 30 MG 24 hr tablet   4. Coronary artery disease of native artery of native heart with stable angina pectoris ([facility])  Case Request Cath Lab: Left Heart Cath    isosorbide mononitrate (IMDUR) 30 MG 24 hr tablet          Plan:       1.  Syncope and collapse, occurred in January, Holter monitor February showed nonsustained monomorphic ventricular tachycardia we will start metoprolol succinate 25 mg at that time.  Patient has not had any feeling of palpitations or tachycardia, no further episodes of dizziness, presyncope or syncope.   2.  Cardiomyopathy-patient has global hypokinesis with left nuclear systolic function that measured 29% on his echo in February.  Right ventricular function also appears to be declined.  Currently on metoprolol and losartan.  3.  Coronary artery disease- coronary artery CAC score of greater than 6000.  The severity of the coronary artery calcification precluded an accurate assessment of any luminal narrowing, but posttest probability now is extremely high that he has hemodynamically significant CAD.  I will add isosorbide and we  will arrange for cardiac catheterization.  He is having symptoms of dyspnea as well as some chest discomfort (which he thinks is reflux).  4.  Cerebrovascular disease-patient has a right carotid bruit, carotid duplex December 2020 showed 70-80% stenosis in the right ICA, patient thinks that you checked it since and have plans to check it again, I do not have any records from your office yet, will we have called to get that to follow-up on that  5.  Abdominal aortic arteriosclerosis, continue risk factor modification.    Thank you very much for allowing us to participate in the care of this pleasant patient.  Please don't hesitate to call if I can be of assistance in any way.      Current Outpatient Medications:   •  acetaminophen (TYLENOL) 500 MG tablet, Take 500 mg by mouth Every 6 (Six) Hours As Needed for Mild Pain ., Disp: , Rfl:   •  aspirin 81 MG EC tablet, Take 81 mg by mouth Daily., Disp: , Rfl:   •  atorvastatin (LIPITOR) 10 MG tablet, Take 10 mg by mouth Daily., Disp: , Rfl:   •  cholecalciferol (VITAMIN D3) 1000 UNITS tablet, Take 1,000 Units by mouth Daily. PT HOLDING FOR SURGERY, Disp: , Rfl:   •  FAMOTIDINE PO, Take 20 mg by mouth Every Night., Disp: , Rfl:   •  fexofenadine (ALLEGRA) 180 MG tablet, Take 180 mg by mouth As Needed., Disp: , Rfl:   •  furosemide (LASIX) 40 MG tablet, Take 40 mg by mouth Daily., Disp: , Rfl:   •  Insulin Aspart (NOVOLOG SC), Inject  under the skin into the appropriate area as directed 4 (Four) Times a Day. Inject 6 units as instructed, Disp: , Rfl:   •  insulin glargine (LANTUS) 100 UNIT/ML injection, Inject 10 Units under the skin Every Night., Disp: , Rfl:   •  lansoprazole (PREVACID) 15 MG capsule, Take 1 capsule by mouth 2 (Two) Times a Day., Disp: 60 capsule, Rfl: 3  •  losartan (Cozaar) 25 MG tablet, Take 1 tablet by mouth Daily., Disp: 30 tablet, Rfl: 5  •  metoprolol succinate XL (TOPROL-XL) 25 MG 24 hr tablet, Take 25 mg by mouth Daily., Disp: , Rfl:   •   nitroglycerin (NITROSTAT) 0.4 MG SL tablet, Place 1 tablet under the tongue Every 5 (Five) Minutes As Needed for Chest Pain. Take no more than 3 doses in 15 minutes., Disp: 25 tablet, Rfl: 0  •  tamsulosin (FLOMAX) 0.4 MG capsule 24 hr capsule, 1 capsule Daily., Disp: , Rfl:   •  isosorbide mononitrate (IMDUR) 30 MG 24 hr tablet, Take 1 tablet by mouth Daily., Disp: 30 tablet, Rfl: 11         No follow-ups on file.

## 2022-06-09 ENCOUNTER — HOSPITAL ENCOUNTER (INPATIENT)
Facility: HOSPITAL | Age: 87
LOS: 5 days | Discharge: HOME | End: 2022-06-14
Attending: INTERNAL MEDICINE

## 2022-06-09 DIAGNOSIS — I50.22 CHRONIC SYSTOLIC CHF (CONGESTIVE HEART FAILURE): ICD-10-CM

## 2022-06-09 DIAGNOSIS — I48.0 PAROXYSMAL ATRIAL FIBRILLATION: ICD-10-CM

## 2022-06-09 DIAGNOSIS — I73.9 PAD (PERIPHERAL ARTERY DISEASE): ICD-10-CM

## 2022-06-09 DIAGNOSIS — I25.118 CORONARY ARTERY DISEASE OF NATIVE ARTERY OF NATIVE HEART WITH STABLE ANGINA PECTORIS: ICD-10-CM

## 2022-06-09 DIAGNOSIS — I25.119 CORONARY ARTERY DISEASE INVOLVING NATIVE CORONARY ARTERY OF NATIVE HEART WITH ANGINA PECTORIS: Primary | ICD-10-CM

## 2022-06-09 PROBLEM — R07.9 CHRONIC CHEST PAIN WITH HIGH RISK FOR CAD: Status: ACTIVE | Noted: 2022-06-09

## 2022-06-09 PROBLEM — G89.29 CHRONIC CHEST PAIN WITH HIGH RISK FOR CAD: Status: ACTIVE | Noted: 2022-06-09

## 2022-06-09 PROBLEM — Z91.89 CHRONIC CHEST PAIN WITH HIGH RISK FOR CAD: Status: ACTIVE | Noted: 2022-06-09

## 2022-06-09 LAB
GLUCOSE BLDC GLUCOMTR-MCNC: 178 MG/DL (ref 70–130)
GLUCOSE BLDC GLUCOMTR-MCNC: 188 MG/DL (ref 70–130)
GLUCOSE BLDC GLUCOMTR-MCNC: 190 MG/DL (ref 70–130)
GLUCOSE BLDC GLUCOMTR-MCNC: 196 MG/DL (ref 70–130)

## 2022-06-09 PROCEDURE — 25010000002 MIDAZOLAM PER 1 MG: Performed by: INTERNAL MEDICINE

## 2022-06-09 PROCEDURE — 0 IOPAMIDOL PER 1 ML: Performed by: INTERNAL MEDICINE

## 2022-06-09 PROCEDURE — 93458 L HRT ARTERY/VENTRICLE ANGIO: CPT | Performed by: INTERNAL MEDICINE

## 2022-06-09 PROCEDURE — 63710000001 INSULIN LISPRO (HUMAN) PER 5 UNITS: Performed by: INTERNAL MEDICINE

## 2022-06-09 PROCEDURE — C1769 GUIDE WIRE: HCPCS | Performed by: INTERNAL MEDICINE

## 2022-06-09 PROCEDURE — B2111ZZ FLUOROSCOPY OF MULTIPLE CORONARY ARTERIES USING LOW OSMOLAR CONTRAST: ICD-10-PCS | Performed by: INTERNAL MEDICINE

## 2022-06-09 PROCEDURE — 4A023N7 MEASUREMENT OF CARDIAC SAMPLING AND PRESSURE, LEFT HEART, PERCUTANEOUS APPROACH: ICD-10-PCS | Performed by: INTERNAL MEDICINE

## 2022-06-09 PROCEDURE — B2151ZZ FLUOROSCOPY OF LEFT HEART USING LOW OSMOLAR CONTRAST: ICD-10-PCS | Performed by: INTERNAL MEDICINE

## 2022-06-09 PROCEDURE — 027034Z DILATION OF CORONARY ARTERY, ONE ARTERY WITH DRUG-ELUTING INTRALUMINAL DEVICE, PERCUTANEOUS APPROACH: ICD-10-PCS | Performed by: INTERNAL MEDICINE

## 2022-06-09 PROCEDURE — 25010000002 HEPARIN (PORCINE) PER 1000 UNITS: Performed by: INTERNAL MEDICINE

## 2022-06-09 PROCEDURE — 82962 GLUCOSE BLOOD TEST: CPT

## 2022-06-09 PROCEDURE — 25010000002 FENTANYL CITRATE (PF) 50 MCG/ML SOLUTION: Performed by: INTERNAL MEDICINE

## 2022-06-09 PROCEDURE — C1894 INTRO/SHEATH, NON-LASER: HCPCS | Performed by: INTERNAL MEDICINE

## 2022-06-09 PROCEDURE — 99152 MOD SED SAME PHYS/QHP 5/>YRS: CPT | Performed by: INTERNAL MEDICINE

## 2022-06-09 RX ORDER — LIDOCAINE HYDROCHLORIDE 20 MG/ML
INJECTION, SOLUTION INFILTRATION; PERINEURAL AS NEEDED
Status: DISCONTINUED | OUTPATIENT
Start: 2022-06-09 | End: 2022-06-09 | Stop reason: HOSPADM

## 2022-06-09 RX ORDER — PANTOPRAZOLE SODIUM 40 MG/1
40 TABLET, DELAYED RELEASE ORAL EVERY MORNING
Status: DISCONTINUED | OUTPATIENT
Start: 2022-06-09 | End: 2022-06-14 | Stop reason: HOSPADM

## 2022-06-09 RX ORDER — LOSARTAN POTASSIUM 25 MG/1
25 TABLET ORAL DAILY
Status: DISCONTINUED | OUTPATIENT
Start: 2022-06-09 | End: 2022-06-14 | Stop reason: HOSPADM

## 2022-06-09 RX ORDER — INSULIN LISPRO 100 [IU]/ML
6 INJECTION, SOLUTION INTRAVENOUS; SUBCUTANEOUS
Status: DISCONTINUED | OUTPATIENT
Start: 2022-06-09 | End: 2022-06-14 | Stop reason: HOSPADM

## 2022-06-09 RX ORDER — SODIUM CHLORIDE 9 MG/ML
1-3 INJECTION, SOLUTION INTRAVENOUS CONTINUOUS
Status: DISCONTINUED | OUTPATIENT
Start: 2022-06-09 | End: 2022-06-09

## 2022-06-09 RX ORDER — SODIUM CHLORIDE 0.9 % (FLUSH) 0.9 %
10 SYRINGE (ML) INJECTION AS NEEDED
Status: DISCONTINUED | OUTPATIENT
Start: 2022-06-09 | End: 2022-06-09 | Stop reason: HOSPADM

## 2022-06-09 RX ORDER — TAMSULOSIN HYDROCHLORIDE 0.4 MG/1
0.4 CAPSULE ORAL DAILY
Status: DISCONTINUED | OUTPATIENT
Start: 2022-06-09 | End: 2022-06-14 | Stop reason: HOSPADM

## 2022-06-09 RX ORDER — METOPROLOL SUCCINATE 25 MG/1
25 TABLET, EXTENDED RELEASE ORAL DAILY
Status: DISCONTINUED | OUTPATIENT
Start: 2022-06-09 | End: 2022-06-14 | Stop reason: HOSPADM

## 2022-06-09 RX ORDER — NITROGLYCERIN 0.4 MG/1
0.4 TABLET SUBLINGUAL
Status: DISCONTINUED | OUTPATIENT
Start: 2022-06-09 | End: 2022-06-14 | Stop reason: HOSPADM

## 2022-06-09 RX ORDER — SODIUM CHLORIDE 9 MG/ML
50 INJECTION, SOLUTION INTRAVENOUS CONTINUOUS
Status: DISCONTINUED | OUTPATIENT
Start: 2022-06-09 | End: 2022-06-11

## 2022-06-09 RX ORDER — FENTANYL CITRATE 50 UG/ML
INJECTION, SOLUTION INTRAMUSCULAR; INTRAVENOUS AS NEEDED
Status: DISCONTINUED | OUTPATIENT
Start: 2022-06-09 | End: 2022-06-09 | Stop reason: HOSPADM

## 2022-06-09 RX ORDER — ASPIRIN 81 MG/1
81 TABLET ORAL DAILY
Status: DISCONTINUED | OUTPATIENT
Start: 2022-06-09 | End: 2022-06-14 | Stop reason: HOSPADM

## 2022-06-09 RX ORDER — ATORVASTATIN CALCIUM 20 MG/1
10 TABLET, FILM COATED ORAL DAILY
Status: DISCONTINUED | OUTPATIENT
Start: 2022-06-09 | End: 2022-06-14 | Stop reason: HOSPADM

## 2022-06-09 RX ORDER — ACETAMINOPHEN 325 MG/1
650 TABLET ORAL EVERY 4 HOURS PRN
Status: DISCONTINUED | OUTPATIENT
Start: 2022-06-09 | End: 2022-06-09 | Stop reason: DRUGHIGH

## 2022-06-09 RX ORDER — FUROSEMIDE 40 MG/1
40 TABLET ORAL DAILY
Status: DISCONTINUED | OUTPATIENT
Start: 2022-06-09 | End: 2022-06-12

## 2022-06-09 RX ORDER — ISOSORBIDE MONONITRATE 30 MG/1
30 TABLET, EXTENDED RELEASE ORAL DAILY
Status: DISCONTINUED | OUTPATIENT
Start: 2022-06-09 | End: 2022-06-11

## 2022-06-09 RX ORDER — SODIUM CHLORIDE 9 MG/ML
75 INJECTION, SOLUTION INTRAVENOUS CONTINUOUS
Status: DISCONTINUED | OUTPATIENT
Start: 2022-06-09 | End: 2022-06-12

## 2022-06-09 RX ORDER — ACETAMINOPHEN 500 MG
500 TABLET ORAL EVERY 6 HOURS PRN
Status: DISCONTINUED | OUTPATIENT
Start: 2022-06-09 | End: 2022-06-14 | Stop reason: HOSPADM

## 2022-06-09 RX ORDER — SODIUM CHLORIDE 0.9 % (FLUSH) 0.9 %
10 SYRINGE (ML) INJECTION EVERY 12 HOURS SCHEDULED
Status: DISCONTINUED | OUTPATIENT
Start: 2022-06-09 | End: 2022-06-09 | Stop reason: HOSPADM

## 2022-06-09 RX ORDER — MIDAZOLAM HYDROCHLORIDE 1 MG/ML
INJECTION INTRAMUSCULAR; INTRAVENOUS AS NEEDED
Status: DISCONTINUED | OUTPATIENT
Start: 2022-06-09 | End: 2022-06-09 | Stop reason: HOSPADM

## 2022-06-09 RX ADMIN — TAMSULOSIN HYDROCHLORIDE 0.4 MG: 0.4 CAPSULE ORAL at 12:39

## 2022-06-09 RX ADMIN — METOPROLOL SUCCINATE 25 MG: 25 TABLET, EXTENDED RELEASE ORAL at 12:39

## 2022-06-09 RX ADMIN — LOSARTAN POTASSIUM 25 MG: 25 TABLET, FILM COATED ORAL at 12:38

## 2022-06-09 RX ADMIN — ATORVASTATIN CALCIUM 10 MG: 20 TABLET, FILM COATED ORAL at 20:12

## 2022-06-09 RX ADMIN — PANTOPRAZOLE SODIUM 40 MG: 40 TABLET, DELAYED RELEASE ORAL at 12:38

## 2022-06-09 RX ADMIN — INSULIN GLARGINE-YFGN 8 UNITS: 100 INJECTION, SOLUTION SUBCUTANEOUS at 20:12

## 2022-06-09 RX ADMIN — SODIUM CHLORIDE 75 ML/HR: 9 INJECTION, SOLUTION INTRAVENOUS at 08:10

## 2022-06-09 RX ADMIN — INSULIN LISPRO 6 UNITS: 100 INJECTION, SOLUTION INTRAVENOUS; SUBCUTANEOUS at 18:25

## 2022-06-09 RX ADMIN — ISOSORBIDE MONONITRATE 30 MG: 30 TABLET ORAL at 12:39

## 2022-06-09 RX ADMIN — ASPIRIN 81 MG: 81 TABLET, COATED ORAL at 12:38

## 2022-06-09 RX ADMIN — SODIUM CHLORIDE 50 ML/HR: 9 INJECTION, SOLUTION INTRAVENOUS at 13:06

## 2022-06-09 RX ADMIN — FUROSEMIDE 40 MG: 40 TABLET ORAL at 12:38

## 2022-06-09 RX ADMIN — INSULIN LISPRO 6 UNITS: 100 INJECTION, SOLUTION INTRAVENOUS; SUBCUTANEOUS at 12:38

## 2022-06-09 NOTE — CONSULTS
Patient Care Team:  Percy Em MD as PCP - General  Percy Em MD as PCP - Family Medicine    Chief complaint No chief complaint on file.        Subjective     Patient is a 86 y.o. male presents with with a variety of cardiovascular symptoms including syncope and shortness of breath.  Outpatient work-up included a Holter monitor which showed signs for ventricular tachycardia and an echocardiogram showed evidence for cardiomyopathy with ejection fraction of 29%.  He subsequently underwent CT scan for coronary calcium scores which was very high and suggest that he may have significant underlying coronary artery disease.  He had a cardiac catheterization today which showed high-grade stenosis in his LAD along with occluded peripheral vasculature.  This is likely the cause of his symptoms.  Apparently stenting is not an option because of the severity of the disease he was admitted to the hospital for evaluation for coronary artery bypass grafting.  At this point he seems to be asymptomatic with no symptoms of angina, shortness of breath, and no recent episodes of syncope.    This is a vigorous 86-year-old white male who is very active at home.  He is only recently begun to slow down some because of his recent symptoms.  He does have some degenerative disease particularly in his right hip that led to surgery that has caused him some limitations.  Otherwise he has been very active, compliant with medications, he watches his diet closely, and has been reliable with follow-ups in the office.  He has relatively well-controlled diabetes mellitus type 2, his cholesterol is also well controlled with medications, his reflux is controlled with PPI and is arthritis is generally treated with over-the-counter medications.  He has no history of smoking or other lung disease.    I discussed with the patient and his wife the current situation.  They understand the severity of his coronary artery disease and that  coronary artery surgery may be recommended.  They certainly were open to the idea.  They understand that there would certainly be some increased risks but considering how active and vigorous he is now and that he would like to maintain that lifestyle he is certainly willing to consider surgery at this time.  It seems he may have a few other options.    Review of Systems   Pertinent items are noted in HPI, all other systems reviewed and negative    History  Past Medical History:   Diagnosis Date   • A-fib (HCC)    • 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HCC)    • Anorexia    • Arteriosclerosis of abdominal aorta (HCC) 02/24/2014   • Arthritis    • Carotid stenosis, asymptomatic, bilateral 02/24/2014    16-49%   • Closed right arm fracture 1994   • Compression fracture of lumbar spine, non-traumatic (HCC)    • Diabetes mellitus (HCC)    • Elevated LFTs    • Esophagitis 06/19/2018    DR MARTINEZ-LA GRADE D LOWER   • Esophagus disorder     THICKENED DISTAL WALL-CT SCAN 6/18/18   • Garbled speech    • Granulomatous disease, chronic (HCC)    • Hard of hearing    • Hiatal hernia with GERD and esophagitis    • History of acute gastritis    • Hyperlipidemia    • Hypertension    • Hyponatremia    • Leukocytosis    • Lumbar canal stenosis    • Lung nodule seen on imaging study    • Mandible fracture (HCC) 03/28/2020    FROM FALL   • Osteoarthritis of multiple joints    • Ptosis of left eyelid    • Remote history of stroke     SMALL CAUDATE NECLEUS   • Thrombocytopenia (HCC)    • Weakness generalized      Past Surgical History:   Procedure Laterality Date   • CATARACT EXTRACTION, BILATERAL     • COLONOSCOPY     • ENDOSCOPY N/A 06/19/2018    Procedure: ESOPHAGOGASTRODUODENOSCOPY WITH BIOPSY;  Surgeon: Toribio Martinez MD;  Location: [facility] ENDOSCOPY;  Service: Gastroenterology   • EYE PTOSIS REPAIR Left 11/15/2016    Procedure: LT UPPER LID EYE PTOSIS REPAIR;  Surgeon: Anup Lancaster MD;  Location: [facility] OR [facility];  Service:    •  EYE SURGERY Left     victreous hemorrhage repair   • HIP ARTHROPLASTY Right    • ORIF MANDIBULAR FRACTURE Bilateral 2020    Procedure: OPEN REDUCTION AND INTERNAL FIXATION OF BILATERAL MANDIBLE FRACTURES;  Surgeon: Percy Jeronimo MD;  Location: [facility] OR;  Service: Maxillofacial;  Laterality: Bilateral;     Family History   Problem Relation Age of Onset   • Pancreatic cancer Mother    • Hypertension Neg Hx    • Hyperlipidemia Neg Hx    • Heart failure Neg Hx    • Heart disease Neg Hx    • Heart attack Neg Hx    • Malig Hyperthermia Neg Hx      Social History     Tobacco Use   • Smoking status: Former Smoker     Years: 5.00     Types: Cigars     Quit date:      Years since quittin.4   • Smokeless tobacco: Never Used   Vaping Use   • Vaping Use: Never used   Substance Use Topics   • Alcohol use: No   • Drug use: No     Medications Prior to Admission   Medication Sig Dispense Refill Last Dose   • aspirin 81 MG EC tablet Take 81 mg by mouth Daily.   2022 at Unknown time   • atorvastatin (LIPITOR) 10 MG tablet Take 10 mg by mouth Daily.   2022 at Unknown time   • cholecalciferol (VITAMIN D3) 1000 UNITS tablet Take 1,000 Units by mouth Daily. PT HOLDING FOR SURGERY   2022 at Unknown time   • FAMOTIDINE PO Take 20 mg by mouth Every Night.   2022 at Unknown time   • furosemide (LASIX) 40 MG tablet Take 40 mg by mouth Daily.   2022 at Unknown time   • Insulin Aspart (NOVOLOG SC) Inject 6 Units under the skin into the appropriate area as directed 3 (Three) Times a Day With Meals.   2022 at Unknown time   • insulin glargine (LANTUS) 100 UNIT/ML injection Inject 8 Units under the skin into the appropriate area as directed Every Night.   2022 at Unknown time   • isosorbide mononitrate (IMDUR) 30 MG 24 hr tablet Take 1 tablet by mouth Daily. 30 tablet 11 2022 at Unknown time   • lansoprazole (PREVACID) 15 MG capsule Take 1 capsule by mouth 2 (Two) Times a Day. 60 capsule 3  6/8/2022 at Unknown time   • losartan (Cozaar) 25 MG tablet Take 1 tablet by mouth Daily. 30 tablet 5 6/8/2022 at Unknown time   • metoprolol succinate XL (TOPROL-XL) 25 MG 24 hr tablet Take 25 mg by mouth Daily.   6/8/2022 at Unknown time   • tamsulosin (FLOMAX) 0.4 MG capsule 24 hr capsule Take 1 capsule by mouth Daily.   6/8/2022 at Unknown time   • acetaminophen (TYLENOL) 500 MG tablet Take 500 mg by mouth Every 6 (Six) Hours As Needed for Mild Pain .   More than a month at Unknown time   • fexofenadine (ALLEGRA) 180 MG tablet Take 180 mg by mouth As Needed.      • nitroglycerin (NITROSTAT) 0.4 MG SL tablet Place 1 tablet under the tongue Every 5 (Five) Minutes As Needed for Chest Pain. Take no more than 3 doses in 15 minutes. 25 tablet 0 never     Allergies:  Patient has no known allergies.        Objective     Vital Signs  Temp:  [97.8 °F (36.6 °C)-98.1 °F (36.7 °C)] 98.1 °F (36.7 °C)  Heart Rate:  [69-88] 69  Resp:  [14-22] 18  BP: ()/(61-88) 98/61    Physical Exam:      General Appearance:    Alert, cooperative, in no acute distress, but he is an elderly white male who is very active and vigorous on a regular basis.   Head:    Normocephalic, without obvious abnormality, atraumatic   Eyes:            Lids and lashes normal, conjunctivae and sclerae normal, no   icterus, no pallor, corneas clear, PERRLA   Ears:    Ears appear intact with no abnormalities noted   Throat:   No oral lesions, no thrush, oral mucosa moist   Neck:   No adenopathy, supple, trachea midline, no thyromegaly, no   carotid bruit, no JVD   Back:     No kyphosis present, no scoliosis present, no skin lesions,      erythema or scars, no tenderness to percussion or                   palpation,   range of motion normal   Lungs:     Clear to auscultation,respirations regular, even and                  unlabored, no wheezes or rhonchi are noted    Heart:    Regular rhythm and normal rate, normal S1 and S2, no            murmur, no gallop,  "no rub, no click   Chest Wall:    No abnormalities observed   Abdomen:     Normal bowel sounds, no masses, no organomegaly, soft        non-tender, non-distended, no guarding, no rebound                tenderness   Rectal:     Deferred   Extremities:  He has degenerative changes that are generally mild except for in his right hip that causes him some decreased range of motion and some discomfort.   Pulses:   Pulses palpable and equal bilaterally   Skin:   No bleeding, bruising or rash   Lymph nodes:   No palpable adenopathy   Neurologic:   Cranial nerves 2 - 12 grossly intact, sensation intact, DTR       present and equal bilaterally       Results Review:    I reviewed the patient's new clinical results.  I reviewed the patient's new imaging results and agree with the interpretation.  I reviewed the patient's other test results and agree with the interpretation  \"           Results from last 7 days   Lab Units 06/07/22  1252   SODIUM mmol/L 137   POTASSIUM mmol/L 4.9   CHLORIDE mmol/L 104   CO2 mmol/L 22.0   BUN mg/dL 20   CREATININE mg/dL 1.01   GLUCOSE mg/dL 153*   CALCIUM mg/dL 9.2         Results from last 7 days   Lab Units 06/07/22  1252   WBC 10*3/mm3 14.87*   HEMOGLOBIN g/dL 12.1*   HEMATOCRIT % 36.7*   PLATELETS 10*3/mm3 146                        Assessment & Plan       Paroxysmal atrial fibrillation ([facility])    PAD (peripheral artery disease) ([facility])    Chronic systolic CHF (congestive heart failure) ([facility])    Coronary artery disease of native artery of native heart with stable angina pectoris ([facility])    Chronic chest pain with high risk for CAD    #1 Atherosclerotic Cardiovascular Disease-he has significant coronary artery disease he has been evaluated by cardiac catheterization and not felt to be an amenable to stenting.  Because of the severity of disease he has been admitted for evaluation by the cardiothoracic surgeons for possible bypass.  Despite his advanced age he is very active and vigorous and " certainly willing to undergo the surgery.  He and his wife both understand that there would be significant increased risks.    2.  Cardiomyopathy-he clearly has a decreased ejection fraction likely related to his ischemic disease.  Fortunately he is not really having a lot of symptoms related to this at this point.    3.  Diabetes mellitus type 2-his diabetes is relatively well controlled as an outpatient.  He is very compliant with his medications and diet and his wife is very supportive.    4.  Hypercholesterolemia-his cholesterol is well controlled on atorvastatin with the last LDL significantly below 80    5.  Degenerative joint disease-he has some diffuse disease but generally its tolerable.  The biggest problem he has is in his right hip where he suffered a hip fracture that was repaired.  Unfortunately had to have a revision of that surgery shortly thereafter and that has left him with some decreased range of motion.  Nonetheless he still is very active.    6.  Reflux-well-controlled on medications    7.  BPH-he has modest symptoms on Flomax and has had no episodes of severe retention.              I discussed the patient's findings and my recommendations with patient and family.     Percy Em MD  06/09/22  17:36 EDT    Time:

## 2022-06-09 NOTE — CONSULTS
Patient Care Team:  Percy Em MD as PCP - General  Percy Em MD as PCP - Family Medicine    Chief complaint: CAD    Subjective     History of Present Illness  Patient is a 86 y.o. male with a past medical history including PAF, DM II with retinopathy, hyperlipidemia, hypertension, and carotid stenosis. He is former smoker, but denies ETOH or illicit drug use. He reports that over the last few months he has developed shortness of breath with exertion. He denies chest pain. In January of 2022 he presented to [facility] January of 2022 after a syncopal episode at [facility]. He was noted to have a should fracture, but was discharge home with outpatient ortho follow up. His PCP ordered a Holter monitor for further evaluation and he was noted to have short episodes of nonsustained ventricular tachycardia. He was started on beta blocker as a result.  Echocardiogram was performed in February which revealed global hypokinesis with EF estimated around 30%. His calcium score was significantly elevated. He was referred to cardiology and subsequently set up for cardiac catheterization. Left heart cath showed severe three vessel CAD and mildly reduced EF of around 40%. We were consulted for cardiac surgery evaluation. His wife reports that he has frequent falls at home.     Review of Systems   Constitutional: Positive for activity change and fatigue. Negative for chills.   HENT: Negative.    Eyes: Negative.    Respiratory: Positive for shortness of breath. Negative for chest tightness.    Cardiovascular: Negative for chest pain, palpitations and leg swelling.   Gastrointestinal: Negative for diarrhea, nausea and vomiting.   Endocrine: Negative.    Genitourinary: Negative for difficulty urinating, frequency and urgency.   Musculoskeletal: Negative.    Skin: Negative for rash and wound.   Allergic/Immunologic: Negative.    Neurological: Positive for dizziness, syncope and light-headedness. Negative for seizures.    Hematological: Negative.    Psychiatric/Behavioral: Negative.         Past Medical History:   Diagnosis Date   • A-fib (HCC)    • 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HCC)    • Anorexia    • Arteriosclerosis of abdominal aorta (HCC) 02/24/2014   • Arthritis    • Carotid stenosis, asymptomatic, bilateral 02/24/2014    16-49%   • Closed right arm fracture 1994   • Compression fracture of lumbar spine, non-traumatic (HCC)    • Diabetes mellitus (HCC)    • Elevated LFTs    • Esophagitis 06/19/2018    DR MARTINEZ-LA GRADE D LOWER   • Esophagus disorder     THICKENED DISTAL WALL-CT SCAN 6/18/18   • Garbled speech    • Granulomatous disease, chronic (HCC)    • Hard of hearing    • Hiatal hernia with GERD and esophagitis    • History of acute gastritis    • Hyperlipidemia    • Hypertension    • Hyponatremia    • Leukocytosis    • Lumbar canal stenosis    • Lung nodule seen on imaging study    • Mandible fracture (HCC) 03/28/2020    FROM FALL   • Osteoarthritis of multiple joints    • Ptosis of left eyelid    • Remote history of stroke     SMALL CAUDATE NECLEUS   • Thrombocytopenia (HCC)    • Weakness generalized      Past Surgical History:   Procedure Laterality Date   • CATARACT EXTRACTION, BILATERAL     • COLONOSCOPY     • ENDOSCOPY N/A 06/19/2018    Procedure: ESOPHAGOGASTRODUODENOSCOPY WITH BIOPSY;  Surgeon: Toribio Martinez MD;  Location: [facility] ENDOSCOPY;  Service: Gastroenterology   • EYE PTOSIS REPAIR Left 11/15/2016    Procedure: LT UPPER LID EYE PTOSIS REPAIR;  Surgeon: Anup Lancaster MD;  Location: [facility] OR OSC;  Service:    • EYE SURGERY Left     victreous hemorrhage repair   • HIP ARTHROPLASTY Right    • ORIF MANDIBULAR FRACTURE Bilateral 03/31/2020    Procedure: OPEN REDUCTION AND INTERNAL FIXATION OF BILATERAL MANDIBLE FRACTURES;  Surgeon: Percy Jeronimo MD;  Location: [facility] MAIN OR;  Service: Maxillofacial;  Laterality: Bilateral;     Family History   Problem Relation Age of Onset   • Pancreatic  cancer Mother    • Hypertension Neg Hx    • Hyperlipidemia Neg Hx    • Heart failure Neg Hx    • Heart disease Neg Hx    • Heart attack Neg Hx    • Malig Hyperthermia Neg Hx      Social History     Tobacco Use   • Smoking status: Former Smoker     Years: 5.00     Types: Cigars     Quit date:      Years since quittin.4   • Smokeless tobacco: Never Used   Vaping Use   • Vaping Use: Never used   Substance Use Topics   • Alcohol use: No   • Drug use: No     Medications Prior to Admission   Medication Sig Dispense Refill Last Dose   • aspirin 81 MG EC tablet Take 81 mg by mouth Daily.   2022 at Unknown time   • atorvastatin (LIPITOR) 10 MG tablet Take 10 mg by mouth Daily.   2022 at Unknown time   • cholecalciferol (VITAMIN D3) 1000 UNITS tablet Take 1,000 Units by mouth Daily. PT HOLDING FOR SURGERY   2022 at Unknown time   • FAMOTIDINE PO Take 20 mg by mouth Every Night.   2022 at Unknown time   • furosemide (LASIX) 40 MG tablet Take 40 mg by mouth Daily.   2022 at Unknown time   • Insulin Aspart (NOVOLOG SC) Inject 6 Units under the skin into the appropriate area as directed 3 (Three) Times a Day With Meals.   2022 at Unknown time   • insulin glargine (LANTUS) 100 UNIT/ML injection Inject 8 Units under the skin into the appropriate area as directed Every Night.   2022 at Unknown time   • isosorbide mononitrate (IMDUR) 30 MG 24 hr tablet Take 1 tablet by mouth Daily. 30 tablet 11 2022 at Unknown time   • lansoprazole (PREVACID) 15 MG capsule Take 1 capsule by mouth 2 (Two) Times a Day. 60 capsule 3 2022 at Unknown time   • losartan (Cozaar) 25 MG tablet Take 1 tablet by mouth Daily. 30 tablet 5 2022 at Unknown time   • metoprolol succinate XL (TOPROL-XL) 25 MG 24 hr tablet Take 25 mg by mouth Daily.   2022 at Unknown time   • tamsulosin (FLOMAX) 0.4 MG capsule 24 hr capsule Take 1 capsule by mouth Daily.   2022 at Unknown time   • acetaminophen (TYLENOL) 500 MG  "tablet Take 500 mg by mouth Every 6 (Six) Hours As Needed for Mild Pain .   More than a month at Unknown time   • fexofenadine (ALLEGRA) 180 MG tablet Take 180 mg by mouth As Needed.      • nitroglycerin (NITROSTAT) 0.4 MG SL tablet Place 1 tablet under the tongue Every 5 (Five) Minutes As Needed for Chest Pain. Take no more than 3 doses in 15 minutes. 25 tablet 0 never     aspirin, 81 mg, Oral, Daily  atorvastatin, 10 mg, Oral, Daily  furosemide, 40 mg, Oral, Daily  insulin glargine, 8 Units, Subcutaneous, Nightly  insulin lispro, 6 Units, Subcutaneous, TID With Meals  isosorbide mononitrate, 30 mg, Oral, Daily  losartan, 25 mg, Oral, Daily  metoprolol succinate XL, 25 mg, Oral, Daily  pantoprazole, 40 mg, Oral, QAM  tamsulosin, 0.4 mg, Oral, Daily      Allergies:  Patient has no known allergies.    Objective      Vital Signs  Temp:  [97.8 °F (36.6 °C)-97.9 °F (36.6 °C)] 97.8 °F (36.6 °C)  Heart Rate:  [75-88] 75  Resp:  [14-22] 18  BP: (136-150)/(81-88) 143/83    Flowsheet Rows    Flowsheet Row First Filed Value   Admission Height 180.3 cm (71\") Documented at 06/09/2022 0804   Admission Weight 69.9 kg (154 lb) Documented at 06/09/2022 0804        180.3 cm (71\")    Physical Exam  Vitals and nursing note reviewed.   Constitutional:       Appearance: Normal appearance.   HENT:      Head: Normocephalic.      Nose: Nose normal.      Mouth/Throat:      Mouth: Mucous membranes are moist.   Eyes:      Pupils: Pupils are equal, round, and reactive to light.   Cardiovascular:      Rate and Rhythm: Normal rate.      Pulses: Normal pulses.      Heart sounds: Normal heart sounds. No murmur heard.  Pulmonary:      Effort: Pulmonary effort is normal.      Breath sounds: Normal breath sounds.   Abdominal:      General: Bowel sounds are normal.      Palpations: Abdomen is soft.   Musculoskeletal:      Cervical back: Normal range of motion and neck supple.      Right lower leg: Edema (trace LE) present.      Left lower leg: Edema " (trace LE) present.   Skin:     General: Skin is warm and dry.      Capillary Refill: Capillary refill takes less than 2 seconds.   Neurological:      General: No focal deficit present.      Mental Status: He is alert and oriented to person, place, and time. Mental status is at baseline.   Psychiatric:         Mood and Affect: Mood normal.         Behavior: Behavior normal.         Thought Content: Thought content normal.         Judgment: Judgment normal.         Results Review:   Lab Results (last 24 hours)     Procedure Component Value Units Date/Time    POC Glucose Once [[id number]]  (Abnormal) Collected: 06/09/22 1139    Specimen: Blood Updated: 06/09/22 1140     Glucose 188 mg/dL      Comment: Meter: [id number] : [id number] Shirley PIERRE       POC Glucose Once [[id number]]  (Abnormal) Collected: 06/09/22 0807    Specimen: Blood Updated: 06/09/22 0809     Glucose 190 mg/dL      Comment: NO TX Meter: [id number] : [id number] Deniz Morris RN               Assessment & Plan  - severe multi-vessel CAD  - chronic systolic congestive heart failure--EF 40% per cath  - hypertension--stable on current regimen  - hyperlipidemia--statin therapy  - PAF  - DM II with retinopathy  - carotid stenosis  - frailty/frequent falls    Given his age and frailty, I am not sure that he would do well with surgery. Dr. Zuniga to review and provide formal surgical recommendations.    Thank you for allowing us to participate in the care of this patient.      JERRY Grove  06/09/22  12:31 EDT

## 2022-06-10 LAB
ALBUMIN SERPL-MCNC: 4.1 G/DL (ref 3.5–5.2)
ALBUMIN/GLOB SERPL: 1.5 G/DL
ALP SERPL-CCNC: 109 U/L (ref 39–117)
ALT SERPL W P-5'-P-CCNC: 15 U/L (ref 1–41)
ANION GAP SERPL CALCULATED.3IONS-SCNC: 11.5 MMOL/L (ref 5–15)
ANION GAP SERPL CALCULATED.3IONS-SCNC: 8.9 MMOL/L (ref 5–15)
AST SERPL-CCNC: 14 U/L (ref 1–40)
BILIRUB SERPL-MCNC: 0.6 MG/DL (ref 0–1.2)
BUN SERPL-MCNC: 20 MG/DL (ref 8–23)
BUN SERPL-MCNC: 21 MG/DL (ref 8–23)
BUN/CREAT SERPL: 19.8 (ref 7–25)
BUN/CREAT SERPL: 21.4 (ref 7–25)
CALCIUM SPEC-SCNC: 8.9 MG/DL (ref 8.6–10.5)
CALCIUM SPEC-SCNC: 9.2 MG/DL (ref 8.6–10.5)
CHLORIDE SERPL-SCNC: 101 MMOL/L (ref 98–107)
CHLORIDE SERPL-SCNC: 103 MMOL/L (ref 98–107)
CO2 SERPL-SCNC: 27.5 MMOL/L (ref 22–29)
CO2 SERPL-SCNC: 28.1 MMOL/L (ref 22–29)
CREAT SERPL-MCNC: 0.98 MG/DL (ref 0.76–1.27)
CREAT SERPL-MCNC: 1.01 MG/DL (ref 0.76–1.27)
DEPRECATED RDW RBC AUTO: 55.1 FL (ref 37–54)
EGFRCR SERPLBLD CKD-EPI 2021: 72.4 ML/MIN/1.73
EGFRCR SERPLBLD CKD-EPI 2021: 75.1 ML/MIN/1.73
ERYTHROCYTE [DISTWIDTH] IN BLOOD BY AUTOMATED COUNT: 17.7 % (ref 12.3–15.4)
GLOBULIN UR ELPH-MCNC: 2.7 GM/DL
GLUCOSE BLDC GLUCOMTR-MCNC: 105 MG/DL (ref 70–130)
GLUCOSE BLDC GLUCOMTR-MCNC: 170 MG/DL (ref 70–130)
GLUCOSE BLDC GLUCOMTR-MCNC: 252 MG/DL (ref 70–130)
GLUCOSE BLDC GLUCOMTR-MCNC: 270 MG/DL (ref 70–130)
GLUCOSE SERPL-MCNC: 238 MG/DL (ref 65–99)
GLUCOSE SERPL-MCNC: 99 MG/DL (ref 65–99)
HCT VFR BLD AUTO: 35.6 % (ref 37.5–51)
HGB BLD-MCNC: 11.1 G/DL (ref 13–17.7)
LYMPHOCYTES # BLD MANUAL: 7.86 10*3/MM3 (ref 0.7–3.1)
LYMPHOCYTES NFR BLD MANUAL: 2 % (ref 5–12)
MCH RBC QN AUTO: 26.7 PG (ref 26.6–33)
MCHC RBC AUTO-ENTMCNC: 31.2 G/DL (ref 31.5–35.7)
MCV RBC AUTO: 85.6 FL (ref 79–97)
MONOCYTES # BLD: 0.29 10*3/MM3 (ref 0.1–0.9)
NEUTROPHILS # BLD AUTO: 6.14 10*3/MM3 (ref 1.7–7)
NEUTROPHILS NFR BLD MANUAL: 43 % (ref 42.7–76)
NRBC BLD AUTO-RTO: 0 /100 WBC (ref 0–0.2)
PLAT MORPH BLD: NORMAL
PLATELET # BLD AUTO: 138 10*3/MM3 (ref 140–450)
PMV BLD AUTO: 9.9 FL (ref 6–12)
POTASSIUM SERPL-SCNC: 4.2 MMOL/L (ref 3.5–5.2)
POTASSIUM SERPL-SCNC: 4.9 MMOL/L (ref 3.5–5.2)
PROT SERPL-MCNC: 6.8 G/DL (ref 6–8.5)
QT INTERVAL: 443 MS
RBC # BLD AUTO: 4.16 10*6/MM3 (ref 4.14–5.8)
RBC MORPH BLD: NORMAL
SODIUM SERPL-SCNC: 138 MMOL/L (ref 136–145)
SODIUM SERPL-SCNC: 142 MMOL/L (ref 136–145)
VARIANT LYMPHS NFR BLD MANUAL: 55 % (ref 19.6–45.3)
WBC MORPH BLD: NORMAL
WBC NRBC COR # BLD: 14.29 10*3/MM3 (ref 3.4–10.8)

## 2022-06-10 PROCEDURE — 99232 SBSQ HOSP IP/OBS MODERATE 35: CPT | Performed by: INTERNAL MEDICINE

## 2022-06-10 PROCEDURE — 63710000001 INSULIN LISPRO (HUMAN) PER 5 UNITS: Performed by: INTERNAL MEDICINE

## 2022-06-10 PROCEDURE — 80053 COMPREHEN METABOLIC PANEL: CPT | Performed by: INTERNAL MEDICINE

## 2022-06-10 PROCEDURE — 97161 PT EVAL LOW COMPLEX 20 MIN: CPT

## 2022-06-10 PROCEDURE — 93010 ELECTROCARDIOGRAM REPORT: CPT | Performed by: INTERNAL MEDICINE

## 2022-06-10 PROCEDURE — 82962 GLUCOSE BLOOD TEST: CPT

## 2022-06-10 PROCEDURE — 93005 ELECTROCARDIOGRAM TRACING: CPT | Performed by: INTERNAL MEDICINE

## 2022-06-10 PROCEDURE — 85007 BL SMEAR W/DIFF WBC COUNT: CPT | Performed by: INTERNAL MEDICINE

## 2022-06-10 PROCEDURE — 85025 COMPLETE CBC W/AUTO DIFF WBC: CPT | Performed by: INTERNAL MEDICINE

## 2022-06-10 PROCEDURE — 25010000002 ENOXAPARIN PER 10 MG: Performed by: INTERNAL MEDICINE

## 2022-06-10 RX ORDER — ENOXAPARIN SODIUM 100 MG/ML
40 INJECTION SUBCUTANEOUS EVERY 24 HOURS
Status: DISCONTINUED | OUTPATIENT
Start: 2022-06-10 | End: 2022-06-14 | Stop reason: HOSPADM

## 2022-06-10 RX ADMIN — PANTOPRAZOLE SODIUM 40 MG: 40 TABLET, DELAYED RELEASE ORAL at 06:50

## 2022-06-10 RX ADMIN — INSULIN LISPRO 6 UNITS: 100 INJECTION, SOLUTION INTRAVENOUS; SUBCUTANEOUS at 11:47

## 2022-06-10 RX ADMIN — ATORVASTATIN CALCIUM 10 MG: 20 TABLET, FILM COATED ORAL at 08:39

## 2022-06-10 RX ADMIN — INSULIN LISPRO 6 UNITS: 100 INJECTION, SOLUTION INTRAVENOUS; SUBCUTANEOUS at 16:59

## 2022-06-10 RX ADMIN — LOSARTAN POTASSIUM 25 MG: 25 TABLET, FILM COATED ORAL at 08:39

## 2022-06-10 RX ADMIN — ASPIRIN 81 MG: 81 TABLET, COATED ORAL at 08:39

## 2022-06-10 RX ADMIN — ISOSORBIDE MONONITRATE 30 MG: 30 TABLET ORAL at 08:39

## 2022-06-10 RX ADMIN — INSULIN LISPRO 6 UNITS: 100 INJECTION, SOLUTION INTRAVENOUS; SUBCUTANEOUS at 06:58

## 2022-06-10 RX ADMIN — METOPROLOL SUCCINATE 25 MG: 25 TABLET, EXTENDED RELEASE ORAL at 08:39

## 2022-06-10 RX ADMIN — TAMSULOSIN HYDROCHLORIDE 0.4 MG: 0.4 CAPSULE ORAL at 08:39

## 2022-06-10 RX ADMIN — FUROSEMIDE 40 MG: 40 TABLET ORAL at 08:39

## 2022-06-10 RX ADMIN — INSULIN GLARGINE-YFGN 8 UNITS: 100 INJECTION, SOLUTION SUBCUTANEOUS at 20:38

## 2022-06-10 RX ADMIN — ENOXAPARIN SODIUM 40 MG: 100 INJECTION SUBCUTANEOUS at 16:58

## 2022-06-10 NOTE — THERAPY TREATMENT NOTE
Patient Name: Javier Marin  : 1935    MRN: [id number]                              Today's Date: 6/10/2022       Admit Date: 2022    Visit Dx:     ICD-10-CM ICD-9-CM   1. Chronic systolic CHF (congestive heart failure) (HCC)  I50.22 428.22     428.0   2. PAD (peripheral artery disease) (HCC)  I73.9 443.9   3. Paroxysmal atrial fibrillation (HCC)  I48.0 427.31   4. Coronary artery disease of native artery of native heart with stable angina pectoris ([facility])  I25.118 414.01     413.9     Patient Active Problem List   Diagnosis   • Acute gastritis   • 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HCC)   • Limb swelling   • Diabetes mellitus type 2, uncontrolled   • Hypertension, essential   • Acute hyponatremia   • Acute kidney injury superimposed on chronic kidney disease (HCC)   • Elevated liver function tests   • Leukocytosis   • Anorexia   • Esophagus disorder, thickened distal wall CT Scan 18   • Granulomatous disease, chronic ([facility])   • Lung nodule seen on imaging study   • Compression fracture of lumbar spine, non-traumatic ([facility])   • Lumbar canal stenosis   • Weakness generalized   • Cough   • Hard of hearing   • Ptosis, left eyelid   • Osteoarthritis of multiple joints   • Paroxysmal atrial fibrillation (HCC)   • Carotid stenosis, asymptomatic, bilateral   • Remote history of stroke, small caudate nucleus   • Thrombocytopenia (HCC)   • Fall at home, subsequent encounter   • Hyperlipidemia   • Esophagitis determined by endoscopy by Sheri 18 LA Grade D Lower 1/3   • Hiatal hernia with GERD and esophagitis   • Mandible fracture (HCC)   • Abnormal ECG   • PAD (peripheral artery disease) (HCC)   • Arteriosclerosis of abdominal aorta (HCC)   • Chronic systolic CHF (congestive heart failure) (HCC)   • Coronary artery disease of native artery of native heart with stable angina pectoris (HCC)   • Chronic chest pain with high risk for CAD     Past Medical History:   Diagnosis Date   • A-fib (HCC)    • Advanced  diabetic retinal disease (HCC)    • Anorexia    • Arteriosclerosis of abdominal aorta (HCC) 02/24/2014   • Arthritis    • Carotid stenosis, asymptomatic, bilateral 02/24/2014    16-49%   • Closed right arm fracture 1994   • Compression fracture of lumbar spine, non-traumatic (HCC)    • Diabetes mellitus (HCC)    • Elevated LFTs    • Esophagitis 06/19/2018    DR RASHID GRADE D LOWER   • Esophagus disorder     THICKENED DISTAL WALL-CT SCAN 6/18/18   • Garbled speech    • Granulomatous disease, chronic (HCC)    • Hard of hearing    • Hiatal hernia with GERD and esophagitis    • History of acute gastritis    • Hyperlipidemia    • Hypertension    • Hyponatremia    • Leukocytosis    • Lumbar canal stenosis    • Lung nodule seen on imaging study    • Mandible fracture (HCC) 03/28/2020    FROM FALL   • Osteoarthritis of multiple joints    • Ptosis of left eyelid    • Remote history of stroke     SMALL CAUDATE NECLEUS   • Thrombocytopenia (HCC)    • Weakness generalized      Past Surgical History:   Procedure Laterality Date   • CARDIAC CATHETERIZATION N/A 6/9/2022    Procedure: Left Heart Cath;  Surgeon: Reinaldo Miller MD;  Location: [facility] CATH INVASIVE LOCATION;  Service: Cardiovascular;  Laterality: N/A;   • CARDIAC CATHETERIZATION N/A 6/9/2022    Procedure: Left ventriculography;  Surgeon: Reinaldo Miller MD;  [facility];  Service: Cardiovascular;  Laterality: N/A;   • CARDIAC CATHETERIZATION N/A 6/9/2022    Procedure: Coronary angiography;  Surgeon: Reinaldo Miller MD;  Location: [facility] CATH INVASIVE LOCATION;  Service: Cardiovascular;  Laterality: N/A;   • CATARACT EXTRACTION, BILATERAL     • COLONOSCOPY     • ENDOSCOPY N/A 06/19/2018    Procedure: ESOPHAGOGASTRODUODENOSCOPY WITH BIOPSY;  Surgeon: Toribio Wade MD;  Location: [facility] ENDOSCOPY;  Service: Gastroenterology   • EYE PTOSIS REPAIR Left 11/15/2016    Procedure: LT UPPER LID EYE PTOSIS REPAIR;  Surgeon:  Anup Lancaster MD;  [place] [facility] [place] [facility] [place]  Service:    • EYE SURGERY Left     victreous hemorrhage repair   • HIP ARTHROPLASTY Right    • ORIF MANDIBULAR FRACTURE Bilateral 03/31/2020    Procedure: OPEN REDUCTION AND INTERNAL FIXATION OF BILATERAL MANDIBLE FRACTURES;  Surgeon: Percy Jeronimo MD;  Location: [facility] MAIN OR;  Service: Maxillofacial;  Laterality: Bilateral;      General Information     Row Name 06/10/22 1507          Physical Therapy Time and Intention    Document Type evaluation  -     Mode of Treatment physical therapy;individual therapy  -     Row Name 06/10/22 1507          General Information    Patient Profile Reviewed yes  -SM     Prior Level of Function min assist:  Ind with ambulation. Min A with bathing and dressing from wife  -     Existing Precautions/Restrictions fall  -     Barriers to Rehab medically complex  -     Row Name 06/10/22 1507          Living Environment    People in Home spouse  -[facility] Name 06/10/22 1507          Home Main Entrance    Number of Stairs, Main Entrance three  -     Stair Railings, Main Entrance railings safe and in good condition  -     Row Name 06/10/22 1507          Cognition    Orientation Status (Cognition) oriented x 3  -[facility] Name 06/10/22 1507          Safety Issues, Functional Mobility    Safety Issues Affecting Function (Mobility) judgment;positioning of assistive device;safety precaution awareness  -     Impairments Affecting Function (Mobility) balance;strength;endurance/activity tolerance  -           User Key  (r) = Recorded By, (t) = Taken By, (c) = Cosigned By    Initials Name Provider Type     Lupe Branch PT Physical Therapist               Mobility     Row Name 06/10/22 1509          Bed Mobility    Bed Mobility supine-sit;sit-supine  -     Supine-Sit [place] (Bed Mobility) contact guard  -     Sit-Supine [place] (Bed Mobility) contact guard;verbal cues  -     Assistive Device  (Bed Mobility) bed rails;head of bed elevated  -     Row Name 06/10/22 1508          Bed-Chair Transfer    Bed-Chair [place] (Transfers) not tested  -[facility] Name 06/10/22 1508          Sit-Stand Transfer    Sit-Stand [place] (Transfers) contact guard  -     Assistive Device (Sit-Stand Transfers) cane, straight  -SM     [facility] Name 06/10/22 1508          Gait/Stairs (Locomotion)    [place] Level (Gait) contact guard  -     Assistive Device (Gait) cane, straight  -     Distance in Feet (Gait) 70ft with some unsteadiness noted but not LOB  -     Deviations/Abnormal Patterns (Gait) oneil decreased;festinating/shuffling;gait speed decreased;base of support, narrow  -     [place] Level (Stairs) not tested  -           User Key  (r) = Recorded By, (t) = Taken By, (c) = Cosigned By    Initials Name Provider Type     Lupe Branch PT Physical Therapist               Obj/Interventions     [facility] Name 06/10/22 1510          Range of Motion Comprehensive    General Range of Motion bilateral lower extremity ROM WFL  -[facility] Name 06/10/22 1510          Strength Comprehensive (MMT)    Comment, General Manual Muscle Testing (MMT) Assessment Generalized weakness  -[facility] Name 06/10/22 1510          Motor Skills    Motor Skills functional endurance  -[facility] Name 06/10/22 1510          Balance    Balance Assessment sitting static balance;sitting dynamic balance;sit to stand dynamic balance;standing static balance;standing dynamic balance  -     Static Sitting Balance modified independence  -     Dynamic Sitting Balance standby assist  -     Position, Sitting Balance sitting edge of bed  -     Sit to Stand Dynamic Balance contact guard  -     Static Standing Balance contact guard  -     Dynamic Standing Balance contact guard  -     Position/Device Used, Standing Balance supported;cane, straight  -     Balance Interventions sitting;standing;sit to  stand;supported;static;dynamic;minimal challenge  -SM           User Key  (r) = Recorded By, (t) = Taken By, (c) = Cosigned By    Initials Name Provider Type     Lupe Branch PT Physical Therapist               Goals/Plan     Row Name 06/10/22 1516          Bed Mobility Goal 1 (PT)    Activity/Assistive Device (Bed Mobility Goal 1, PT) bed mobility activities, all  -SM     [place] Level/Cues Needed (Bed Mobility Goal 1, PT) supervision required  -SM     Time Frame (Bed Mobility Goal 1, PT) 10 days  -SM     Row Name 06/10/22 1516          Transfer Goal 1 (PT)    Activity/Assistive Device (Transfer Goal 1, PT) transfers, all  -SM     [place] Level/Cues Needed (Transfer Goal 1, PT) standby assist  -SM     Time Frame (Transfer Goal 1, PT) 10 days  -SM     Row Name 06/10/22 1516          Gait Training Goal 1 (PT)    Activity/Assistive Device (Gait Training Goal 1, PT) gait (walking locomotion)  -SM     [place] Level (Gait Training Goal 1, PT) standby assist  -SM     Distance (Gait Training Goal 1, PT) 200ft  -SM     Time Frame (Gait Training Goal 1, PT) 10 days  -SM           User Key  (r) = Recorded By, (t) = Taken By, (c) = Cosigned By    Initials Name Provider Type     Lupe Branch PT Physical Therapist               Clinical Impression     Row Name 06/10/22 1510          Pain    Pretreatment Pain Rating 0/10 - no pain  -SM     Posttreatment Pain Rating 0/10 - no pain  -SM     Row Name 06/10/22 1510          Plan of Care Review    Plan of Care Reviewed With patient;spouse  -     Outcome Evaluation Pt is a 86y.o male who presents witha history of CAD, Cardiomyopathy, and DM type 2. Patient lives at home with his wife with 3 steps to enter the home. Patient ambulates with a cane on the right at baseline. Patient performed supine to sit and sit to supine transfers with CGA today. Patient ambulated 70ft with single point cane  and CGA today. Patient appeared slightly unsteady and may  benefit from use of a rwx. Patient demonstrates overall decreased strength and activity endurance limiting mobility. Patient would benefit from continued skilled PT to address deficits. At this time PT recommends home with assist and home health. Will continue to monitor.  -     Row Name 06/10/22 1510          Therapy Assessment/Plan (PT)    Rehab Potential (PT) good, to achieve stated therapy goals  -     Criteria for Skilled Interventions Met (PT) yes  -     Therapy Frequency (PT) 6 times/wk  -     Row Name 06/10/22 1510          Vital Signs    O2 Delivery Pre Treatment room air  -SM     O2 Delivery Intra Treatment room air  -SM     O2 Delivery Post Treatment room air  -SM     Pre Patient Position Supine  -SM     Intra Patient Position Standing  -SM     Post Patient Position Supine  -SM     Row Name 06/10/22 1510          Positioning and Restraints    Pre-Treatment Position in bed  -SM     Post Treatment Position bed  -SM     In Bed exit alarm on;encouraged to call for assist;call light within reach;with family/caregiver  -           User Key  (r) = Recorded By, (t) = Taken By, (c) = Cosigned By    Initials Name Provider Type     Lupe rBanch, PT Physical Therapist               Outcome Measures     Row Name 06/10/22 1517 06/10/22 0910       How much help from another person do you currently need...    Turning from your back to your side while in flat bed without using bedrails? 3  - 3  -JS    Moving from lying on back to sitting on the side of a flat bed without bedrails? 3  -SM 3  -JS    Moving to and from a bed to a chair (including a wheelchair)? 3  -SM 3  -JS    Standing up from a chair using your arms (e.g., wheelchair, bedside chair)? 3  - 3  -JS    Climbing 3-5 steps with a railing? 3  -SM 3  -JS    To walk in hospital room? 3  -SM 3  -JS    AM-PAC 6 Clicks Score (PT) 18  -SM 18  -JS    Highest level of mobility 6 --> Walked 10 steps or more  - 6 --> Walked 10 steps or more  -JS     Row Name 06/10/22 1517          Functional Assessment    Outcome Measure Options AM-PAC 6 Clicks Basic Mobility (PT)  -           User Key  (r) = Recorded By, (t) = Taken By, (c) = Cosigned By    Initials Name Provider Type    Janie Kimbrough, RN Registered Nurse    Lupe Morejon PT Physical Therapist                             Physical Therapy Education                 Title: PT OT SLP Therapies (Done)     Topic: Physical Therapy (Done)     Point: Mobility training (Done)     Learning Progress Summary           Patient Acceptance, TB,E, VU by  at 6/10/2022 1517                   Point: Home exercise program (Done)     Learning Progress Summary           Patient Acceptance, TB,E, VU by  at 6/10/2022 1517                   Point: Body mechanics (Done)     Learning Progress Summary           Patient Acceptance, TB,E, VU by  at 6/10/2022 1517                   Point: Precautions (Done)     Learning Progress Summary           Patient Acceptance, TB,E, VU by  at 6/10/2022 1517                               User Key     Initials Effective Dates Name Provider Type Discipline     05/02/22 -  Lupe Branch PT Physical Therapist PT              PT Recommendation and Plan     Plan of Care Reviewed With: patient, spouse  Outcome Evaluation: Pt is a 86y.o male who presents witha history of CAD, Cardiomyopathy, and DM type 2. Patient lives at home with his wife with 3 steps to enter the home. Patient ambulates with a cane on the right at baseline. Patient performed supine to sit and sit to supine transfers with CGA today. Patient ambulated 70ft with single point cane  and CGA today. Patient appeared slightly unsteady and may benefit from use of a rwx. Patient demonstrates overall decreased strength and activity endurance limiting mobility. Patient would benefit from continued skilled PT to address deficits. At this time PT recommends home with assist and home health. Will continue to monitor.      Time Calculation:    PT Charges     Row Name 06/10/22 1517             Time Calculation    Start Time 1421  -      Stop Time 1434  -      Time Calculation (min) 13 min  -      PT Received On 06/10/22  -      PT - Next Appointment 06/11/22  -      PT Goal Re-Cert Due Date 06/20/22  -              Time Calculation- PT    Total Timed Code Minutes- PT 13 minute(s)  -            User Key  (r) = Recorded By, (t) = Taken By, (c) = Cosigned By    Initials Name Provider Type     Lupe Branch PT Physical Therapist              Therapy Charges for Today     Code Description Service Date Service Provider Modifiers Qty    [id number] HC PT EVAL LOW COMPLEXITY 3 6/10/2022 Lupe Branch PT GP 1          PT G-Codes  Outcome Measure Options: AM-PAC 6 Clicks Basic Mobility (PT)  AM-PAC 6 Clicks Score (PT): 18  Patient was intermittently wearing a face mask during this therapy encounter. Therapist used appropriate personal protective equipment including mask and gloves.  Mask used was standard procedure mask. Appropriate PPE was worn during the entire therapy session. Hand hygiene was completed before and after therapy session. Patient is not in enhanced droplet precautions.     Lupe Branch PT  6/10/2022

## 2022-06-10 NOTE — PROGRESS NOTES
"[facility] Clinical Pharmacy Services: Enoxaparin Consult    Javier CERNA Marin has a pharmacy consult to dose prophylactic enoxaparin per Dr. Alegre's request.     Indication: VTE prophylaxis  Home Anticoagulation: None     Relevant clinical data and objective history reviewed:  86 y.o. male 180.3 cm (71\") 69.9 kg (154 lb)   Body mass index is 21.48 kg/m². Results from last 7 days   Lab Units 06/07/22  1252   PLATELETS 10*3/mm3 146     Estimated Creatinine Clearance: 51.9 mL/min (by C-G formula based on SCr of 1.01 mg/dL).    Assessment/Plan    Will start patient on 40mg subcutaneous every 24 hours, adjusted for renal function. Consult order will be discontinued but pharmacy will continue to follow.     Gardenia Wallace, Pharm.D., [facility]  Clinical Pharmacist  Phone Extension #[phone]  "

## 2022-06-10 NOTE — PROGRESS NOTES
LOS: 3 days   Patient Care Team:  Percy Em MD as PCP - General  Percy Em MD as PCP - Family Medicine    Chief Complaint: No chief complaint on file.        Subjective    Today the patient is awake, alert, and has not had any chest pain or other cardiac symptoms at this point.  He feels relatively good and with no complications from his cardiac catheterization yesterday.  I had a long discussion with the patient and his wife about current treatment options.  Since bypass grafting is not possible he agrees to have the PCI procedure since it offers the best likelihood of long-term resolution of his symptoms.  He understands the risks.  I discussed it with his nurse who will contact Dr. Alegre    Interval History:     Patient Complaints: none  Patient Denies: He denies shortness of breath or chest pain, no abdominal pain, no pain or discomfort at his cath site  History taken from: patient chart family RN    Review of Systems:    Overall his review of systems are relatively benign.  He is not having any confusion or disorientation.  He is currently alert, oriented, conversant and he understands was been explained to him about the procedure.  As noted he is not having any chest pain or other cardiac symptoms.  He is not having shortness of breath or abdominal pain.  Overall he seems to be very stable and doing very well.    Objective      Vital Signs  Temp:  [98 °F (36.7 °C)-99.2 °F (37.3 °C)] 98.9 °F (37.2 °C)  Pulse:  [] 109  Resp:  [18] 18  BP: (111-154)/(55-97) 154/97    I/O'S  Intake & Output (last 7 days)       06/03 0701  06/04 0700 06/04 0701  06/05 0700 06/05 0701  06/06 0700 06/06 0701  06/07 0700 06/07 0701  06/08 0700 06/08 0701  06/09 0700 06/09 0701  06/10 0700 06/10 0701  06/11 0700    P.O.       480     Total Intake(mL/kg)       480 (6.9)     Urine (mL/kg/hr)       1460     Total Output       1460     Net       -980                       Physical Exam:   General Appearance  alert, pleasant, appears stated age, interactive and cooperative  Lungs clear to auscultation, respirations regular, respirations even and respirations unlabored  Heart regular rhythm & normal rate  Abdomen normal bowel sounds, no masses, no hepatomegaly, no splenomegaly, soft non-tender, no guarding and no rebound tenderness  Extremities moves extremities well, no edema, no cyanosis and no redness     Results Review:     I reviewed the patient's new clinical results.  I reviewed the patient's other test results and agree with the interpretation                     Results from last 7 days   Lab Units 06/10/22  0315 06/07/22  1252   SODIUM mmol/L 138 137   POTASSIUM mmol/L 4.2 4.9   CHLORIDE mmol/L 101 104   CO2 mmol/L 28.1 22.0   BUN mg/dL 20 20   CREATININE mg/dL 1.01 1.01   GLUCOSE mg/dL 99 153*   CALCIUM mg/dL 8.9 9.2         Results from last 7 days   Lab Units 06/07/22  1252   WBC 10*3/mm3 14.87*   HEMOGLOBIN g/dL 12.1*   HEMATOCRIT % 36.7*   PLATELETS 10*3/mm3 146                           Lab Results   Component Value Date    HGBA1C 7.45 (H) 04/01/2020    HGBA1C 6.80 (H) 06/19/2018     Glucose   Date/Time Value Ref Range Status   06/10/2022 0528 105 70 - 130 mg/dL Final     Comment:     Meter: [id number] : [id number] Arcadio Heck    06/09/2022 2008 178 (H) 70 - 130 mg/dL Final     Comment:     Meter: [id number] : [id number] Arcadio Heck    06/09/2022 1602 196 (H) 70 - 130 mg/dL Final     Comment:     Meter: [id number] : [id number] Shirley Andrade    06/09/2022 1139 188 (H) 70 - 130 mg/dL Final     Comment:     Meter: [id number] : [id number] Shirley Andrade    06/09/2022 0807 190 (H) 70 - 130 mg/dL Final     Comment:     NO TX Meter: [id number] : [id number] Deniz Morris RN       eGFR   Date Value Ref Range Status   06/10/2022 72.4 >60.0 mL/min/1.73 Final     Comment:     National Kidney Foundation and American Society of Nephrology (ASN) Task Force recommended calculation based  on the Chronic Kidney Disease Epidemiology Collaboration (CKD-EPI) equation refit without adjustment for race.   06/07/2022 72.4 >60.0 mL/min/1.73 Final     Comment:     National Kidney Foundation and American Society of Nephrology (ASN) Task Force recommended calculation based on the Chronic Kidney Disease Epidemiology Collaboration (CKD-EPI) equation refit without adjustment for race.           Medication Review:   Scheduled Meds:aspirin, 81 mg, Oral, Daily  atorvastatin, 10 mg, Oral, Daily  furosemide, 40 mg, Oral, Daily  insulin glargine, 8 Units, Subcutaneous, Nightly  insulin lispro, 6 Units, Subcutaneous, TID With Meals  isosorbide mononitrate, 30 mg, Oral, Daily  losartan, 25 mg, Oral, Daily  metoprolol succinate XL, 25 mg, Oral, Daily  pantoprazole, 40 mg, Oral, QAM  tamsulosin, 0.4 mg, Oral, Daily      Continuous Infusions:sodium chloride, 75 mL/hr, Last Rate: 75 mL/hr (06/09/22 0810)  sodium chloride, 50 mL/hr, Last Rate: Stopped (06/09/22 1825)      PRN Meds:.•  acetaminophen  •  nitroglycerin              Patient Active Problem List   Diagnosis   • Acute gastritis   • 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HCC)   • Limb swelling   • Diabetes mellitus type 2, uncontrolled   • Hypertension, essential   • Acute hyponatremia   • Acute kidney injury superimposed on chronic kidney disease (HCC)   • Elevated liver function tests   • Leukocytosis   • Anorexia   • Esophagus disorder, thickened distal wall CT Scan 6/18/18   • Granulomatous disease, chronic (HCC)   • Lung nodule seen on imaging study   • Compression fracture of lumbar spine, non-traumatic (HCC)   • Lumbar canal stenosis   • Weakness generalized   • Cough   • Hard of hearing   • Ptosis, left eyelid   • Osteoarthritis of multiple joints   • Paroxysmal atrial fibrillation (HCC)   • Carotid stenosis, asymptomatic, bilateral   • Remote history of stroke, small caudate nucleus   • Thrombocytopenia (HCC)   • Fall at home, subsequent encounter   •  Hyperlipidemia   • Esophagitis determined by endoscopy by Sheri 6/19/18 LA Grade D Lower 1/3   • Hiatal hernia with GERD and esophagitis   • Mandible fracture ([facility])   • Abnormal ECG   • PAD (peripheral artery disease) ([facility])   • Arteriosclerosis of abdominal aorta ([facility])   • Chronic systolic CHF (congestive heart failure) ([facility])   • Coronary artery disease of native artery of native heart with stable angina pectoris ([facility])   • Chronic chest pain with high risk for CAD       #1 ASCVD-he has multivessel disease and is not a candidate for bypass grafting but is a candidate for PCI with atherectomy and stent placement to his LAD.  He agrees to have this procedure and he seems to understand the current risk.  His wife is present and she also seems to understand.  At this point is just a matter of getting him on the schedule and proceeding.    2.  Cardiomyopathy-he does have a decreased ejection fraction but the cardiologist feels that its better than initially evaluated.  For now he has no evidence for congestive failure.    3.  Diabetes mellitus type 2-generally speaking his blood sugars are under good control    4.  Hyper cholesterolemia-his cholesterol is well controlled on atorvastatin    5.  Degenerative joint disease-most of the pain and difficulty he has iinvolves his right hip but he has other areas as well.  Nonetheless he is well compensated and remains very active and vigorous at home.    6.  Reflux-controlled with PPI    7.  BPH-on medications with minimal symptoms.    Overall he is very stable and doing well at this point.  He has agreed to have stenting and I think in the long run that is a good choice despite the risks.  Hopefully he will do well.          Plan for disposition:Where: home    Percy Em MD  06/10/22  10:36 EDT          Time:

## 2022-06-10 NOTE — PROGRESS NOTES
[facility] Follow Up    Chief Complaint: Follow up CAD, ICMP    Interval History: No reports or chest pain or shortness of breath at rest.  No events overnight.    Objective:     Objective:  Temp:  [97.6 °F (36.4 °C)-98.1 °F (36.7 °C)] 97.6 °F (36.4 °C)  Heart Rate:  [58-88] 58  Resp:  [14-22] 18  BP: ()/(54-88) 101/61     Intake/Output Summary (Last 24 hours) at 6/10/2022 0743  Last data filed at 6/10/2022 0630  Gross per 24 hour   Intake 480 ml   Output 1460 ml   Net -980 ml     Body mass index is 21.48 kg/m².      06/09/22  0804   Weight: 69.9 kg (154 lb)     Weight change:       Physical Exam:   General : Alert, cooperative, in no acute distress.  Neuro: Alert,cooperative and oriented.  Lungs: CTAB. Normal respiratory effort and rate.  CV: Regular rate and rhythm, normal S1 and S2, no murmurs, gallops or rubs.  ABD: Soft, nontender, nondistended. Positive bowel sounds.  Extr: No edema or cyanosis, moves all extremities.    Lab Review:   Results from last 7 days   Lab Units 06/10/22  0315 06/07/22  1252   SODIUM mmol/L 138 137   POTASSIUM mmol/L 4.2 4.9   CHLORIDE mmol/L 101 104   CO2 mmol/L 28.1 22.0   BUN mg/dL 20 20   CREATININE mg/dL 1.01 1.01   GLUCOSE mg/dL 99 153*   CALCIUM mg/dL 8.9 9.2         Results from last 7 days   Lab Units 06/07/22  1252   WBC 10*3/mm3 14.87*   HEMOGLOBIN g/dL 12.1*   HEMATOCRIT % 36.7*   PLATELETS 10*3/mm3 146                   Invalid input(s): LDLCALC          I reviewed the patient's new clinical results.  I personally viewed and interpreted the patient's EKG  Current Medications:   Scheduled Meds:aspirin, 81 mg, Oral, Daily  atorvastatin, 10 mg, Oral, Daily  furosemide, 40 mg, Oral, Daily  insulin glargine, 8 Units, Subcutaneous, Nightly  insulin lispro, 6 Units, Subcutaneous, TID With Meals  isosorbide mononitrate, 30 mg, Oral, Daily  losartan, 25 mg, Oral, Daily  metoprolol succinate XL, 25 mg, Oral, Daily  pantoprazole, 40 mg, Oral,  QAM  tamsulosin, 0.4 mg, Oral, Daily      Continuous Infusions:sodium chloride, 75 mL/hr, Last Rate: 75 mL/hr (06/09/22 0810)  sodium chloride, 50 mL/hr, Last Rate: Stopped (06/09/22 1825)        Allergies:  No Known Allergies    Assessment/Plan:     1. Coronary artery disease.  Severe two-vessel coronary artery disease including severely calcified ostial LAD 90 to 95% stenosis and 100% chronic total occlusion of the ostial left circumflex artery with right to left collateral filling.    2. Ischemic cardiomyopathy.  EF 30% by echo in 2/2022.  However by left ventriculogram yesterday EF appears to be closer to about 40%.  On guideline directed management with metoprolol succinate and losartan.  3. Prior stroke  4. Carotid artery disease.   5. Hypertension.  Well-controlled.  6. Hyperlipidemia  7. Diabetes mellitus type 2    - Cardiothoracic surgery evaluated the patient yesterday and does not feel he is a candidate for CABG due to his overall frailty.  Recommend medical management versus PCI.  - I reviewed the patient's films and I think PCI with likely atherectomy followed by stent placement of his ostial/proximal LAD extending back into his left main artery would be an option.  This would be a higher risk procedure but I think would be his best option for at least partial revascularization.  The circumflex is not amenable to PCI but fortunately this has collateral filling from the minimally diseased RCA.    I think this can be done without additional circulatory support with Impella.    -I attempted to discuss the options of medical management versus PCI with the patient.  It was difficult due to the patient's issues with hearing however he appeared to understand.  However I will come back and discuss this once his wife is present at bedside.  If they do decide they want to proceed with PCI we can proceed possibly later today or Monday.  -In the meanwhile continue current medical management.    Michelle Alegre,  MD  06/10/22  07:43 EDT     ADDENDUM:  Discussed the above with the patient's wife at bedside.  I went over the options including PCI of the LAD with atherectomy including the procedure, risk, benefits at length with the patient's wife and the patient again.  The patient and wife would like to think about the options and discuss it further with Dr. Em.    No plans for procedure today.  If they opt to proceed with PCI will consider proceeding next week (possibly Monday).  If they opt not to proceed with further procedures will optimize his medications before discharge.

## 2022-06-10 NOTE — PLAN OF CARE
Goal Outcome Evaluation:  Plan of Care Reviewed With: patient, spouse           Outcome Evaluation: Pt is a 86y.o male who presents witha history of CAD, Cardiomyopathy, and DM type 2. Patient lives at home with his wife with 3 steps to enter the home. Patient ambulates with a cane on the right at baseline. Patient performed supine to sit and sit to supine transfers with CGA today. Patient ambulated 70ft with single point cane  and CGA today. Patient appeared slightly unsteady and may benefit from use of a rwx. Patient demonstrates overall decreased strength and activity endurance limiting mobility. Patient would benefit from continued skilled PT to address deficits. At this time PT recommends home with assist and home health. Will continue to monitor.

## 2022-06-10 NOTE — CASE MANAGEMENT/SOCIAL WORK
Discharge Planning Assessment  [facility]     Patient Name: Javier Marin  MRN: [id number]  Today's Date: 6/10/2022    Admit Date: 6/9/2022     Discharge Needs Assessment     Row Name 06/10/22 1348       Living Environment    People in Home spouse    Name(s) of People in Home Olga Martinez, wife of 65 years    Current Living Arrangements home    Primary Care Provided by spouse/significant other    Provides Primary Care For no one, unable/limited ability to care for self    Family Caregiver if Needed spouse    Family Caregiver Names Olga Marin, wife    Quality of Family Relationships helpful;supportive;involved    Able to Return to Prior Arrangements yes       Resource/Environmental Concerns    Resource/Environmental Concerns none    Transportation Concerns none       Transition Planning    Patient/Family Anticipates Transition to home with family    Patient/Family Anticipated Services at Transition none    Transportation Anticipated family or friend will provide       Discharge Needs Assessment    Readmission Within the Last 30 Days no previous admission in last 30 days    Equipment Currently Used at Home bp cuff;scales;cane, straight;wheelchair;glucometer  has a rolling walker that rarely uses    Concerns to be Addressed denies needs/concerns at this time    Anticipated Changes Related to Illness none    Equipment Needed After Discharge none    Provided Post Acute Provider List? Refused    Refused Provider List Comment Spouse declined need for list               Discharge Plan     Row Name 06/10/22 2371       Plan    Plan Home w/ wife assist; Denies needs.    Plan Comments CCP spoke with Pt spouse, Olga Marin ([phone]), via bedside phone to complete Pt discharge screening assessment.  CCP introduced self and role.  Spouse confirmed information on Pt face sheet.  Spouse reports she assist Pt with bathing (washing his back) and some dressing.  Spouse reports Pt uses straight cane when  "ambulating and loves to, \"piddle in garage and use the zero turn to mow lawn\".  Pt no longer drives.  Spouse provides transportation.  Pt uses scales, glucometer, BP cuff monitor, straight cane, and wheelchair (if going to be out shopping with wife) at home.  Pt lives in single-story home with a basement and two entrance stair steps.  Pt PCP is, Percy Em.  Pt pharmacy confirmed as, [facility].  Spouse denies current discharge needs.  Plan is for Pt to return home at d/c and spouse will transport.  CCP continues to follow.......Jessica S/FARAZ CM              Continued Care and Services - Admitted Since 6/9/2022    Coordination has not been started for this encounter.       Expected Discharge Date and Time     Expected Discharge Date Expected Discharge Time    Jun 14, 2022          Demographic Summary     Row Name 06/10/22 1346       General Information    Admission Type inpatient    Arrived From procedure suite  cath lab    Required Notices Provided Important Message from Medicare    Referral Source admission list    Reason for Consult discharge planning    Preferred Language English       Contact Information    Permission Granted to Share Info With family/designee               Functional Status     Row Name 06/10/22 1347       Functional Status    Usual Activity Tolerance moderate    Current Activity Tolerance fair       Functional Status, IADL    Medications assistive person    Meal Preparation assistive equipment and person    Housekeeping assistive equipment and person    Laundry assistive equipment and person    Shopping assistive equipment and person    IADL Comments Wife assist with all the above.  Pt uses a straight can when ambulating.       Employment/    Employment Status retired               Psychosocial    No documentation.                Abuse/Neglect    No documentation.                Legal    No documentation.                Substance Abuse    No documentation.        "         Patient Forms     Row Name 06/10/22 7606       Patient Forms    Important Message from Medicare ([facility]) Delivered    Delivered to Support person    Method of delivery Telephone  D/W wife via phone and she request Pt copy be mailed to their home address on face sheet.                  Jessica Chatterjee RN

## 2022-06-11 PROBLEM — E11.9 TYPE 2 DIABETES MELLITUS, WITHOUT LONG-TERM CURRENT USE OF INSULIN (HCC): Status: ACTIVE | Noted: 2018-06-18

## 2022-06-11 PROBLEM — I25.10 ASCVD (ARTERIOSCLEROTIC CARDIOVASCULAR DISEASE): Status: ACTIVE | Noted: 2022-06-11

## 2022-06-11 PROBLEM — I25.5 ISCHEMIC CARDIOMYOPATHY: Status: ACTIVE | Noted: 2022-06-11

## 2022-06-11 PROBLEM — N40.0 BPH WITHOUT OBSTRUCTION/LOWER URINARY TRACT SYMPTOMS: Status: ACTIVE | Noted: 2022-06-11

## 2022-06-11 PROBLEM — Z79.4 TYPE 2 DIABETES MELLITUS, WITH LONG-TERM CURRENT USE OF INSULIN (HCC): Status: ACTIVE | Noted: 2018-06-18

## 2022-06-11 PROBLEM — I25.119 CORONARY ARTERY DISEASE INVOLVING NATIVE CORONARY ARTERY WITH ANGINA PECTORIS (HCC): Status: ACTIVE | Noted: 2022-06-11

## 2022-06-11 PROBLEM — R55 SYNCOPAL EPISODES: Status: ACTIVE | Noted: 2022-06-11

## 2022-06-11 PROBLEM — M16.11 OSTEOARTHRITIS OF RIGHT HIP: Status: ACTIVE | Noted: 2022-06-11

## 2022-06-11 LAB
ALBUMIN SERPL-MCNC: 3.9 G/DL (ref 3.5–5.2)
ALBUMIN/GLOB SERPL: 1.5 G/DL
ALP SERPL-CCNC: 110 U/L (ref 39–117)
ALT SERPL W P-5'-P-CCNC: 14 U/L (ref 1–41)
ANION GAP SERPL CALCULATED.3IONS-SCNC: 9 MMOL/L (ref 5–15)
AST SERPL-CCNC: 12 U/L (ref 1–40)
BILIRUB SERPL-MCNC: 0.5 MG/DL (ref 0–1.2)
BUN SERPL-MCNC: 20 MG/DL (ref 8–23)
BUN/CREAT SERPL: 19.8 (ref 7–25)
CALCIUM SPEC-SCNC: 9.2 MG/DL (ref 8.6–10.5)
CHLORIDE SERPL-SCNC: 99 MMOL/L (ref 98–107)
CO2 SERPL-SCNC: 27 MMOL/L (ref 22–29)
CREAT SERPL-MCNC: 1.01 MG/DL (ref 0.76–1.27)
DEPRECATED RDW RBC AUTO: 56.4 FL (ref 37–54)
EGFRCR SERPLBLD CKD-EPI 2021: 72.4 ML/MIN/1.73
EOSINOPHIL # BLD MANUAL: 0.16 10*3/MM3 (ref 0–0.4)
EOSINOPHIL NFR BLD MANUAL: 1 % (ref 0.3–6.2)
ERYTHROCYTE [DISTWIDTH] IN BLOOD BY AUTOMATED COUNT: 17.8 % (ref 12.3–15.4)
GLOBULIN UR ELPH-MCNC: 2.6 GM/DL
GLUCOSE BLDC GLUCOMTR-MCNC: 159 MG/DL (ref 70–130)
GLUCOSE BLDC GLUCOMTR-MCNC: 200 MG/DL (ref 70–130)
GLUCOSE BLDC GLUCOMTR-MCNC: 205 MG/DL (ref 70–130)
GLUCOSE BLDC GLUCOMTR-MCNC: 218 MG/DL (ref 70–130)
GLUCOSE SERPL-MCNC: 191 MG/DL (ref 65–99)
HBA1C MFR BLD: 8.2 % (ref 4.8–5.6)
HCT VFR BLD AUTO: 37.7 % (ref 37.5–51)
HGB BLD-MCNC: 12 G/DL (ref 13–17.7)
LYMPHOCYTES # BLD MANUAL: 9.5 10*3/MM3 (ref 0.7–3.1)
LYMPHOCYTES NFR BLD MANUAL: 1 % (ref 5–12)
MCH RBC QN AUTO: 27.3 PG (ref 26.6–33)
MCHC RBC AUTO-ENTMCNC: 31.8 G/DL (ref 31.5–35.7)
MCV RBC AUTO: 85.7 FL (ref 79–97)
MONOCYTES # BLD: 0.16 10*3/MM3 (ref 0.1–0.9)
MYELOCYTES NFR BLD MANUAL: 1 % (ref 0–0)
NEUTROPHILS # BLD AUTO: 5.61 10*3/MM3 (ref 1.7–7)
NEUTROPHILS NFR BLD MANUAL: 36 % (ref 42.7–76)
PLAT MORPH BLD: NORMAL
PLATELET # BLD AUTO: 150 10*3/MM3 (ref 140–450)
PMV BLD AUTO: 9.9 FL (ref 6–12)
POTASSIUM SERPL-SCNC: 4.2 MMOL/L (ref 3.5–5.2)
PROT SERPL-MCNC: 6.5 G/DL (ref 6–8.5)
RBC # BLD AUTO: 4.4 10*6/MM3 (ref 4.14–5.8)
RBC MORPH BLD: NORMAL
SODIUM SERPL-SCNC: 135 MMOL/L (ref 136–145)
VARIANT LYMPHS NFR BLD MANUAL: 61 % (ref 19.6–45.3)
WBC MORPH BLD: NORMAL
WBC NRBC COR # BLD: 15.58 10*3/MM3 (ref 3.4–10.8)

## 2022-06-11 PROCEDURE — 97116 GAIT TRAINING THERAPY: CPT

## 2022-06-11 PROCEDURE — 99232 SBSQ HOSP IP/OBS MODERATE 35: CPT | Performed by: INTERNAL MEDICINE

## 2022-06-11 PROCEDURE — 80053 COMPREHEN METABOLIC PANEL: CPT | Performed by: INTERNAL MEDICINE

## 2022-06-11 PROCEDURE — 97530 THERAPEUTIC ACTIVITIES: CPT

## 2022-06-11 PROCEDURE — 85025 COMPLETE CBC W/AUTO DIFF WBC: CPT | Performed by: INTERNAL MEDICINE

## 2022-06-11 PROCEDURE — 83036 HEMOGLOBIN GLYCOSYLATED A1C: CPT | Performed by: INTERNAL MEDICINE

## 2022-06-11 PROCEDURE — 63710000001 INSULIN LISPRO (HUMAN) PER 5 UNITS: Performed by: INTERNAL MEDICINE

## 2022-06-11 PROCEDURE — 25010000002 ENOXAPARIN PER 10 MG: Performed by: INTERNAL MEDICINE

## 2022-06-11 PROCEDURE — 82962 GLUCOSE BLOOD TEST: CPT

## 2022-06-11 PROCEDURE — 85007 BL SMEAR W/DIFF WBC COUNT: CPT | Performed by: INTERNAL MEDICINE

## 2022-06-11 RX ORDER — ISOSORBIDE MONONITRATE 30 MG/1
30 TABLET, EXTENDED RELEASE ORAL DAILY
Status: DISCONTINUED | OUTPATIENT
Start: 2022-06-12 | End: 2022-06-12

## 2022-06-11 RX ADMIN — TAMSULOSIN HYDROCHLORIDE 0.4 MG: 0.4 CAPSULE ORAL at 10:02

## 2022-06-11 RX ADMIN — ATORVASTATIN CALCIUM 10 MG: 20 TABLET, FILM COATED ORAL at 10:03

## 2022-06-11 RX ADMIN — ISOSORBIDE MONONITRATE 30 MG: 30 TABLET ORAL at 10:02

## 2022-06-11 RX ADMIN — LOSARTAN POTASSIUM 25 MG: 25 TABLET, FILM COATED ORAL at 10:02

## 2022-06-11 RX ADMIN — ENOXAPARIN SODIUM 40 MG: 100 INJECTION SUBCUTANEOUS at 17:51

## 2022-06-11 RX ADMIN — ASPIRIN 81 MG: 81 TABLET, COATED ORAL at 10:03

## 2022-06-11 RX ADMIN — METOPROLOL SUCCINATE 25 MG: 25 TABLET, EXTENDED RELEASE ORAL at 10:02

## 2022-06-11 RX ADMIN — INSULIN GLARGINE-YFGN 8 UNITS: 100 INJECTION, SOLUTION SUBCUTANEOUS at 21:37

## 2022-06-11 RX ADMIN — FUROSEMIDE 40 MG: 40 TABLET ORAL at 10:02

## 2022-06-11 RX ADMIN — INSULIN LISPRO 6 UNITS: 100 INJECTION, SOLUTION INTRAVENOUS; SUBCUTANEOUS at 17:51

## 2022-06-11 RX ADMIN — INSULIN LISPRO 6 UNITS: 100 INJECTION, SOLUTION INTRAVENOUS; SUBCUTANEOUS at 11:36

## 2022-06-11 RX ADMIN — INSULIN LISPRO 6 UNITS: 100 INJECTION, SOLUTION INTRAVENOUS; SUBCUTANEOUS at 06:34

## 2022-06-11 RX ADMIN — PANTOPRAZOLE SODIUM 40 MG: 40 TABLET, DELAYED RELEASE ORAL at 06:34

## 2022-06-11 NOTE — PLAN OF CARE
Goal Outcome Evaluation:  Plan of Care Reviewed With: patient, spouse        Progress: improving  Outcome Evaluation: Patient is progressing with PT and ambulated much further today.  He appears to be close to attaining his goals for PT. Patient was intermittently wearing a face mask during this therapy encounter. Therapist used appropriate personal protective equipment including eye protection, mask, and gloves.  Mask used was standard procedure mask. Appropriate PPE was worn during the entire therapy session. Hand hygiene was completed before and after therapy session. Patient is not in enhanced droplet precautions.

## 2022-06-11 NOTE — PROGRESS NOTES
[facility]  INTERNAL MEDICINE  FELIX RODRIGUEZ MD  [address] [place] [address]  Phone [phone] Fax [phone]  E-mail:  [email or web address]      INTERNAL MEDICINE DAILY PROGRESS NOTE  Felix Rodriguez M.D.  2022            Patient Identification:  Name: Javier Marin  Age: 86 y.o.  Sex: male  :  1935  MRN: [id number]         Primary Care Physician: Percy Em MD  LENGTH OF STAY 2 DAYS    Consults     Date and Time Order Name Status Description    2022 11:42 AM Inpatient Cardiothoracic Surgery Consult Completed           Chief Complaint:  Coronary artery disease involving native coronary artery with angina pectoris    History of Present Illness:  Subjective     Interval History: Patient is a 86 y.o.male who presented with coronary artery disease involving native coronary artery with angina pectoris to the [facility] cardiology clinic where he was seen initially by Dr. Fadi Alvarez and was referred as a direct admit to [facility] for evaluation.  Patient did apparently have a syncopal episode back in January of this year and a work-up at that time discovered that he did have cardiomyopathy with nonsustained ventricular tachycardia.  He was started on a beta-blocker at the time and seemed to do fairly well.  Patient was seen by Dr. Alvarez in the cardiology clinic and a CT angiogram was done which ended up showing heavy coronary calcifications with a CAC score greater than 6000 and suggested severe coronary obstructive disease.  Patient was noted to have had dyspnea with walking any significant distance and with that had some lower precordial chest pressure which he thought was reflux but which very well might of been angina.  The pattern of this dyspnea seemed more frequent as of late.  Patient had been evaluated in the past by Dr. Mccrary for paroxysmal atrial fibrillation.  A Holter monitor done February  25, 2022 that showed 3 episodes of nonsustained monomorphic ventricular tachycardia longest in length 11 beats duration.  An echocardiogram done at the time did show new global hypokinesis with ejection fraction of 29% and right ventricular function was diminished.  Patient was noted to have no previous history of coronary disease and had a stress test in 2018 that showed no ischemia.  Patient is also known to have bilateral carotid artery disease greater on the right than the left with 70 to 80% stenosis on the right.  After evaluation of the history and test results, Dr. Alvarez felt it best to admit the patient to the hospital, ad isosorbide for possible angina, and arrange for cardiac catheterization.  Patient was seen by Dr. Miller at the hospital and underwent cardiac catheterization by Dr. Alegre on 6/10/2022 which did show ischemic heart disease with severe three-vessel coronary artery disease and critical ostial LAD stenosis with complete occlusion of the left circumflex and collaterals.  Patient was felt to have severely compromised functional status though ejection fraction was estimated at 40%.  Patient was referred to CT surgery for evaluation and seen by  who felt the patient looked very frail, had had multiple falls in the last year, and was not a good surgical candidate.  Patient is now being evaluated for PCI versus medical treatment.  Dr. Alegre has recommended PCI of the LAD with arthrectomy though the procedure is somewhat higher in risk than usual due to the patient's frail state and the complexity of his lesion.  After thinking about this decision overnight, patient and wife have opted to proceed with PCI which is scheduled for early next week on Monday.    Patient was initially seen by his regular internal medicine physician, Dr. Percy Em for consult.  Patient is reported to be a vigorous 86-year-old white male who is very active at home and only recently slowed down by his  symptoms discussed above.  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  He is very compliant with diet, watches his medicines closely, and has been reliable and follow-ups at Dr. Jewell office's.  Patient's other comorbidities include well-controlled type 2 diabetes mellitus, hyperlipidemia, gastroesophageal reflux on PPI, and arthritis treated with over-the-counter medications.  He has no history of smoking or lung disease.  He and his wife both are quite aware of the severity of the coronary artery disease and open to treatment after discussion with Dr. Pollock.  Case was transferred by Dr Em to me for coverage over the weekend.    6/11/2022. I personally saw the patient for the first time on this date in his room on [address] [facility] room #2214.  Patient was sitting up at bedside with his wife eating dinner.  I wore full PPE for the exam including an N95 facemask, goggles, white lab coat, and gloves when touching patient.  I performed thorough hand hygiene before and after the patient visit.  Patient is a delightful 86-year-old white male who remembers me well from a previous visit to the hospital approximately 4 years ago when I covered for Dr. Pollock while he was on vacation.  Patient denies any discomfort at all at the present time.  He has no shortness of breath, no chest pain, no nausea and no vomiting.  Labs today are quite stable though glucose has been up slightly to a max of 252 with stress.  Patient's white count which normally runs slightly up was at 14.29 with no signs of infection.  Patient's hemoglobin was 11.1 which is stable.  Platelet count is slightly low at 138,000 which is a typical level for the patient.  Patient has had COVID screening which was negative.  An EKG done yesterday at time of admission showed normal sinus rhythm with occasional PVCs, right bundle branch block and left anterior fascicular block, and nonspecific ST  changes.  Vital signs are stable with temperature of 97.6, pulse 57, respiratory rate 16, and blood pressure at 82/52.  Blood pressure slightly low possibly due to Imdur which was recently started by cardiology.  Patient is eating and drinking well and has no signs of volume depletion currently.  Patient seems to be in no distress at the present time.  Cardiology is planning PCI intervention on Monday a.m.  They have written orders to hold Glucophage for 24 hours prior to the procedure.  No other major changes are currently ordered in his care.  We will continue following through the procedure with you.      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[facility])    • Anorexia    • Arteriosclerosis of abdominal aorta ([facility]) 02/24/2014   • Arthritis    • Carotid stenosis, asymptomatic, bilateral 02/24/2014    16-49%   • Closed right arm fracture 1994   • Compression fracture of lumbar spine, non-traumatic ([facility])    • Diabetes mellitus ([facility])    • Elevated LFTs    • Esophagitis 06/19/2018    DR RASHID GRADE D LOWER   • Esophagus disorder     THICKENED DISTAL WALL-CT SCAN 6/18/18   • Garbled speech    • Granulomatous disease, chronic ([facility])    • Hard of hearing    • Hiatal hernia with GERD and esophagitis    • History of acute gastritis    • Hyperlipidemia    • Hypertension    • Hyponatremia    • Leukocytosis    • Lumbar canal stenosis    • Lung nodule seen on imaging study    • Mandible fracture ([facility]) 03/28/2020    FROM FALL   • Osteoarthritis  of multiple joints    • Ptosis of left eyelid    • Remote history of stroke     SMALL CAUDATE NECLEUS   • Thrombocytopenia ([facility])    • Weakness generalized      Past Surgical History:   Procedure Laterality Date   • CARDIAC CATHETERIZATION N/A 6/9/2022    Procedure: Left Heart Cath;  Surgeon: Reinaldo Miller MD;  Location: [facility] CATH INVASIVE LOCATION;  Service: Cardiovascular;  Laterality: N/A;   • CARDIAC CATHETERIZATION N/A 6/9/2022    Procedure: Left ventriculography;  Surgeon: Reinaldo Miller MD;  Location: [facility] CATH INVASIVE LOCATION;  Service: Cardiovascular;  Laterality: N/A;   • CARDIAC CATHETERIZATION N/A 6/9/2022    Procedure: Coronary angiography;  Surgeon: Reinaldo Miller MD;  Location: [facility] CATH INVASIVE LOCATION;  Service: Cardiovascular;  Laterality: N/A;   • CATARACT EXTRACTION, BILATERAL     • COLONOSCOPY     • ENDOSCOPY N/A 06/19/2018    Procedure: ESOPHAGOGASTRODUODENOSCOPY WITH BIOPSY;  Surgeon: Toribio Wade MD;  Location: [facility] ENDOSCOPY;  Service: Gastroenterology   • EYE PTOSIS REPAIR Left 11/15/2016    Procedure: LT UPPER LID EYE PTOSIS REPAIR;  Surgeon: Anup Lancaster MD;  Location: [facility] OR OSC;  Service:    • EYE SURGERY Left     victreous hemorrhage repair   • HIP ARTHROPLASTY Right    • ORIF MANDIBULAR FRACTURE Bilateral 03/31/2020    Procedure: OPEN REDUCTION AND INTERNAL FIXATION OF BILATERAL MANDIBLE FRACTURES;  Surgeon: Percy Jeronimo MD;  Location: [facility] MAIN OR;  Service: Maxillofacial;  Laterality: Bilateral;     No Known Allergies  Family History   Problem Relation Age of Onset   • Pancreatic cancer Mother    • Hypertension Neg Hx    • Hyperlipidemia Neg Hx    • Heart failure Neg Hx    • Heart disease Neg Hx    • Heart attack Neg Hx    • Malig Hyperthermia Neg Hx      Social History     Socioeconomic History   • Marital status:    Tobacco Use   • Smoking status: Former Smoker     Years: 5.00     Types: Cigars     Quit date: 1994      "Years since quittin.4   • Smokeless tobacco: Never Used   Vaping Use   • Vaping Use: Never used   Substance and Sexual Activity   • Alcohol use: No   • Drug use: No   • Sexual activity: Not Currently     Partners: Female       PMH, FH, SH and ROS completed with Admission History and Physical and updated in EPIC system.        Objective     Scheduled Meds:aspirin, 81 mg, Oral, Daily  atorvastatin, 10 mg, Oral, Daily  enoxaparin, 40 mg, Subcutaneous, Q24H  furosemide, 40 mg, Oral, Daily  insulin glargine, 8 Units, Subcutaneous, Nightly  insulin lispro, 6 Units, Subcutaneous, TID With Meals  [START ON 2022] isosorbide mononitrate, 30 mg, Oral, Daily  losartan, 25 mg, Oral, Daily  metoprolol succinate XL, 25 mg, Oral, Daily  pantoprazole, 40 mg, Oral, QAM  tamsulosin, 0.4 mg, Oral, Daily      Continuous Infusions:sodium chloride, 75 mL/hr, Last Rate: 75 mL/hr (22 0810)        Vital signs in last 24 hours:  Temp:  [97.6 °F (36.4 °C)-98.6 °F (37 °C)] 98 °F (36.7 °C)  Heart Rate:  [] 73  Resp:  [16-18] 18  BP: ()/(52-81) 118/66    Intake/Output:    Intake/Output Summary (Last 24 hours) at 2022 2131  Last data filed at 2022 1700  Gross per 24 hour   Intake 840 ml   Output 300 ml   Net 540 ml       Exam:  /66 (BP Location: Right arm, Patient Position: Lying)   Pulse 73   Temp 98 °F (36.7 °C)   Resp 18   Ht 180.3 cm (71\")   Wt 69.9 kg (154 lb)   SpO2 96%   BMI 21.48 kg/m²     Constitutional:  Alert, cooperative, no distress, AAOx3, resting comfortably, eating dinner with wife at time of my visit.   Head:      Normocephalic, without obvious abnormality, atraumatic   Eyes:     PERRLA, conjunctiva/corneas clear, no icterus, no conjunctival                                     pallor, EOM's intact, both eyes      ENT and Mouth: Lips, tongue, gums normal; oral mucosa pink and moist   Neck:     Supple, symmetrical, trachea midline, no JVD  Respiratory:     Clear to auscultation " bilaterally, respirations unlabored  Cardiovascular:  Regular rate and rhythm, Occasional PVCs, blood pressure slightly low at time of visit. S1 and S2 normal, no murmur,      no  Rub or gallop.  Pulses normal.    Gastrointestinal:   BS present x 4 Soft, non-tender, bowel sounds active,      no masses, no hepatosplenomegaly                                                     :       No hernia.  Normal exam for sex.         Musculoskeletal: Extremities normal, atraumatic, no cyanosis or edema     No arthropathy.  No deformity.  Gait normal                                                 Skin:   Skin is warm and dry,  no rashes, swelling or palpable lesions   Neurologic:  CN -XII intact, motor strength grossly intact, sensation grossly intact to light touch, no focal reflex deficits noted    Psychiatric:     Alert,oriented X3, no delusions, psychoses, depression or anxiety    Heme/Lymph/Imun:   No bruises, petechiae.  Lymph nodes normal in size/configuration       Data Review:  Lab Results   Component Value Date    CALCIUM 9.2 06/11/2022    PHOS 1.4 (L) 06/20/2018     Results from last 7 days   Lab Units 06/11/22  0333 06/10/22  1151 06/10/22  0315 06/07/22  1252   AST (SGOT) U/L 12 14  --   --    SODIUM mmol/L 135* 142 138 137   POTASSIUM mmol/L 4.2 4.9 4.2 4.9   CHLORIDE mmol/L 99 103 101 104   CO2 mmol/L 27.0 27.5 28.1 22.0   BUN mg/dL 20 21 20 20   CREATININE mg/dL 1.01 0.98 1.01 1.01   GLUCOSE mg/dL 191* 238* 99 153*   CALCIUM mg/dL 9.2 9.2 8.9 9.2   WBC 10*3/mm3 15.58* 14.29*  --  14.87*   HEMOGLOBIN g/dL 12.0* 11.1*  --  12.1*   PLATELETS 10*3/mm3 150 138*  --  146   ALT (SGPT) U/L 14 15  --   --      Lab Results   Component Value Date    TROPONINT <0.010 01/02/2022     Estimated Creatinine Clearance: 51.9 mL/min (by C-G formula based on SCr of 1.01 mg/dL).  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[facility])    Type 2 diabetes mellitus, with long-term current use of insulin ([facility])    Hypertension, essential    Esophagus disorder, thickened distal wall CT Scan 6/18/18    Granulomatous disease, chronic (HCC)    Lumbar canal stenosis    Cough    Carotid stenosis, asymptomatic, bilateral    Remote history of stroke, small caudate nucleus    Hyperlipidemia    Esophagitis determined by endoscopy by Sheri 6/19/18 LA Grade D Lower 1/3    Hiatal hernia with GERD and esophagitis    PAD (peripheral artery disease) ([facility])    BPH without obstruction/lower urinary tract symptoms    Compression fracture of lumbar spine, non-traumatic ([facility])    Hard of hearing    Ptosis, left eyelid    Osteoarthritis of multiple joints    Osteoarthritis of right hip      Attending Physician Assessment and Plan:    1.  Atherosclerotic cardiovascular disease with significant coronary artery disease by cardiac catheterization.  Plan is for PTCA on Monday by cardiology.  Patient and his wife are agreeable to this plan and seemed to understand the associated risks.  They also understand that surgical intervention at present is not considered to be a good option by the CT surgery team.  Metformin to be held prior to surgery by 24 hours.  Otherwise patient ready for procedure.    2.  Cardiomyopathy.  Ongoing problem for the patient who has a decreased ejection fraction probably related to ischemic disease.  It does appear that his ejection fraction has improved somewhat and is currently at 40% on recent cardiac catheterization.  Luckily patient is not having any  significant symptomatology at present time.    3.  Type 2 diabetes mellitus on long-term insulin.  Patient is relatively stable with his diabetes at the present time he is very compliant with medications and diet and his wife is very supportive.    4.  Hyperlipidemia.  Patient's cholesterol well controlled at present time on atorvastatin with last LDL being significantly below 80.    5.  Degenerative joint disease.  Patient has nonspecific diffuse disease.  Biggest problem is right hip where he suffered a fracture in the past that has been repaired.  Patient did have to have revision of his surgery shortly thereafter and is left him with decreased range of motion.  He is nonetheless quite active.    6.  Gastroesophageal reflux disease.  Well controlled with PPIs.    7.  BPH.  Modest symptoms only on Flomax and no recent evidence of severe urinary retention.    I did discuss findings with patient and his family and with nursing staff.    Plan for disposition:Where: home and When:  2-3 days following successful procedure.      Felix Rodriguez MD  6/11/2022  [id number]

## 2022-06-11 NOTE — PROGRESS NOTES
[facility] Follow Up    Chief Complaint: Follow up CAD, ICMP    Interval History: No chest pain or shortness of breath.    Objective:     Objective:  Temp:  [97.9 °F (36.6 °C)-98.7 °F (37.1 °C)] 98.6 °F (37 °C)  Heart Rate:  [] 73  Resp:  [16] 16  BP: (116-137)/(70-77) 137/77     Intake/Output Summary (Last 24 hours) at 6/11/2022 0821  Last data filed at 6/11/2022 0813  Gross per 24 hour   Intake 450 ml   Output 300 ml   Net 150 ml     Body mass index is 21.48 kg/m².      06/09/22  0804   Weight: 69.9 kg (154 lb)     Weight change:       Physical Exam:   General : Alert, cooperative, in no acute distress.  Neuro: Alert,cooperative and oriented.  Lungs: CTAB. Normal respiratory effort and rate.  CV: Regular rate and rhythm, normal S1 and S2, no murmurs, gallops or rubs.  ABD: Soft, nontender, nondistended. Positive bowel sounds.  Extr: No edema or cyanosis, moves all extremities.    Lab Review:   Results from last 7 days   Lab Units 06/11/22  0333 06/10/22  1151   SODIUM mmol/L 135* 142   POTASSIUM mmol/L 4.2 4.9   CHLORIDE mmol/L 99 103   CO2 mmol/L 27.0 27.5   BUN mg/dL 20 21   CREATININE mg/dL 1.01 0.98   GLUCOSE mg/dL 191* 238*   CALCIUM mg/dL 9.2 9.2   AST (SGOT) U/L 12 14   ALT (SGPT) U/L 14 15         Results from last 7 days   Lab Units 06/11/22  0333 06/10/22  1151   WBC 10*3/mm3 15.58* 14.29*   HEMOGLOBIN g/dL 12.0* 11.1*   HEMATOCRIT % 37.7 35.6*   PLATELETS 10*3/mm3 150 138*                   Invalid input(s): LDLCALC          I reviewed the patient's new clinical results.  I personally viewed and interpreted the patient's EKG  Current Medications:   Scheduled Meds:aspirin, 81 mg, Oral, Daily  atorvastatin, 10 mg, Oral, Daily  enoxaparin, 40 mg, Subcutaneous, Q24H  furosemide, 40 mg, Oral, Daily  insulin glargine, 8 Units, Subcutaneous, Nightly  insulin lispro, 6 Units, Subcutaneous, TID With Meals  isosorbide mononitrate, 30 mg, Oral, Daily  losartan, 25 mg, Oral,  Daily  metoprolol succinate XL, 25 mg, Oral, Daily  pantoprazole, 40 mg, Oral, QAM  tamsulosin, 0.4 mg, Oral, Daily      Continuous Infusions:sodium chloride, 75 mL/hr, Last Rate: 75 mL/hr (06/09/22 0810)  sodium chloride, 50 mL/hr, Last Rate: Stopped (06/09/22 1825)        Allergies:  No Known Allergies    Assessment/Plan:     1. Coronary artery disease.  Severe two-vessel coronary artery disease including severely calcified ostial LAD 90 to 95% stenosis and 100% chronic total occlusion of the ostial left circumflex artery with right to left collateral filling.    2. Ischemic cardiomyopathy.  EF 30% by echo in 2/2022.  However by left ventriculogram yesterday EF appears to be closer to about 40%.  On guideline directed management with metoprolol succinate and losartan.  No evidence of volume overload at this time.  3. Prior stroke  4. Carotid artery disease.   5. Hypertension.  Well-controlled.  6. Hyperlipidemia  7. Diabetes mellitus type 2    -The patient and the wife are agreeable to proceed with PCI of the LAD.  We will plan to proceed on Monday.  - In the meanwhile continue current cardiac management.    Michelle Alegre MD  06/11/22  08:21 EDT

## 2022-06-11 NOTE — THERAPY TREATMENT NOTE
Acute Care - Physical Therapy Treatment Note  [facility]     Patient Name: Javier Marin  : 1935  MRN: [id number]  Today's Date: 2022      Visit Dx:     ICD-10-CM ICD-9-CM   1. Chronic systolic CHF (congestive heart failure) (HCC)  I50.22 428.22     428.0   2. PAD (peripheral artery disease) (HCC)  I73.9 443.9   3. Paroxysmal atrial fibrillation (HCC)  I48.0 427.31   4. 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[facility])   • Limb swelling   • Diabetes mellitus type 2, uncontrolled   • Hypertension, essential   • Acute hyponatremia   • Acute kidney injury superimposed on chronic kidney disease (HCC)   • Elevated liver function tests   • Leukocytosis   • Anorexia   • Esophagus disorder, thickened distal wall CT Scan 18   • Granulomatous disease, chronic ([facility])   • Lung nodule seen on imaging study   • Compression fracture of lumbar spine, non-traumatic ([facility])   • Lumbar canal stenosis   • Weakness generalized   • Cough   • Hard of hearing   • Ptosis, left eyelid   • Osteoarthritis of multiple joints   • Paroxysmal atrial fibrillation (HCC)   • Carotid stenosis, asymptomatic, bilateral   • Remote history of stroke, small caudate nucleus   • Thrombocytopenia ([facility])   • Fall at home, subsequent encounter   • Hyperlipidemia   • Esophagitis determined by endoscopy by Sheri 18 LA Grade D Lower 1/3   • Hiatal hernia with GERD and esophagitis   • Mandible fracture ([facility])   • Abnormal ECG   • PAD (peripheral artery disease) (HCC)   • Arteriosclerosis of abdominal aorta (HCC)   • Chronic systolic CHF (congestive heart failure) (HCC)   • Coronary artery disease of native artery of native heart with stable angina pectoris (HCC)   • Chronic chest pain with high risk for CAD     Past Medical History:   Diagnosis Date   • A-fib (HCC)    •  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HCC)    • Anorexia    • Arteriosclerosis of abdominal aorta (HCC) 02/24/2014   • Arthritis    • Carotid stenosis, asymptomatic, bilateral 02/24/2014    16-49%   • Closed right arm fracture 1994   • Compression fracture of lumbar spine, non-traumatic (HCC)    • Diabetes mellitus (HCC)    • Elevated LFTs    • Esophagitis 06/19/2018    DR RASHID GRADE D LOWER   • Esophagus disorder     THICKENED DISTAL WALL-CT SCAN 6/18/18   • Garbled speech    • Granulomatous disease, chronic (HCC)    • Hard of hearing    • Hiatal hernia with GERD and esophagitis    • History of acute gastritis    • Hyperlipidemia    • Hypertension    • Hyponatremia    • Leukocytosis    • Lumbar canal stenosis    • Lung nodule seen on imaging study    • Mandible fracture ([facility]) 03/28/2020    FROM FALL   • Osteoarthritis of multiple joints    • Ptosis of left eyelid    • Remote history of stroke     SMALL CAUDATE NECLEUS   • Thrombocytopenia (HCC)    • Weakness generalized      Past Surgical History:   Procedure Laterality Date   • CARDIAC CATHETERIZATION N/A 6/9/2022    Procedure: Left Heart Cath;  Surgeon: Reinaldo Miller MD;  Location: [facility] CATH INVASIVE LOCATION;  Service: Cardiovascular;  Laterality: N/A;   • CARDIAC CATHETERIZATION N/A 6/9/2022    Procedure: Left ventriculography;  Surgeon: Reinaldo Miller MD;  Location: [facility] CATH INVASIVE LOC[facility];  Service: Cardiovascular;  Laterality: N/A;   • CARDIAC CATHETERIZATION N/A 6/9/2022    Procedure: Coronary angiography;  Surgeon: Reinaldo Miller MD;  Location: [facility] CATH INVASIVE LOCATION;  Service: Cardiovascular;  Laterality: N/A;   • CATARACT EXTRACTION, BILATERAL     • COLONOSCOPY     • ENDOSCOPY N/A 06/19/2018    Procedure: ESOPHAGOGASTRODUODENOSCOPY WITH BIOPSY;  Surgeon: Toribio Wade MD;  Location: [facility] ENDOSCOPY;  Service: Gastroenterology   • EYE PTOSIS REPAIR Left 11/15/2016    Procedure: LT UPPER LID EYE PTOSIS REPAIR;  Surgeon:  Anup Lancaster MD;  [place] [facility] [place] [facility]  Service:    • EYE SURGERY Left     victreous hemorrhage repair   • HIP ARTHROPLASTY Right    • ORIF MANDIBULAR FRACTURE Bilateral 03/31/2020    Procedure: OPEN REDUCTION AND INTERNAL FIXATION OF BILATERAL MANDIBLE FRACTURES;  Surgeon: Percy Jeronimo MD;  Location: [facility] MAIN OR;  Service: Maxillofacial;  Laterality: Bilateral;     PT Assessment (last 12 hours)     PT Evaluation and Treatment     Row Name 06/11/22 0935          Physical Therapy Time and Intention    Subjective Information no complaints  -     Document Type therapy note (daily note)  -JR     Mode of Treatment individual therapy  -JR     Patient Effort good  -JR     Row Name 06/11/22 0935          General Information    Patient Profile Reviewed yes  -     Row Name 06/11/22 0935          Living Environment    Current Living Arrangements home  -JR     People in Home spouse  -JR     Row Name 06/11/22 0935          Home Main Entrance    Number of Stairs, Main Entrance three  -JR     Row Name 06/11/22 0935          Pain    Pretreatment Pain Rating 0/10 - no pain  -JR     Posttreatment Pain Rating 0/10 - no pain  -JR     Row Name 06/11/22 0935          Cognition    Orientation Status (Cognition) oriented x 3  -JR     Row Name 06/11/22 0935          Sensory    Hearing Status hearing impairment, bilaterally  -JR     Row Name 06/11/22 0935          Bed Mobility    Supine-Sit Harper (Bed Mobility) contact guard  -     Sit-Supine Harper (Bed Mobility) contact guard  -JR     Row Name 06/11/22 0935          Transfers    Sit-Stand Harper (Transfers) contact guard  -JR     Row Name 06/11/22 0935          Sit-Stand Transfer    Assistive Device (Sit-Stand Transfers) cane, straight  -JR     Row Name 06/11/22 0935          Gait/Stairs (Locomotion)    Harper Level (Gait) contact guard  -     Assistive Device (Gait) cane, straight  -JR     Distance in Feet (Gait) 350  Ambulated up and  down hallway x 2.  -     Deviations/Abnormal Patterns (Gait) antalgic;oneil decreased;gait speed decreased;weight shifting decreased;stride length decreased  -     Row Name 06/11/22 0935          Safety Issues, Functional Mobility    Impairments Affecting Function (Mobility) balance;endurance/activity tolerance;strength  -     Row Name 06/11/22 0935          Balance    Static Sitting Balance independent  -     Sit to Stand Dynamic Balance standby assist  -     Static Standing Balance standby assist  -     Dynamic Standing Balance contact guard  -     Balance Interventions standing;sit to stand;supported;static;dynamic  -     Row Name 06/11/22 0935          Vital Signs    Pre Systolic BP Rehab 137  -     Pre Treatment Diastolic BP 77  -JR     Pretreatment Heart Rate (beats/min) 77  -JR     Posttreatment Heart Rate (beats/min) 80  -JR     O2 Delivery Pre Treatment room air  -     O2 Delivery Intra Treatment room air  -     O2 Delivery Post Treatment room air  -     Row Name 06/11/22 0935          Positioning and Restraints    Pre-Treatment Position in bed  -     Post Treatment Position bed  -     In Bed notified nsg;sitting EOB;call light within reach;encouraged to call for assist;with family/caregiver  -           User Key  (r) = Recorded By, (t) = Taken By, (c) = Cosigned By    Initials Name Provider Type     Sy Witt, PT Physical Therapist                Physical Therapy Education                 Title: PT OT SLP Therapies (Done)     Topic: Physical Therapy (Done)     Point: Mobility training (Done)     Learning Progress Summary           Patient Acceptance, E,D, VU,DU by  at 6/11/2022 0939    Acceptance, TB,E, VU by  at 6/10/2022 1517   Significant Other Acceptance, E,D, VU,DU by  at 6/11/2022 0939                   Point: Home exercise program (Done)     Learning Progress Summary           Patient Acceptance, E,D, VU,DU by  at 6/11/2022 0939    Acceptance, TB,E,  VU by  at 6/10/2022 1517   Significant Other Acceptance, E,D, VU,DU by  at 6/11/2022 0939                   Point: Body mechanics (Done)     Learning Progress Summary           Patient Acceptance, E,D, VU,DU by  at 6/11/2022 0939    Acceptance, TB,E, VU by  at 6/10/2022 1517   Significant Other Acceptance, E,D, VU,DU by  at 6/11/2022 0939                   Point: Precautions (Done)     Learning Progress Summary           Patient Acceptance, E,D, VU,DU by  at 6/11/2022 0939    Acceptance, TB,E, VU by  at 6/10/2022 1517   Significant Other Acceptance, E,D, VU,DU by  at 6/11/2022 0939                               User Key     Initials Effective Dates Name Provider Type [facility] 06/16/21 -  Sy Witt, PT Physical Therapist PT     05/02/22 -  Lupe Branch, JOHN Physical Therapist PT              PT Recommendation and Plan     Plan of Care Reviewed With: patient, spouse  Progress: improving  Outcome Evaluation: Patient is progressing with PT and ambulated much further today.  He appears to be close to attaining his goals for PT.   Outcome Measures     Row Name 06/11/22 0940             How much help from another person do you currently need...    Turning from your back to your side while in flat bed without using bedrails? 4  -JR      Moving from lying on back to sitting on the side of a flat bed without bedrails? 3  -JR      Moving to and from a bed to a chair (including a wheelchair)? 3  -JR      Standing up from a chair using your arms (e.g., wheelchair, bedside chair)? 3  -JR      Climbing 3-5 steps with a railing? 3  -JR      To walk in hospital room? 3  -JR      AM-PAC 6 Clicks Score (PT) 19  -JR            User Key  (r) = Recorded By, (t) = Taken By, (c) = Cosigned By    Initials Name Provider Type    Sy Ackerman, PT Physical Therapist                 Time Calculation:    PT Charges     Row Name 06/11/22 0940             Time Calculation    Start Time 0910  -JR      Stop  Time 0930  -      Time Calculation (min) 20 min  -      PT Received On 06/11/22  -      PT - Next Appointment 06/12/22  -            User Key  (r) = Recorded By, (t) = Taken By, (c) = Cosigned By    Initials Name Provider Type    Sy Ackerman, PT Physical Therapist              Therapy Charges for Today     Code Description Service Date Service Provider Modifiers Qty    41395356413 HC PT THERAPEUTIC ACT EA 15 MIN 6/11/2022 Sy Witt, PT GP 1    18771110897 HC GAIT TRAINING EA 15 MIN 6/11/2022 Sy Witt, PT GP 1          PT G-Codes  Outcome Measure Options: AM-PAC 6 Clicks Basic Mobility (PT)  AM-PAC 6 Clicks Score (PT): 19    Sy Witt, PT  6/11/2022

## 2022-06-12 LAB
ALBUMIN SERPL-MCNC: 3.9 G/DL (ref 3.5–5.2)
ALBUMIN/GLOB SERPL: 1.7 G/DL
ALP SERPL-CCNC: 104 U/L (ref 39–117)
ALT SERPL W P-5'-P-CCNC: 15 U/L (ref 1–41)
ANION GAP SERPL CALCULATED.3IONS-SCNC: 11 MMOL/L (ref 5–15)
AST SERPL-CCNC: 15 U/L (ref 1–40)
BASOPHILS # BLD MANUAL: 0.16 10*3/MM3 (ref 0–0.2)
BASOPHILS NFR BLD MANUAL: 1 % (ref 0–1.5)
BILIRUB SERPL-MCNC: 0.5 MG/DL (ref 0–1.2)
BUN SERPL-MCNC: 26 MG/DL (ref 8–23)
BUN/CREAT SERPL: 19.1 (ref 7–25)
CALCIUM SPEC-SCNC: 9.1 MG/DL (ref 8.6–10.5)
CHLORIDE SERPL-SCNC: 101 MMOL/L (ref 98–107)
CO2 SERPL-SCNC: 26 MMOL/L (ref 22–29)
CREAT SERPL-MCNC: 1.36 MG/DL (ref 0.76–1.27)
DEPRECATED RDW RBC AUTO: 55.4 FL (ref 37–54)
EGFRCR SERPLBLD CKD-EPI 2021: 50.7 ML/MIN/1.73
ERYTHROCYTE [DISTWIDTH] IN BLOOD BY AUTOMATED COUNT: 18 % (ref 12.3–15.4)
GLOBULIN UR ELPH-MCNC: 2.3 GM/DL
GLUCOSE BLDC GLUCOMTR-MCNC: 133 MG/DL (ref 70–130)
GLUCOSE BLDC GLUCOMTR-MCNC: 137 MG/DL (ref 70–130)
GLUCOSE BLDC GLUCOMTR-MCNC: 155 MG/DL (ref 70–130)
GLUCOSE BLDC GLUCOMTR-MCNC: 172 MG/DL (ref 70–130)
GLUCOSE SERPL-MCNC: 119 MG/DL (ref 65–99)
HCT VFR BLD AUTO: 35.2 % (ref 37.5–51)
HGB BLD-MCNC: 11.2 G/DL (ref 13–17.7)
LYMPHOCYTES # BLD MANUAL: 5.06 10*3/MM3 (ref 0.7–3.1)
LYMPHOCYTES NFR BLD MANUAL: 7 % (ref 5–12)
MCH RBC QN AUTO: 27.2 PG (ref 26.6–33)
MCHC RBC AUTO-ENTMCNC: 31.8 G/DL (ref 31.5–35.7)
MCV RBC AUTO: 85.4 FL (ref 79–97)
MONOCYTES # BLD: 1.14 10*3/MM3 (ref 0.1–0.9)
NEUTROPHILS # BLD AUTO: 9.96 10*3/MM3 (ref 1.7–7)
NEUTROPHILS NFR BLD MANUAL: 61 % (ref 42.7–76)
PLAT MORPH BLD: NORMAL
PLATELET # BLD AUTO: 147 10*3/MM3 (ref 140–450)
PMV BLD AUTO: 10.6 FL (ref 6–12)
POTASSIUM SERPL-SCNC: 4.2 MMOL/L (ref 3.5–5.2)
PROT SERPL-MCNC: 6.2 G/DL (ref 6–8.5)
RBC # BLD AUTO: 4.12 10*6/MM3 (ref 4.14–5.8)
RBC MORPH BLD: NORMAL
SMUDGE CELLS BLD QL SMEAR: ABNORMAL
SODIUM SERPL-SCNC: 138 MMOL/L (ref 136–145)
VARIANT LYMPHS NFR BLD MANUAL: 31 % (ref 19.6–45.3)
WBC NRBC COR # BLD: 16.32 10*3/MM3 (ref 3.4–10.8)

## 2022-06-12 PROCEDURE — 63710000001 INSULIN LISPRO (HUMAN) PER 5 UNITS: Performed by: INTERNAL MEDICINE

## 2022-06-12 PROCEDURE — 99232 SBSQ HOSP IP/OBS MODERATE 35: CPT | Performed by: INTERNAL MEDICINE

## 2022-06-12 PROCEDURE — 25010000002 ENOXAPARIN PER 10 MG: Performed by: INTERNAL MEDICINE

## 2022-06-12 PROCEDURE — 85025 COMPLETE CBC W/AUTO DIFF WBC: CPT | Performed by: INTERNAL MEDICINE

## 2022-06-12 PROCEDURE — 82962 GLUCOSE BLOOD TEST: CPT

## 2022-06-12 PROCEDURE — 85007 BL SMEAR W/DIFF WBC COUNT: CPT | Performed by: INTERNAL MEDICINE

## 2022-06-12 PROCEDURE — 80053 COMPREHEN METABOLIC PANEL: CPT | Performed by: INTERNAL MEDICINE

## 2022-06-12 RX ORDER — SODIUM CHLORIDE 9 MG/ML
75 INJECTION, SOLUTION INTRAVENOUS CONTINUOUS
Status: DISCONTINUED | OUTPATIENT
Start: 2022-06-12 | End: 2022-06-13

## 2022-06-12 RX ADMIN — ISOSORBIDE MONONITRATE 30 MG: 30 TABLET ORAL at 08:53

## 2022-06-12 RX ADMIN — TAMSULOSIN HYDROCHLORIDE 0.4 MG: 0.4 CAPSULE ORAL at 08:53

## 2022-06-12 RX ADMIN — ATORVASTATIN CALCIUM 10 MG: 20 TABLET, FILM COATED ORAL at 08:53

## 2022-06-12 RX ADMIN — SODIUM CHLORIDE 75 ML/HR: 9 INJECTION, SOLUTION INTRAVENOUS at 11:26

## 2022-06-12 RX ADMIN — PANTOPRAZOLE SODIUM 40 MG: 40 TABLET, DELAYED RELEASE ORAL at 06:47

## 2022-06-12 RX ADMIN — INSULIN LISPRO 6 UNITS: 100 INJECTION, SOLUTION INTRAVENOUS; SUBCUTANEOUS at 16:44

## 2022-06-12 RX ADMIN — ENOXAPARIN SODIUM 40 MG: 100 INJECTION SUBCUTANEOUS at 16:44

## 2022-06-12 RX ADMIN — SODIUM CHLORIDE 75 ML/HR: 9 INJECTION, SOLUTION INTRAVENOUS at 21:47

## 2022-06-12 RX ADMIN — INSULIN LISPRO 6 UNITS: 100 INJECTION, SOLUTION INTRAVENOUS; SUBCUTANEOUS at 07:27

## 2022-06-12 RX ADMIN — INSULIN LISPRO 6 UNITS: 100 INJECTION, SOLUTION INTRAVENOUS; SUBCUTANEOUS at 11:26

## 2022-06-12 RX ADMIN — ASPIRIN 81 MG: 81 TABLET, COATED ORAL at 08:53

## 2022-06-12 RX ADMIN — FUROSEMIDE 40 MG: 40 TABLET ORAL at 08:53

## 2022-06-12 RX ADMIN — INSULIN GLARGINE-YFGN 8 UNITS: 100 INJECTION, SOLUTION SUBCUTANEOUS at 20:50

## 2022-06-12 RX ADMIN — METOPROLOL SUCCINATE 25 MG: 25 TABLET, EXTENDED RELEASE ORAL at 08:53

## 2022-06-12 NOTE — PROGRESS NOTES
[facility] Follow Up    Chief Complaint: Follow up CAD    Interval History: When I saw the patient earlier this morning he was doing well without any complaints of chest pain or shortness of breath.  He apparently had an episode of hypotension yesterday afternoon with systolic pressures in the 80s.  He was asymptomatic at that time.  A few hours after I saw the patient in the morning he developed recurrent hypotension with systolic pressures down in the 60s.  This occurred sometime after he received his morning medications of metoprolol succinate, isosorbide, and furosemide.  He again was asymptomatic.    Objective:     Objective:  Temp:  [97.6 °F (36.4 °C)-98.6 °F (37 °C)] 98 °F (36.7 °C)  Heart Rate:  [56-86] 86  Resp:  [16-20] 20  BP: ()/(46-81) 122/75     Intake/Output Summary (Last 24 hours) at 6/12/2022 0814  Last data filed at 6/12/2022 0650  Gross per 24 hour   Intake 720 ml   Output 1575 ml   Net -855 ml     Body mass index is 21.48 kg/m².      06/09/22  0804   Weight: 69.9 kg (154 lb)     Weight change:       Physical Exam:   General : Alert, cooperative, in no acute distress.  Neuro: Alert,cooperative and oriented.  Lungs: CTAB. Normal respiratory effort and rate.  CV: Regular rate and rhythm, normal S1 and S2, no murmurs, gallops or rubs.  ABD: Soft, nontender, nondistended. Positive bowel sounds.  Extr: No edema or cyanosis, moves all extremities.    Lab Review:   Results from last 7 days   Lab Units 06/12/22  0316 06/11/22  0333   SODIUM mmol/L 138 135*   POTASSIUM mmol/L 4.2 4.2   CHLORIDE mmol/L 101 99   CO2 mmol/L 26.0 27.0   BUN mg/dL 26* 20   CREATININE mg/dL 1.36* 1.01   GLUCOSE mg/dL 119* 191*   CALCIUM mg/dL 9.1 9.2   AST (SGOT) U/L 15 12   ALT (SGPT) U/L 15 14         Results from last 7 days   Lab Units 06/12/22  0316 06/11/22  0333   WBC 10*3/mm3 16.32* 15.58*   HEMOGLOBIN g/dL 11.2* 12.0*   HEMATOCRIT % 35.2* 37.7   PLATELETS 10*3/mm3 147 150                    Invalid input(s): LDLCALC          I reviewed the patient's new clinical results.  I personally viewed and interpreted the patient's EKG  Current Medications:   Scheduled Meds:aspirin, 81 mg, Oral, Daily  atorvastatin, 10 mg, Oral, Daily  enoxaparin, 40 mg, Subcutaneous, Q24H  furosemide, 40 mg, Oral, Daily  insulin glargine, 8 Units, Subcutaneous, Nightly  insulin lispro, 6 Units, Subcutaneous, TID With Meals  isosorbide mononitrate, 30 mg, Oral, Daily  losartan, 25 mg, Oral, Daily  metoprolol succinate XL, 25 mg, Oral, Daily  pantoprazole, 40 mg, Oral, QAM  tamsulosin, 0.4 mg, Oral, Daily      Continuous Infusions:sodium chloride, 75 mL/hr, Last Rate: 75 mL/hr (06/09/22 0810)        Allergies:  No Known Allergies    Assessment/Plan:     1. Coronary artery disease.  Severe two-vessel coronary artery disease including severely calcified ostial LAD 90 to 95% stenosis and 100% chronic total occlusion of the ostial left circumflex artery with right to left collateral filling.    2. Ischemic cardiomyopathy.  EF 30% by echo in 2/2022.  However by left ventriculogram yesterday EF appears to be closer to about 40%.  On guideline directed management with metoprolol succinate and losartan.  No evidence of volume overload at this time.  3. Prior stroke  4. Carotid artery disease.   5. Hypertension.  Well-controlled.  With recurrent episodes of hypotension.  6. Hyperlipidemia  7. Diabetes mellitus type 2  8.  Acute kidney injury.  Creatinine mildly elevated today.  Unclear if this is due to contrast exposure a couple of days ago, hypotension yesterday or if we are over diuresing him.    - Hold losartan today.  - Discontinue isosorbide mononitrate and furosemide for now.  - Patient given IV fluid bolus with some improvement and systolic pressures now in the 80s.  We will continue to monitor for improvement as he is asymptomatic.  - Start IV fluids to support pressures and in anticipation of PCI tomorrow.    Michelle Alegre,  MD  06/12/22  08:14 EDT

## 2022-06-12 NOTE — NURSING NOTE
Patient became asymptomatically hypotensive, map<65 (62) while sitting in chair, no acute distress noted, on call provider paged, cardiac medications staggered see emr, parameters applied to administration, next shift aware and to monitor.

## 2022-06-13 LAB
ACT BLD: 283 SECONDS (ref 82–152)
ACT BLD: 335 SECONDS (ref 82–152)
ACT BLD: 370 SECONDS (ref 82–152)
ACT BLD: 376 SECONDS (ref 82–152)
ACT BLD: <50 SECONDS (ref 82–152)
ALBUMIN SERPL-MCNC: 3.8 G/DL (ref 3.5–5.2)
ALBUMIN/GLOB SERPL: 1.7 G/DL
ALP SERPL-CCNC: 106 U/L (ref 39–117)
ALT SERPL W P-5'-P-CCNC: 16 U/L (ref 1–41)
ANION GAP SERPL CALCULATED.3IONS-SCNC: 8 MMOL/L (ref 5–15)
AST SERPL-CCNC: 15 U/L (ref 1–40)
BILIRUB SERPL-MCNC: 0.4 MG/DL (ref 0–1.2)
BUN SERPL-MCNC: 26 MG/DL (ref 8–23)
BUN/CREAT SERPL: 22.8 (ref 7–25)
CALCIUM SPEC-SCNC: 8.7 MG/DL (ref 8.6–10.5)
CHLORIDE SERPL-SCNC: 102 MMOL/L (ref 98–107)
CO2 SERPL-SCNC: 26 MMOL/L (ref 22–29)
CREAT SERPL-MCNC: 1.14 MG/DL (ref 0.76–1.27)
DEPRECATED RDW RBC AUTO: 53.2 FL (ref 37–54)
EGFRCR SERPLBLD CKD-EPI 2021: 62.6 ML/MIN/1.73
EOSINOPHIL # BLD MANUAL: 0.31 10*3/MM3 (ref 0–0.4)
EOSINOPHIL NFR BLD MANUAL: 2 % (ref 0.3–6.2)
ERYTHROCYTE [DISTWIDTH] IN BLOOD BY AUTOMATED COUNT: 17.9 % (ref 12.3–15.4)
GLOBULIN UR ELPH-MCNC: 2.3 GM/DL
GLUCOSE BLDC GLUCOMTR-MCNC: 157 MG/DL (ref 70–130)
GLUCOSE BLDC GLUCOMTR-MCNC: 206 MG/DL (ref 70–130)
GLUCOSE BLDC GLUCOMTR-MCNC: 263 MG/DL (ref 70–130)
GLUCOSE SERPL-MCNC: 168 MG/DL (ref 65–99)
HCT VFR BLD AUTO: 35.4 % (ref 37.5–51)
HGB BLD-MCNC: 11.7 G/DL (ref 13–17.7)
LYMPHOCYTES # BLD MANUAL: 7.71 10*3/MM3 (ref 0.7–3.1)
LYMPHOCYTES NFR BLD MANUAL: 5 % (ref 5–12)
MCH RBC QN AUTO: 27.3 PG (ref 26.6–33)
MCHC RBC AUTO-ENTMCNC: 33.1 G/DL (ref 31.5–35.7)
MCV RBC AUTO: 82.5 FL (ref 79–97)
MONOCYTES # BLD: 0.79 10*3/MM3 (ref 0.1–0.9)
NEUTROPHILS # BLD AUTO: 6.93 10*3/MM3 (ref 1.7–7)
NEUTROPHILS NFR BLD MANUAL: 44 % (ref 42.7–76)
PLAT MORPH BLD: NORMAL
PLATELET # BLD AUTO: 146 10*3/MM3 (ref 140–450)
PMV BLD AUTO: 9.9 FL (ref 6–12)
POTASSIUM SERPL-SCNC: 4.1 MMOL/L (ref 3.5–5.2)
PROT SERPL-MCNC: 6.1 G/DL (ref 6–8.5)
QT INTERVAL: 473 MS
RBC # BLD AUTO: 4.29 10*6/MM3 (ref 4.14–5.8)
RBC MORPH BLD: NORMAL
SODIUM SERPL-SCNC: 136 MMOL/L (ref 136–145)
VARIANT LYMPHS NFR BLD MANUAL: 49 % (ref 19.6–45.3)
WBC MORPH BLD: NORMAL
WBC NRBC COR # BLD: 15.74 10*3/MM3 (ref 3.4–10.8)

## 2022-06-13 PROCEDURE — C1887 CATHETER, GUIDING: HCPCS | Performed by: INTERNAL MEDICINE

## 2022-06-13 PROCEDURE — C1725 CATH, TRANSLUMIN NON-LASER: HCPCS | Performed by: INTERNAL MEDICINE

## 2022-06-13 PROCEDURE — 92978 ENDOLUMINL IVUS OCT C 1ST: CPT | Performed by: INTERNAL MEDICINE

## 2022-06-13 PROCEDURE — 63710000001 INSULIN LISPRO (HUMAN) PER 5 UNITS: Performed by: INTERNAL MEDICINE

## 2022-06-13 PROCEDURE — C1769 GUIDE WIRE: HCPCS | Performed by: INTERNAL MEDICINE

## 2022-06-13 PROCEDURE — 25010000002 MIDAZOLAM PER 1 MG: Performed by: INTERNAL MEDICINE

## 2022-06-13 PROCEDURE — 82962 GLUCOSE BLOOD TEST: CPT

## 2022-06-13 PROCEDURE — 85025 COMPLETE CBC W/AUTO DIFF WBC: CPT | Performed by: INTERNAL MEDICINE

## 2022-06-13 PROCEDURE — C1894 INTRO/SHEATH, NON-LASER: HCPCS | Performed by: INTERNAL MEDICINE

## 2022-06-13 PROCEDURE — 25010000002 PHENYLEPHRINE 10 MG/ML SOLUTION: Performed by: INTERNAL MEDICINE

## 2022-06-13 PROCEDURE — 93005 ELECTROCARDIOGRAM TRACING: CPT | Performed by: INTERNAL MEDICINE

## 2022-06-13 PROCEDURE — C9602 PERC D-E COR STENT ATHER S: HCPCS | Performed by: INTERNAL MEDICINE

## 2022-06-13 PROCEDURE — 80053 COMPREHEN METABOLIC PANEL: CPT | Performed by: INTERNAL MEDICINE

## 2022-06-13 PROCEDURE — 25010000002 FENTANYL CITRATE (PF) 50 MCG/ML SOLUTION: Performed by: INTERNAL MEDICINE

## 2022-06-13 PROCEDURE — 99152 MOD SED SAME PHYS/QHP 5/>YRS: CPT | Performed by: INTERNAL MEDICINE

## 2022-06-13 PROCEDURE — 99153 MOD SED SAME PHYS/QHP EA: CPT | Performed by: INTERNAL MEDICINE

## 2022-06-13 PROCEDURE — 85007 BL SMEAR W/DIFF WBC COUNT: CPT | Performed by: INTERNAL MEDICINE

## 2022-06-13 PROCEDURE — 93010 ELECTROCARDIOGRAM REPORT: CPT | Performed by: INTERNAL MEDICINE

## 2022-06-13 PROCEDURE — 0 IOPAMIDOL PER 1 ML: Performed by: INTERNAL MEDICINE

## 2022-06-13 PROCEDURE — 85347 COAGULATION TIME ACTIVATED: CPT

## 2022-06-13 PROCEDURE — 99232 SBSQ HOSP IP/OBS MODERATE 35: CPT | Performed by: INTERNAL MEDICINE

## 2022-06-13 PROCEDURE — 92933 PRQ TRLML C ATHRC ST ANGIOP1: CPT | Performed by: INTERNAL MEDICINE

## 2022-06-13 PROCEDURE — C1874 STENT, COATED/COV W/DEL SYS: HCPCS | Performed by: INTERNAL MEDICINE

## 2022-06-13 PROCEDURE — C1753 CATH, INTRAVAS ULTRASOUND: HCPCS | Performed by: INTERNAL MEDICINE

## 2022-06-13 PROCEDURE — 25010000002 HEPARIN (PORCINE) PER 1000 UNITS: Performed by: INTERNAL MEDICINE

## 2022-06-13 DEVICE — XIENCE SKYPOINT™ EVEROLIMUS ELUTING CORONARY STENT SYSTEM 3.00 MM X 38 MM / RAPID-EXCHANGE
Type: IMPLANTABLE DEVICE | Site: CORONARY | Status: FUNCTIONAL
Brand: XIENCE SKYPOINT™

## 2022-06-13 RX ORDER — MIDAZOLAM HYDROCHLORIDE 1 MG/ML
INJECTION INTRAMUSCULAR; INTRAVENOUS AS NEEDED
Status: DISCONTINUED | OUTPATIENT
Start: 2022-06-13 | End: 2022-06-13 | Stop reason: HOSPADM

## 2022-06-13 RX ORDER — SODIUM CHLORIDE 9 MG/ML
INJECTION, SOLUTION INTRAVENOUS CONTINUOUS PRN
Status: COMPLETED | OUTPATIENT
Start: 2022-06-13 | End: 2022-06-13

## 2022-06-13 RX ORDER — MORPHINE SULFATE 2 MG/ML
1 INJECTION, SOLUTION INTRAMUSCULAR; INTRAVENOUS EVERY 4 HOURS PRN
Status: DISCONTINUED | OUTPATIENT
Start: 2022-06-13 | End: 2022-06-14 | Stop reason: HOSPADM

## 2022-06-13 RX ORDER — HEPARIN SODIUM 1000 [USP'U]/ML
INJECTION, SOLUTION INTRAVENOUS; SUBCUTANEOUS AS NEEDED
Status: DISCONTINUED | OUTPATIENT
Start: 2022-06-13 | End: 2022-06-13 | Stop reason: HOSPADM

## 2022-06-13 RX ORDER — FENTANYL CITRATE 50 UG/ML
INJECTION, SOLUTION INTRAMUSCULAR; INTRAVENOUS AS NEEDED
Status: DISCONTINUED | OUTPATIENT
Start: 2022-06-13 | End: 2022-06-13 | Stop reason: HOSPADM

## 2022-06-13 RX ORDER — PHENYLEPHRINE HYDROCHLORIDE 10 MG/ML
INJECTION INTRAVENOUS AS NEEDED
Status: DISCONTINUED | OUTPATIENT
Start: 2022-06-13 | End: 2022-06-13 | Stop reason: HOSPADM

## 2022-06-13 RX ORDER — CLOPIDOGREL BISULFATE 75 MG/1
75 TABLET ORAL DAILY
Status: DISCONTINUED | OUTPATIENT
Start: 2022-06-14 | End: 2022-06-14 | Stop reason: HOSPADM

## 2022-06-13 RX ORDER — HYDROCODONE BITARTRATE AND ACETAMINOPHEN 5; 325 MG/1; MG/1
1 TABLET ORAL EVERY 4 HOURS PRN
Status: DISCONTINUED | OUTPATIENT
Start: 2022-06-13 | End: 2022-06-14 | Stop reason: HOSPADM

## 2022-06-13 RX ORDER — NALOXONE HCL 0.4 MG/ML
0.4 VIAL (ML) INJECTION
Status: DISCONTINUED | OUTPATIENT
Start: 2022-06-13 | End: 2022-06-14 | Stop reason: HOSPADM

## 2022-06-13 RX ORDER — SODIUM CHLORIDE 9 MG/ML
75 INJECTION, SOLUTION INTRAVENOUS CONTINUOUS
Status: ACTIVE | OUTPATIENT
Start: 2022-06-13 | End: 2022-06-13

## 2022-06-13 RX ORDER — CLOPIDOGREL BISULFATE 75 MG/1
TABLET ORAL AS NEEDED
Status: DISCONTINUED | OUTPATIENT
Start: 2022-06-13 | End: 2022-06-13 | Stop reason: HOSPADM

## 2022-06-13 RX ORDER — ACETAMINOPHEN 325 MG/1
650 TABLET ORAL EVERY 4 HOURS PRN
Status: DISCONTINUED | OUTPATIENT
Start: 2022-06-13 | End: 2022-06-14 | Stop reason: HOSPADM

## 2022-06-13 RX ORDER — LIDOCAINE HYDROCHLORIDE 20 MG/ML
INJECTION, SOLUTION INFILTRATION; PERINEURAL AS NEEDED
Status: DISCONTINUED | OUTPATIENT
Start: 2022-06-13 | End: 2022-06-13 | Stop reason: HOSPADM

## 2022-06-13 RX ADMIN — INSULIN GLARGINE-YFGN 8 UNITS: 100 INJECTION, SOLUTION SUBCUTANEOUS at 20:56

## 2022-06-13 RX ADMIN — TAMSULOSIN HYDROCHLORIDE 0.4 MG: 0.4 CAPSULE ORAL at 08:29

## 2022-06-13 RX ADMIN — INSULIN LISPRO 6 UNITS: 100 INJECTION, SOLUTION INTRAVENOUS; SUBCUTANEOUS at 17:22

## 2022-06-13 RX ADMIN — ASPIRIN 81 MG: 81 TABLET, COATED ORAL at 08:29

## 2022-06-13 RX ADMIN — ATORVASTATIN CALCIUM 10 MG: 20 TABLET, FILM COATED ORAL at 08:29

## 2022-06-13 RX ADMIN — METOPROLOL SUCCINATE 25 MG: 25 TABLET, EXTENDED RELEASE ORAL at 08:29

## 2022-06-13 NOTE — PROGRESS NOTES
[facility] Follow Up    Chief Complaint: Follow up CAD, ICMP    Interval History: No chest pain or shortness of breath.  Hypotension yesterday resolved with IV fluids.    Objective:     Objective:  Temp:  [98 °F (36.7 °C)-99 °F (37.2 °C)] 98 °F (36.7 °C)  Heart Rate:  [58-98] 82  Resp:  [18-20] 18  BP: ()/(42-83) 139/83     Intake/Output Summary (Last 24 hours) at 6/13/2022 0745  Last data filed at 6/13/2022 0617  Gross per 24 hour   Intake 1818.75 ml   Output 2235 ml   Net -416.25 ml     Body mass index is 21.48 kg/m².      06/09/22  0804   Weight: 69.9 kg (154 lb)     Weight change:       Physical Exam:   General : Alert, cooperative, in no acute distress.  Neuro: Alert,cooperative and oriented.  Lungs: CTAB. Normal respiratory effort and rate.  CV: Regular rate and rhythm, normal S1 and S2, no murmurs, gallops or rubs.  ABD: Soft, nontender, nondistended. Positive bowel sounds.  Extr: No edema or cyanosis, moves all extremities.    Lab Review:   Results from last 7 days   Lab Units 06/13/22  0346 06/12/22  0316   SODIUM mmol/L 136 138   POTASSIUM mmol/L 4.1 4.2   CHLORIDE mmol/L 102 101   CO2 mmol/L 26.0 26.0   BUN mg/dL 26* 26*   CREATININE mg/dL 1.14 1.36*   GLUCOSE mg/dL 168* 119*   CALCIUM mg/dL 8.7 9.1   AST (SGOT) U/L 15 15   ALT (SGPT) U/L 16 15         Results from last 7 days   Lab Units 06/13/22  0346 06/12/22  0316   WBC 10*3/mm3 15.74* 16.32*   HEMOGLOBIN g/dL 11.7* 11.2*   HEMATOCRIT % 35.4* 35.2*   PLATELETS 10*3/mm3 146 147                   Invalid input(s): LDLCALC          I reviewed the patient's new clinical results.  I personally viewed and interpreted the patient's EKG  Current Medications:   Scheduled Meds:aspirin, 81 mg, Oral, Daily  atorvastatin, 10 mg, Oral, Daily  enoxaparin, 40 mg, Subcutaneous, Q24H  insulin glargine, 8 Units, Subcutaneous, Nightly  insulin lispro, 6 Units, Subcutaneous, TID With Meals  losartan, 25 mg, Oral, Daily  metoprolol succinate XL,  25 mg, Oral, Daily  pantoprazole, 40 mg, Oral, QAM  sodium chloride, 250 mL, Intravenous, Once  tamsulosin, 0.4 mg, Oral, Daily      Continuous Infusions:sodium chloride, 75 mL/hr, Last Rate: 75 mL/hr (06/12/22 4659)        Allergies:  No Known Allergies    Assessment/Plan:     1. Coronary artery disease.  Severe two-vessel coronary artery disease including severely calcified ostial LAD 90 to 95% stenosis and 100% chronic total occlusion of the ostial left circumflex artery with right to left collateral filling.    2. Ischemic cardiomyopathy.  EF 30% by echo in 2/2022.  However by left ventriculogram yesterday EF appears to be closer to about 40%.  On guideline directed management with metoprolol succinate and losartan.  No evidence of volume overload at this time.  3. Prior stroke  4. Carotid artery disease.   5. Hypertension.  Well-controlled.  6. Hyperlipidemia  7. Diabetes mellitus type 2    - Plan for PCI with atherectomy and stent placement of the LAD today.    Michelle Alegre MD  06/13/22  07:45 EDT

## 2022-06-13 NOTE — PROGRESS NOTES
[facility]  INTERNAL MEDICINE  FELIX RODRIGUEZ MD  [address] [place] [address]  Phone [phone] Fax [phone]  E-mail:  [email or web address]      INTERNAL MEDICINE DAILY PROGRESS NOTE  Felix Rodriguez M.D.  2022              Patient Identification:  Name: Javier Marin  Age: 86 y.o.  Sex: male  :  1935  MRN: [id number]         Primary Care Physician: Percy Em MD  LENGTH OF STAY 3 DAYS    Consults     Date and Time Order Name Status Description    2022 11:42 AM Inpatient Cardiothoracic Surgery Consult Completed           Chief Complaint:  Coronary artery disease involving native coronary artery with angina pectoris    History of Present Illness:  Subjective     Interval History: Patient is a 86 y.o.male who presented with coronary artery disease involving native coronary artery with angina pectoris to the [facility] cardiology clinic where he was seen initially by Dr. Fadi Alvarez and was referred as a direct admit to [facility] for evaluation.  Patient did apparently have a syncopal episode back in January of this year and a work-up at that time discovered that he did have cardiomyopathy with nonsustained ventricular tachycardia.  He was started on a beta-blocker at the time and seemed to do fairly well.  Patient was seen by Dr. Alvarez in the cardiology clinic and a CT angiogram was done which ended up showing heavy coronary calcifications with a CAC score greater than 6000 and suggested severe coronary obstructive disease.  Patient was noted to have had dyspnea with walking any significant distance and with that had some lower precordial chest pressure which he thought was reflux but which very well might of been angina.  The pattern of this dyspnea seemed more frequent as of late.  Patient had been evaluated in the past by Dr. Mccrary for paroxysmal atrial fibrillation.  A Holter monitor done February  25, 2022 that showed 3 episodes of nonsustained monomorphic ventricular tachycardia longest in length 11 beats duration.  An echocardiogram done at the time did show new global hypokinesis with ejection fraction of 29% and right ventricular function was diminished.  Patient was noted to have no previous history of coronary disease and had a stress test in 2018 that showed no ischemia.  Patient is also known to have bilateral carotid artery disease greater on the right than the left with 70 to 80% stenosis on the right.  After evaluation of the history and test results, Dr. Alvarez felt it best to admit the patient to the hospital, ad isosorbide for possible angina, and arrange for cardiac catheterization.  Patient was seen by Dr. Miller at the hospital and underwent cardiac catheterization by Dr. Alegre on 6/10/2022 which did show ischemic heart disease with severe three-vessel coronary artery disease and critical ostial LAD stenosis with complete occlusion of the left circumflex and collaterals.  Patient was felt to have severely compromised functional status though ejection fraction was estimated at 40%.  Patient was referred to CT surgery for evaluation and seen by  who felt the patient looked very frail, had had multiple falls in the last year, and was not a good surgical candidate.  Patient is now being evaluated for PCI versus medical treatment.  Dr. Alegre has recommended PCI of the LAD with arthrectomy though the procedure is somewhat higher in risk than usual due to the patient's frail state and the complexity of his lesion.  After thinking about this decision overnight, patient and wife have opted to proceed with PCI which is scheduled for early next week on Monday.    Patient was initially seen by his regular internal medicine physician, Dr. Percy Em for consult.  Patient is reported to be a vigorous 86-year-old white male who is very active at home and only recently slowed down by his  symptoms discussed above.  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  He is very compliant with diet, watches his medicines closely, and has been reliable and follow-ups at Dr. Jewell office's.  Patient's other comorbidities include well-controlled type 2 diabetes mellitus, hyperlipidemia, gastroesophageal reflux on PPI, and arthritis treated with over-the-counter medications.  He has no history of smoking or lung disease.  He and his wife both are quite aware of the severity of the coronary artery disease and open to treatment after discussion with Dr. Pollock.  Case was transferred by Dr Em to me for coverage over the weekend.    6/11/2022. I personally saw the patient for the first time on this date in his room on [address] [facility] room #2214.  Patient was sitting up at bedside with his wife eating dinner.  I wore full PPE for the exam including an N95 facemask, goggles, white lab coat, and gloves when touching patient.  I performed thorough hand hygiene before and after the patient visit.  Patient is a delightful 86-year-old white male who remembers me well from a previous visit to the hospital approximately 4 years ago when I covered for Dr. Pollock while he was on vacation.  Patient denies any discomfort at all at the present time.  He has no shortness of breath, no chest pain, no nausea and no vomiting.  Labs today are quite stable though glucose has been up slightly to a max of 252 with stress.  Patient's white count which normally runs slightly up was at 14.29 with no signs of infection.  Patient's hemoglobin was 11.1 which is stable.  Platelet count is slightly low at 138,000 which is a typical level for the patient.  Patient has had COVID screening which was negative.  An EKG done yesterday at time of admission showed normal sinus rhythm with occasional PVCs, right bundle branch block and left anterior fascicular block, and nonspecific ST  changes.  Vital signs are stable with temperature of 97.6, pulse 57, respiratory rate 16, and blood pressure at 82/52.  Blood pressure slightly low possibly due to Imdur which was recently started by cardiology.  Patient is eating and drinking well and has no signs of volume depletion currently.  Patient seems to be in no distress at the present time.  Cardiology is planning PCI intervention on Monday a.m.  They have written orders to hold Glucophage for 24 hours prior to the procedure.  No other major changes are currently ordered in his care.  We will continue following through the procedure with you.    6/12/2022.  Mr. Marin was seen again in his room today on [address] #2214.  His wife is present at bedside.  I wore full PPE for the exam including N95 facemask, goggles, white lab coat, and gloves.  I performed thorough hand hygiene before and after the patient visit.  Patient was actually sitting up in the bedside chair at the time of my visit and his wife was working on puzzles while sitting on the bedside.  They were happy to see me again and asked very appropriate questions with respect to the upcoming Cardiologic intervention tomorrow.  Blood pressure is much improved today after cardiology held further blood pressure medications.  Blood pressure most recently was 114/66.  Patient's weight has decreased from 157 pounds on admission to 154 pounds at present.  Labs today did show a slight increase in creatinine up to 1.36 after lower blood pressures yesterday.  Patient is on IV fluids to support blood pressure at present time.  Patient is anxious to proceed with procedure but has been told that his procedure will be the third of the morning tomorrow.  Dr. Percy Em will return tomorrow to resume care of the patient.      Review of Systems:    A comprehensive 14 point review of systems was negative except for:  Constitution:  positive for fatigue and malaise  Respiratory: positive for  shortness of  air  Cardiovascular: positive for  chest pressure / pain, on exertion, irregular pulse and palpitations  Gastrointestinal: positive for  early satiety  Musculoskeletal: positive for  joint stiffness and muscle weakness  Behavioral/Psych: positive for  anxiety    Past Medical History:   Diagnosis Date   • A-fib ([facility])    • 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[facility])    • Anorexia    • Arteriosclerosis of abdominal aorta ([facility]) 02/24/2014   • Arthritis    • Carotid stenosis, asymptomatic, bilateral 02/24/2014    16-49%   • Closed right arm fracture 1994   • Compression fracture of lumbar spine, non-traumatic ([facility])    • Diabetes mellitus ([facility])    • Elevated LFTs    • Esophagitis 06/19/2018    DR RASHID GRADE D LOWER   • Esophagus disorder     THICKENED DISTAL WALL-CT SCAN 6/18/18   • Garbled speech    • Granulomatous disease, chronic ([facility])    • Hard of hearing    • Hiatal hernia with GERD and esophagitis    • History of acute gastritis    • Hyperlipidemia    • Hypertension    • Hyponatremia    • Leukocytosis    • Lumbar canal stenosis    • Lung nodule seen on imaging study    • Mandible fracture ([facility]) 03/28/2020    FROM FALL   • Osteoarthritis of multiple joints    • Ptosis of left eyelid    • Remote history of stroke     SMALL CAUDATE NECLEUS   • Thrombocytopenia ([facility])    • Weakness generalized      Past Surgical History:   Procedure Laterality Date   • CARDIAC CATHETERIZATION N/A 6/9/2022    Procedure: Left Heart Cath;  Surgeon: Reinaldo Miller MD;  Location: [facility] CATH INVASIVE LOCATION;  Service: Cardiovascular;  Laterality: N/A;   • CARDIAC CATHETERIZATION N/A 6/9/2022    Procedure: Left ventriculography;  Surgeon: Reinaldo Miller MD;  Location: [facility] CATH INVASIVE LOCATION;  Service: Cardiovascular;  Laterality: N/A;   • CARDIAC CATHETERIZATION N/A 6/9/2022    Procedure: Coronary angiography;  Surgeon: Reinaldo Miller MD;  Location: [facility] CATH INVASIVE LOCATION;  Service: Cardiovascular;  Laterality:  N/A;   • CATARACT EXTRACTION, BILATERAL     • COLONOSCOPY     • ENDOSCOPY N/A 2018    Procedure: ESOPHAGOGASTRODUODENOSCOPY WITH BIOPSY;  Surgeon: Toribio Wade MD;  Location: [facility] ENDOSCOPY;  Service: Gastroenterology   • EYE PTOSIS REPAIR Left 11/15/2016    Procedure: LT UPPER LID EYE PTOSIS REPAIR;  Surgeon: Anup Lancaster MD;  Location: [facility] OR [facility];  Service:    • EYE SURGERY Left     victreous hemorrhage repair   • HIP ARTHROPLASTY Right    • ORIF MANDIBULAR FRACTURE Bilateral 2020    Procedure: OPEN REDUCTION AND INTERNAL FIXATION OF BILATERAL MANDIBLE FRACTURES;  Surgeon: Percy Jeronimo MD;  Location: [facility] MAIN OR;  Service: Maxillofacial;  Laterality: Bilateral;     No Known Allergies    Family History   Problem Relation Age of Onset   • Pancreatic cancer Mother    • Hypertension Neg Hx    • Hyperlipidemia Neg Hx    • Heart failure Neg Hx    • Heart disease Neg Hx    • Heart attack Neg Hx    • Malig Hyperthermia Neg Hx        Social History     Socioeconomic History   • Marital status:    Tobacco Use   • Smoking status: Former Smoker     Years: 5.00     Types: Cigars     Quit date:      Years since quittin.4   • Smokeless tobacco: Never Used   Vaping Use   • Vaping Use: Never used   Substance and Sexual Activity   • Alcohol use: No   • Drug use: No   • Sexual activity: Not Currently     Partners: Female       PMH, FH, SH and ROS completed with Admission History and Physical and updated in EPIC system.        Objective     Scheduled Meds:aspirin, 81 mg, Oral, Daily  atorvastatin, 10 mg, Oral, Daily  enoxaparin, 40 mg, Subcutaneous, Q24H  insulin glargine, 8 Units, Subcutaneous, Nightly  insulin lispro, 6 Units, Subcutaneous, TID With Meals  losartan, 25 mg, Oral, Daily  metoprolol succinate XL, 25 mg, Oral, Daily  pantoprazole, 40 mg, Oral, QAM  sodium chloride, 250 mL, Intravenous, Once  tamsulosin, 0.4 mg, Oral, Daily      Continuous Infusions:sodium chloride,  "75 mL/hr, Last Rate: 75 mL/hr (06/12/22 2147)        Vital signs in last 24 hours:  Temp:  [98 °F (36.7 °C)-99 °F (37.2 °C)] 98.3 °F (36.8 °C)  Heart Rate:  [58-98] 98  Resp:  [18-20] 18  BP: ()/(42-76) 122/76    Intake/Output:    Intake/Output Summary (Last 24 hours) at 6/13/2022 0207  Last data filed at 6/13/2022 0127  Gross per 24 hour   Intake 1718.75 ml   Output 3050 ml   Net -1331.25 ml       Exam:  /76 (BP Location: Right arm, Patient Position: Lying)   Pulse 98   Temp 98.3 °F (36.8 °C) (Oral)   Resp 18   Ht 180.3 cm (71\")   Wt 69.9 kg (154 lb)   SpO2 97%   BMI 21.48 kg/m²     Constitutional:  Alert, cooperative, no distress, AAOx3, resting comfortably,Sitting up talking to wife from bedside chair.   Head:      Normocephalic, without obvious abnormality, atraumatic   Eyes:     PERRLA, conjunctiva/corneas clear, no icterus, no conjunctival                                     pallor, EOM's intact, both eyes      ENT and Mouth: Lips, tongue, gums normal; oral mucosa pink and moist   Neck:     Supple, symmetrical, trachea midline, no JVD  Respiratory:     Clear to auscultation bilaterally, respirations unlabored  Cardiovascular:  Regular rate and rhythm, Occasional PVCs, blood pressure slightly low at time of visit. S1 and S2 normal, no murmur,      no  Rub or gallop.  Pulses normal.    Gastrointestinal:   BS present x 4 Soft, non-tender, bowel sounds active,      no masses, no hepatosplenomegaly                                                     :       No hernia.  Normal exam for sex.         Musculoskeletal: Extremities normal, atraumatic, no cyanosis or edema     No arthropathy.  No deformity.  Gait normal                                                 Skin:   Skin is warm and dry,  no rashes, swelling or palpable lesions   Neurologic:  CN -XII intact, motor strength grossly intact, sensation grossly intact to light touch, no focal reflex deficits noted    Psychiatric:   "   Alert,oriented X3, no delusions, psychoses, depression or anxiety    Heme/Lymph/Imun:   No bruises, petechiae.  Lymph nodes normal in size/configuration       Data Review:  Lab Results   Component Value Date    CALCIUM 9.1 06/12/2022    PHOS 1.4 (L) 06/20/2018     Results from last 7 days   Lab Units 06/12/22  0316 06/11/22  0333 06/10/22  1151   AST (SGOT) U/L 15 12 14   SODIUM mmol/L 138 135* 142   POTASSIUM mmol/L 4.2 4.2 4.9   CHLORIDE mmol/L 101 99 103   CO2 mmol/L 26.0 27.0 27.5   BUN mg/dL 26* 20 21   CREATININE mg/dL 1.36* 1.01 0.98   GLUCOSE mg/dL 119* 191* 238*   CALCIUM mg/dL 9.1 9.2 9.2   WBC 10*3/mm3 16.32* 15.58* 14.29*   HEMOGLOBIN g/dL 11.2* 12.0* 11.1*   PLATELETS 10*3/mm3 147 150 138*   ALT (SGPT) U/L 15 14 15     Lab Results   Component Value Date    TROPONINT <0.010 01/02/2022     Estimated Creatinine Clearance: 38.5 mL/min (A) (by C-G formula based on SCr of 1.36 mg/dL (H)).  WEIGHTS:     Wt Readings from Last 1 Encounters:   06/09/22 0804 69.9 kg (154 lb)         Assessment:    Coronary artery disease involving native coronary artery with angina pectoris (HCC)    Weakness generalized    Paroxysmal atrial fibrillation (HCC)    Abnormal ECG    Chronic systolic CHF (congestive heart failure) (HCC)    Chronic chest pain with high risk for CAD    Syncope and collapse 1/2022    ASCVD (arteriosclerotic cardiovascular disease)    Ischemic cardiomyopathy EF 40% Cath 6/10/2022    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HCC)    Type 2 diabetes mellitus, with long-term current use of insulin (HCC)    Hypertension, essential    Esophagus disorder, thickened distal wall CT Scan 6/18/18    Granulomatous disease, chronic (HCC)    Lumbar canal stenosis    Cough    Carotid stenosis, asymptomatic, bilateral    Remote history of stroke, small caudate nucleus    Hyperlipidemia    Esophagitis determined by endoscopy by Sheri 6/19/18 LA Grade D Lower 1/3    Hiatal hernia with GERD and esophagitis    PAD (peripheral  artery disease) (HCC)    BPH without obstruction/lower urinary tract symptoms    Compression fracture of lumbar spine, non-traumatic (HCC)    Hard of hearing    Ptosis, left eyelid    Osteoarthritis of multiple joints    Osteoarthritis of right hip      Attending Physician Assessment and Plan:    1.  Atherosclerotic cardiovascular disease with significant coronary artery disease by cardiac catheterization.  Plan is for PTCA on Monday by cardiology.  Patient and his wife are agreeable to this plan and seemed to understand the associated risks.  They also understand that surgical intervention at present is not considered to be a good option by the CT surgery team.  Metformin to be held prior to surgery by 24 hours.  Otherwise patient ready for procedure.Cardiology is adjusting medications and allowing blood pressure to run higher.  IV fluids have been started to support pressure.    2.  Cardiomyopathy.  Ongoing problem for the patient who has a decreased ejection fraction probably related to ischemic disease.  It does appear that his ejection fraction has improved somewhat and is currently at 40% on recent cardiac catheterization.  Luckily patient is not having any significant symptomatology at present time.    3.  Type 2 diabetes mellitus on long-term insulin.  Patient is relatively stable with his diabetes at the present time he is very compliant with medications and diet and his wife is very supportive.    4.  Hyperlipidemia.  Patient's cholesterol well controlled at present time on atorvastatin with last LDL being significantly below 80.    5.  Degenerative joint disease.  Patient has nonspecific diffuse disease.  Biggest problem is right hip where he suffered a fracture in the past that has been repaired.  Patient did have to have revision of his surgery shortly thereafter and is left him with decreased range of motion.  He is nonetheless quite active.    6.  Gastroesophageal reflux disease.  Well controlled with  PPIs.    7.  BPH.  Modest symptoms only on Flomax and no recent evidence of severe urinary retention.    I did discuss findings with patient and his family and with nursing staff.    Plan for disposition:Where: home and When:  2-3 days following successful procedure.      Felix Rodriguez MD  6/12/2022  1300 EDT

## 2022-06-13 NOTE — PROGRESS NOTES
Enter Query Response Below      Query Response:     Stage 1 ckd         If applicable, please update the problem list.        Patient: Javier Marin        : 1935  Account: [id number]           Admit Date:         Options to Respond to Query:    1. Access the Encounter     a. From the To-Do Side bar, click Respond With Note.     b. Click New Note     c. Answer query within the yellow box.                d. Update the Problem List if applicable.     Dr. Alegre,    Patient admitted with CAD, s/p stent placement.  Patient also with ROSA/CKD.  Creatinine:  6/10 1.01  GFR 72  and 0.98  GFR 75   1.01  GFR 72   1.36  GFR 50   1.14  GFR 62  IVF during admission    Please clarify the stage of CKD being treated/monitored    KDIGO CKD staging [email or web address]  CKD Stage 1   GFR > 90  CKD Stage 2   GFR 60-89  CKD Stage 3a   GFR 45-59  CKD Stage 3b   GFR 30-44  CKD Stage 4     GFR 15-29  CKD Stage 5     GFR   <15    By submitting this query, we are merely seeking further clarification of documentation to accurately reflect all conditions that you are monitoring, evaluating, treating or that extend the hospitalization or utilize additional resources of care. Please utilize your independent clinical judgment when addressing the question(s) above.     This query and your response, once completed, will be entered into the legal medical record.    Sincerely,  [email or web address]  Clinical Documentation Integrity Program

## 2022-06-13 NOTE — PROGRESS NOTES
LOS: 3 days   Patient Care Team:  Percy Em MD as PCP - General  Percy Em MD as PCP - Family Medicine    Chief Complaint: No chief complaint on file.        Subjective    Today the patient is awake, alert, and has no complaints of chest pain, shortness of breath or any cardiac symptoms.  He is eating and drinking well.  He has no new complaints or problems.  He understands that his procedure is scheduled for today and is anxious to proceed.  He understands there are risks but clearly there are also some significant benefits.    Interval History:        Patient Complaints: none  Patient Denies: He denies shortness of breath or chest pain.  No abdominal pain.  No new complaints  History taken from: patient chart    Review of Systems:    Overall his review of systems is relatively benign.  Fortunately he is not having any cardiac symptoms of any sort.  He is awake alert and oriented.  He is somewhat hard of hearing but clearly he understands well what is going on and the risk of the procedure.  As noted he is not having any chest pain or cardiac-like symptoms but he is also not having any shortness of breath, abdominal pain, nausea vomiting or any extremity pain or discomfort other than his chronic arthritis.    Objective      Vital Signs  Temp:  [98 °F (36.7 °C)-99.2 °F (37.3 °C)] 98.9 °F (37.2 °C)  Pulse:  [] 109  Resp:  [18] 18  BP: (111-154)/(55-97) 154/97    I/O'S  Intake & Output (last 7 days)       06/06 0701  06/07 0700 06/07 0701  06/08 0700 06/08 0701  06/09 0700 06/09 0701  06/10 0700 06/10 0701  06/11 0700 06/11 0701  06/12 0700 06/12 0701  06/13 0700 06/13 0701  06/14 0700    P.O.    480 330 840 820     I.V. (mL/kg)       998.8 (14.3)     Total Intake(mL/kg)    480 (6.9) 330 (4.7) 840 (12) 1818.8 (26)     Urine (mL/kg/hr)    1460 0 (0) 1875 (1.1) 2235 (1.3)     Stool       0     Total Output    1460 0 1875 2235     Net    -980 +330 -1035 -416.3                 Urine Unmeasured  Occurrence      5 x      Stool Unmeasured Occurrence       1 x           Physical Exam:   General Appearance alert, pleasant, appears stated age, interactive and cooperative  Lungs clear to auscultation, respirations regular, respirations even and respirations unlabored  Heart regular rhythm & normal rate  Abdomen normal bowel sounds, no masses, no hepatomegaly, no splenomegaly, soft non-tender, no guarding and no rebound tenderness  Extremities moves extremities well, no edema, no cyanosis and no redness     Results Review:     I reviewed the patient's new clinical results.  I reviewed the patient's other test results and agree with the interpretation                     Results from last 7 days   Lab Units 06/13/22  0346 06/12/22  0316 06/11/22  0333 06/10/22  1151 06/10/22  0315 06/07/22  1252   SODIUM mmol/L 136 138 135* 142 138 137   POTASSIUM mmol/L 4.1 4.2 4.2 4.9 4.2 4.9   CHLORIDE mmol/L 102 101 99 103 101 104   CO2 mmol/L 26.0 26.0 27.0 27.5 28.1 22.0   BUN mg/dL 26* 26* 20 21 20 20   CREATININE mg/dL 1.14 1.36* 1.01 0.98 1.01 1.01   GLUCOSE mg/dL 168* 119* 191* 238* 99 153*   CALCIUM mg/dL 8.7 9.1 9.2 9.2 8.9 9.2         Results from last 7 days   Lab Units 06/13/22  0346 06/12/22  0316 06/11/22  0333 06/10/22  1151 06/07/22  1252   WBC 10*3/mm3 15.74* 16.32* 15.58* 14.29* 14.87*   HEMOGLOBIN g/dL 11.7* 11.2* 12.0* 11.1* 12.1*   HEMATOCRIT % 35.4* 35.2* 37.7 35.6* 36.7*   PLATELETS 10*3/mm3 146 147 150 138* 146                           Lab Results   Component Value Date    HGBA1C 8.20 (H) 06/11/2022    HGBA1C 7.45 (H) 04/01/2020    HGBA1C 6.80 (H) 06/19/2018     Glucose   Date/Time Value Ref Range Status   06/13/2022 0655 157 (H) 70 - 130 mg/dL Final     Comment:     Meter: [id number] : [id number] Toby Patricia RN   06/12/2022 1958 172 (H) 70 - 130 mg/dL Final     Comment:     Meter: [id number] : [id number] Toby Patricia RN   06/12/2022 1611 133 (H) 70 - 130 mg/dL Final     Comment:     Meter:  [id number] : [id number] Cesar Klein NA   06/12/2022 1105 155 (H) 70 - 130 mg/dL Final     Comment:     Meter: [id number] : [id number] Cesar Klein NA   06/12/2022 0556 137 (H) 70 - 130 mg/dL Final     Comment:     Meter: [id number] : [id number] Peak Hawa tech   06/11/2022 2101 159 (H) 70 - 130 mg/dL Final     Comment:     Meter: [id number] : [id number] Peak Hawa tech   06/11/2022 1641 218 (H) 70 - 130 mg/dL Final     Comment:     Meter: [id number] : [id number] Siegrist Kaitlyn NA   06/11/2022 1043 205 (H) 70 - 130 mg/dL Final     Comment:     Meter: [id number] : [id number] Cesar Klein NA       eGFR   Date Value Ref Range Status   06/13/2022 62.6 >60.0 mL/min/1.73 Final     Comment:     National Kidney Foundation and American Society of Nephrology (ASN) Task Force recommended calculation based on the Chronic Kidney Disease Epidemiology Collaboration (CKD-EPI) equation refit without adjustment for race.   06/12/2022 50.7 (L) >60.0 mL/min/1.73 Final     Comment:     National Kidney Foundation and American Society of Nephrology (ASN) Task Force recommended calculation based on the Chronic Kidney Disease Epidemiology Collaboration (CKD-EPI) equation refit without adjustment for race.   06/11/2022 72.4 >60.0 mL/min/1.73 Final     Comment:     National Kidney Foundation and American Society of Nephrology (ASN) Task Force recommended calculation based on the Chronic Kidney Disease Epidemiology Collaboration (CKD-EPI) equation refit without adjustment for race.   06/10/2022 75.1 >60.0 mL/min/1.73 Final     Comment:     National Kidney Foundation and American Society of Nephrology (ASN) Task Force recommended calculation based on the Chronic Kidney Disease Epidemiology Collaboration (CKD-EPI) equation refit without adjustment for race.   06/10/2022 72.4 >60.0 mL/min/1.73 Final     Comment:     National Kidney Foundation and American Society of Nephrology (ASN) Task Force recommended  calculation based on the Chronic Kidney Disease Epidemiology Collaboration (CKD-EPI) equation refit without adjustment for race.   06/07/2022 72.4 >60.0 mL/min/1.73 Final     Comment:     National Kidney Foundation and American Society of Nephrology (ASN) Task Force recommended calculation based on the Chronic Kidney Disease Epidemiology Collaboration (CKD-EPI) equation refit without adjustment for race.           Medication Review:   Scheduled Meds:aspirin, 81 mg, Oral, Daily  atorvastatin, 10 mg, Oral, Daily  enoxaparin, 40 mg, Subcutaneous, Q24H  insulin glargine, 8 Units, Subcutaneous, Nightly  insulin lispro, 6 Units, Subcutaneous, TID With Meals  losartan, 25 mg, Oral, Daily  metoprolol succinate XL, 25 mg, Oral, Daily  pantoprazole, 40 mg, Oral, QAM  sodium chloride, 250 mL, Intravenous, Once  tamsulosin, 0.4 mg, Oral, Daily      Continuous Infusions:sodium chloride, 75 mL/hr, Last Rate: 75 mL/hr (06/12/22 2147)      PRN Meds:.•  acetaminophen  •  nitroglycerin              Patient Active Problem List   Diagnosis   • Acute gastritis   • 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HCC)   • Limb swelling   • Type 2 diabetes mellitus, with long-term current use of insulin (HCC)   • Hypertension, essential   • Acute hyponatremia   • Acute kidney injury superimposed on chronic kidney disease (HCC)   • Elevated liver function tests   • Leukocytosis   • Anorexia   • Esophagus disorder, thickened distal wall CT Scan 6/18/18   • Granulomatous disease, chronic (HCC)   • Lung nodule seen on imaging study   • Compression fracture of lumbar spine, non-traumatic (HCC)   • Lumbar canal stenosis   • Weakness generalized   • Cough   • Hard of hearing   • Ptosis, left eyelid   • Osteoarthritis of multiple joints   • Paroxysmal atrial fibrillation (HCC)   • Carotid stenosis, asymptomatic, bilateral   • Remote history of stroke, small caudate nucleus   • Thrombocytopenia (HCC)   • Fall at home, subsequent encounter   • Hyperlipidemia   •  Esophagitis determined by endoscopy by Sheri 6/19/18 LA Grade D Lower 1/3   • Hiatal hernia with GERD and esophagitis   • Mandible fracture ([facility])   • Abnormal ECG   • PAD (peripheral artery disease) ([facility])   • Arteriosclerosis of abdominal aorta ([facility])   • Chronic systolic CHF (congestive heart failure) ([facility])   • 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  He is not a candidate for bypass surgery so hopefully this will give him enough flow to minimize his symptoms and allow him to continue to be active.    2.  Cardiomyopathy-he has reduced ejection fraction is likely ischemic in nature.  It may improve somewhat once his cardiac flow is improved.  At this point it does not seem to cause him any symptoms or difficulty.  Cardiology has made some adjustments to his medications on this admission.  Unfortunately may have caused some hypotension.    3.  Diabetes mellitus type 2--generally controlled on his current medications but his A1c is up at this point.  We need to adjust his medication once he is more stable.    4.  Hyperlipidemia-well-controlled on statins    5.  ROSA-the patient's had some decline in his renal function since admission.  That may be related to his initial dye load or could be related to his medication adjustments including an ACE inhibitor.  Nonetheless those medications have been stopped.  He has been well-hydrated throughout.  Hopefully the dye load today will not be problematic.    6.  Degenerative joint disease-he has chronic DJD  but is relatively well controlled and stable    7.  Reflux-no complaints on PPI    8.  BPH-on medications with no complaints.    9.  Leukocytosis-has had a persistent leukocytosis since admission and will need to follow this up as an outpatient.    Overall he is very stable and hopefully he will do well with the procedure today.  We will need to watch his renal function very closely.                Plan for disposition:Where: home and When:  tomorrow    Percy Em MD  06/13/22  08:24 EDT          Time:      Alternatives Discussed Intro (Do Not Add Period): I discussed alternative treatments to Mohs surgery and specifically discussed the risks and benefits of

## 2022-06-14 ENCOUNTER — READMISSION MANAGEMENT (OUTPATIENT)
Dept: CALL CENTER | Facility: HOSPITAL | Age: 87
End: 2022-06-14

## 2022-06-14 VITALS
RESPIRATION RATE: 18 BRPM | SYSTOLIC BLOOD PRESSURE: 114 MMHG | WEIGHT: 154 LBS | TEMPERATURE: 97.7 F | HEART RATE: 75 BPM | OXYGEN SATURATION: 99 % | DIASTOLIC BLOOD PRESSURE: 75 MMHG | BODY MASS INDEX: 21.56 KG/M2 | HEIGHT: 71 IN

## 2022-06-14 LAB
ANION GAP SERPL CALCULATED.3IONS-SCNC: 7 MMOL/L (ref 5–15)
BUN SERPL-MCNC: 18 MG/DL (ref 8–23)
BUN/CREAT SERPL: 22.5 (ref 7–25)
CALCIUM SPEC-SCNC: 8.6 MG/DL (ref 8.6–10.5)
CHLORIDE SERPL-SCNC: 106 MMOL/L (ref 98–107)
CHOLEST SERPL-MCNC: 93 MG/DL (ref 0–200)
CO2 SERPL-SCNC: 23 MMOL/L (ref 22–29)
CREAT SERPL-MCNC: 0.8 MG/DL (ref 0.76–1.27)
DEPRECATED RDW RBC AUTO: 53.9 FL (ref 37–54)
EGFRCR SERPLBLD CKD-EPI 2021: 86.2 ML/MIN/1.73
EOSINOPHIL # BLD MANUAL: 0.13 10*3/MM3 (ref 0–0.4)
EOSINOPHIL NFR BLD MANUAL: 1 % (ref 0.3–6.2)
ERYTHROCYTE [DISTWIDTH] IN BLOOD BY AUTOMATED COUNT: 17.8 % (ref 12.3–15.4)
GLUCOSE BLDC GLUCOMTR-MCNC: 147 MG/DL (ref 70–130)
GLUCOSE BLDC GLUCOMTR-MCNC: 267 MG/DL (ref 70–130)
GLUCOSE BLDC GLUCOMTR-MCNC: 76 MG/DL (ref 70–130)
GLUCOSE SERPL-MCNC: 76 MG/DL (ref 65–99)
HCT VFR BLD AUTO: 36.1 % (ref 37.5–51)
HDLC SERPL-MCNC: 36 MG/DL (ref 40–60)
HGB BLD-MCNC: 11.6 G/DL (ref 13–17.7)
LDLC SERPL CALC-MCNC: 46 MG/DL (ref 0–100)
LDLC/HDLC SERPL: 1.36 {RATIO}
LYMPHOCYTES # BLD MANUAL: 9.84 10*3/MM3 (ref 0.7–3.1)
MCH RBC QN AUTO: 26.9 PG (ref 26.6–33)
MCHC RBC AUTO-ENTMCNC: 32.1 G/DL (ref 31.5–35.7)
MCV RBC AUTO: 83.6 FL (ref 79–97)
NEUTROPHILS # BLD AUTO: 3.5 10*3/MM3 (ref 1.7–7)
NEUTROPHILS NFR BLD MANUAL: 26 % (ref 42.7–76)
PLAT MORPH BLD: NORMAL
PLATELET # BLD AUTO: 134 10*3/MM3 (ref 140–450)
PMV BLD AUTO: 9.6 FL (ref 6–12)
POTASSIUM SERPL-SCNC: 4 MMOL/L (ref 3.5–5.2)
QT INTERVAL: 419 MS
QT INTERVAL: 429 MS
RBC # BLD AUTO: 4.32 10*6/MM3 (ref 4.14–5.8)
RBC MORPH BLD: NORMAL
SODIUM SERPL-SCNC: 136 MMOL/L (ref 136–145)
TRIGL SERPL-MCNC: 41 MG/DL (ref 0–150)
VARIANT LYMPHS NFR BLD MANUAL: 73 % (ref 19.6–45.3)
VLDLC SERPL-MCNC: 11 MG/DL (ref 5–40)
WBC MORPH BLD: NORMAL
WBC NRBC COR # BLD: 13.48 10*3/MM3 (ref 3.4–10.8)

## 2022-06-14 PROCEDURE — 99239 HOSP IP/OBS DSCHRG MGMT >30: CPT | Performed by: NURSE PRACTITIONER

## 2022-06-14 PROCEDURE — 80061 LIPID PANEL: CPT | Performed by: INTERNAL MEDICINE

## 2022-06-14 PROCEDURE — 63710000001 INSULIN LISPRO (HUMAN) PER 5 UNITS: Performed by: INTERNAL MEDICINE

## 2022-06-14 PROCEDURE — 97116 GAIT TRAINING THERAPY: CPT

## 2022-06-14 PROCEDURE — 82962 GLUCOSE BLOOD TEST: CPT

## 2022-06-14 PROCEDURE — 85025 COMPLETE CBC W/AUTO DIFF WBC: CPT | Performed by: INTERNAL MEDICINE

## 2022-06-14 PROCEDURE — 93010 ELECTROCARDIOGRAM REPORT: CPT | Performed by: INTERNAL MEDICINE

## 2022-06-14 PROCEDURE — 80048 BASIC METABOLIC PNL TOTAL CA: CPT | Performed by: INTERNAL MEDICINE

## 2022-06-14 PROCEDURE — 93005 ELECTROCARDIOGRAM TRACING: CPT | Performed by: INTERNAL MEDICINE

## 2022-06-14 PROCEDURE — 85007 BL SMEAR W/DIFF WBC COUNT: CPT | Performed by: INTERNAL MEDICINE

## 2022-06-14 RX ORDER — CLOPIDOGREL BISULFATE 75 MG/1
75 TABLET ORAL DAILY
Qty: 30 TABLET | Refills: 11 | Status: SHIPPED | OUTPATIENT
Start: 2022-06-15

## 2022-06-14 RX ADMIN — PANTOPRAZOLE SODIUM 40 MG: 40 TABLET, DELAYED RELEASE ORAL at 06:29

## 2022-06-14 RX ADMIN — TAMSULOSIN HYDROCHLORIDE 0.4 MG: 0.4 CAPSULE ORAL at 08:56

## 2022-06-14 RX ADMIN — LOSARTAN POTASSIUM 25 MG: 25 TABLET, FILM COATED ORAL at 11:46

## 2022-06-14 RX ADMIN — METOPROLOL SUCCINATE 25 MG: 25 TABLET, EXTENDED RELEASE ORAL at 06:29

## 2022-06-14 RX ADMIN — ATORVASTATIN CALCIUM 10 MG: 20 TABLET, FILM COATED ORAL at 08:55

## 2022-06-14 RX ADMIN — INSULIN LISPRO 6 UNITS: 100 INJECTION, SOLUTION INTRAVENOUS; SUBCUTANEOUS at 11:45

## 2022-06-14 RX ADMIN — ASPIRIN 81 MG: 81 TABLET, COATED ORAL at 08:55

## 2022-06-14 RX ADMIN — CLOPIDOGREL 75 MG: 75 TABLET, FILM COATED ORAL at 08:56

## 2022-06-14 NOTE — PROGRESS NOTES
LOS: 3 days   Patient Care Team:  Percy Em MD as PCP - General  Percy Em MD as PCP - Family Medicine    Chief Complaint: No chief complaint on file.        Subjective    Today the patient is awake, alert, and says he feels well.  He has no complaints of chest pain, shortness of breath, or any other cardiac symptoms.  He is not having pain or discomfort at the cath site.  He seems to be tolerating his medications well.  He is hungry and ready to eat breakfast.  He is mostly ready to be discharged home.    Interval History:     Patient Complaints: none  Patient Denies: As noted he denies shortness of breath or chest pain, no abdominal pain, no extremity pain  History taken from: patient chart RN    Review of Systems:    Overall his review of systems there are essentially negative.  As noted he is awake, alert, oriented, hungry and ready to eat.  He has no shortness of breath or chest pain, no abdominal pain, no discomfort at the catheterization site, no swelling in his legs, no other issues or concerns.    Objective      Vital Signs  Temp:  [98 °F (36.7 °C)-99.2 °F (37.3 °C)] 98.9 °F (37.2 °C)  Pulse:  [] 109  Resp:  [18] 18  BP: (111-154)/(55-97) 154/97    I/O'S  Intake & Output (last 7 days)       06/07 0701  06/08 0700 06/08 0701  06/09 0700 06/09 0701  06/10 0700 06/10 0701  06/11 0700 06/11 0701  06/12 0700 06/12 0701  06/13 0700 06/13 0701  06/14 0700 06/14 0701  06/15 0700    P.O.   480 330 840 820 0     I.V. (mL/kg)      998.8 (14.3) 50 (0.7)     IV Piggyback       950     Total Intake(mL/kg)   480 (6.9) 330 (4.7) 840 (12) 1818.8 (26) 1000 (14.3)     Urine (mL/kg/hr)   1460 0 (0) 1875 (1.1) 2235 (1.3) 1650 (1)     Stool      0      Total Output   1460 0 1875 2235 1650     Net   -980 +330 -1035 -416.3 -650                 Urine Unmeasured Occurrence     5 x  1 x     Stool Unmeasured Occurrence      1 x            Physical Exam:   General Appearance alert, pleasant, appears stated  age, interactive and cooperative  Lungs clear to auscultation, respirations regular, respirations even and respirations unlabored  Heart regular rhythm & normal rate and (The EKG this morning read A. fib but the monitor appears to show first-degree AV block)  Abdomen normal bowel sounds, no masses, no hepatomegaly, no splenomegaly, soft non-tender, no guarding and no rebound tenderness  Extremities moves extremities well, no edema, no cyanosis and no redness     Results Review:     I reviewed the patient's new clinical results.  I reviewed the patient's other test results and agree with the interpretation                     Results from last 7 days   Lab Units 06/14/22  0313 06/13/22  0346 06/12/22  0316 06/11/22  0333 06/10/22  1151 06/10/22  0315 06/07/22  1252   SODIUM mmol/L 136 136 138 135* 142 138 137   POTASSIUM mmol/L 4.0 4.1 4.2 4.2 4.9 4.2 4.9   CHLORIDE mmol/L 106 102 101 99 103 101 104   CO2 mmol/L 23.0 26.0 26.0 27.0 27.5 28.1 22.0   BUN mg/dL 18 26* 26* 20 21 20 20   CREATININE mg/dL 0.80 1.14 1.36* 1.01 0.98 1.01 1.01   GLUCOSE mg/dL 76 168* 119* 191* 238* 99 153*   CALCIUM mg/dL 8.6 8.7 9.1 9.2 9.2 8.9 9.2         Results from last 7 days   Lab Units 06/14/22 0313 06/13/22  0346 06/12/22 0316 06/11/22  0333 06/10/22  1151 06/07/22  1252   WBC 10*3/mm3 13.48* 15.74* 16.32* 15.58* 14.29* 14.87*   HEMOGLOBIN g/dL 11.6* 11.7* 11.2* 12.0* 11.1* 12.1*   HEMATOCRIT % 36.1* 35.4* 35.2* 37.7 35.6* 36.7*   PLATELETS 10*3/mm3 134* 146 147 150 138* 146             Results from last 7 days   Lab Units 06/14/22 0313   CHOLESTEROL mg/dL 93   TRIGLYCERIDES mg/dL 41   HDL CHOL mg/dL 36*   LDL CHOL mg/dL 46               Lab Results   Component Value Date    HGBA1C 8.20 (H) 06/11/2022    HGBA1C 7.45 (H) 04/01/2020    HGBA1C 6.80 (H) 06/19/2018     Glucose   Date/Time Value Ref Range Status   06/14/2022 0556 76 70 - 130 mg/dL Final     Comment:     Meter: [id number] : [id number] Matt Charles CNA   06/13/2022 [facility]  206 (H) 70 - 130 mg/dL Final     Comment:     Meter: [id number] : [id number] Matt POTTERA   06/13/2022 1639 263 (H) 70 - 130 mg/dL Final     Comment:     Meter: [id number] : [id number] Evita Garcia JOSEFA   06/13/2022 0655 157 (H) 70 - 130 mg/dL Final     Comment:     Meter: [id number] : [id number] Toby Patricia RN   06/12/2022 1958 172 (H) 70 - 130 mg/dL Final     Comment:     Meter: [id number] : [id number] Toby Patricia RN   06/12/2022 1611 133 (H) 70 - 130 mg/dL Final     Comment:     Meter: [id number] : [id number] Cesar Klein NA   06/12/2022 1105 155 (H) 70 - 130 mg/dL Final     Comment:     Meter: [id number] : [id number] Guerrero Latoya NA   06/12/2022 0556 137 (H) 70 - 130 mg/dL Final     Comment:     Meter: [id number] : [id number] [facility]       eGFR   Date Value Ref Range Status   06/14/2022 86.2 >60.0 mL/min/1.73 Final     Comment:     National Kidney Foundation and American Society of Nephrology (ASN) Task Force recommended calculation based on the Chronic Kidney Disease Epidemiology Collaboration (CKD-EPI) equation refit without adjustment for race.   06/13/2022 62.6 >60.0 mL/min/1.73 Final     Comment:     National Kidney Foundation and American Society of Nephrology (ASN) Task Force recommended calculation based on the Chronic Kidney Disease Epidemiology Collaboration (CKD-EPI) equation refit without adjustment for race.   06/12/2022 50.7 (L) >60.0 mL/min/1.73 Final     Comment:     National Kidney Foundation and American Society of Nephrology (ASN) Task Force recommended calculation based on the Chronic Kidney Disease Epidemiology Collaboration (CKD-EPI) equation refit without adjustment for race.   06/11/2022 72.4 >60.0 mL/min/1.73 Final     Comment:     National Kidney Foundation and American Society of Nephrology (ASN) Task Force recommended calculation based on the Chronic Kidney Disease Epidemiology Collaboration (CKD-EPI) equation refit without adjustment for race.    06/10/2022 75.1 >60.0 mL/min/1.73 Final     Comment:     National Kidney Foundation and American Society of Nephrology (ASN) Task Force recommended calculation based on the Chronic Kidney Disease Epidemiology Collaboration (CKD-EPI) equation refit without adjustment for race.   06/10/2022 72.4 >60.0 mL/min/1.73 Final     Comment:     National Kidney Foundation and American Society of Nephrology (ASN) Task Force recommended calculation based on the Chronic Kidney Disease Epidemiology Collaboration (CKD-EPI) equation refit without adjustment for race.   06/07/2022 72.4 >60.0 mL/min/1.73 Final     Comment:     National Kidney Foundation and American Society of Nephrology (ASN) Task Force recommended calculation based on the Chronic Kidney Disease Epidemiology Collaboration (CKD-EPI) equation refit without adjustment for race.           Medication Review:   Scheduled Meds:aspirin, 81 mg, Oral, Daily  atorvastatin, 10 mg, Oral, Daily  clopidogrel, 75 mg, Oral, Daily  enoxaparin, 40 mg, Subcutaneous, Q24H  insulin glargine, 8 Units, Subcutaneous, Nightly  insulin lispro, 6 Units, Subcutaneous, TID With Meals  losartan, 25 mg, Oral, Daily  metoprolol succinate XL, 25 mg, Oral, Daily  pantoprazole, 40 mg, Oral, QAM  sodium chloride, 250 mL, Intravenous, Once  tamsulosin, 0.4 mg, Oral, Daily      Continuous Infusions:   PRN Meds:.•  acetaminophen  •  acetaminophen  •  HYDROcodone-acetaminophen  •  Morphine **AND** naloxone  •  nitroglycerin              Patient Active Problem List   Diagnosis   • Acute gastritis   • 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HCC)   • Limb swelling   • Type 2 diabetes mellitus, with long-term current use of insulin ([facility])   • Hypertension, essential   • Acute hyponatremia   • Acute kidney injury superimposed on chronic kidney disease (HCC)   • Elevated liver function tests   • Leukocytosis   • Anorexia   • Esophagus disorder, thickened distal wall CT Scan 6/18/18   • Granulomatous disease, chronic  ([facility])   • Lung nodule seen on imaging study   • Compression fracture of lumbar spine, non-traumatic ([facility])   • Lumbar canal stenosis   • Weakness generalized   • Cough   • Hard of hearing   • Ptosis, left eyelid   • Osteoarthritis of multiple joints   • Paroxysmal atrial fibrillation ([facility])   • Carotid stenosis, asymptomatic, bilateral   • Remote history of stroke, small caudate nucleus   • Thrombocytopenia ([facility])   • Fall at home, subsequent encounter   • Hyperlipidemia   • Esophagitis determined by endoscopy by Sheri 6/19/18 LA Grade D Lower 1/3   • Hiatal hernia with GERD and esophagitis   • Mandible fracture ([facility])   • Abnormal ECG   • PAD (peripheral artery disease) ([facility])   • Arteriosclerosis of abdominal aorta (HCC)   • Chronic systolic CHF (congestive heart failure) ([facility])   • Coronary artery disease of native artery of native heart with stable angina pectoris ([facility])   • Chronic chest pain with high risk for CAD   • Coronary artery disease involving native coronary artery with angina pectoris ([facility])   • Syncope and collapse 1/2022   • ASCVD (arteriosclerotic cardiovascular disease)   • Ischemic cardiomyopathy EF 40% Cath 6/10/2022   • Osteoarthritis of right hip   • BPH without obstruction/lower urinary tract symptoms     #1 ASCVD-he had atherotomy and stent placement yesterday that was successful.  He had no complications or problems related to that.  He says he feels well.  He hopes to be discharged today.    2.  Cardiomyopathy-his ejection fractions around 40% and he has no signs or symptoms of CHF.    3.  Diabetes mellitus type 2-his blood sugar is actually a little low today.  It has run a little higher.  We will need to make some adjustments in his medication as an outpatient since his A1c is elevated.    4.  Hyperlipidemia-his cholesterol and LDL are well controlled on his current medications    5.  ROSA-his renal function is improved at this point.  It may have been a combination of medications and IV dye that  caused a transient drop in his renal function.  At this point and seems to have recovered well.    6.  DJD-chronic and stable    7.  Reflux-no complaints on PPI    8.  BPH-on medications without complaints    9.  Leukocytosis-he has a leukocytosis on this admission along with some increase in his lymphocytes.  We will follow this up as an outpatient and work it up appropriately.    Overall he seems to be doing well after successful atherotomy and stent placement to the LAD.  Hopefully this will stabilize him for some time.            Plan for disposition:Where: home and When:  today    Percy Em MD  06/14/22  08:21 EDT          Time:

## 2022-06-14 NOTE — DISCHARGE SUMMARY
Patient Name: Javier Marin  :1935  86 y.o.    Date of Admit: 2022  Date of Discharge:  2022    Discharge Diagnosis:  1. Coronary Artery disease  2. S/P Frank to the LAD with  of the Cx  3. Angina  4. Ischemic CM Ef 30%  5. Chronic systolic CHF  6. Previous stroke  7. Carotid disease  8. Essential HTN  9. Hyperlipidemia  10 DM2    Hospital Course:   The patient is a 86 year old male who has a history of DM2, coronary artery disease, htrn, hld, and carotid disease. He is an ex smoker. He has been having some ROBLEDO for severeal months and had a syncopal episode in [place] in January. He was noted to have some NSVT on a holter. Echo showed a reduced EF and he had a cardiac cath that showed 3 vessel CAD. He was evaluated for cardiac surgery but due to his age and frailty we chose PCI. Yesterday he underwent PCI to the LAD, his Cx is  with collaterals. He was put on plavix. He needs aggressive risk factor modification. I discussed with him and his wife the need for DAPT uninterrupted for a year. He understands and agrees.     Procedures Performed  Procedure(s):  STENT FRANK - CORONARY  Atherectomy-coronary  Optical Coherent Tomography       Consults     Date and Time Order Name Status Description    2022 11:42 AM Inpatient Cardiothoracic Surgery Consult Completed           Pertinent Test Results:   Results from last 7 days   Lab Units 22  0346   SODIUM mmol/L 136 136   POTASSIUM mmol/L 4.0 4.1   CHLORIDE mmol/L 106 102   CO2 mmol/L 23.0 26.0   BUN mg/dL 18 26*   CREATININE mg/dL 0.80 1.14   CALCIUM mg/dL 8.6 8.7   BILIRUBIN mg/dL  --  0.4   ALK PHOS U/L  --  106   ALT (SGPT) U/L  --  16   AST (SGOT) U/L  --  15   GLUCOSE mg/dL 76 168*         @LABRCNT(bnp)@  Results from last 7 days   Lab Units 22  0313   WBC 10*3/mm3 13.48*   HEMOGLOBIN g/dL 11.6*   HEMATOCRIT % 36.1*   PLATELETS 10*3/mm3 134*             Results from last 7 days   Lab Units 22  0313   CHOLESTEROL  mg/dL 93   TRIGLYCERIDES mg/dL 41   HDL CHOL mg/dL 36*   LDL CHOL mg/dL 46       Condition on Discharge: stable    Discharge Medications     Discharge Medications      New Medications      Instructions Start Date   clopidogrel 75 MG tablet  Commonly known as: PLAVIX   75 mg, Oral, Daily   Start Date: Lucy 15, 2022        Continue These Medications      Instructions Start Date   acetaminophen 500 MG tablet  Commonly known as: TYLENOL   500 mg, Oral, Every 6 Hours PRN      aspirin 81 MG EC tablet   81 mg, Oral, Daily      atorvastatin 10 MG tablet  Commonly known as: LIPITOR   10 mg, Oral, Daily      cholecalciferol 25 MCG (1000 UT) tablet  Commonly known as: VITAMIN D3   1,000 Units, Oral, Daily, PT HOLDING FOR SURGERY       FAMOTIDINE PO   20 mg, Oral, Nightly      fexofenadine 180 MG tablet  Commonly known as: ALLEGRA   180 mg, Oral, As Needed      furosemide 40 MG tablet  Commonly known as: LASIX   40 mg, Oral, Daily      insulin glargine 100 UNIT/ML injection  Commonly known as: LANTUS, SEMGLEE   8 Units, Subcutaneous, Nightly      isosorbide mononitrate 30 MG 24 hr tablet  Commonly known as: IMDUR   30 mg, Oral, Daily      lansoprazole 15 MG capsule  Commonly known as: Prevacid   15 mg, Oral, 2 Times Daily      losartan 25 MG tablet  Commonly known as: Cozaar   25 mg, Oral, Daily      metoprolol succinate XL 25 MG 24 hr tablet  Commonly known as: TOPROL-XL   25 mg, Oral, Daily      nitroglycerin 0.4 MG SL tablet  Commonly known as: NITROSTAT   0.4 mg, Sublingual, Every 5 Minutes PRN, Take no more than 3 doses in 15 minutes.      NOVOLOG SC   6 Units, Subcutaneous, 3 Times Daily With Meals      tamsulosin 0.4 MG capsule 24 hr capsule  Commonly known as: FLOMAX   1 capsule, Oral, Daily             Discharge Diet:     Activity at Discharge:     Discharge disposition: home    Follow-up Appointments  No future appointments.  Additional Instructions for the Follow-ups that You Need to Schedule     Ambulatory Referral  to Cardiac Rehab   As directed            Test Results Pending at Discharge       JERRY Lin, [facility]  06/14/22  10:54 EDT    Time: Discharge 40 min  Electronically signed by JERRY Lin, 06/14/22, 10:54 AM EDT.

## 2022-06-14 NOTE — CONSULTS
Met with patient and his wife to discuss the benefits of cardiac rehab. Patient and wife declined attending the program. Provided phase II information along with the contact information for cardiac rehab here at [facility] if they were to change their minds.

## 2022-06-14 NOTE — PROGRESS NOTES
[facility] Clinical Pharmacy Services: National Cardiology Data Registry (NCDR) Medication Review    Javier Marin is s/p PCI with drug-eluding stent placement for angina. Pharmacy to review discharge medications to make sure appropriate medications have been prescribed.    Patient has been discharged on the following:  · Aspirin EC 81 mg daily  · High Intensity Statin: Atorvastatin 10 mg daily  · Beta-blocker: Metoprolol XL 25 mg daily  · P2Y12 Inhibitor: Clopidogrel 75 mg daily  · LVEF=29%: Losartan 25 mg daily    Since patient is older than 75 years, it is acceptable for him/her to be on a medium intensity statin per guideline-driven therapy (risk vs benefit).    These medications meet the requirements for NCDR discharge medication for chest pain and MI.    Gardenia Wallace, Pharm.D., [facility]  Clinical Pharmacist  Phone Extension #[phone]

## 2022-06-14 NOTE — PROGRESS NOTES
[facility] Clinical Pharmacy Services: Pharmacy Education - Antiplatelet - Clopidogrel    Javier Marin has been ordered clopidogrel s/p stent placement.     Counseling points included the following:  Clopidogrel's indication, patient's need for the medication, and dosing/frequency of this medication.  Enforced the importance of taking their medication as instructed every day and the reason why the medication is dosed that way.  Explained possible side effects of antiplatelet therapy, including increased risk of bleeding, and s/sx of bleeding. Also talked about ways to control bleeding for minor cuts and scrapes.  Emphasized the importance of going to the emergency room if any of the following occur: Falling and hitting your head; noticing bright red blood in urine or dark/tarry stools; vomiting up blood or vomit has a coffee-ground like texture; coughing up blood.  Discussed the importance of informing any physician or dentist that they have been started on an antiplatelet, in case they need to be taken off for a procedure.  Discussed all important drug interactions, including over-the-counter medications and supplements.  Instructed the patient not to begin or discontinue any medications without informing his/her physician/pharmacist.     Patient expressed understanding and had no further questions.      Gardenia Wallace, Pharm.D., [facility]   Clinical Pharmacist   Phone Extension #[phone]

## 2022-06-14 NOTE — THERAPY TREATMENT NOTE
Patient Name: Javier Marin  : 1935    MRN: [id number]                              Today's Date: 2022       Admit Date: 2022    Visit Dx:     ICD-10-CM ICD-9-CM   1. Coronary artery disease involving native coronary artery of native heart with angina pectoris ([facility])  I25.119 414.01     413.9   2. Chronic systolic CHF (congestive heart failure) (HCC)  I50.22 428.22     428.0   3. PAD (peripheral artery disease) ([facility])  I73.9 443.9   4. Paroxysmal atrial fibrillation (HCC)  I48.0 427.31   5. Coronary artery disease of native artery of native heart with stable angina pectoris ([facility])  I25.118 414.01     413.9     Patient Active Problem List   Diagnosis   • Acute gastritis   • 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[facility])   • Limb swelling   • Type 2 diabetes mellitus, with long-term current use of insulin ([facility])   • Hypertension, essential   • Acute hyponatremia   • Acute kidney injury superimposed on chronic kidney disease (HCC)   • Elevated liver function tests   • Leukocytosis   • Anorexia   • Esophagus disorder, thickened distal wall CT Scan 18   • Granulomatous disease, chronic ([facility])   • Lung nodule seen on imaging study   • Compression fracture of lumbar spine, non-traumatic ([facility])   • Lumbar canal stenosis   • Weakness generalized   • Cough   • Hard of hearing   • Ptosis, left eyelid   • Osteoarthritis of multiple joints   • Paroxysmal atrial fibrillation ([facility])   • Carotid stenosis, asymptomatic, bilateral   • Remote history of stroke, small caudate nucleus   • Thrombocytopenia ([facility])   • Fall at home, subsequent encounter   • Hyperlipidemia   • Esophagitis determined by endoscopy by Sheri 18 LA Grade D Lower 1/3   • Hiatal hernia with GERD and esophagitis   • Mandible fracture ([facility])   • Abnormal ECG   • PAD (peripheral artery disease) (HCC)   • Arteriosclerosis of abdominal aorta (HCC)   • Chronic systolic CHF (congestive heart failure) (HCC)   • Coronary artery disease of native artery of  native heart with stable angina pectoris (HCC)   • Chronic chest pain with high risk for CAD   • Coronary artery disease involving native coronary artery with angina pectoris (HCC)   • Syncope and collapse 1/2022   • ASCVD (arteriosclerotic cardiovascular disease)   • Ischemic cardiomyopathy EF 40% Cath 6/10/2022   • Osteoarthritis of right hip   • BPH without obstruction/lower urinary tract symptoms     Past Medical History:   Diagnosis Date   • A-fib ([facility])    • 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[facility])    • Anorexia    • Arteriosclerosis of abdominal aorta ([facility]) 02/24/2014   • Arthritis    • Carotid stenosis, asymptomatic, bilateral 02/24/2014    16-49%   • Closed right arm fracture 1994   • Compression fracture of lumbar spine, non-traumatic ([facility])    • Diabetes mellitus ([facility])    • Elevated LFTs    • Esophagitis 06/19/2018    DR RASHID GRADE D LOWER   • Esophagus disorder     THICKENED DISTAL WALL-CT SCAN 6/18/18   • Garbled speech    • Granulomatous disease, chronic ([facility])    • Hard of hearing    • Hiatal hernia with GERD and esophagitis    • History of acute gastritis    • Hyperlipidemia    • Hypertension    • Hyponatremia    • Leukocytosis    • Lumbar canal stenosis    • Lung nodule seen on imaging study    • Mandible fracture ([facility]) 03/28/2020    FROM FALL   • Osteoarthritis of multiple joints    • Ptosis of left eyelid    • Remote history of stroke     SMALL CAUDATE NECLEUS   • Thrombocytopenia ([facility])    • Weakness generalized      Past Surgical History:   Procedure Laterality Date   • CARDIAC CATHETERIZATION N/A 6/9/2022    Procedure: Left Heart Cath;  Surgeon: Reinaldo Miller MD;  Location: [facility] INVASIVE LOCATION;  Service: Cardiovascular;  Laterality: N/A;   • CARDIAC CATHETERIZATION N/A 6/9/2022    Procedure: Left ventriculography;  Surgeon: Reinaldo Miller MD;  Location: [facility] CATH INVASIVE LOCATION;  Service: Cardiovascular;  Laterality: N/A;   • CARDIAC CATHETERIZATION N/A 6/9/2022     Procedure: Coronary angiography;  Surgeon: Reinaldo Miller MD;  Location: [facility] CATH INVASIVE LOCATION;  Service: Cardiovascular;  Laterality: N/A;   • CARDIAC CATHETERIZATION N/A 6/13/2022    Procedure: STENT CHARISMA - CORONARY;  Surgeon: Michelle Alegre MD;  Location: [facility] CATH INVASIVE LOCATION;  Service: Cardiovascular;  Laterality: N/A;  PCI of the LAD with atherectomy   • CARDIAC CATHETERIZATION N/A 6/13/2022    Procedure: Atherectomy-coronary;  Surgeon: Michelle Alegre MD;  Location: [facility] CATH INVASIVE LOCATION;  Service: Cardiovascular;  Laterality: N/A;  rotablator   • CARDIAC CATHETERIZATION N/A 6/13/2022    Procedure: Optical Coherent Tomography;  Surgeon: Michelle Alegre MD;  Location: [facility] CATH INVASIVE LOCATION;  Service: Cardiovascular;  Laterality: N/A;   • CATARACT EXTRACTION, BILATERAL     • COLONOSCOPY     • ENDOSCOPY N/A 06/19/2018    Procedure: ESOPHAGOGASTRODUODENOSCOPY WITH BIOPSY;  Surgeon: Toribio Wade MD;  Location: [facility] ENDOSCOPY;  Service: Gastroenterology   • EYE PTOSIS REPAIR Left 11/15/2016    Procedure: LT UPPER LID EYE PTOSIS REPAIR;  Surgeon: Anup [facility] MD;  Location: [facility] OR OSC;  Service:    • EYE SURGERY Left     victreous hemorrhage repair   • HIP ARTHROPLASTY Right    • ORIF MANDIBULAR FRACTURE Bilateral 03/31/2020    Procedure: OPEN REDUCTION AND INTERNAL FIXATION OF BILATERAL MANDIBLE FRACTURES;  Surgeon: Percy Jeronimo MD;  Location: [facility] MAIN OR;  Service: Maxillofacial;  Laterality: Bilateral;      General Information     Row Name 06/14/22 0935          Physical Therapy Time and Intention    Document Type therapy note (daily note)  -EE     Mode of Treatment physical therapy;individual therapy  -EE     Row Name 06/14/22 0935          General Information    Existing Precautions/Restrictions fall  -EE     Barriers to Rehab hearing deficit;medically complex  -EE     Row Name 06/14/22 0935          Cognition    Orientation Status (Cognition)  oriented x 3  -EE     Row Name 06/14/22 0935          Safety Issues, Functional Mobility    Impairments Affecting Function (Mobility) balance;endurance/activity tolerance  -EE           User Key  (r) = Recorded By, (t) = Taken By, (c) = Cosigned By    Initials Name Provider Type    EE Ximena Lugo, PT Physical Therapist               Mobility     Row Name 06/14/22 0935          Bed Mobility    Comment, (Bed Mobility) not tested; pt sitting up EOB  -EE     Row Name 06/14/22 0935          Sit-Stand Transfer    Sit-Stand [place] (Transfers) contact guard  -EE     Assistive Device (Sit-Stand Transfers) cane, straight  -EE     Row Name 06/14/22 0935          Gait/Stairs (Locomotion)    [place] Level (Gait) contact guard;verbal cues  -EE     Assistive Device (Gait) cane, straight  -EE     Distance in Feet (Gait) 130'  -EE     Deviations/Abnormal Patterns (Gait) oneil decreased;stride length decreased  -EE     Bilateral Gait Deviations forward flexed posture;heel strike decreased  -EE           User Key  (r) = Recorded By, (t) = Taken By, (c) = Cosigned By    Initials Name Provider Type    EE Ximena Lugo, PT Physical Therapist               Obj/Interventions     Row Name 06/14/22 0936          Balance    Comment, Balance Standing with B UE reaching x2 min with CGA during ADLs  -EE           User Key  (r) = Recorded By, (t) = Taken By, (c) = Cosigned By    Initials Name Provider Type    EE Ximena Lugo, PT Physical Therapist               Goals/Plan    No documentation.                Clinical Impression     Row Name 06/14/22 0936          Pain    Pretreatment Pain Rating 0/10 - no pain  -EE     Posttreatment Pain Rating 0/10 - no pain  -EE     Row Name 06/14/22 0936          Plan of Care Review    Plan of Care Reviewed With patient  -EE     Progress no change  -EE     Outcome Evaluation Pt agreeable to PT and able to maintain activity level. Pt continues to ambulate well with straight cane and CGA. No new PT  concerns; no problems anticipated with DC home when medically stable.  -EE     Row Name 06/14/22 0936          Positioning and Restraints    Pre-Treatment Position in bed  -EE     Post Treatment Position bed  -EE     In Bed sitting EOB;call light within reach;encouraged to call for assist  -EE           User Key  (r) = Recorded By, (t) = Taken By, (c) = Cosigned By    Initials Name Provider Type    Ximena Alarcon PT Physical Therapist               Outcome Measures     Row Name 06/14/22 0937          How much help from another person do you currently need...    Turning from your back to your side while in flat bed without using bedrails? 4  -EE     Moving from lying on back to sitting on the side of a flat bed without bedrails? 3  -EE     Moving to and from a bed to a chair (including a wheelchair)? 3  -EE     Standing up from a chair using your arms (e.g., wheelchair, bedside chair)? 3  -EE     Climbing 3-5 steps with a railing? 3  -EE     To walk in hospital room? 3  -EE     AM-PAC 6 Clicks Score (PT) 19  -EE     Highest level of mobility 6 --> Walked 10 steps or more  -EE           User Key  (r) = Recorded By, (t) = Taken By, (c) = Cosigned By    Initials Name Provider Type    Ximena Alarcon PT Physical Therapist                             Physical Therapy Education                 Title: PT OT SLP Therapies (Done)     Topic: Physical Therapy (Done)     Point: Mobility training (Done)     Learning Progress Summary           Patient Acceptance, E, VU,NR by  at 6/14/2022 0937    Acceptance, E,D, VU,DU by  at 6/11/2022 0939    Acceptance, TB,E, VU by  at 6/10/2022 1517   Significant Other Acceptance, E,D, VU,DU by  at 6/11/2022 0939                   Point: Home exercise program (Done)     Learning Progress Summary           Patient Acceptance, E,D, VU,DU by  at 6/11/2022 0939    Acceptance, TB,E, VU by  at 6/10/2022 1517   Significant Other Acceptance, E,D, VU,DU by  at 6/11/2022 0939                    Point: Body mechanics (Done)     Learning Progress Summary           Patient Acceptance, E,D, VU,DU by  at 6/11/2022 0939    Acceptance, TB,E, VU by  at 6/10/2022 1517   Significant Other Acceptance, E,D, VU,DU by  at 6/11/2022 0939                   Point: Precautions (Done)     Learning Progress Summary           Patient Acceptance, E,D, VU,DU by  at 6/11/2022 0939    Acceptance, TB,E, VU by  at 6/10/2022 1517   Significant Other Acceptance, E,D, VU,DU by  at 6/11/2022 0939                               User Key     Initials Effective Dates Name Provider Type Discipline     06/16/21 -  Ximena Lugo, PT Physical Therapist PT     06/16/21 -  Sy Witt, PT Physical Therapist PT     05/02/22 -  Lupe Branch, PT Physical Therapist PT              PT Recommendation and Plan     Plan of Care Reviewed With: patient  Progress: no change  Outcome Evaluation: Pt agreeable to PT and able to maintain activity level. Pt continues to ambulate well with straight cane and CGA. No new PT concerns; no problems anticipated with DC home when medically stable.     Time Calculation:    PT Charges     Row Name 06/14/22 0937             Time Calculation    Start Time 0918  -EE      Stop Time 0928  -EE      Time Calculation (min) 10 min  -EE      PT Received On 06/14/22  -EE      PT - Next Appointment 06/15/22  -EE              Time Calculation- PT    Total Timed Code Minutes- PT 10 minute(s)  -EE              Timed Charges    20252 - Gait Training Minutes  10  -EE              Total Minutes    Timed Charges Total Minutes 10  -EE       Total Minutes 10  -EE            User Key  (r) = Recorded By, (t) = Taken By, (c) = Cosigned By    Initials Name Provider Type    EE Ximean Lugo, PT Physical Therapist              Therapy Charges for Today     Code Description Service Date Service Provider Modifiers Qty    [id number] HC GAIT TRAINING EA 15 MIN 6/14/2022 Ximena Lugo, PT GP 1          PT G-Codes  Outcome  Measure Options: AM-PAC 6 Clicks Basic Mobility (PT)  AM-PAC 6 Clicks Score (PT): 19      Patient was wearing a face mask during this therapy encounter. Therapist used appropriate personal protective equipment including mask and gloves.  Mask used was standard procedure mask. Appropriate PPE was worn during the entire therapy session. Hand hygiene was completed before and after therapy session. Patient is not in enhanced droplet precautions.     Ximena Lugo, PT  6/14/2022

## 2022-06-15 NOTE — OUTREACH NOTE
Prep Survey    Flowsheet Row Responses   [facility] facility patient discharged from? [facility]   Is LACE score < 7 ? No   Emergency Room discharge w/ pulse ox? No   Eligibility Readm Mgmt   Discharge diagnosis Coronary Artery disease   Does the patient have one of the following disease processes/diagnoses(primary or secondary)? Acute MI (STEMI,NSTEMI)   Does the patient have Home health ordered? No   Is there a DME ordered? No   Prep survey completed? Yes          CAROLYN CERNA - Registered Nurse

## 2022-06-16 ENCOUNTER — READMISSION MANAGEMENT (OUTPATIENT)
Dept: CALL CENTER | Facility: HOSPITAL | Age: 87
End: 2022-06-16

## 2022-06-16 NOTE — OUTREACH NOTE
AMI Week 1 Survey    Flowsheet Row Responses   [facility] patient discharged from? [place]   Does the patient have one of the following disease processes/diagnoses(primary or secondary)? Acute MI (STEMI,NSTEMI)   Week 1 attempt successful? Yes   Call start time [phone]   Call end time [phone]   Discharge diagnosis Coronary Artery disease   Person spoke with today (if not patient) and relationship Spouse   Meds reviewed with patient/caregiver? Yes   Is the patient having any side effects they believe may be caused by any medication additions or changes? No   Does the patient have all prescriptions related to this admission filled (includes statins,anticoagulants,HTN meds,anti-arrhythmia meds) Yes   Is the patient taking all medications as directed (includes completed medication regime)? Yes   Does the patient have a primary care provider?  Yes   Does the patient have an appointment with their PCP,cardiologist,or clinic within 7 days of discharge? Greater than 7 days   What is preventing the patient from scheduling follow up appointments within 7 days of discharge? Earlier appointment not available   Nursing Interventions Verified appointment date/time/provider   Has the patient kept scheduled appointments due by today? N/A   Psychosocial issues? No   Did the patient receive a copy of their discharge instructions? Yes   Nursing interventions Reviewed instructions with patient   What is the patient's perception of their health status since discharge? Improving   Nursing interventions Nurse provided patient education   Is the patient/caregiver able to teach back signs and symptoms of when to call for help immediately: Sudden sweating or clammy skin, Shortness of breath at any time, Sudden discomfort in arms, back, neck or jaw, Sudden chest discomfort, Nausea or vomiting, Dizziness or lightheadedness, Irregular or rapid heart rate   Nursing interventions Nurse provided patient education   Is the pateint /caregiver  able to teach back the importance of cardiac rehab? Yes   Nursing interventions Provided education on importance of cardiac rehab   Is the patient/caregiver able to teach back lifestyle changes to help prevent MIs Limiting alcohol intake, Reducing stress, Managing diabetes, Maintaining a healthy weight, Heart healthy diet, Regular exercise as approved by provider, Quit smoking   Is the patient/caregiver able to teach back ways to prevent a second heart attack: Participate in Cardiac Rehab, Follow up with MD   If the patient is a current smoker, are they able to teach back resources for cessation? Not a smoker   Is the patient/caregiver able to teach back the hierarchy of who to call/visit for symptoms/problems? PCP, Specialist, Home health nurse, Urgent Care, ED, 911 Yes   Wrap up additional comments Spouse states pt is doing better. Spouse educated on cardiac warning s/s, and when to call 911. Spouse verified PCP fu appt on 6/28/22, and cardiologist fu appt on 6/23/22. Spouse educated on the importance of pt going to cardiac rehab.           JOSE ROSALES - Registered Nurse

## 2022-06-22 ENCOUNTER — READMISSION MANAGEMENT (OUTPATIENT)
Dept: CALL CENTER | Facility: HOSPITAL | Age: 87
End: 2022-06-22

## 2022-06-22 NOTE — OUTREACH NOTE
AMI Week 2 Survey    Flowsheet Row Responses   [facility] patient discharged from? [facility]   Does the patient have one of the following disease processes/diagnoses(primary or secondary)? Acute MI (STEMI,NSTEMI)   Week 2 attempt successful? Yes   Call start time 1030   Call end time 1032   Discharge diagnosis Coronary Artery disease   Is patient permission given to speak with other caregiver? Yes   List who call center can speak with Olga spouse    Person spoke with today (if not patient) and relationship Olga spouse    Meds reviewed with patient/caregiver? Yes   Is the patient taking all medications as directed (includes completed medication regime)? Yes   Has the patient kept scheduled appointments due by today? N/A  [6-23-22 Dr. Carter's office ]   Psychosocial issues? No   What is the patient's perception of their health status since discharge? Improving   Week 2 call completed? Yes   Wrap up additional comments Spouse states pt is doing better-was outside walking around during time of call           YENIFER LOMAX - Registered Nurse

## 2022-06-23 ENCOUNTER — OFFICE VISIT (OUTPATIENT)
Dept: CARDIOLOGY | Facility: CLINIC | Age: 87
End: 2022-06-23

## 2022-06-23 ENCOUNTER — TELEPHONE (OUTPATIENT)
Dept: CARDIOLOGY | Facility: CLINIC | Age: 87
End: 2022-06-23

## 2022-06-23 VITALS
DIASTOLIC BLOOD PRESSURE: 70 MMHG | BODY MASS INDEX: 22.12 KG/M2 | WEIGHT: 158 LBS | HEIGHT: 71 IN | HEART RATE: 63 BPM | SYSTOLIC BLOOD PRESSURE: 138 MMHG

## 2022-06-23 DIAGNOSIS — I25.10 CORONARY ARTERY DISEASE INVOLVING NATIVE CORONARY ARTERY OF NATIVE HEART WITHOUT ANGINA PECTORIS: Primary | ICD-10-CM

## 2022-06-23 DIAGNOSIS — R94.31 ABNORMAL ECG: ICD-10-CM

## 2022-06-23 DIAGNOSIS — R55 SYNCOPE AND COLLAPSE: ICD-10-CM

## 2022-06-23 DIAGNOSIS — I25.5 ISCHEMIC CARDIOMYOPATHY: ICD-10-CM

## 2022-06-23 DIAGNOSIS — I48.0 PAROXYSMAL ATRIAL FIBRILLATION: ICD-10-CM

## 2022-06-23 DIAGNOSIS — I49.3 PVCS (PREMATURE VENTRICULAR CONTRACTIONS): ICD-10-CM

## 2022-06-23 DIAGNOSIS — I42.0 CARDIOMYOPATHY, DILATED: ICD-10-CM

## 2022-06-23 DIAGNOSIS — E78.2 MIXED HYPERLIPIDEMIA: ICD-10-CM

## 2022-06-23 DIAGNOSIS — I10 PRIMARY HYPERTENSION: ICD-10-CM

## 2022-06-23 PROBLEM — R07.9 CHRONIC CHEST PAIN WITH HIGH RISK FOR CAD: Status: RESOLVED | Noted: 2022-06-09 | Resolved: 2022-06-23

## 2022-06-23 PROBLEM — K29.00 ACUTE GASTRITIS: Status: RESOLVED | Noted: 2018-06-18 | Resolved: 2022-06-23

## 2022-06-23 PROBLEM — E87.1 ACUTE HYPONATREMIA: Status: RESOLVED | Noted: 2018-06-18 | Resolved: 2022-06-23

## 2022-06-23 PROBLEM — M79.89 LIMB SWELLING: Status: RESOLVED | Noted: 2018-06-18 | Resolved: 2022-06-23

## 2022-06-23 PROBLEM — I50.22 CHRONIC SYSTOLIC CHF (CONGESTIVE HEART FAILURE) (HCC): Status: RESOLVED | Noted: 2022-06-07 | Resolved: 2022-06-23

## 2022-06-23 PROBLEM — Z91.89 CHRONIC CHEST PAIN WITH HIGH RISK FOR CAD: Status: RESOLVED | Noted: 2022-06-09 | Resolved: 2022-06-23

## 2022-06-23 PROBLEM — G89.29 CHRONIC CHEST PAIN WITH HIGH RISK FOR CAD: Status: RESOLVED | Noted: 2022-06-09 | Resolved: 2022-06-23

## 2022-06-23 PROBLEM — I25.119 CORONARY ARTERY DISEASE INVOLVING NATIVE CORONARY ARTERY WITH ANGINA PECTORIS (HCC): Status: RESOLVED | Noted: 2022-06-11 | Resolved: 2022-06-23

## 2022-06-23 PROBLEM — S02.609A MANDIBLE FRACTURE: Status: RESOLVED | Noted: 2020-03-31 | Resolved: 2022-06-23

## 2022-06-23 PROCEDURE — 93000 ELECTROCARDIOGRAM COMPLETE: CPT | Performed by: NURSE PRACTITIONER

## 2022-06-23 PROCEDURE — 99214 OFFICE O/P EST MOD 30 MIN: CPT | Performed by: NURSE PRACTITIONER

## 2022-06-23 RX ORDER — LOSARTAN POTASSIUM 25 MG/1
25 TABLET ORAL 2 TIMES DAILY
Qty: 180 TABLET | Refills: 3 | Status: SHIPPED | OUTPATIENT
Start: 2022-06-23

## 2022-06-23 RX ORDER — BLOOD SUGAR DIAGNOSTIC
STRIP MISCELLANEOUS
COMMUNITY
Start: 2022-06-01

## 2022-06-23 RX ORDER — PEN NEEDLE, DIABETIC 32GX 5/32"
NEEDLE, DISPOSABLE MISCELLANEOUS
COMMUNITY
Start: 2022-05-31

## 2022-06-23 NOTE — TELEPHONE ENCOUNTER
Please arrange a repeat echocardiogram (ultrasound of the heart) to be completed the day that he comes back to see Dr. Alvarez in October.  Thank you.

## 2022-06-23 NOTE — PROGRESS NOTES
Date of Office Visit: 2022  Encounter Provider: JERRY Crowell  Place of Service: [facility] CARDIOLOGY  Patient Name: Javier Marin  :1935  Primary Cardiologist: Dr. Fadi Alvarez     Chief Complaint   Patient presents with   • stent placement   • Hospital Follow Up Visit   :     HPI: Javier Marin is a pleasant 86 y.o. male who presents today for follow-up on recent hospitalization and stent placement.  He is a new patient to me and I have reviewed his medical records    He has been diagnosed with hypertension, hyperlipidemia, diabetes, and carotid artery disease. He was previously followed by Dr. Mccrary for paroxysmal atrial fibrillation.    In 2022, he presented to the ED after having a syncopal episode while at [facility].  There were no acute findings in the ED and he was released.  His PCP ordered a Holter monitor which showed normal sinus rhythm with 3 episodes of nonsustained monomorphic ventricular tachycardia with the longest 11 beats in duration.  He was started on metoprolol succinate.  He then had a 2D echocardiogram which reported an EF of 29% and new global hypokinesis.      In 2022, an appointment was made in our office with Dr. Fadi Alvarez so they came.  They decided they would stay with Dr. Alvarez as their primary cardiologist.  Dr. Alvarez added losartan for the cardiomyopathy.  He had had a syncopal episode in January and was experiencing some dyspnea with exertion.  He was recommended to have a CT coronary angiogram which was abnormal with a calcium score of 6059 Agatston units.  Due to the severe nature of the coronary artery calcification, the test was nondiagnostic for obstructive coronary artery disease and he was referred for heart catheterization.    On 2022, he underwent cardiac catheterization which showed left main extensively calcified, LAD extensively calcified throughout, LAD narrowed 95% at the ostium and  60-70% mid segment, diffuse disease of distal LAD, left circumflex occluded proximally and extensively calcified, and RCA calcified in the proximal mid and distal segments.    On 6/13/2022, he underwent atherectomy and drug-eluting stent placement using a Xience madeleine point 3.0 x 38 mm to the LAD.  He was recommended continue with aspirin and clopidogrel indefinitely.    He presents today for follow-up visit with his wife accompanying him.  He has not noticed any dyspnea with exertion since leaving the hospital, but says he really has not done much of anything.  He occasionally experiences lower extremity edema which improves in the morning.  He bruises easily, but denies bleeding.  He denies chest pain, palpitations, dizziness, or syncope.  He is hard of hearing and wears hearing aids.  His right wrist has a small soft not and he has full range of motion of the hand.  Blood pressure is stable.      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[facility])    • Anorexia    • Arteriosclerosis of abdominal aorta ([facility]) 02/24/2014   • Arthritis    • Carotid stenosis, asymptomatic, bilateral 02/24/2014    16-49%   • Closed right arm fracture 1994   • Compression fracture of lumbar spine, non-traumatic ([facility])    • Diabetes mellitus ([facility])    • Elevated LFTs    • Esophagitis 06/19/2018    DR RASHID GRADE D LOWER   • Esophagus disorder     THICKENED DISTAL WALL-CT SCAN 6/18/18   • First degree AV block    • Garbled speech    • Granulomatous disease, chronic ([facility])    • Hard of hearing    • Hiatal hernia with GERD and esophagitis    • History of acute gastritis    • Hyperlipidemia    • Hypertension    • Hyponatremia    • Leukocytosis    • Lumbar canal stenosis    • Lung nodule seen on imaging study    • Mandible fracture ([facility]) 03/28/2020    FROM FALL   • Osteoarthritis of multiple joints    • Ptosis of left eyelid    • Remote history of stroke     SMALL CAUDATE NECLEUS   • Syncope and  collapse 1/2022 6/11/2022   • Thrombocytopenia (HCC)    • Weakness generalized        Past Surgical History:   Procedure Laterality Date   • CARDIAC CATHETERIZATION N/A 6/9/2022    Procedure: Left Heart Cath;  Surgeon: Reinaldo Miller MD;  Location: [facility] CATH INVASIVE LOCATION;  Service: Cardiovascular;  Laterality: N/A;   • CARDIAC CATHETERIZATION N/A 6/9/2022    Procedure: Left ventriculography;  Surgeon: Reinaldo Miller MD;  Location: [facility] CATH INVASIVE LOCATION;  Service: Cardiovascular;  Laterality: N/A;   • CARDIAC CATHETERIZATION N/A 6/9/2022    Procedure: Coronary angiography;  Surgeon: Reinaldo Miller MD;  Location: [facility] CATH INVASIVE LOCATION;  Service: Cardiovascular;  Laterality: N/A;   • CARDIAC CATHETERIZATION N/A 6/13/2022    Procedure: STENT CHARISMA - CORONARY;  Surgeon: Michelle Alegre MD;  Location: [facility] CATH INVASIVE LOCATION;  Service: Cardiovascular;  Laterality: N/A;  PCI of the LAD with atherectomy   • CARDIAC CATHETERIZATION N/A 6/13/2022    Procedure: Atherectomy-coronary;  Surgeon: Michelle Alegre MD;  Location: [facility] INVASIVE LOCATION;  Service: Cardiovascular;  Laterality: N/A;  rotablator   • CARDIAC CATHETERIZATION N/A 6/13/2022    Procedure: Optical Coherent Tomography;  Surgeon: Michelle Alegre MD;  Location: [facility] CATH INVASIVE LOCATION;  Service: Cardiovascular;  Laterality: N/A;   • CATARACT EXTRACTION, BILATERAL     • COLONOSCOPY     • ENDOSCOPY N/A 06/19/2018    Procedure: ESOPHAGOGASTRODUODENOSCOPY WITH BIOPSY;  Surgeon: Toribio Wade MD;  Location: [facility] ENDOSCOPY;  Service: Gastroenterology   • EYE PTOSIS REPAIR Left 11/15/2016    Procedure: LT UPPER LID EYE PTOSIS REPAIR;  Surgeon: Anup Lancaster MD;  Location: [facility] OR [facility];  Service:    • EYE SURGERY Left     victreous hemorrhage repair   • HIP ARTHROPLASTY Right    • ORIF MANDIBULAR FRACTURE Bilateral 03/31/2020    Procedure: OPEN REDUCTION AND INTERNAL FIXATION OF BILATERAL MANDIBLE  FRACTURES;  Surgeon: Percy Jeronimo MD;  Location: [facility] OR;  Service: Maxillofacial;  Laterality: Bilateral;       Social History     Socioeconomic History   • Marital status:    Tobacco Use   • Smoking status: Former Smoker     Years: 5.00     Types: Cigars     Quit date:      Years since quittin.4   • Smokeless tobacco: Never Used   Vaping Use   • Vaping Use: Never used   Substance and Sexual Activity   • Alcohol use: No   • Drug use: No   • Sexual activity: Not Currently     Partners: Female       Family History   Problem Relation Age of Onset   • Pancreatic cancer Mother    • Hypertension Neg Hx    • Hyperlipidemia Neg Hx    • Heart failure Neg Hx    • Heart disease Neg Hx    • Heart attack Neg Hx    • Malig Hyperthermia Neg Hx        The following portion of the patient's history were reviewed and updated as appropriate: past medical history, past surgical history, past social history, past family history, allergies, current medications, and problem list.    Review of Systems   Constitutional: Negative.   HENT: Positive for hearing loss.    Cardiovascular: Positive for leg swelling.   Respiratory: Negative.    Hematologic/Lymphatic: Negative.    Neurological: Negative.        No Known Allergies      Current Outpatient Medications:   •  acetaminophen (TYLENOL) 500 MG tablet, Take 500 mg by mouth Every 6 (Six) Hours As Needed for Mild Pain ., Disp: , Rfl:   •  aspirin 81 MG EC tablet, Take 81 mg by mouth Daily., Disp: , Rfl:   •  atorvastatin (LIPITOR) 10 MG tablet, Take 10 mg by mouth Daily., Disp: , Rfl:   •  cholecalciferol (VITAMIN D3) 1000 UNITS tablet, Take 1,000 Units by mouth Daily. PT HOLDING FOR SURGERY, Disp: , Rfl:   •  clopidogrel (PLAVIX) 75 MG tablet, Take 1 tablet by mouth Daily., Disp: 30 tablet, Rfl: 11  •  FAMOTIDINE PO, Take 20 mg by mouth Every Night., Disp: , Rfl:   •  fexofenadine (ALLEGRA) 180 MG tablet, Take 180 mg by mouth As Needed., Disp: , Rfl:   •   "furosemide (LASIX) 40 MG tablet, Take 40 mg by mouth Daily., Disp: , Rfl:   •  Insulin Aspart (NOVOLOG SC), Inject 6 Units under the skin into the appropriate area as directed 3 (Three) Times a Day With Meals., Disp: , Rfl:   •  insulin glargine (LANTUS) 100 UNIT/ML injection, Inject 8 Units under the skin into the appropriate area as directed Every Night., Disp: , Rfl:   •  isosorbide mononitrate (IMDUR) 30 MG 24 hr tablet, Take 1 tablet by mouth Daily., Disp: 30 tablet, Rfl: 11  •  lansoprazole (PREVACID) 15 MG capsule, Take 1 capsule by mouth 2 (Two) Times a Day., Disp: 60 capsule, Rfl: 3  •  losartan (Cozaar) 25 MG tablet, Take 1 tablet by mouth Daily., Disp: 30 tablet, Rfl: 5  •  metoprolol succinate XL (TOPROL-XL) 25 MG 24 hr tablet, Take 25 mg by mouth Daily., Disp: , Rfl:   •  nitroglycerin (NITROSTAT) 0.4 MG SL tablet, Place 1 tablet under the tongue Every 5 (Five) Minutes As Needed for Chest Pain. Take no more than 3 doses in 15 minutes., Disp: 25 tablet, Rfl: 0  •  tamsulosin (FLOMAX) 0.4 MG capsule 24 hr capsule, Take 1 capsule by mouth Daily., Disp: , Rfl:   •  Accu-Chek Guide test strip, USE ONE STRIP 4 TIMES A DAY TO TEST BLOOD SUGAR, Disp: , Rfl:   •  Easy Touch Pen Needles 32G X 4 MM misc, , Disp: , Rfl:         Objective:     Vitals:    06/23/22 1017 06/23/22 1026   BP: 130/66 138/70   BP Location: Left arm Right arm   Pulse: 63    Weight: 71.7 kg (158 lb)    Height: 180.3 cm (71\")      Body mass index is 22.04 kg/m².    PHYSICAL EXAM:    Vitals Reviewed.   General Appearance: No acute distress, well developed and well nourished.  Thin.  Eyes: Conjunctiva and lids: No erythema, swelling, or discharge. Sclera non-icteric. Glasses.   HENT: Atraumatic, normocephalic. External eyes, ears, and nose normal.  Hearing loss-wears hearing aids.  Mucous membranes normal. Lips not cyanotic. Neck supple with no tenderness. Wearing mask.   Respiratory: No signs of respiratory distress. Respiration rhythm and " depth normal.   Clear to auscultation. No rales, crackles, rhonchi, or wheezing auscultated.   Cardiovascular:  Jugular Venous Pressure: Normal  Heart Rate and Rhythm: Normal, Heart Sounds: Normal S1 and S2. No S3 or S4 noted.  Murmurs: No murmurs noted. No rubs, thrills, or gallops.   Lower Extremities: Bilateral lower extremity edema.    Gastrointestinal:  Abdomen soft, non-distended, non-tender.    Musculoskeletal: Normal movement of extremities.  Skin and Nails: General appearance normal. No pallor, cyanosis, diaphoresis. Skin temperature normal. No clubbing of fingernails.   Psychiatric: Patient alert and oriented to person, place, and time. Speech and behavior appropriate. Normal mood and affect.   Right wrist: Small knot present.  Palpable pulse.      ECG 12 Lead    Date/Time: 6/23/2022 10:22 AM  Performed by: Shaylee Dumont APRN  Authorized by: Shaylee Dumont APRN   Comparison: compared with previous ECG from 6/14/2022  Similar to previous ECG  Rhythm: sinus rhythm  Ectopy: unifocal PVCs  Rate: normal  BPM: 63  Conduction: right bundle branch block and 1st degree AV block  ST Segments: ST segments normal  T Waves: T waves normal  QRS axis: normal    Clinical impression: abnormal EKG              Assessment:       Diagnosis Plan   1. Coronary artery disease involving native coronary artery of native heart without angina pectoris     2. Ischemic cardiomyopathy EF 40% Cath 6/10/2022  Adult Transthoracic Echo Complete W/ Cont if Necessary Per Protocol   3. Paroxysmal atrial fibrillation (HCC)     4. Abnormal ECG     5. PVCs (premature ventricular contractions)     6. Primary hypertension     7. Mixed hyperlipidemia            Plan:       1.  Coronary Artery Disease: Status post stent placement to the LAD and known  of circumflex.  Denies dyspnea with exertion.  Continue aspirin, pedicle, and atorvastatin.  I will defer to Dr. Alvarez if he wants to continue with the isosorbide.  Patient will think about  participation in cardiac rehab.    2.  Ischemic Cardiomyopathy: NYHA class II.  LVEF 29% per echo in February 2022.  Repeat echocardiogram in 3 months after revascularization.  Continue metoprolol and losartan.  Mild lower extremity edema present today, but denies shortness of breath.    3.  Paroxysmal Atrial Fibrillation: Remains on metoprolol.  He is not anticoagulated.    4.  Abnormal EKG with right bundle branch block and first-degree AV block noted.    5.  PVCs noted on EKG.  Asymptomatic and continue beta-blocker.    7.  Hypertension: Blood pressure stable today.    8.  Hyperlipidemia: Remains on atorvastatin.    9.  I will discuss with Dr. Alvarez if he wants to discontinue the isosorbide and if so, I will increase his losartan for further titration for the cardiomyopathy.  I recommended a 3-month follow-up visit with Dr. Alvarez and echocardiogram.    ADDENDUM 6/23/2022:  · I discussed with Dr. Alvarez. Stop isosorbide. Increase losartan to 25 mg twice daily. Monitor blood pressure at home. Call with concerns. Wife informed.     As always, it has been a pleasure to participate in your patient's care. Thank you.       Sincerely,         JERRY Alejo  [facility]      · Dictated utilizing Dragon Dictation  · COVID-19 Precautions - Patient was compliant in wearing a mask. When I saw the patient, I used appropriate personal protective equipment (PPE) including mask and eye shield (standard procedure).  Additionally, I used gown and gloves if indicated.  Hand hygiene was completed before and after seeing the patient.  · I spent 30 minutes reviewing her medical records/testing/previous office notes/labs, face-to-face interaction with patient, physical examination, formulating the plan of care, and discussion of plan of care with patient.

## 2022-06-28 ENCOUNTER — HOSPITAL ENCOUNTER (OUTPATIENT)
Dept: GENERAL RADIOLOGY | Facility: HOSPITAL | Age: 87
Discharge: HOME | End: 2022-06-28
Admitting: INTERNAL MEDICINE

## 2022-06-28 DIAGNOSIS — M54.50 LOW BACK PAIN, UNSPECIFIED BACK PAIN LATERALITY, UNSPECIFIED CHRONICITY, UNSPECIFIED WHETHER SCIATICA PRESENT: ICD-10-CM

## 2022-06-28 PROCEDURE — 72110 X-RAY EXAM L-2 SPINE 4/>VWS: CPT

## 2022-10-11 ENCOUNTER — APPOINTMENT (OUTPATIENT)
Dept: GENERAL RADIOLOGY | Facility: HOSPITAL | Age: 87
End: 2022-10-11

## 2022-10-11 ENCOUNTER — HOSPITAL ENCOUNTER (OUTPATIENT)
Facility: HOSPITAL | Age: 87
Setting detail: OBSERVATION
Discharge: NURSING, REHAB OR LONG-TERM CARE | End: 2022-10-14
Attending: EMERGENCY MEDICINE | Admitting: INTERNAL MEDICINE

## 2022-10-11 DIAGNOSIS — M25.551 RIGHT HIP PAIN: ICD-10-CM
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
## 2022-10-11 DIAGNOSIS — S42.291A OTHER CLOSED DISPLACED FRACTURE OF PROXIMAL END OF RIGHT HUMERUS, INITIAL ENCOUNTER: ICD-10-CM

## 2022-10-11 DIAGNOSIS — S42.201A CLOSED FRACTURE OF PROXIMAL END OF RIGHT HUMERUS, UNSPECIFIED FRACTURE MORPHOLOGY, INITIAL ENCOUNTER: Primary | ICD-10-CM

## 2022-10-11 LAB
CHOLEST SERPL-MCNC: 85 MG/DL (ref 0–200)
GLUCOSE BLDC GLUCOMTR-MCNC: 242 MG/DL (ref 70–130)
HDLC SERPL-MCNC: 38 MG/DL (ref 40–60)
LDLC SERPL CALC-MCNC: 36 MG/DL (ref 0–100)
LDLC/HDLC SERPL: 1.03 {RATIO}
MAGNESIUM SERPL-MCNC: 1.9 MG/DL (ref 1.6–2.4)
TRIGL SERPL-MCNC: 40 MG/DL (ref 0–150)
VLDLC SERPL-MCNC: 11 MG/DL (ref 5–40)

## 2022-10-11 PROCEDURE — 80061 LIPID PANEL: CPT | Performed by: INTERNAL MEDICINE

## 2022-10-11 PROCEDURE — 96360 HYDRATION IV INFUSION INIT: CPT

## 2022-10-11 PROCEDURE — 83735 ASSAY OF MAGNESIUM: CPT | Performed by: INTERNAL MEDICINE

## 2022-10-11 PROCEDURE — 63710000001 ONDANSETRON ODT 4 MG TABLET DISPERSIBLE: Performed by: EMERGENCY MEDICINE

## 2022-10-11 PROCEDURE — G0378 HOSPITAL OBSERVATION PER HR: HCPCS

## 2022-10-11 PROCEDURE — 73030 X-RAY EXAM OF SHOULDER: CPT

## 2022-10-11 PROCEDURE — 99284 EMERGENCY DEPT VISIT MOD MDM: CPT

## 2022-10-11 PROCEDURE — 73502 X-RAY EXAM HIP UNI 2-3 VIEWS: CPT

## 2022-10-11 PROCEDURE — 82962 GLUCOSE BLOOD TEST: CPT

## 2022-10-11 RX ORDER — LOSARTAN POTASSIUM 25 MG/1
25 TABLET ORAL 2 TIMES DAILY
Status: DISCONTINUED | OUTPATIENT
Start: 2022-10-11 | End: 2022-10-13

## 2022-10-11 RX ORDER — SODIUM CHLORIDE 9 MG/ML
100 INJECTION, SOLUTION INTRAVENOUS CONTINUOUS
Status: DISCONTINUED | OUTPATIENT
Start: 2022-10-11 | End: 2022-10-12

## 2022-10-11 RX ORDER — FAMOTIDINE 20 MG/1
20 TABLET, FILM COATED ORAL NIGHTLY
Status: DISCONTINUED | OUTPATIENT
Start: 2022-10-11 | End: 2022-10-14 | Stop reason: HOSPADM

## 2022-10-11 RX ORDER — ONDANSETRON 4 MG/1
4 TABLET, ORALLY DISINTEGRATING ORAL ONCE
Status: COMPLETED | OUTPATIENT
Start: 2022-10-11 | End: 2022-10-11

## 2022-10-11 RX ORDER — HYDROCODONE BITARTRATE AND ACETAMINOPHEN 7.5; 325 MG/1; MG/1
2 TABLET ORAL EVERY 6 HOURS PRN
Status: DISCONTINUED | OUTPATIENT
Start: 2022-10-11 | End: 2022-10-13

## 2022-10-11 RX ORDER — HYDROCODONE BITARTRATE AND ACETAMINOPHEN 7.5; 325 MG/1; MG/1
1 TABLET ORAL ONCE
Status: COMPLETED | OUTPATIENT
Start: 2022-10-11 | End: 2022-10-11

## 2022-10-11 RX ORDER — HYDROCODONE BITARTRATE AND ACETAMINOPHEN 5; 325 MG/1; MG/1
1 TABLET ORAL EVERY 6 HOURS PRN
Qty: 20 TABLET | Refills: 0 | Status: SHIPPED | OUTPATIENT
Start: 2022-10-11

## 2022-10-11 RX ORDER — SODIUM CHLORIDE 0.9 % (FLUSH) 0.9 %
10 SYRINGE (ML) INJECTION EVERY 12 HOURS SCHEDULED
Status: DISCONTINUED | OUTPATIENT
Start: 2022-10-11 | End: 2022-10-12

## 2022-10-11 RX ORDER — ONDANSETRON 4 MG/1
4 TABLET, ORALLY DISINTEGRATING ORAL EVERY 6 HOURS PRN
Qty: 15 TABLET | Refills: 0 | Status: SHIPPED | OUTPATIENT
Start: 2022-10-11

## 2022-10-11 RX ORDER — HYDROCODONE BITARTRATE AND ACETAMINOPHEN 7.5; 325 MG/1; MG/1
1 TABLET ORAL EVERY 6 HOURS PRN
Status: DISCONTINUED | OUTPATIENT
Start: 2022-10-11 | End: 2022-10-12 | Stop reason: SDUPTHER

## 2022-10-11 RX ORDER — FAMOTIDINE 20 MG/1
20 TABLET, FILM COATED ORAL NIGHTLY
Status: ON HOLD | COMMUNITY
End: 2022-10-11

## 2022-10-11 RX ORDER — SODIUM CHLORIDE 0.9 % (FLUSH) 0.9 %
10 SYRINGE (ML) INJECTION AS NEEDED
Status: DISCONTINUED | OUTPATIENT
Start: 2022-10-11 | End: 2022-10-13

## 2022-10-11 RX ADMIN — Medication 10 ML: at 23:02

## 2022-10-11 RX ADMIN — INSULIN GLARGINE-YFGN 8 UNITS: 100 INJECTION, SOLUTION SUBCUTANEOUS at 23:02

## 2022-10-11 RX ADMIN — HYDROCODONE BITARTRATE AND ACETAMINOPHEN 1 TABLET: 7.5; 325 TABLET ORAL at 17:07

## 2022-10-11 RX ADMIN — ONDANSETRON 4 MG: 4 TABLET, ORALLY DISINTEGRATING ORAL at 17:07

## 2022-10-11 RX ADMIN — SODIUM CHLORIDE 100 ML/HR: 9 INJECTION, SOLUTION INTRAVENOUS at 23:02

## 2022-10-11 RX ADMIN — FAMOTIDINE 20 MG: 20 TABLET ORAL at 23:59

## 2022-10-11 NOTE — ED NOTES
.Nursing report ED to floor  Javier Marin  86 y.o.  male    HPI :   Chief Complaint   Patient presents with    Fall    Shoulder Pain    Hip Pain       Admitting doctor:   Percy Em MD    Admitting diagnosis:   The primary encounter diagnosis was Fall, initial encounter. Diagnoses of Other closed displaced fracture of proximal end of right humerus, initial encounter and Right hip pain were also pertinent to this visit.    Code status:   Current Code Status       Date Active Code Status Order ID Comments User Context       Prior            Allergies:   Patient has no known allergies.    Isolation:   No active isolations    Intake and Output  No intake or output data in the 24 hours ending 10/11/22 1822    Weight:   There were no vitals filed for this visit.    Most recent vitals:   Vitals:    10/11/22 1530 10/11/22 1700   BP: 113/57 132/63   BP Location: Right arm    Patient Position: Sitting    Pulse: 69    Resp: 16    Temp: 98.5 °F (36.9 °C)    TempSrc: Tympanic    SpO2: 96%        Active LDAs/IV Access:   Lines, Drains & Airways       Active LDAs       None                    Labs (abnormal labs have a star):   Labs Reviewed - No data to display    EKG:   No orders to display       Meds given in ED:   Medications   HYDROcodone-acetaminophen (NORCO) 7.5-325 MG per tablet 1 tablet (1 tablet Oral Given 10/11/22 1707)   ondansetron ODT (ZOFRAN-ODT) disintegrating tablet 4 mg (4 mg Oral Given 10/11/22 1707)       Imaging results:  XR Shoulder 2+ View Right    Result Date: 10/11/2022   Proximal right humerus fracture.  This report was finalized on 10/11/2022 4:20 PM by Dr. Ron Freitas M.D.      XR Hip With or Without Pelvis 2 - 3 View Right    Result Date: 10/11/2022   As described.    This report was finalized on 10/11/2022 4:25 PM by Dr. Ron Freitas M.D.       Ambulatory status:   - up w/assist     Social issues:   Social History     Socioeconomic History    Marital status:    Tobacco  Use    Smoking status: Former     Types: Cigars     Quit date:      Years since quittin.7    Smokeless tobacco: Never   Vaping Use    Vaping Use: Never used   Substance and Sexual Activity    Alcohol use: No    Drug use: No    Sexual activity: Not Currently     Partners: Female       NIH Stroke Scale:         Yoana Garcia RN  10/11/22 18:22 EDT

## 2022-10-11 NOTE — ED PROVIDER NOTES
EMERGENCY DEPARTMENT ENCOUNTER    Room Number:  26/26  Date of encounter:  10/11/2022  PCP: Percy Em MD  Historian: Patient      HPI:  Chief Complaint: Fall, right shoulder and right hip pain     A complete HPI/ROS/PMH/PSH/SH/FH are unobtainable due to: N/A    Context: Javier Marin is a 86 y.o. male who presents to the ED c/o right shoulder and right hip pain after tripping and falling in the parking lot.  He is on Plavix but did not hit his head.  He was able to walk to the ambulance and is complaining of moderate pain in the right shoulder.  Pain is constant, does not radiate and is exacerbated with movement of the shoulder.  Improved if he is still.  EMS placed a sling and transported the patient to the emergency department.      Summary of prior records: He has a history of type 2 diabetes, CKD, elevated LFTs, compression fracture of lumbar spine, he is hard of hearing, his paroxysmal atrial fibrillation and carotid stenosis.    No recent ER visits/hospitalizations at [facility].    The patient was placed in a mask in triage, hand hygiene was performed before and after my interaction with the patient.  I wore a mask, safety glasses and gloves during my entire interaction with the patient.    PAST MEDICAL HISTORY  Active Ambulatory Problems     Diagnosis Date Noted   • 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HCC) 06/18/2018   • Type 2 diabetes mellitus, with long-term current use of insulin ([facility]) 06/18/2018   • Acute kidney injury superimposed on chronic kidney disease (HCC) 06/18/2018   • Elevated liver function tests 06/18/2018   • Leukocytosis 06/18/2018   • Anorexia 06/18/2018   • Esophagus disorder, thickened distal wall CT Scan 6/18/18 06/18/2018   • Granulomatous disease, chronic (HCC) 06/18/2018   • Lung nodule seen on imaging study 06/18/2018   • Compression fracture of lumbar spine, non-traumatic ([facility]) 06/18/2018   • Lumbar canal stenosis 06/18/2018   • Weakness generalized  06/18/2018   • Cough 06/18/2018   • Hard of hearing 06/19/2018   • Ptosis, left eyelid 06/19/2018   • Osteoarthritis of multiple joints 06/19/2018   • Paroxysmal atrial fibrillation ([facility]) 06/19/2018   • Carotid stenosis, asymptomatic, bilateral 06/19/2018   • Remote history of stroke, small caudate nucleus 06/19/2018   • Thrombocytopenia ([facility]) 06/19/2018   • Fall at home, subsequent encounter 06/19/2018   • Hyperlipidemia 06/19/2018   • Esophagitis determined by endoscopy by Sheri 6/19/18 LA Grade D Lower 1/3 06/20/2018   • Hiatal hernia with GERD and esophagitis 06/20/2018   • Abnormal ECG 04/26/2022   • PAD (peripheral artery disease) ([facility]) 02/24/2014   • Arteriosclerosis of abdominal aorta ([facility]) 02/24/2014   • Coronary artery disease involving native coronary artery of native heart without angina pectoris 06/07/2022   • Ischemic cardiomyopathy EF 40% Cath 6/10/2022 06/11/2022   • Osteoarthritis of right hip 06/11/2022   • BPH without obstruction/lower urinary tract symptoms 06/11/2022   • RBBB (right bundle branch block)    • First degree AV block      Resolved Ambulatory Problems     Diagnosis Date Noted   • Acute gastritis 06/18/2018   • Diabetes ([facility]) 06/18/2018   • Limb swelling 06/18/2018   • Hypertension, essential 06/18/2018   • Acute hyponatremia 06/18/2018   • Mandible fracture ([facility]) 03/31/2020   • Chronic systolic CHF (congestive heart failure) ([facility]) 06/07/2022   • Chronic chest pain with high risk for CAD 06/09/2022   • Coronary artery disease involving native coronary artery with angina pectoris ([facility]) 06/11/2022   • Syncope and collapse 1/2022 06/11/2022   • ASCVD (arteriosclerotic cardiovascular disease) 06/11/2022     Past Medical History:   Diagnosis Date   • A-fib ([facility])    • Arthritis    • Closed right arm fracture 1994   • Diabetes mellitus ([facility])    • Elevated LFTs    • Esophagitis 06/19/2018   • Garbled speech    • History of acute gastritis    • Hypertension    • Hyponatremia    • Ptosis of left  eyelid          PAST SURGICAL HISTORY  Past Surgical History:   Procedure Laterality Date   • CARDIAC CATHETERIZATION N/A 6/9/2022    Procedure: Left Heart Cath;  Surgeon: Reinaldo Miller MD;  Location: [facility] CATH INVASIVE LOCATION;  Service: Cardiovascular;  Laterality: N/A;   • CARDIAC CATHETERIZATION N/A 6/9/2022    Procedure: Left ventriculography;  Surgeon: Reinaldo Miller MD;  Location: [facility] CATH INVASIVE LOCATION;  Service: Cardiovascular;  Laterality: N/A;   • CARDIAC CATHETERIZATION N/A 6/9/2022    Procedure: Coronary angiography;  Surgeon: Reinaldo Miller MD;  Location: [facility] CATH INVASIVE LOCATION;  Service: Cardiovascular;  Laterality: N/A;   • CARDIAC CATHETERIZATION N/A 6/13/2022    Procedure: STENT CHARISMA - CORONARY;  Surgeon: Michelle Alegre MD;  Location: [facility] CATH INVASIVE LOCATION;  Service: Cardiovascular;  Laterality: N/A;  PCI of the LAD with atherectomy   • CARDIAC CATHETERIZATION N/A 6/13/2022    Procedure: Atherectomy-coronary;  Surgeon: Michelle Alegre MD;  Location: [facility] CATH INVASIVE LOCATION;  Service: Cardiovascular;  Laterality: N/A;  rotablator   • CARDIAC CATHETERIZATION N/A 6/13/2022    Procedure: Optical Coherent Tomography;  Surgeon: Michelle Alegre MD;  Location: [facility] INVASIVE LOCATION;  Service: Cardiovascular;  Laterality: N/A;   • CATARACT EXTRACTION, BILATERAL     • COLONOSCOPY     • ENDOSCOPY N/A 06/19/2018    Procedure: ESOPHAGOGASTRODUODENOSCOPY WITH BIOPSY;  Surgeon: Toribio Wade MD;  Location: [facility] ENDOSCOPY;  Service: Gastroenterology   • EYE PTOSIS REPAIR Left 11/15/2016    Procedure: LT UPPER LID EYE PTOSIS REPAIR;  Surgeon: Anup Lancaster MD;  [place] [facility] [place] [facility]  Service:    • EYE SURGERY Left     victreous hemorrhage repair   • HIP ARTHROPLASTY Right    • ORIF MANDIBULAR FRACTURE Bilateral 03/31/2020    Procedure: OPEN REDUCTION AND INTERNAL FIXATION OF BILATERAL MANDIBLE FRACTURES;  Surgeon: Percy Jeronimo MD;   Location: [facility] MAIN OR;  Service: Maxillofacial;  Laterality: Bilateral;         FAMILY HISTORY  Family History   Problem Relation Age of Onset   • Pancreatic cancer Mother    • Hypertension Neg Hx    • Hyperlipidemia Neg Hx    • Heart failure Neg Hx    • Heart disease Neg Hx    • Heart attack Neg Hx    • Malig Hyperthermia Neg Hx          SOCIAL HISTORY  Social History     Socioeconomic History   • Marital status:    Tobacco Use   • Smoking status: Former     Types: Cigars     Quit date:      Years since quittin.7   • Smokeless tobacco: Never   Vaping Use   • Vaping Use: Never used   Substance and Sexual Activity   • Alcohol use: No   • Drug use: No   • Sexual activity: Not Currently     Partners: Female         ALLERGIES  Patient has no known allergies.        REVIEW OF SYSTEMS  Review of Systems   Respiratory: Negative for chest tightness and shortness of breath.    Cardiovascular: Negative for chest pain and palpitations.   Gastrointestinal: Negative for abdominal pain.   Musculoskeletal:        Right shoulder pain   Neurological: Negative for dizziness, light-headedness, numbness and headaches.        All systems reviewed and negative except for those discussed in HPI.       PHYSICAL EXAM    I have reviewed the triage vital signs and nursing notes.    ED Triage Vitals [10/11/22 1530]   Temp Heart Rate Resp BP SpO2   98.5 °F (36.9 °C) 69 16 113/57 96 %      Temp src Heart Rate Source Patient Position BP Location FiO2 (%)   Tympanic Monitor Sitting Right arm --       Physical Exam   Constitutional: Pt. is awake and alert.  He is very hard of hearing.  He appears oriented.  He is in no acute distress.    HENT: Normocephalic and atraumatic.   Neck: Normal range of motion. Neck supple. No JVD present.   Cardiovascular: Normal rate, regular rhythm and normal heart sounds. No murmur heard.  Pulmonary/Chest: Effort normal and breath sounds normal. No stridor. No respiratory distress. No wheezes, no  rales.   Abdominal: Soft. Bowel sounds are normal. No distension. There is no tenderness. There is no rebound and no guarding.   Musculoskeletal: Right upper extremity is in a sling.  There is prominent swelling over the anterior deltoid but no obvious dislocation.  Humerus is nontender, there is adequate PROM of the elbow and wrist.  Forearm is nontender to palpation.  Hand and fingers are nontender to palpation.  There is superficial abrasion over the dorsum of the hand.  Ulnar and radial pulses are easily palpable.  He does have limited range of motion of the shoulder but better than I expected given the amount of swelling.  Remaining extremities exhibit normal range of motion and are without edema, tenderness or deformity.  He does complain of pain in the right groin.  Neurological: Pt. is alert and oriented to person, place, and time.  He has no focal neurologic deficits  Skin: Skin is warm and dry. No rash noted. Pt. is not diaphoretic. No erythema.   Psychiatric: Mood, affect and judgment normal.  He is pleasant and cooperative.  Nursing note and vitals reviewed.        LAB RESULTS  No results found for this or any previous visit (from the past 24 hour(s)).    Ordered the above labs and independently reviewed the results.        RADIOLOGY  XR Shoulder 2+ View Right    Result Date: 10/11/2022  XR SHOULDER 2+ VW RIGHT-  INDICATIONS: Trauma  TECHNIQUE: 2 views of the right shoulder  COMPARISON: None available  FINDINGS:  A comminuted, impacted fracture is seen involving the right humeral neck. A mildly displaced posterior fragment measures about 2.3 x 0.5 cm. No other fractures are noted. The bones appear diffusely osteopenic. No dislocation. Old granulomatous disease is apparent in the right lung.       Proximal right humerus fracture.  This report was finalized on 10/11/2022 4:20 PM by Dr. Ron Freitas M.D.      XR Hip With or Without Pelvis 2 - 3 View Right    Result Date: 10/11/2022  XR HIP W OR WO  PELVIS 2-3 VIEW RIGHT-  INDICATIONS: Trauma  TECHNIQUE: Frontal views of the pelvis, 2 views of the right hip  COMPARISON: 06/28/2013, CT  image from 06/18/2018  FINDINGS:  Small osseous lucency adjacent to the proximal aspect of the femoral component of the right hip arthroplasty hardware, better appreciated on the frog leg lateral view, could be evidence of hardware loosening or infection. No acute fracture, erosion, or dislocation is identified. The bones appear diffusely osteopenic. Chronic osseous excrescence is seen along the lateral margin of the left femoral shaft. Degenerative changes are seen in the partly included lumbar spine. Follow-up/further evaluation can be obtained as indications persist. Arterial calcification is present.       As described.    This report was finalized on 10/11/2022 4:25 PM by Dr. Ron Freitas M.D.        I ordered the above noted radiological studies. Reviewed by me and discussed with radiologist.  See dictation for official radiology interpretation.      PROCEDURES    Procedures      MEDICATIONS GIVEN IN ER    Medications   HYDROcodone-acetaminophen (NORCO) 7.5-325 MG per tablet 1 tablet (1 tablet Oral Given 10/11/22 1707)   ondansetron ODT (ZOFRAN-ODT) disintegrating tablet 4 mg (4 mg Oral Given 10/11/22 1707)         PROGRESS, DATA ANALYSIS, CONSULTS, AND MEDICAL DECISION MAKING    Any/all labs have been independently reviewed by me.  Any/all radiology studies have been reviewed by me and discussed with radiologist dictating the report.   EKG's independently viewed and interpreted by me.  Discussion below represents my analysis of pertinent findings related to patient's condition, differential diagnosis, treatment plan and final disposition.    Number of Diagnoses or Management Options     Amount and/or Complexity of Data Reviewed  Clinical lab tests: No  Tests in the radiology section of CPT®: Yes  Tests in the medicine section of CPT®: Yes  Review and summarize  past medical records:  (Yes - see HPI)  Independent visualization of images, tracings, or specimens: (Yes - see below)      ED Course as of 10/11/22 1801   Tue Oct 11, 2022   1626 2 views of the right shoulder were independently visualized by me and discussed with/interpreted by Dr. Freitas (radiology)-there is a comminuted impacted fracture involving the right humeral neck with a mildly displaced posterior fracture fragment.  No dislocation.  For official interpretation, see dictated report. [WC]   1631 Right pelvis x-ray was independently visualized by me and discussed with/interpreted by Dr. Freitas (radiology)-no obvious fractures apparent but there is a small osseous lucency adjacent to the proximal aspect of the femoral component of the right hip which could be evidence of hardware loosening.  For official interpretation, see dictated report. [WC]   1633 X-ray results discussed with the patient and his wife.  He follows with Dr. Julio C Goodman-I advised him to make a follow-up appointment for further evaluation of his shoulder and hip. [WC]   1759 Mr. Marin is having a lot of difficulty with pain control and ambulation.  His wife is not comfortable taking him well.  I spoke with the patient's primary care physician, Dr. Em, and he agrees to observe the patient in the hospital overnight for pain control and orthopedics consultation. [WC]      ED Course User Index  [WC] Felix Lancaster MD       AS OF 18:01 EDT VITALS:    BP - 132/63  HR - 69  TEMP - 98.5 °F (36.9 °C) (Tympanic)  02 SATS - 96%        DIAGNOSIS  Final diagnoses:   Fall, initial encounter   Other closed displaced fracture of proximal end of right humerus, initial encounter   Right hip pain         DISPOSITION  Observation-MedSurg          Note Disclaimer: At [facility], we believe that sharing information builds trust and better relationships. You are receiving this note because you recently visited [facility]. It is possible you will  see health information before a provider has talked with you about it. This kind of information can be easy to misunderstand. To help you fully understand what it means for your health, we urge you to discuss this note with your provider.\         Felix Lancaster MD  10/11/22 1638       Felix Lancaster MD  10/11/22 1804

## 2022-10-11 NOTE — ED NOTES
Pt presents to ED via EMS from parking lot. Pt had a trip and fall and landed on R side. Upon EMS arrival pt had R shoulder deformity, and complaints of R hip pain but was able to ambulate. Pt unsure if he hit his head, no LOC, but is on blood thinner. EMS placed R shoulder in sling in route. Pt is A&OX4, able to ambulate, and in a mask at this time.       Patient was placed in face mask during first look triage.  Patient was wearing a face mask throughout encounter.  I wore personal protective equipment throughout the encounter.  Hand hygiene was performed before and after patient encounter.

## 2022-10-12 PROBLEM — S42.201A CLOSED FRACTURE OF PROXIMAL END OF RIGHT HUMERUS: Status: ACTIVE | Noted: 2022-10-12

## 2022-10-12 PROBLEM — M25.551 RIGHT HIP PAIN: Status: ACTIVE | Noted: 2022-10-12

## 2022-10-12 LAB
ALBUMIN SERPL-MCNC: 3.5 G/DL (ref 3.5–5.2)
ALBUMIN/GLOB SERPL: 1.7 G/DL
ALP SERPL-CCNC: 95 U/L (ref 39–117)
ALT SERPL W P-5'-P-CCNC: 11 U/L (ref 1–41)
ANION GAP SERPL CALCULATED.3IONS-SCNC: 6.5 MMOL/L (ref 5–15)
AST SERPL-CCNC: 15 U/L (ref 1–40)
BILIRUB SERPL-MCNC: 0.5 MG/DL (ref 0–1.2)
BUN SERPL-MCNC: 25 MG/DL (ref 8–23)
BUN/CREAT SERPL: 26.3 (ref 7–25)
CALCIUM SPEC-SCNC: 8.5 MG/DL (ref 8.6–10.5)
CHLORIDE SERPL-SCNC: 106 MMOL/L (ref 98–107)
CHOLEST SERPL-MCNC: 82 MG/DL (ref 0–200)
CO2 SERPL-SCNC: 24.5 MMOL/L (ref 22–29)
CREAT SERPL-MCNC: 0.95 MG/DL (ref 0.76–1.27)
DEPRECATED RDW RBC AUTO: 39.1 FL (ref 37–54)
EGFRCR SERPLBLD CKD-EPI 2021: 78 ML/MIN/1.73
ERYTHROCYTE [DISTWIDTH] IN BLOOD BY AUTOMATED COUNT: 12.9 % (ref 12.3–15.4)
GLOBULIN UR ELPH-MCNC: 2.1 GM/DL
GLUCOSE BLDC GLUCOMTR-MCNC: 227 MG/DL (ref 70–130)
GLUCOSE BLDC GLUCOMTR-MCNC: 257 MG/DL (ref 70–130)
GLUCOSE BLDC GLUCOMTR-MCNC: 262 MG/DL (ref 70–130)
GLUCOSE BLDC GLUCOMTR-MCNC: 309 MG/DL (ref 70–130)
GLUCOSE SERPL-MCNC: 214 MG/DL (ref 65–99)
HCT VFR BLD AUTO: 25.4 % (ref 37.5–51)
HDLC SERPL-MCNC: 35 MG/DL (ref 40–60)
HGB BLD-MCNC: 8.3 G/DL (ref 13–17.7)
LDLC SERPL CALC-MCNC: 33 MG/DL (ref 0–100)
LDLC/HDLC SERPL: 1.01 {RATIO}
LYMPHOCYTES # BLD MANUAL: 4.16 10*3/MM3 (ref 0.7–3.1)
LYMPHOCYTES NFR BLD MANUAL: 3 % (ref 5–12)
MAGNESIUM SERPL-MCNC: 1.8 MG/DL (ref 1.6–2.4)
MCH RBC QN AUTO: 27 PG (ref 26.6–33)
MCHC RBC AUTO-ENTMCNC: 32.7 G/DL (ref 31.5–35.7)
MCV RBC AUTO: 82.7 FL (ref 79–97)
MONOCYTES # BLD: 0.38 10*3/MM3 (ref 0.1–0.9)
NEUTROPHILS # BLD AUTO: 8.25 10*3/MM3 (ref 1.7–7)
NEUTROPHILS NFR BLD MANUAL: 64.5 % (ref 42.7–76)
PLAT MORPH BLD: NORMAL
PLATELET # BLD AUTO: 135 10*3/MM3 (ref 140–450)
PMV BLD AUTO: 10.4 FL (ref 6–12)
POTASSIUM SERPL-SCNC: 5.1 MMOL/L (ref 3.5–5.2)
PROT SERPL-MCNC: 5.6 G/DL (ref 6–8.5)
RBC # BLD AUTO: 3.07 10*6/MM3 (ref 4.14–5.8)
RBC MORPH BLD: NORMAL
SMUDGE CELLS BLD QL SMEAR: ABNORMAL
SODIUM SERPL-SCNC: 137 MMOL/L (ref 136–145)
TRIGL SERPL-MCNC: 59 MG/DL (ref 0–150)
VARIANT LYMPHS NFR BLD MANUAL: 32.5 % (ref 19.6–45.3)
VLDLC SERPL-MCNC: 14 MG/DL (ref 5–40)
WBC NRBC COR # BLD: 12.79 10*3/MM3 (ref 3.4–10.8)

## 2022-10-12 PROCEDURE — 83735 ASSAY OF MAGNESIUM: CPT | Performed by: INTERNAL MEDICINE

## 2022-10-12 PROCEDURE — 80053 COMPREHEN METABOLIC PANEL: CPT | Performed by: INTERNAL MEDICINE

## 2022-10-12 PROCEDURE — G0378 HOSPITAL OBSERVATION PER HR: HCPCS

## 2022-10-12 PROCEDURE — 97530 THERAPEUTIC ACTIVITIES: CPT

## 2022-10-12 PROCEDURE — 85007 BL SMEAR W/DIFF WBC COUNT: CPT | Performed by: INTERNAL MEDICINE

## 2022-10-12 PROCEDURE — 96361 HYDRATE IV INFUSION ADD-ON: CPT

## 2022-10-12 PROCEDURE — 82962 GLUCOSE BLOOD TEST: CPT

## 2022-10-12 PROCEDURE — 63710000001 INSULIN LISPRO (HUMAN) PER 5 UNITS: Performed by: INTERNAL MEDICINE

## 2022-10-12 PROCEDURE — 80061 LIPID PANEL: CPT | Performed by: INTERNAL MEDICINE

## 2022-10-12 PROCEDURE — 97162 PT EVAL MOD COMPLEX 30 MIN: CPT

## 2022-10-12 PROCEDURE — 97166 OT EVAL MOD COMPLEX 45 MIN: CPT

## 2022-10-12 PROCEDURE — 85025 COMPLETE CBC W/AUTO DIFF WBC: CPT | Performed by: INTERNAL MEDICINE

## 2022-10-12 RX ORDER — PANTOPRAZOLE SODIUM 40 MG/1
40 TABLET, DELAYED RELEASE ORAL EVERY MORNING
Status: DISCONTINUED | OUTPATIENT
Start: 2022-10-12 | End: 2022-10-14 | Stop reason: HOSPADM

## 2022-10-12 RX ORDER — FUROSEMIDE 40 MG/1
40 TABLET ORAL DAILY
Status: DISCONTINUED | OUTPATIENT
Start: 2022-10-12 | End: 2022-10-14 | Stop reason: HOSPADM

## 2022-10-12 RX ORDER — CLOPIDOGREL BISULFATE 75 MG/1
75 TABLET ORAL DAILY
Status: DISCONTINUED | OUTPATIENT
Start: 2022-10-12 | End: 2022-10-14 | Stop reason: HOSPADM

## 2022-10-12 RX ORDER — HYDROCODONE BITARTRATE AND ACETAMINOPHEN 7.5; 325 MG/1; MG/1
1 TABLET ORAL EVERY 4 HOURS PRN
Status: DISCONTINUED | OUTPATIENT
Start: 2022-10-12 | End: 2022-10-14 | Stop reason: HOSPADM

## 2022-10-12 RX ORDER — ATORVASTATIN CALCIUM 20 MG/1
10 TABLET, FILM COATED ORAL DAILY
Status: DISCONTINUED | OUTPATIENT
Start: 2022-10-12 | End: 2022-10-14 | Stop reason: HOSPADM

## 2022-10-12 RX ORDER — SODIUM CHLORIDE 0.9 % (FLUSH) 0.9 %
10 SYRINGE (ML) INJECTION AS NEEDED
Status: DISCONTINUED | OUTPATIENT
Start: 2022-10-12 | End: 2022-10-14 | Stop reason: HOSPADM

## 2022-10-12 RX ORDER — NITROGLYCERIN 0.4 MG/1
0.4 TABLET SUBLINGUAL
Status: DISCONTINUED | OUTPATIENT
Start: 2022-10-12 | End: 2022-10-14 | Stop reason: HOSPADM

## 2022-10-12 RX ORDER — ACETAMINOPHEN 500 MG
500 TABLET ORAL EVERY 6 HOURS PRN
Status: DISCONTINUED | OUTPATIENT
Start: 2022-10-12 | End: 2022-10-14 | Stop reason: HOSPADM

## 2022-10-12 RX ORDER — METOPROLOL SUCCINATE 25 MG/1
25 TABLET, EXTENDED RELEASE ORAL DAILY
Status: DISCONTINUED | OUTPATIENT
Start: 2022-10-12 | End: 2022-10-14 | Stop reason: HOSPADM

## 2022-10-12 RX ORDER — ASPIRIN 81 MG/1
81 TABLET ORAL DAILY
Status: DISCONTINUED | OUTPATIENT
Start: 2022-10-12 | End: 2022-10-14 | Stop reason: HOSPADM

## 2022-10-12 RX ORDER — SODIUM CHLORIDE 0.9 % (FLUSH) 0.9 %
10 SYRINGE (ML) INJECTION EVERY 12 HOURS SCHEDULED
Status: DISCONTINUED | OUTPATIENT
Start: 2022-10-12 | End: 2022-10-14 | Stop reason: HOSPADM

## 2022-10-12 RX ORDER — TAMSULOSIN HYDROCHLORIDE 0.4 MG/1
0.4 CAPSULE ORAL DAILY
Status: DISCONTINUED | OUTPATIENT
Start: 2022-10-12 | End: 2022-10-14 | Stop reason: HOSPADM

## 2022-10-12 RX ORDER — INSULIN LISPRO 100 [IU]/ML
6 INJECTION, SOLUTION INTRAVENOUS; SUBCUTANEOUS
Status: DISCONTINUED | OUTPATIENT
Start: 2022-10-12 | End: 2022-10-14 | Stop reason: HOSPADM

## 2022-10-12 RX ORDER — SODIUM CHLORIDE 9 MG/ML
75 INJECTION, SOLUTION INTRAVENOUS CONTINUOUS
Status: DISCONTINUED | OUTPATIENT
Start: 2022-10-12 | End: 2022-10-14 | Stop reason: HOSPADM

## 2022-10-12 RX ADMIN — INSULIN LISPRO 6 UNITS: 100 INJECTION, SOLUTION INTRAVENOUS; SUBCUTANEOUS at 12:41

## 2022-10-12 RX ADMIN — PANTOPRAZOLE SODIUM 40 MG: 40 TABLET, DELAYED RELEASE ORAL at 09:31

## 2022-10-12 RX ADMIN — SODIUM CHLORIDE 75 ML/HR: 9 INJECTION, SOLUTION INTRAVENOUS at 09:31

## 2022-10-12 RX ADMIN — FAMOTIDINE 20 MG: 20 TABLET ORAL at 21:12

## 2022-10-12 RX ADMIN — Medication 10 ML: at 09:36

## 2022-10-12 RX ADMIN — INSULIN LISPRO 6 UNITS: 100 INJECTION, SOLUTION INTRAVENOUS; SUBCUTANEOUS at 09:31

## 2022-10-12 RX ADMIN — HYDROCODONE BITARTRATE AND ACETAMINOPHEN 1 TABLET: 7.5; 325 TABLET ORAL at 10:21

## 2022-10-12 RX ADMIN — TAMSULOSIN HYDROCHLORIDE 0.4 MG: 0.4 CAPSULE ORAL at 09:31

## 2022-10-12 RX ADMIN — INSULIN LISPRO 6 UNITS: 100 INJECTION, SOLUTION INTRAVENOUS; SUBCUTANEOUS at 17:21

## 2022-10-12 RX ADMIN — INSULIN GLARGINE-YFGN 8 UNITS: 100 INJECTION, SOLUTION SUBCUTANEOUS at 21:12

## 2022-10-12 RX ADMIN — Medication 10 ML: at 21:12

## 2022-10-12 RX ADMIN — METOPROLOL SUCCINATE 25 MG: 25 TABLET, EXTENDED RELEASE ORAL at 09:31

## 2022-10-12 RX ADMIN — SODIUM CHLORIDE 75 ML/HR: 9 INJECTION, SOLUTION INTRAVENOUS at 21:12

## 2022-10-12 RX ADMIN — FUROSEMIDE 40 MG: 40 TABLET ORAL at 09:32

## 2022-10-12 RX ADMIN — ATORVASTATIN CALCIUM 10 MG: 20 TABLET, FILM COATED ORAL at 09:32

## 2022-10-12 RX ADMIN — ASPIRIN 81 MG: 81 TABLET, COATED ORAL at 09:32

## 2022-10-12 RX ADMIN — HYDROCODONE BITARTRATE AND ACETAMINOPHEN 1 TABLET: 7.5; 325 TABLET ORAL at 21:12

## 2022-10-12 RX ADMIN — CLOPIDOGREL 75 MG: 75 TABLET, FILM COATED ORAL at 09:32

## 2022-10-12 NOTE — H&P
Patient Care Team:  Percy Em MD as PCP - General  Percy Em MD as PCP - Family Medicine  Felix Rodriguez MD as Consulting Physician (Internal Medicine)    Chief complaint   Chief Complaint   Patient presents with   • Fall   • Shoulder Pain   • Hip Pain         Subjective     Patient is a 86 y.o. male presents with a fall in a parking lot after going on a senior outing.  Apparently he had gotten out of the van and was walking across a parking lot when he stumbled and fell onto his right shoulder.  He had a great deal of pain and was unable to get up.  He is brought to the emergency room for further work-up and evaluation.  It became evident immediately that his shoulder was likely broken.  An x-ray confirmed a proximal humeral fracture.  He was also has complaining of some pain in his hip and an x-ray of this area showed a previous surgery and some concern for loosening of the hardware.  Otherwise he appeared to be relatively stable.  He did not sustain a head injury during this fall.  He was not complaining of lightheadedness or dizziness, he was not complaining of chest pain, shortness of breath, or any other cardiac symptom.  He is indicated that he had been in his normal state of health and was feeling good up until the point he fell.    The patient has a long history of orthopedic issues with multiple surgeries to his right hip.  He also has longstanding diabetes, reflux, and other recent stroke that left him with modest residuals.  Despite his multiple illnesses he is still very active at home.  He is compliant with his diet, take his medications regularly, and keep routine follow-ups into the office.  He was seen in the office recently was doing well at that time.  This sounds like this was just a simple stumble and fall and unfortunately led to a fracture of his shoulder.    Because of his advanced age and other medical problems along with the severity of the pain in his shoulder was  felt the patient needed to be admitted for further stabilization.  His wife is healthy but also elderly and was concerned about taking him home and it being safe.  Hopefully we can get his pain under control, and get him up on his feet with physical therapy, and get him home in the very near future.    Review of Systems   Pertinent items are noted in HPI, all other systems reviewed and negative    History  Past Medical History:   Diagnosis Date   • A-fib ([facility])    • 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[facility])    • Anorexia    • Arteriosclerosis of abdominal aorta ([facility]) 02/24/2014   • Arthritis    • Carotid stenosis, asymptomatic, bilateral 02/24/2014    16-49%   • Closed right arm fracture 1994   • Compression fracture of lumbar spine, non-traumatic ([facility])    • Diabetes mellitus ([facility])    • Elevated LFTs    • Esophagitis 06/19/2018    DR RASHID GRADE D LOWER   • Esophagus disorder     THICKENED DISTAL WALL-CT SCAN 6/18/18   • First degree AV block    • Garbled speech    • Granulomatous disease, chronic ([facility])    • Hard of hearing    • Hiatal hernia with GERD and esophagitis    • History of acute gastritis    • Hyperlipidemia    • Hypertension    • Hyponatremia    • Leukocytosis    • Lumbar canal stenosis    • Lung nodule seen on imaging study    • Mandible fracture ([facility]) 03/28/2020    FROM FALL   • Osteoarthritis of multiple joints    • Ptosis of left eyelid    • Remote history of stroke     SMALL CAUDATE NECLEUS   • Syncope and collapse 1/2022 6/11/2022   • Thrombocytopenia ([facility])    • Weakness generalized      Past Surgical History:   Procedure Laterality Date   • CARDIAC CATHETERIZATION N/A 6/9/2022    Procedure: Left Heart Cath;  Surgeon: Reinaldo Miller MD;  Location: [facility] CATH INVASIVE LOCATION;  Service: Cardiovascular;  Laterality: N/A;   • CARDIAC CATHETERIZATION N/A 6/9/2022    Procedure: Left ventriculography;  Surgeon: Reinaldo Miller MD;  Location:  [facility] CATH INVASIVE LOCATION;  Service:  Cardiovascular;  Laterality: N/A;   • CARDIAC CATHETERIZATION N/A 2022    Procedure: Coronary angiography;  Surgeon: Reinaldo Miller MD;  Location[facility] CATH [facility]VASIVE LOC[facility];  Service: Cardiovascular;  Laterality: N/A;   • CARDIAC CATHETERIZATION N/A 2022    Procedure: STENT CHARISMA - CORONARY;  Surgeon: Michelle Alegre MD;  [facility];  Service: Cardiovascular;  Laterality: N/A;  PCI of the LAD with atherectomy   • CARDIAC CATHETERIZATION N/A 2022    Procedure: Atherectomy-coronary;  Surgeon: Michelle Alegre MD;  Location: [facility] CATH INVASIVE LOCATION;  Service: Cardiovascular;  Laterality: N/A;  rotablator   • CARDIAC CATHETERIZATION N/A 2022    Procedure: Optical Coherent Tomography;  Surgeon: Michelle Alegre MD;  Location: [facility] CATH INVASIVE LOCATION;  Service: Cardiovascular;  Laterality: N/A;   • CATARACT EXTRACTION, BILATERAL     • COLONOSCOPY     • ENDOSCOPY N/A 2018    Procedure: ESOPHAGOGASTRODUODENOSCOPY WITH BIOPSY;  Surgeon: Toribio Wade MD;  Location: [facility] ENDOSCOPY;  Service: Gastroenterology   • EYE PTOSIS REPAIR Left 11/15/2016    Procedure: LT UPPER LID EYE PTOSIS REPAIR;  Surgeon: Anup Lancaster MD;  Location: [facility] OR [facility];  Service:    • EYE SURGERY Left     victreous hemorrhage repair   • HIP ARTHROPLASTY Right    • ORIF MANDIBULAR FRACTURE Bilateral 2020    Procedure: OPEN REDUCTION AND INTERNAL FIXATION OF BILATERAL MANDIBLE FRACTURES;  Surgeon: Percy Jeroinmo MD;  Location: [facility] MAIN OR;  Service: Maxillofacial;  Laterality: Bilateral;     Family History   Problem Relation Age of Onset   • Pancreatic cancer Mother    • Hypertension Neg Hx    • Hyperlipidemia Neg Hx    • Heart failure Neg Hx    • Heart disease Neg Hx    • Heart attack Neg Hx    • Malig Hyperthermia Neg Hx      Social History     Tobacco Use   • Smoking status: Former     Types: Cigars     Quit date:      Years since quittin.7   •  Smokeless tobacco: Never   Vaping Use   • Vaping Use: Never used   Substance Use Topics   • Alcohol use: No   • Drug use: No     Medications Prior to Admission   Medication Sig Dispense Refill Last Dose   • Accu-Chek Guide test strip USE ONE STRIP 4 TIMES A DAY TO TEST BLOOD SUGAR      • acetaminophen (TYLENOL) 500 MG tablet Take 500 mg by mouth Every 6 (Six) Hours As Needed for Mild Pain .      • aspirin 81 MG EC tablet Take 81 mg by mouth Daily.   10/11/2022 at 0900   • atorvastatin (LIPITOR) 10 MG tablet Take 10 mg by mouth Daily.   10/11/2022 at 0900   • cholecalciferol (VITAMIN D3) 1000 UNITS tablet Take 1,000 Units by mouth Daily. PT HOLDING FOR SURGERY   10/11/2022 at 0900   • clopidogrel (PLAVIX) 75 MG tablet Take 1 tablet by mouth Daily. 30 tablet 11 10/11/2022 at 0900   • Easy Touch Pen Needles 32G X 4 MM misc       • FAMOTIDINE PO Take 20 mg by mouth Every Night.      • fexofenadine (ALLEGRA) 180 MG tablet Take 1 tablet by mouth Daily As Needed.   10/10/2022 at 0900   • furosemide (LASIX) 40 MG tablet Take 40 mg by mouth Daily.   10/11/2022 at 0900   • Insulin Aspart (NOVOLOG SC) Inject 6 Units under the skin into the appropriate area as directed 3 (Three) Times a Day With Meals.   10/11/2022 at 1200   • insulin glargine (LANTUS) 100 UNIT/ML injection Inject 8 Units under the skin into the appropriate area as directed Every Night.   10/10/2022 at 2100   • lansoprazole (PREVACID) 15 MG capsule Take 1 capsule by mouth 2 (Two) Times a Day. (Patient taking differently: Take 1 capsule by mouth Daily.) 60 capsule 3 10/11/2022 at 0900   • losartan (Cozaar) 25 MG tablet Take 1 tablet by mouth 2 (Two) Times a Day. 180 tablet 3 10/11/2022 at 0900   • metoprolol succinate XL (TOPROL-XL) 25 MG 24 hr tablet Take 25 mg by mouth Daily.   10/11/2022 at 0900   • nitroglycerin (NITROSTAT) 0.4 MG SL tablet Place 1 tablet under the tongue Every 5 (Five) Minutes As Needed for Chest Pain. Take no more than 3 doses in 15  minutes. 25 tablet 0    • tamsulosin (FLOMAX) 0.4 MG capsule 24 hr capsule Take 1 capsule by mouth Daily.   10/11/2022 at 0900     Allergies:  Patient has no known allergies.        Objective     Vital Signs  Temp:  [97.6 °F (36.4 °C)-98.5 °F (36.9 °C)] 97.6 °F (36.4 °C)  Heart Rate:  [69-79] 75  Resp:  [16-18] 16  BP: (108-132)/(57-63) 108/60    Physical Exam:      General Appearance:    Alert, cooperative, in no acute distress, but he is an elderly white male who is very hard of hearing and has some mild dysarthria related to his stroke.  He has no new focal signs.   Head:    Normocephalic, without obvious abnormality, atraumatic   Eyes:            Lids and lashes normal, conjunctivae and sclerae normal, no   icterus, no pallor, corneas clear, PERRLA   Ears:    Ears appear intact with no abnormalities noted   Throat:   No oral lesions, no thrush, oral mucosa moist   Neck:   No adenopathy, supple, trachea midline, no thyromegaly, no   carotid bruit, no JVD   Back:    Mild kyphosis present, no scoliosis present, no skin lesions,      erythema or scars, no tenderness to percussion or                   palpation,   range of motion normal   Lungs:     Clear to auscultation,respirations regular, even and                  unlabored    Heart:    Regular rhythm and normal rate, normal S1 and S2, no            murmur, no gallop, no rub, no click   Chest Wall:    No abnormalities observed   Abdomen:     Normal bowel sounds, no masses, no organomegaly, soft        non-tender, non-distended, no guarding, no rebound                tenderness   Rectal:     Deferred   Extremities:  His right shoulder is in a sling for immobilization because of the fracture.  He has some abrasions on his hands related to the fall.  He indicates that the pain in his hip is much less today.   Pulses:   Pulses palpable and equal bilaterally   Skin:   No bleeding, bruising or rash   Lymph nodes:   No palpable adenopathy   Neurologic:   Cranial nerves  "2 - 12 grossly intact, sensation intact, DTR       present and equal bilaterally       Results Review:    I reviewed the patient's new clinical results.  I reviewed the patient's new imaging results and agree with the interpretation.  I reviewed the patient's other test results and agree with the interpretation  \"                           Results from last 7 days   Lab Units 10/11/22  2323   MAGNESIUM mg/dL 1.9     Results from last 7 days   Lab Units 10/11/22  2323   CHOLESTEROL mg/dL 85   TRIGLYCERIDES mg/dL 40   HDL CHOL mg/dL 38*   LDL CHOL mg/dL 36            Assessment & Plan       Closed fracture of right proximal humerus    Type 2 diabetes mellitus, with long-term current use of insulin (HCC)    Right hip pain    Acute kidney injury superimposed on chronic kidney disease (HCC)    Compression fracture of lumbar spine, non-traumatic ([facility])    Osteoarthritis of multiple joints    Remote history of stroke, small caudate nucleus    Hyperlipidemia    Esophagitis determined by endoscopy by Sheri 6/19/18 LA Grade D Lower 1/3    Hiatal hernia with GERD and esophagitis    BPH without obstruction/lower urinary tract symptoms    Fall, initial encounter    #1 Right Humeral Fracture-currently his pain is a little better controlled now that he is in a sling and has been able to rest overnight.  He has been seen by the orthopedist who feels that there is no surgical intervention that is necessary.  We will have PT and OT work with him today to be sure he is stable on his feet.  Hopefully we can get him home soon.    2.  Right hip pain-initially had some significant pain in his right hip and there was concern for changes at the prosthesis.  Again the orthopedist is seen the patient and feels like this is stable and needs no further intervention.    3.  Diabetes mellitus type 2-generally speaking his blood sugars have been under good control at home.  We will continue to watch them closely here.    4.  Hypertension-has no " evidence for hypotension and his blood pressure generally well controlled    5.  Reflux-he is on PPI with no complaints    6.  Degenerative joint disease-he has diffuse degenerative joint disease in his spine but also in other joints.  Nonetheless he compensates well and is still very active.    7.  ASCVD-he has stable cardiac disease with no complaints of chest pain.  He was not having any cardiac symptoms with this episode    8.  High cholesterol-well-controlled on his current medications    9.  Past stroke-he had a stroke that left him with minimal residual.  The most notable thing is his mild dysarthria.  Otherwise he has been very stable on his feet and active in general    10.  BPH-he has been on Flomax for many years and seems to have good results.    Hopefully he will do well today from the standpoint of pain control and also from stability with working with PT.  Hopefully he can be discharged home in the morning                I discussed the patient's findings and my recommendations with patient.     Percy Em MD  10/12/22  07:59 EDT    Time:

## 2022-10-12 NOTE — DISCHARGE PLACEMENT REQUEST
"Radha Santos (86 y.o. Male)     Date of Birth   1935    Social Security Number       Address   [address] [place], [place] [address]    Home Phone   [phone]    MRN   [id number]       [facility]    Marital Status                               Admission Date   10/11/22    Admission Type   Emergency    Admitting Provider   Percy Em MD    Attending Provider   Percy Em MD    Department, Room/Bed   [address], P380/1       Discharge Date       Discharge Disposition       Discharge Destination                               Attending Provider: Percy Em MD    Allergies: No Known Allergies    Isolation: None   Infection: None   Code Status: No CPR    Ht: 180.3 cm (71\")   Wt: 74.3 kg (163 lb 12.8 oz)    Admission Cmt: None   Principal Problem: Closed fracture of right proximal humerus [S42.201A]                 Active Insurance as of 10/11/2022     Primary Coverage     Payor Plan Insurance Group Employer/Plan Group    HUMANA MEDICARE REPLACEMENT HU[employer]NA MEDICARE REPLACEMENT [id number]     Payor Plan Address Payor Plan Phone Number Payor Plan Fax Number Effective Dates    [address] [address] [phone]  1/1/2022 - None Entered    [facility] [id number]       Subscriber Name Subscriber Birth Date Member ID       RADHA SANTOS 1935 [id number]                 Emergency Contacts      (Rel.) Home Phone Work Phone Mobile Phone    Olga Santos (Spouse) [phone] -- --              "

## 2022-10-12 NOTE — THERAPY EVALUATION
Patient Name: Javier Marin  : 1935    MRN: [id number]                              Today's Date: 10/12/2022       Admit Date: 10/11/2022    Visit Dx:     ICD-10-CM ICD-9-CM   1. Fall, initial encounter  W19.XXXA E888.9   2. Other closed displaced fracture of proximal end of right humerus, initial encounter  S42.291A 812.09   3. Right hip pain  M25.551 719.45     Patient Active Problem List   Diagnosis   • 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[facility])   • Type 2 diabetes mellitus, with long-term current use of insulin ([facility])   • Acute kidney injury superimposed on chronic kidney disease (HCC)   • Elevated liver function tests   • Leukocytosis   • Anorexia   • Esophagus disorder, thickened distal wall CT Scan 18   • Granulomatous disease, chronic ([facility])   • Lung nodule seen on imaging study   • Compression fracture of lumbar spine, non-traumatic ([facility])   • Lumbar canal stenosis   • Weakness generalized   • Cough   • Hard of hearing   • Ptosis, left eyelid   • Osteoarthritis of multiple joints   • Paroxysmal atrial fibrillation ([facility])   • Carotid stenosis, asymptomatic, bilateral   • Remote history of stroke, small caudate nucleus   • Thrombocytopenia ([facility])   • Fall at home, subsequent encounter   • Hyperlipidemia   • Esophagitis determined by endoscopy by Sheri 18 LA Grade D Lower 1/3   • Hiatal hernia with GERD and esophagitis   • Abnormal ECG   • PAD (peripheral artery disease) ([facility])   • Arteriosclerosis of abdominal aorta ([facility])   • Coronary artery disease involving native coronary artery of native heart without angina pectoris   • Ischemic cardiomyopathy EF 40% Cath 6/10/2022   • Osteoarthritis of right hip   • BPH without obstruction/lower urinary tract symptoms   • RBBB (right bundle branch block)   • First degree AV block   • Fall, initial encounter   • Right hip pain   • Closed fracture of right proximal humerus     Past Medical History:   Diagnosis Date   • A-fib ([facility])    • Advanced diabetic  retinal disease ([facility])    • Anorexia    • Arteriosclerosis of abdominal aorta ([facility]) 02/24/2014   • Arthritis    • Carotid stenosis, asymptomatic, bilateral 02/24/2014    16-49%   • Closed right arm fracture 1994   • Compression fracture of lumbar spine, non-traumatic ([facility])    • Diabetes mellitus (HCC)    • Elevated LFTs    • Esophagitis 06/19/2018    DR RASHID GRADE D LOWER   • Esophagus disorder     THICKENED DISTAL WALL-CT SCAN 6/18/18   • First degree AV block    • Garbled speech    • Granulomatous disease, chronic ([facility])    • Hard of hearing    • Hiatal hernia with GERD and esophagitis    • History of acute gastritis    • Hyperlipidemia    • Hypertension    • Hyponatremia    • Leukocytosis    • Lumbar canal stenosis    • Lung nodule seen on imaging study    • Mandible fracture ([facility]) 03/28/2020    FROM FALL   • Osteoarthritis of multiple joints    • Ptosis of left eyelid    • Remote history of stroke     SMALL CAUDATE NECLEUS   • Syncope and collapse 1/2022 6/11/2022   • Thrombocytopenia ([facility])    • Weakness generalized      Past Surgical History:   Procedure Laterality Date   • CARDIAC CATHETERIZATION N/A 6/9/2022    Procedure: Left Heart Cath;  Surgeon: Reinaldo Miller MD;  Location: [facility] CATH INVASIVE LOCATION;  Service: Cardiovascular;  Laterality: N/A;   • CARDIAC CATHETERIZATION N/A 6/9/2022    Procedure: Left ventriculography;  Surgeon: Reinaldo Miller MD;  Location: [facility] CATH INVASIVE LOCATION;  Service: Cardiovascular;  Laterality: N/A;   • CARDIAC CATHETERIZATION N/A 6/9/2022    Procedure: Coronary angiography;  Surgeon: Reinaldo Miller MD;  Location: [facility] CATH INVASIVE LOCATION;  Service: Cardiovascular;  Laterality: N/A;   • CARDIAC CATHETERIZATION N/A 6/13/2022    Procedure: STENT CHARISMA - CORONARY;  Surgeon: Michelle Alegre MD;  Location: [facility] CATH INVASIVE LOCATION;  Service: Cardiovascular;  Laterality: N/A;  PCI of the LAD with atherectomy   • CARDIAC CATHETERIZATION N/A  6/13/2022    Procedure: Atherectomy-coronary;  Surgeon: Michelle Alegre MD;  Location: [facility] CATH INVASIVE LOCATION;  Service: Cardiovascular;  Laterality: N/A;  rotablator   • CARDIAC CATHETERIZATION N/A 6/13/2022    Procedure: Optical Coherent Tomography;  Surgeon: Michelle Alerge MD;  Location: [facility] CATH INVASIVE LOCATION;  Service: Cardiovascular;  Laterality: N/A;   • CATARACT EXTRACTION, BILATERAL     • COLONOSCOPY     • ENDOSCOPY N/A 06/19/2018    Procedure: ESOPHAGOGASTRODUODENOSCOPY WITH BIOPSY;  Surgeon: Toribio Wade MD;  Location: [facility] ENDOSCOPY;  Service: Gastroenterology   • EYE PTOSIS REPAIR Left 11/15/2016    Procedure: LT UPPER LID EYE PTOSIS REPAIR;  Surgeon: Anup Lancaster MD;  Location: [facility] OR OSC;  Service:    • EYE SURGERY Left     victreous hemorrhage repair   • HIP ARTHROPLASTY Right    • ORIF MANDIBULAR FRACTURE Bilateral 03/31/2020    Procedure: OPEN REDUCTION AND INTERNAL FIXATION OF BILATERAL MANDIBLE FRACTURES;  Surgeon: Percy Jeronimo MD;  Location: [facility] MAIN OR;  Service: Maxillofacial;  Laterality: Bilateral;      General Information     Row Name 10/12/22 1525          Physical Therapy Time and Intention    Document Type evaluation  -LB     Mode of Treatment physical therapy  -LB     Row Name 10/12/22 1525          General Information    Patient Profile Reviewed yes  -LB     Existing Precautions/Restrictions fall;non-weight bearing;right  NWB on right shoulder  -LB     Row Name 10/12/22 1525          Living Environment    People in Home spouse  -LB     Row Name 10/12/22 1525          Home Main Entrance    Number of Stairs, Main Entrance one  -LB     Row Name 10/12/22 1525          Stairs Within Home, Primary    Stairs, Within Home, Primary pt has step to enter and one in the house  -LB     Row Name 10/12/22 1525          Cognition    Orientation Status (Cognition) oriented x 3  -LB     Row Name 10/12/22 1525          Safety Issues, Functional Mobility     Impairments Affecting Function (Mobility) balance;endurance/activity tolerance;pain;range of motion (ROM);strength  -LB           User Key  (r) = Recorded By, (t) = Taken By, (c) = Cosigned By    Initials Name Provider Type    Zee Castanon PT Physical Therapist               Mobility     Row Name 10/12/22 1526          Bed Mobility    Sit-Supine [place] (Bed Mobility) moderate assist (50% patient effort);2 person assist  -LB     Row Name 10/12/22 1526          Bed-Chair Transfer    Bed-Chair [place] (Transfers) maximum assist (25% patient effort);2 person assist  -LB     Comment, (Bed-Chair Transfer) stand pivot  -LB     Row Name 10/12/22 1526          Sit-Stand Transfer    Sit-Stand [place] (Transfers) maximum assist (25% patient effort);2 person assist  -LB     Comment, (Sit-Stand Transfer) Pt stood from chair with Max of 2 with flexed posture leaning significantly to the left to avoid weight bearing on right LE.  -LB     Row Name 10/12/22 1526          Gait/Stairs (Locomotion)    Distance in Feet (Gait) Unable to take steps as the pt is not able to stand fully upright  -LB     Row Name 10/12/22 1526          Mobility    Extremity Weight-bearing Status right upper extremity  -LB     Right Upper Extremity (Weight-bearing Status) non weight-bearing (NWB)  -LB           User Key  (r) = Recorded By, (t) = Taken By, (c) = Cosigned By    Initials Name Provider Type    Zee Castanon PT Physical Therapist               Obj/Interventions     Row Name 10/12/22 1533          Range of Motion Comprehensive    Comment, General Range of Motion Left LE: WFL; Right hip PROM limited by ~25%; knee and ankle WFL  -LB     Row Name 10/12/22 1533          Strength Comprehensive (MMT)    Comment, General Manual Muscle Testing (MMT) Assessment Left UE: WFL; Right hip NT due to pain; Right knee/ankle WFL  -LB     Row Name 10/12/22 1533          Motor Skills    Therapeutic Exercise --  PROM of right LE  -LB     Row  Name 10/12/22 1533          Balance    Static Sitting Balance contact guard;minimal assist  -LB     Static Standing Balance maximum assist;2-person assist  -LB     Dynamic Standing Balance maximum assist;2-person assist  -LB           User Key  (r) = Recorded By, (t) = Taken By, (c) = Cosigned By    Initials Name Provider Type    LB Zee Murrieta, PT Physical Therapist               Goals/Plan     Row Name 10/12/22 1543          Bed Mobility Goal 1 (PT)    Activity/Assistive Device (Bed Mobility Goal 1, PT) sit to supine/supine to sit  -LB     [place] Level/Cues Needed (Bed Mobility Goal 1, PT) minimum assist (75% or more patient effort)  -LB     Time Frame (Bed Mobility Goal 1, PT) long term goal (LTG);1 week  -LB     Row Name 10/12/22 1543          Transfer Goal 1 (PT)    Activity/Assistive Device (Transfer Goal 1, PT) bed-to-chair/chair-to-bed;sit-to-stand/stand-to-sit  -LB     [place] Level/Cues Needed (Transfer Goal 1, PT) moderate assist (50-74% patient effort)  -LB     Time Frame (Transfer Goal 1, PT) long term goal (LTG);1 week  -LB     Row Name 10/12/22 1543          Therapy Assessment/Plan (PT)    Planned Therapy Interventions (PT) bed mobility training;strengthening;transfer training  -LB           User Key  (r) = Recorded By, (t) = Taken By, (c) = Cosigned By    Initials Name Provider Type    Zee Castanon, PT Physical Therapist               Clinical Impression     Row Name 10/12/22 1535          Pain    Pre/Posttreatment Pain Comment Pt with pain in right hip with standing and ROM but did not provide number  -LB     Additional Documentation Pain Scale: FACES Pre/Post-Treatment (Group)  -LB     Row Name 10/12/22 1535          Pain Scale: FACES Pre/Post-Treatment    Pain: FACES Scale, Pretreatment 0-->no hurt  -LB     Posttreatment Pain Rating 8-->hurts whole lot  during standing  -LB     Pain Location - hip  -LB     Row Name 10/12/22 1535          Plan of Care Review    Plan of Care  Reviewed With patient  -LB     Outcome Evaluation Pt is pleasant 87 y/o male who fell in the community resulting in right humeral fracture, NWB and painful right hip form the fall.  No hip fracture noted.  The patient had a fall earlier this year resutling in left humeral fracture.  The patient's spouse states the patient has declined to use a rolling walker, only a cane.  The patient was found up in recliner.  Pt required Max A of 2 to come to stand and from chair to bed and assist of 2 to return to supine.  Recommend some type of rehab at this time as the patient requires assist of 2 for mobility.  -LB     Row Name 10/12/22 153          Therapy Assessment/Plan (PT)    Rehab Potential (PT) good, to achieve stated therapy goals  -LB     Criteria for Skilled Interventions Met (PT) yes  -LB     Therapy Frequency (PT) 3 times/wk  -LB     Row Name 10/12/22 1531          Positioning and Restraints    Pre-Treatment Position sitting in chair/recliner  -LB     Post Treatment Position bed  -LB     In Bed supine;call light within reach;encouraged to call for assist;exit alarm on  -LB           User Key  (r) = Recorded By, (t) = Taken By, (c) = Cosigned By    Initials Name Provider Type    LB Zee Murrieta, PT Physical Therapist               Outcome Measures     Row Name 10/12/22 1380          How much help from another person do you currently need...    Turning from your back to your side while in flat bed without using bedrails? 2  -LB     Moving from lying on back to sitting on the side of a flat bed without bedrails? 2  -LB     Moving to and from a bed to a chair (including a wheelchair)? 2  -LB     Standing up from a chair using your arms (e.g., wheelchair, bedside chair)? 2  -LB     Climbing 3-5 steps with a railing? 1  -LB     To walk in hospital room? 1  -LB     AM-PAC 6 Clicks Score (PT) 10  -LB     Highest level of mobility 4 --> Transferred to chair/commode  -LB     Row Name 10/12/22 4226 10/12/22 3803        Functional Assessment    Outcome Measure Options AM-PAC 6 Clicks Basic Mobility (PT)  -LB AM-PAC 6 Clicks Daily Activity (OT)  -LUPE          User Key  (r) = Recorded By, (t) = Taken By, (c) = Cosigned By    Initials Name Provider Type    LB Zee Murrieta, PT Physical Therapist    Lakisha Sweet OT Occupational Therapist                             Physical Therapy Education     Title: PT OT SLP Therapies (In Progress)     Topic: Physical Therapy (Done)     Point: Mobility training (Done)     Learning Progress Summary           Patient Acceptance, E,TB, VU,NR by GERARDO at 10/12/2022 1545                               User Key     Initials Effective Dates Name Provider Type Discipline     06/16/21 -  Zee Murrieta PT Physical Therapist PT              PT Recommendation and Plan  Planned Therapy Interventions (PT): bed mobility training, strengthening, transfer training  Plan of Care Reviewed With: patient  Outcome Evaluation: Pt is pleasant 85 y/o male who fell in the community resulting in right humeral fracture, NWB and painful right hip form the fall.  No hip fracture noted.  The patient had a fall earlier this year resutling in left humeral fracture.  The patient's spouse states the patient has declined to use a rolling walker, only a cane.  The patient was found up in recliner.  Pt required Max A of 2 to come to stand and from chair to bed and assist of 2 to return to supine.  Recommend some type of rehab at this time as the patient requires assist of 2 for mobility.     Time Calculation:    PT Charges     Row Name 10/12/22 1546             Time Calculation    Start Time 1455  -LB      Stop Time 1514  -LB      Time Calculation (min) 19 min  -LB      PT Received On 10/12/22  -LB      PT - Next Appointment 10/14/22  -LB      PT Goal Re-Cert Due Date 10/19/22  -LB         Untimed Charges    PT Eval/Re-eval Minutes 19  -LB         Total Minutes    Untimed Charges Total Minutes 19  -LB       Total Minutes 19  -LB             User Key  (r) = Recorded By, (t) = Taken By, (c) = Cosigned By    Initials Name Provider Type    Zee Castanon, PT Physical Therapist              Therapy Charges for Today     Code Description Service Date Service Provider Modifiers Qty    [id number]  PT EVAL MOD COMPLEXITY 2 10/12/2022 Zee Murrieta, PT GP 1    81439479135 HC PT THER SUPP EA 15 MIN 10/12/2022 Zee Murrieta, PT GP 1          PT G-Codes  Outcome Measure Options: AM-PAC 6 Clicks Basic Mobility (PT)  AM-PAC 6 Clicks Score (PT): 10  AM-PAC 6 Clicks Score (OT): 13  Patient was wearing a face mask during this therapy encounter. Therapist used appropriate personal protective equipment including mask and gloves.  Mask used was standard procedure mask. Appropriate PPE was worn during the entire therapy session. Hand hygiene was completed before and after therapy session. Patient is not in enhanced droplet precautions.       Zee Murrieta PT  10/12/2022

## 2022-10-12 NOTE — PLAN OF CARE
Goal Outcome Evaluation:  Plan of Care Reviewed With: patient, spouse           Outcome Evaluation: Pt is an 87 y/o male admitted s/p fall resulting in R proximal humerus fx (non-op, NWB, immobilization in sling for 1-2 weeks. Okay for elbow, wrist, digit AROM per ortho); R hip pain (no fx, conservative tx). Hx of L proximal humerus fx in January 2022. Pt has req'ed assist with ADLs since LUE fx, use of cane for mobility and has had multiple recent falls/syncopal episodes. Pt req's ModA for bed mobility today, SBA for sitting balance, maxA to don socks d/t dec fxl reach and limited use of RUE for ADLs. AROM to R elbow, wrist and digits WFL, adjusted sling for optimal support/comfort, educated on NWB. STS with ModA/HHA on L, difficulty attaining full upright position d/t R hip pain and generalized weakness. He is unable to take lateral steps towards HOB d/t dec weight shifting. Pt will benefit from OT services to address fxl deficits, recommend d/c rehab to promote return to PLOF.

## 2022-10-12 NOTE — PLAN OF CARE
Goal Outcome Evaluation:  Plan of Care Reviewed With: patient           Outcome Evaluation: Pt is pleasant 87 y/o male who fell in the community resulting in right humeral fracture, NWB and painful right hip form the fall.  No hip fracture noted.  The patient had a fall earlier this year resutling in left humeral fracture.  The patient's spouse states the patient has declined to use a rolling walker, only a cane.  The patient was found up in recliner.  Pt required Max A of 2 to come to stand and from chair to bed and assist of 2 to return to supine.  Recommend some type of rehab at this time as the patient requires assist of 2 for mobility.

## 2022-10-12 NOTE — CONSULTS
Orthopaedic Surgery  Consult Note  Dr. NELLY Gonzalez” Jose   [phone]    HPI:  Patient is a 86 y.o. Not  or  male who presents with Right shoulder and right hip pain after a fall.  Patient was brought to the emergency room where x-rays were taken that demonstrated a minimally displaced right proximal humerus fracture and concerns for injury to his right hip.  He has a history of multiple hip surgeries on the right side with a revision femoral stem.  X-rays demonstrated concern for possible proximal loosening.  Patient complains today of some achy pain in the right hip and sharp pain in the right shoulder.  He is not a great historian.  Pain is made better with sling immobilization of the shoulder and bed rest.  It is also better with narcotic administration.    MEDICAL HISTORY  Past Medical History:   Diagnosis Date   • A-fib ([facility])    • 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[facility])    • Anorexia    • Arteriosclerosis of abdominal aorta ([facility]) 02/24/2014   • Arthritis    • Carotid stenosis, asymptomatic, bilateral 02/24/2014    16-49%   • Closed right arm fracture 1994   • Compression fracture of lumbar spine, non-traumatic ([facility])    • Diabetes mellitus ([facility])    • Elevated LFTs    • Esophagitis 06/19/2018    DR RASHID GRADE D LOWER   • Esophagus disorder     THICKENED DISTAL WALL-CT SCAN 6/18/18   • First degree AV block    • Garbled speech    • Granulomatous disease, chronic ([facility])    • Hard of hearing    • Hiatal hernia with GERD and esophagitis    • History of acute gastritis    • Hyperlipidemia    • Hypertension    • Hyponatremia    • Leukocytosis    • Lumbar canal stenosis    • Lung nodule seen on imaging study    • Mandible fracture ([facility]) 03/28/2020    FROM FALL   • Osteoarthritis of multiple joints    • Ptosis of left eyelid    • Remote history of stroke     SMALL CAUDATE NECLEUS   • Syncope and collapse 1/2022 6/11/2022   • Thrombocytopenia ([facility])    • Weakness generalized    ·   Past Surgical  History:   Procedure Laterality Date   • CARDIAC CATHETERIZATION N/A 6/9/2022    Procedure: Left Heart Cath;  Surgeon: Reinaldo Miller MD;  Location: [facility] CATH INVASIVE LOCATION;  Service: Cardiovascular;  Laterality: N/A;   • CARDIAC CATHETERIZATION N/A 6/9/2022    Procedure: Left ventriculography;  Surgeon: Reinaldo Miller MD;  Location: [facility] CATH INVASIVE LOCATION;  Service: Cardiovascular;  Laterality: N/A;   • CARDIAC CATHETERIZATION N/A 6/9/2022    Procedure: Coronary angiography;  Surgeon: Reinaldo Miller MD;  Location: [facility] CATH INVASIVE LOCATION;  Service: Cardiovascular;  Laterality: N/A;   • CARDIAC CATHETERIZATION N/A 6/13/2022    Procedure: STENT CHARISMA - CORONARY;  Surgeon: Michelle Alegre MD;  Location: [facility] CATH INVASIVE LOCATION;  Service: Cardiovascular;  Laterality: N/A;  PCI of the LAD with atherectomy   • CARDIAC CATHETERIZATION N/A 6/13/2022    Procedure: Atherectomy-coronary;  Surgeon: Michelle Alegre MD;  Location: [facility] CATH INVASIVE LOCATION;  Service: Cardiovascular;  Laterality: N/A;  rotablator   • CARDIAC CATHETERIZATION N/A 6/13/2022    Procedure: Optical Coherent Tomography;  Surgeon: Michelle Alegre MD;  Location: [facility] INVASIVE LOCATION;  Service: Cardiovascular;  Laterality: N/A;   • CATARACT EXTRACTION, BILATERAL     • COLONOSCOPY     • ENDOSCOPY N/A 06/19/2018    Procedure: ESOPHAGOGASTRODUODENOSCOPY WITH BIOPSY;  Surgeon: Toribio Wade MD;  Location: [facility] ENDOSCOPY;  Service: Gastroenterology   • EYE PTOSIS REPAIR Left 11/15/2016    Procedure: LT UPPER LID EYE PTOSIS REPAIR;  Surgeon: Anup Lancaster MD;  Location: [facility] OR [facility];  Service:    • EYE SURGERY Left     victreous hemorrhage repair   • HIP ARTHROPLASTY Right    • ORIF MANDIBULAR FRACTURE Bilateral 03/31/2020    Procedure: OPEN REDUCTION AND INTERNAL FIXATION OF BILATERAL MANDIBLE FRACTURES;  Surgeon: Percy Jeronimo MD;  Location: [facility] MAIN OR;  Service: Maxillofacial;   Laterality: Bilateral;   ·   Prior to Admission medications    Medication Sig Start Date End Date Taking? Authorizing Provider   Accu-Chek Guide test strip USE ONE STRIP 4 TIMES A DAY TO TEST BLOOD SUGAR 6/1/22  Yes Malik Lauren MD   acetaminophen (TYLENOL) 500 MG tablet Take 500 mg by mouth Every 6 (Six) Hours As Needed for Mild Pain .   Yes Malik Lauren MD   aspirin 81 MG EC tablet Take 81 mg by mouth Daily.   Yes Malik Lauren MD   atorvastatin (LIPITOR) 10 MG tablet Take 10 mg by mouth Daily.   Yes Malik Lauren MD   cholecalciferol (VITAMIN D3) 1000 UNITS tablet Take 1,000 Units by mouth Daily. PT HOLDING FOR SURGERY   Yes Malik Lauren MD   clopidogrel (PLAVIX) 75 MG tablet Take 1 tablet by mouth Daily. 6/15/22  Yes Zee Ayers APRN   Easy Touch Pen Needles 32G X 4 MM misc  5/31/22  Yes Malik Lauren MD   FAMOTIDINE PO Take 20 mg by mouth Every Night.   Yes Malik Lauren MD   fexofenadine (ALLEGRA) 180 MG tablet Take 1 tablet by mouth Daily As Needed.   Yes Malik Lauren MD   furosemide (LASIX) 40 MG tablet Take 40 mg by mouth Daily. 2/17/22  Yes Malik Lauren MD   Insulin Aspart (NOVOLOG SC) Inject 6 Units under the skin into the appropriate area as directed 3 (Three) Times a Day With Meals.   Yes Malik Lauren MD   insulin glargine (LANTUS) 100 UNIT/ML injection Inject 8 Units under the skin into the appropriate area as directed Every Night.   Yes Malik Lauren MD   lansoprazole (PREVACID) 15 MG capsule Take 1 capsule by mouth 2 (Two) Times a Day.  Patient taking differently: Take 1 capsule by mouth Daily. 6/22/18  Yes Felix Rodriguez MD   losartan (Cozaar) 25 MG tablet Take 1 tablet by mouth 2 (Two) Times a Day. 6/23/22  Yes Shaylee Dumont APRN   metoprolol succinate XL (TOPROL-XL) 25 MG 24 hr tablet Take 25 mg by mouth Daily. 2/17/22  Yes Malik Lauren MD   nitroglycerin (NITROSTAT) 0.4 MG SL  "tablet Place 1 tablet under the tongue Every 5 (Five) Minutes As Needed for Chest Pain. Take no more than 3 doses in 15 minutes. 20  Yes Juliana Mccrary MD   tamsulosin (FLOMAX) 0.4 MG capsule 24 hr capsule Take 1 capsule by mouth Daily. 22  Yes Malik Lauren MD   HYDROcodone-acetaminophen (NORCO) 5-325 MG per tablet Take 1 tablet by mouth Every 6 (Six) Hours As Needed for Severe Pain. 10/11/22   Felix Lancaster MD   ondansetron ODT (ZOFRAN-ODT) 4 MG disintegrating tablet Place 1 tablet under the tongue Every 6 (Six) Hours As Needed for Nausea or Vomiting. 10/11/22   Felix Lancaster MD   ·   · No Known Allergies  · There is no immunization history for the selected administration types on file for this patient.  Social History     Tobacco Use   • Smoking status: Former     Types: Cigars     Quit date:      Years since quittin.7   • Smokeless tobacco: Never   Substance Use Topics   • Alcohol use: No   ·    Social History     Substance and Sexual Activity   Drug Use No   ·     VITALS: /60 (BP Location: Left arm, Patient Position: Lying)   Pulse 75   Temp 97.6 °F (36.4 °C) (Oral)   Resp 16   Ht 180.3 cm (71\")   Wt 74.3 kg (163 lb 12.8 oz)   SpO2 99%   BMI 22.85 kg/m²  Body mass index is 22.85 kg/m².    PHYSICAL EXAM:   · CONSTITUTIONAL: No acute distress  · LUNGS: Equal chest rise, no shortness of air  · CARDIOVASCULAR: palpable peripheral pulses  · SKIN: no skin lesions in the area examined  · LYMPH: no lymphadenopathy in the area examined  · EXTREMITY: Right Lower Extremity  · Tenderness to Palpation: No tenderness to palpation and minimal pain with logroll of the hip  · Gross Deformity: No Gross Deformity  · Pulses:  Brisk Capillary Refill  · Sensation: Intact to Saphenous, Sural, Deep Peroneal, Superficial Peroneal, and Tibial Nerves and grossly throughout extremity  · Motor: 5/5 EHL/FHL/TA/GS motor complexes   · Right upper extremity: Pain with any attempted range " of motion.  Tenderness palpation at the shoulder.  No tenderness at the elbow wrist.  Normal neurovascular exam    RADIOLOGY REVIEW:   XR Shoulder 2+ View Right    Result Date: 10/11/2022   Proximal right humerus fracture.  This report was finalized on 10/11/2022 4:20 PM by Dr. Ron Freitas M.D.      XR Hip With or Without Pelvis 2 - 3 View Right    Result Date: 10/11/2022   As described.    This report was finalized on 10/11/2022 4:25 PM by Dr. Ron Freitas M.D.        LABS:   Results for the past 24 hours:   Recent Results (from the past 24 hour(s))   POC Glucose Once    Collection Time: 10/11/22  8:57 PM    Specimen: Blood   Result Value Ref Range    Glucose 242 (H) 70 - 130 mg/dL   Lipid Panel    Collection Time: 10/11/22 11:23 PM    Specimen: Blood   Result Value Ref Range    Total Cholesterol 85 0 - 200 mg/dL    Triglycerides 40 0 - 150 mg/dL    HDL Cholesterol 38 (L) 40 - 60 mg/dL    LDL Cholesterol  36 0 - 100 mg/dL    VLDL Cholesterol 11 5 - 40 mg/dL    LDL/HDL Ratio 1.03    Magnesium    Collection Time: 10/11/22 11:23 PM    Specimen: Blood   Result Value Ref Range    Magnesium 1.9 1.6 - 2.4 mg/dL   POC Glucose Once    Collection Time: 10/12/22  7:39 AM    Specimen: Blood   Result Value Ref Range    Glucose 257 (H) 70 - 130 mg/dL   POC Glucose Once    Collection Time: 10/12/22  7:41 AM    Specimen: Blood   Result Value Ref Range    Glucose 262 (H) 70 - 130 mg/dL       IMPRESSION:  Patient is a 86 y.o. Not  or  male with  right proximal humerus fracture and stable right revision total hip arthroplasty construct    PLAN:   · Admited to: Percy Em MD  · Disposition:  the right hip revision stem is well fixated distally and does not need any orthopedic intervention.  He has not significantly damaged the hip in the fall.  He may be weight-bear as tolerated.  The right upper extremity he needs to remain nonweightbearing.  I recommend sling immobilization for 1 to 2 weeks and then  "begin physical therapy for gentle range of motion.  He is okay for range of motion of elbow and wrist acutely.  No need for surgical intervention on the hip or shoulder.    R \"Pawan\" Jose SOLIS MD  Orthopaedic Surgery  [facility]  [phone] - [place] Office  [phone] - [place] Office            "

## 2022-10-12 NOTE — PLAN OF CARE
Goal Outcome Evaluation: Pt admitted for falls. Fall interventions maintained throughout the shift. Pt worked with PT and OT and spoke with CCP to arrange discharge plans. Administered PRN pain medications x1. BP on lower side. Pt asymptomatic. No new orders.

## 2022-10-12 NOTE — PLAN OF CARE
Goal Outcome Evaluation:  Plan of Care Reviewed With: patient        Progress: no change  Outcome Evaluation: ED admit s/p fall resulting in R hip pain and fracture of R humerus.  Sling in place.  Hx of L shoulder injury.  [place].  Spouse took maris hearing aid home to recharge.  Upper & lower dentures in denture cup at bedside.  Male purewick in place d/t decrease mobility.  Norco 1 - 2 avaiable for pain.  None required since arrival.  Restart night time meds per MD verbal order.  MD to address remaining med rec and possible SSI need.

## 2022-10-12 NOTE — CASE MANAGEMENT/SOCIAL WORK
Discharge Planning Assessment  [facility]     Patient Name: Javier Marin  MRN: [id number]  Today's Date: 10/12/2022    Admit Date: 10/11/2022    Plan: SNF pending accepting facility and [employer]   Discharge Needs Assessment     Row Name 10/12/22 1712       Living Environment    People in Home spouse    Name(s) of People in Home Olga Marin wife [phone]    Current Living Arrangements home    Primary Care Provided by self;spouse/significant other    Provides Primary Care For no one    Family Caregiver if Needed spouse    Family Caregiver Names Olga Marin wife [phone]    Quality of Family Relationships supportive    Able to Return to Prior Arrangements no       Resource/Environmental Concerns    Transportation Concerns none       Transition Planning    Patient/Family Anticipates Transition to inpatient rehabilitation facility;long-term care facility    Patient/Family Anticipated Services at Transition skilled nursing    Transportation Anticipated family or friend will provide       Discharge Needs Assessment    Readmission Within the Last 30 Days no previous admission in last 30 days    Equipment Currently Used at Home cane, straight;glucometer    Concerns to be Addressed discharge planning    Anticipated Changes Related to Illness none    Provided Post Acute Provider List? Yes    Post Acute Provider List Inpatient Rehab;Nursing Home    Provided Post Acute Provider Quality & Resource List? Yes    Post Acute Provider Quality and Resource List Long Term Acute Care;Nursing Home    Delivered To Support Person    Method of Delivery In person    Patient's Choice of Community Agency(s) Olga Marin wife [phone]    Current Discharge Risk chronically ill               Discharge Plan     Row Name 10/12/22 1714       Plan    Plan SNF pending accepting facility and [employer]    Patient/Family in Agreement with Plan yes    Plan Comments MOON 10/11/2022. Met with patient and Olga Marin wife  [phone] at bedside. Patient is very hard of hearing but he was able to announce that questions should be directed toward his wife. Face sheet verified.  Prior to admission patient was able to do part of his ADL’s, he required assist from wife.  Patient has a walker, cane and glucometer. Patient uses the [facility] in [place], denies any issues affording or remembering to take his medication. Declined Meds to Beds. Olga Marin wife [phone] is patient’s health care surrogate. Discharge plans discussed, wife states that in patient’s current state she is unable to care for him at home and she requested patient go to rehab. Provider list given to wife, she selected 1. [facility]- left voice mail. 2. [facility] and 3. [facility] message for [facility]. Wife should be able to transport. Will continue to monitor for new or changing discharge needs.  Yolis FLORES RN CCP              Continued Care and Services - Admitted Since 10/11/2022     Destination     Service Provider Request Status Selected Services Address Phone Fax Patient Preferred    [facility] [address] [facility] [address] [phone] --    [facility] Pending - Request Sent N/A [address] [facility] [id number] [phone] --    [facility] Pending - Request Sent N/A [id number] [facility] [address] [phone] --              Expected Discharge Date and Time     Expected Discharge Date Expected Discharge Time    Oct 13, 2022          Demographic Summary     Row Name 10/12/22 1711       General Information    Admission Type observation    Arrived From emergency department    Required Notices Provided Observation Status Notice    Referral Source admission list    Reason for Consult discharge planning    Preferred Language English       Contact Information    Permission Granted to Share Info With  family/designee  Olga Marin wife [phone]               Functional Status     Row Name 10/12/22 0254       Functional Status    Usual Activity Tolerance moderate    Current Activity Tolerance fair       Functional Status, IADL    Medications assistive person    Meal Preparation completely dependent    Housekeeping completely dependent    Laundry completely dependent    Shopping completely dependent       Mental Status    General Appearance WDL WDL       Mental Status Summary    Recent Changes in Mental Status/Cognitive Functioning hearing       Employment/    Employment Status retired               Psychosocial    No documentation.                Abuse/Neglect    No documentation.                Legal    No documentation.                Substance Abuse    No documentation.                Patient Forms    No documentation.                   Yolis Reed RN

## 2022-10-12 NOTE — THERAPY EVALUATION
Patient Name: Javier Marin  : 1935    MRN: [id number]                              Today's Date: 10/12/2022       Admit Date: 10/11/2022    Visit Dx:     ICD-10-CM ICD-9-CM   1. Fall, initial encounter  W19.XXXA E888.9   2. Other closed displaced fracture of proximal end of right humerus, initial encounter  S42.291A 812.09   3. Right hip pain  M25.551 719.45     Patient Active Problem List   Diagnosis   • 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[facility])   • Type 2 diabetes mellitus, with long-term current use of insulin ([facility])   • Acute kidney injury superimposed on chronic kidney disease (HCC)   • Elevated liver function tests   • Leukocytosis   • Anorexia   • Esophagus disorder, thickened distal wall CT Scan 18   • Granulomatous disease, chronic ([facility])   • Lung nodule seen on imaging study   • Compression fracture of lumbar spine, non-traumatic ([facility])   • Lumbar canal stenosis   • Weakness generalized   • Cough   • Hard of hearing   • Ptosis, left eyelid   • Osteoarthritis of multiple joints   • Paroxysmal atrial fibrillation ([facility])   • Carotid stenosis, asymptomatic, bilateral   • Remote history of stroke, small caudate nucleus   • Thrombocytopenia ([facility])   • Fall at home, subsequent encounter   • Hyperlipidemia   • Esophagitis determined by endoscopy by Sheri 18 LA Grade D Lower 1/3   • Hiatal hernia with GERD and esophagitis   • Abnormal ECG   • PAD (peripheral artery disease) ([facility])   • Arteriosclerosis of abdominal aorta ([facility])   • Coronary artery disease involving native coronary artery of native heart without angina pectoris   • Ischemic cardiomyopathy EF 40% Cath 6/10/2022   • Osteoarthritis of right hip   • BPH without obstruction/lower urinary tract symptoms   • RBBB (right bundle branch block)   • First degree AV block   • Fall, initial encounter   • Right hip pain   • Closed fracture of right proximal humerus     Past Medical History:   Diagnosis Date   • A-fib ([facility])    • Advanced diabetic  retinal disease ([facility])    • Anorexia    • Arteriosclerosis of abdominal aorta ([facility]) 02/24/2014   • Arthritis    • Carotid stenosis, asymptomatic, bilateral 02/24/2014    16-49%   • Closed right arm fracture 1994   • Compression fracture of lumbar spine, non-traumatic ([facility])    • Diabetes mellitus (HCC)    • Elevated LFTs    • Esophagitis 06/19/2018    DR RASHID GRADE D LOWER   • Esophagus disorder     THICKENED DISTAL WALL-CT SCAN 6/18/18   • First degree AV block    • Garbled speech    • Granulomatous disease, chronic ([facility])    • Hard of hearing    • Hiatal hernia with GERD and esophagitis    • History of acute gastritis    • Hyperlipidemia    • Hypertension    • Hyponatremia    • Leukocytosis    • Lumbar canal stenosis    • Lung nodule seen on imaging study    • Mandible fracture ([facility]) 03/28/2020    FROM FALL   • Osteoarthritis of multiple joints    • Ptosis of left eyelid    • Remote history of stroke     SMALL CAUDATE NECLEUS   • Syncope and collapse 1/2022 6/11/2022   • Thrombocytopenia ([facility])    • Weakness generalized      Past Surgical History:   Procedure Laterality Date   • CARDIAC CATHETERIZATION N/A 6/9/2022    Procedure: Left Heart Cath;  Surgeon: Reinaldo Miller MD;  Location: [facility] CATH INVASIVE LOCATION;  Service: Cardiovascular;  Laterality: N/A;   • CARDIAC CATHETERIZATION N/A 6/9/2022    Procedure: Left ventriculography;  Surgeon: Reinaldo Miller MD;  Location: [facility] CATH INVASIVE LOCATION;  Service: Cardiovascular;  Laterality: N/A;   • CARDIAC CATHETERIZATION N/A 6/9/2022    Procedure: Coronary angiography;  Surgeon: Reinaldo Miller MD;  Location: [facility] CATH INVASIVE LOCATION;  Service: Cardiovascular;  Laterality: N/A;   • CARDIAC CATHETERIZATION N/A 6/13/2022    Procedure: STENT CHARISMA - CORONARY;  Surgeon: Michelle Alegre MD;  Location: [facility] CATH INVASIVE LOCATION;  Service: Cardiovascular;  Laterality: N/A;  PCI of the LAD with atherectomy   • CARDIAC CATHETERIZATION N/A  6/13/2022    Procedure: Atherectomy-coronary;  Surgeon: Michelle Alegre MD;  Location: [facility] CATH INVASIVE LOCATION;  Service: Cardiovascular;  Laterality: N/A;  rotablator   • CARDIAC CATHETERIZATION N/A 6/13/2022    Procedure: Optical Coherent Tomography;  Surgeon: Michelle Alegre MD;  Location:  [facility] CATH INVASIVE LOCATION;  Service: Cardiovascular;  Laterality: N/A;   • CATARACT EXTRACTION, BILATERAL     • COLONOSCOPY     • ENDOSCOPY N/A 06/19/2018    Procedure: ESOPHAGOGASTRODUODENOSCOPY WITH BIOPSY;  Surgeon: Toribio Wade MD;  Location: [facility] ENDOSCOPY;  Service: Gastroenterology   • EYE PTOSIS REPAIR Left 11/15/2016    Procedure: LT UPPER LID EYE PTOSIS REPAIR;  Surgeon: Anup Lancaster MD;  Location: [facility] OR [facility];  Service:    • EYE SURGERY Left     victreous hemorrhage repair   • HIP ARTHROPLASTY Right    • ORIF MANDIBULAR FRACTURE Bilateral 03/31/2020    Procedure: OPEN REDUCTION AND INTERNAL FIXATION OF BILATERAL MANDIBLE FRACTURES;  Surgeon: Percy Jeronimo MD;  Location: [facility] MAIN OR;  Service: Maxillofacial;  Laterality: Bilateral;      General Information     Row Name 10/12/22 1125          OT Time and Intention    Document Type evaluation  -JW     Mode of Treatment occupational therapy  -     Row Name 10/12/22 1125          General Information    Patient Profile Reviewed yes  -JW     Prior Level of Function min assist:;ADL's;all household mobility  wife was assisting with some ADLs such as LB dressing and ADL TFs for safety. Pt typically ambulates using a cane. Multiple recent falls  -JW     Existing Precautions/Restrictions fall;non-weight bearing;right  -JW     Barriers to Rehab previous functional deficit  -JW     Row Name 10/12/22 1125          Living Environment    People in Home spouse  -     Row Name 10/12/22 1125          Home Main Entrance    Number of Stairs, Main Entrance three  -JW     Row Name 10/12/22 1125          Cognition    Orientation Status (Cognition)  oriented x 3  -     Row Name 10/12/22 1125          Safety Issues, Functional Mobility    Impairments Affecting Function (Mobility) balance;endurance/activity tolerance;pain;range of motion (ROM);strength  -           User Key  (r) = Recorded By, (t) = Taken By, (c) = Cosigned By    Initials Name Provider Type     Lakisha Rankin OT Occupational Therapist                 Mobility/ADL's     Row Name 10/12/22 1128          Bed Mobility    Bed Mobility supine-sit;sit-supine  -     Supine-Sit [place] (Bed Mobility) moderate assist (50% patient effort);verbal cues  -     Sit-Supine [place] (Bed Mobility) moderate assist (50% patient effort);verbal cues  -     Bed Mobility, Safety Issues decreased use of arms for pushing/pulling  -     Assistive Device (Bed Mobility) bed rails;head of bed elevated  -     Comment, (Bed Mobility) cues for sequencing, increased time to complete bed mobility d/t pain  -[facility] Name 10/12/22 1128          Transfers    Transfers sit-stand transfer  -[facility] 10/12/22 [facility]          Sit-Stand Transfer    Sit-Stand [place] (Transfers) moderate assist (50% patient effort);verbal cues  -     Comment, (Sit-Stand Transfer) STS from EOB 3x with ModA/HHA on left. Difficulty attaining full upright position d/t hip pain and generalized weakness  -[facility] Name 10/12/22 1128          Functional Mobility    Functional Mobility- Comment unable to take lateral steps towards HOB  -[facility] Name 10/12/22 1128          Activities of Daily Living    BADL Assessment/Intervention lower body dressing  -[facility] Name 10/12/22 [facility]          Mobility    Extremity Weight-bearing Status right upper extremity  -     Right Upper Extremity (Weight-bearing Status) non weight-bearing (NWB)  -[facility] Name 10/12/22 [facility]          Lower Body Dressing Assessment/Training    [place] Level (Lower Body Dressing) doff;don;socks;maximum assist (25% patient effort)  -      Position (Lower Body Dressing) edge of bed sitting  -     Comment, (Lower Body Dressing) dec fxl reach, RUE immobilized in sling  -           User Key  (r) = Recorded By, (t) = Taken By, (c) = Cosigned By    Initials Name Provider Type     Lakisha Rankin OT Occupational Therapist               Obj/Interventions     Row Name 10/12/22 1130          Sensory Assessment (Somatosensory)    Sensory Assessment (Somatosensory) UE sensation intact  -[facility] Name 10/12/22 1130          Vision Assessment/Intervention    Visual Impairment/Limitations WNL  -     Row Name 10/12/22 1130          Range of Motion Comprehensive    Comment, General Range of Motion Distal RUE WFL, proximal not tested d/t fx. L shoulder limited to ~90 deg flexion/abduction from L proximal humerus fx in January 2022.  -     Row Name 10/12/22 1130          Strength Comprehensive (MMT)    General Manual Muscle Testing (MMT) Assessment upper extremity strength deficits identified  -     Comment, General Manual Muscle Testing (MMT) Assessment generalized weakness  -[facility] Name 10/12/22 1130          Elbow/Forearm (Therapeutic Exercise)    Elbow/Forearm (Therapeutic Exercise) AROM (active range of motion)  -     Elbow/Forearm AROM (Therapeutic Exercise) right;extension;flexion;supination;pronation;10 repetitions  -[facility] Name 10/12/22 1130          Wrist (Therapeutic Exercise)    Wrist (Therapeutic Exercise) AROM (active range of motion)  -     Wrist AROM (Therapeutic Exercise) right;flexion;extension;10 repetitions  -[facility] Name 10/12/22 1130          Hand (Therapeutic Exercise)    Hand (Therapeutic Exercise) AROM (active range of motion)  -     Hand AROM/AAROM (Therapeutic Exercise) right;AROM (active range of motion);finger flexion;finger extension  -[facility] Name 10/12/22 1130          Motor Skills    Therapeutic Exercise elbow/forearm;wrist;hand  -[facility] Name 10/12/22 1130          Balance    Balance Assessment sitting  static balance;sitting dynamic balance;standing static balance;standing dynamic balance  -     Static Sitting Balance standby assist  -     Dynamic Sitting Balance contact guard;minimal assist  -     Position, Sitting Balance sitting edge of bed  -     Static Standing Balance moderate assist  -     Dynamic Standing Balance moderate assist;maximum assist  -     Position/Device Used, Standing Balance supported  -     Balance Interventions sitting;standing;occupation based/functional task  -           User Key  (r) = Recorded By, (t) = Taken By, (c) = Cosigned By    Initials Name Provider Type    JW Lakisha Rankin OT Occupational Therapist               Goals/Plan     Row Name 10/12/22 1144          Transfer Goal 1 (OT)    Activity/Assistive Device (Transfer Goal 1, OT) transfers, all;bed-to-chair/chair-to-bed;toilet;shower chair  -     [place] Level/Cues Needed (Transfer Goal 1, OT) minimum assist (75% or more patient effort);contact guard required  -     Time Frame (Transfer Goal 1, OT) short term goal (STG);2 weeks  -     Progress/Outcome (Transfer Goal 1, OT) goal ongoing  -     Row Name 10/12/22 1144          Dressing Goal 1 (OT)    Activity/Device (Dressing Goal 1, OT) dressing skills, all;upper body dressing  -     [place]/Cues Needed (Dressing Goal 1, OT) minimum assist (75% or more patient effort)  -     Time Frame (Dressing Goal 1, OT) short term goal (STG);2 weeks  -     Strategies/Barriers (Dressing Goal 1, OT) using jignesh dressing techs  -     Progress/Outcome (Dressing Goal 1, OT) goal ongoing  -     Row Name 10/12/22 1144          Toileting Goal 1 (OT)    Activity/Device (Toileting Goal 1, OT) toileting skills, all  -     [place] Level/Cues Needed (Toileting Goal 1, OT) minimum assist (75% or more patient effort)  -     Time Frame (Toileting Goal 1, OT) short term goal (STG);2 weeks  -     Progress/Outcome (Toileting Goal 1, OT) goal ongoing  -      Row Name 10/12/22 1144          Grooming Goal 1 (OT)    Activity/Device (Grooming Goal 1, OT) grooming skills, all  -     [place] (Grooming Goal 1, OT) supervision required;set-up required  -     Time Frame (Grooming Goal 1, OT) short term goal (STG);2 weeks  -     Progress/Outcome (Grooming Goal 1, OT) goal ongoing  -     Row Name 10/12/22 1145          Therapy Assessment/Plan (OT)    Planned Therapy Interventions (OT) activity tolerance training;adaptive equipment training;BADL retraining;functional balance retraining;occupation/activity based interventions;patient/caregiver education/training;ROM/therapeutic exercise;strengthening exercise;transfer/mobility retraining  -           User Key  (r) = Recorded By, (t) = Taken By, (c) = Cosigned By    Initials Name Provider Type    Lakisha Sweet OT Occupational Therapist               Clinical Impression     Row Name 10/12/22 1130          Pain Assessment    Pretreatment Pain Rating 0/10 - no pain  -     Posttreatment Pain Rating 4/10  -     Pain Location - Side/Orientation Right  -     Pain Location - shoulder;hip  -     Pre/Posttreatment Pain Comment reports pain in RUE and R hip after mobilization  -     Pain Intervention(s) Repositioned;Ambulation/increased activity;Rest;Elevated  -     Row Name 10/12/22 2172          Plan of Care Review    Plan of Care Reviewed With patient;spouse  -     Outcome Evaluation Pt is an 87 y/o male admitted s/p fall resulting in R proximal humerus fx (non-op, NWB, immobilization in sling for 1-2 weeks. Okay for elbow, wrist, digit AROM per ortho); R hip pain (no fx, conservative tx). Hx of L proximal humerus fx in January 2022. Pt has req'ed assist with ADLs since LUE fx, use of cane for mobility and has had multiple recent falls/syncopal episodes. Pt req's ModA for bed mobility today, SBA for sitting balance, maxA to don socks d/t dec fxl reach and limited use of RUE for ADLs. AROM to R elbow, wrist and  digits WFL, adjusted sling for optimal support/comfort, educated on NWB. STS with ModA/HHA on L, difficulty attaining full upright position d/t R hip pain and generalized weakness. He is unable to take lateral steps towards HOB d/t dec weight shifting. Pt will benefit from OT services to address fxl deficits, recommend d/c rehab to promote return to PLOF.  -     Row Name 10/12/22 1137          Therapy Assessment/Plan (OT)    Rehab Potential (OT) good, to achieve stated therapy goals  -     Criteria for Skilled Therapeutic Interventions Met (OT) yes;skilled treatment is necessary  -     Therapy Frequency (OT) 5 times/wk  -     Row Name 10/12/22 1137          Therapy Plan Review/Discharge Plan (OT)    Anticipated Discharge Disposition (OT) inpatient rehabilitation facility  -     Row Name 10/12/22 1137          Positioning and Restraints    Pre-Treatment Position in bed  -     Post Treatment Position bed  -JW     In Bed notified nsg;fowlers;call light within reach;encouraged to call for assist;exit alarm on;with family/caregiver  -           User Key  (r) = Recorded By, (t) = Taken By, (c) = Cosigned By    Initials Name Provider Type    Lakisha Sweet, PASCUAL Occupational Therapist               Outcome Measures     Row Name 10/12/22 1145          How much help from another is currently needed...    Putting on and taking off regular lower body clothing? 2  -JW     Bathing (including washing, rinsing, and drying) 2  -JW     Toileting (which includes using toilet bed pan or urinal) 2  -JW     Putting on and taking off regular upper body clothing 2  -JW     Taking care of personal grooming (such as brushing teeth) 2  -JW     Eating meals 3  -JW     AM-PAC 6 Clicks Score (OT) 13  -     Row Name 10/12/22 1147          Functional Assessment    Outcome Measure Options AM-PAC 6 Clicks Daily Activity (OT)  -           User Key  (r) = Recorded By, (t) = Taken By, (c) = Cosigned By    Initials Name Provider Type     Lakisha Sweet OT Occupational Therapist                Occupational Therapy Education     Title: PT OT SLP Therapies (In Progress)     Topic: Occupational Therapy (In Progress)     Point: ADL training (Done)     Description:   Instruct learner(s) on proper safety adaptation and remediation techniques during self care or transfers.   Instruct in proper use of assistive devices.              Learning Progress Summary           Patient Acceptance, E, VU,NR by LUPE at 10/12/2022 1146    Comment: NWB RUE, safety, d/c planning                   Point: Home exercise program (Not Started)     Description:   Instruct learner(s) on appropriate technique for monitoring, assisting and/or progressing therapeutic exercises/activities.              Learner Progress:  Not documented in this visit.          Point: Precautions (Done)     Description:   Instruct learner(s) on prescribed precautions during self-care and functional transfers.              Learning Progress Summary           Patient Acceptance, E, VU,NR by LUPE at 10/12/2022 1146    Comment: LUIS FELIPE ELISE, safety, d/c planning                   Point: Body mechanics (Done)     Description:   Instruct learner(s) on proper positioning and spine alignment during self-care, functional mobility activities and/or exercises.              Learning Progress Summary           Patient Acceptance, E, VU,NR by LUPE at 10/12/2022 1146    Comment: LUIS FELIPE ELISE, safety, d/c planning                               User Key     Initials Effective Dates Name Provider Type Discipline    LUPE 06/10/21 -  Lakisha Rankin OT Occupational Therapist OT              OT Recommendation and Plan  Planned Therapy Interventions (OT): activity tolerance training, adaptive equipment training, BADL retraining, functional balance retraining, occupation/activity based interventions, patient/caregiver education/training, ROM/therapeutic exercise, strengthening exercise, transfer/mobility retraining  Therapy Frequency (OT): 5  times/wk  Plan of Care Review  Plan of Care Reviewed With: patient, spouse  Outcome Evaluation: Pt is an 85 y/o male admitted s/p fall resulting in R proximal humerus fx (non-op, NWB, immobilization in sling for 1-2 weeks. Okay for elbow, wrist, digit AROM per ortho); R hip pain (no fx, conservative tx). Hx of L proximal humerus fx in January 2022. Pt has req'ed assist with ADLs since LUE fx, use of cane for mobility and has had multiple recent falls/syncopal episodes. Pt req's ModA for bed mobility today, SBA for sitting balance, maxA to don socks d/t dec fxl reach and limited use of RUE for ADLs. AROM to R elbow, wrist and digits WFL, adjusted sling for optimal support/comfort, educated on NWB. STS with ModA/HHA on L, difficulty attaining full upright position d/t R hip pain and generalized weakness. He is unable to take lateral steps towards HOB d/t dec weight shifting. Pt will benefit from OT services to address fxl deficits, recommend d/c rehab to promote return to PLOF.     Time Calculation:    Time Calculation- OT     Row Name 10/12/22 1147             Time Calculation-     OT Start Time 0955  -      OT Stop Time 1022  -      OT Time Calculation (min) 27 min  -      Total Timed Code Minutes- OT 17 minute(s)  -      OT Received On 10/12/22  -      OT - Next Appointment 10/13/22  -      OT Goal Re-Cert Due Date 10/26/22  -         Timed Charges    36688 - OT Therapeutic Activity Minutes 17  -         Untimed Charges    OT Eval/Re-eval Minutes 10  -         Total Minutes    Timed Charges Total Minutes 17  -      Untimed Charges Total Minutes 10  -       Total Minutes 27  -            User Key  (r) = Recorded By, (t) = Taken By, (c) = Cosigned By    Initials Name Provider Type    Lakisha Sweet OT Occupational Therapist              Therapy Charges for Today     Code Description Service Date Service Provider Modifiers Qty    [id number]  OT THERAPEUTIC ACT EA 15 MIN 10/12/2022 Chucho  PASCUAL Banuelos GO 1    [id number]  OT EVAL MOD COMPLEXITY 3 10/12/2022 Lakisha Rankin OT GO 1               Lakisha Rankin OT  10/12/2022

## 2022-10-13 LAB
ANION GAP SERPL CALCULATED.3IONS-SCNC: 7 MMOL/L (ref 5–15)
BUN SERPL-MCNC: 24 MG/DL (ref 8–23)
BUN/CREAT SERPL: 28.6 (ref 7–25)
CALCIUM SPEC-SCNC: 8.3 MG/DL (ref 8.6–10.5)
CHLORIDE SERPL-SCNC: 104 MMOL/L (ref 98–107)
CO2 SERPL-SCNC: 24 MMOL/L (ref 22–29)
CREAT SERPL-MCNC: 0.84 MG/DL (ref 0.76–1.27)
DEPRECATED RDW RBC AUTO: 41.1 FL (ref 37–54)
EGFRCR SERPLBLD CKD-EPI 2021: 84.9 ML/MIN/1.73
ERYTHROCYTE [DISTWIDTH] IN BLOOD BY AUTOMATED COUNT: 13.1 % (ref 12.3–15.4)
GLUCOSE BLDC GLUCOMTR-MCNC: 175 MG/DL (ref 70–130)
GLUCOSE BLDC GLUCOMTR-MCNC: 197 MG/DL (ref 70–130)
GLUCOSE BLDC GLUCOMTR-MCNC: 64 MG/DL (ref 70–130)
GLUCOSE BLDC GLUCOMTR-MCNC: 65 MG/DL (ref 70–130)
GLUCOSE BLDC GLUCOMTR-MCNC: 75 MG/DL (ref 70–130)
GLUCOSE BLDC GLUCOMTR-MCNC: 83 MG/DL (ref 70–130)
GLUCOSE BLDC GLUCOMTR-MCNC: 93 MG/DL (ref 70–130)
GLUCOSE SERPL-MCNC: 173 MG/DL (ref 65–99)
HCT VFR BLD AUTO: 24.1 % (ref 37.5–51)
HGB BLD-MCNC: 7.6 G/DL (ref 13–17.7)
HYPOCHROMIA BLD QL: ABNORMAL
LYMPHOCYTES # BLD MANUAL: 8.31 10*3/MM3 (ref 0.7–3.1)
LYMPHOCYTES NFR BLD MANUAL: 7 % (ref 5–12)
MCH RBC QN AUTO: 27.5 PG (ref 26.6–33)
MCHC RBC AUTO-ENTMCNC: 31.5 G/DL (ref 31.5–35.7)
MCV RBC AUTO: 87.3 FL (ref 79–97)
MONOCYTES # BLD: 1.16 10*3/MM3 (ref 0.1–0.9)
NEUTROPHILS # BLD AUTO: 7.14 10*3/MM3 (ref 1.7–7)
NEUTROPHILS NFR BLD MANUAL: 43 % (ref 42.7–76)
PLAT MORPH BLD: NORMAL
PLATELET # BLD AUTO: 132 10*3/MM3 (ref 140–450)
PMV BLD AUTO: 10.2 FL (ref 6–12)
POTASSIUM SERPL-SCNC: 4.8 MMOL/L (ref 3.5–5.2)
RBC # BLD AUTO: 2.76 10*6/MM3 (ref 4.14–5.8)
SODIUM SERPL-SCNC: 135 MMOL/L (ref 136–145)
VARIANT LYMPHS NFR BLD MANUAL: 50 % (ref 19.6–45.3)
WBC MORPH BLD: NORMAL
WBC NRBC COR # BLD: 16.61 10*3/MM3 (ref 3.4–10.8)

## 2022-10-13 PROCEDURE — 80048 BASIC METABOLIC PNL TOTAL CA: CPT | Performed by: INTERNAL MEDICINE

## 2022-10-13 PROCEDURE — 85007 BL SMEAR W/DIFF WBC COUNT: CPT | Performed by: INTERNAL MEDICINE

## 2022-10-13 PROCEDURE — 85025 COMPLETE CBC W/AUTO DIFF WBC: CPT | Performed by: INTERNAL MEDICINE

## 2022-10-13 PROCEDURE — 96361 HYDRATE IV INFUSION ADD-ON: CPT

## 2022-10-13 PROCEDURE — 63710000001 INSULIN LISPRO (HUMAN) PER 5 UNITS: Performed by: INTERNAL MEDICINE

## 2022-10-13 PROCEDURE — 82962 GLUCOSE BLOOD TEST: CPT

## 2022-10-13 PROCEDURE — G0378 HOSPITAL OBSERVATION PER HR: HCPCS

## 2022-10-13 RX ORDER — NICOTINE POLACRILEX 4 MG
15 LOZENGE BUCCAL
Status: DISCONTINUED | OUTPATIENT
Start: 2022-10-13 | End: 2022-10-14 | Stop reason: HOSPADM

## 2022-10-13 RX ORDER — INSULIN LISPRO 100 [IU]/ML
0-7 INJECTION, SOLUTION INTRAVENOUS; SUBCUTANEOUS
Status: DISCONTINUED | OUTPATIENT
Start: 2022-10-13 | End: 2022-10-14 | Stop reason: HOSPADM

## 2022-10-13 RX ORDER — FERROUS SULFATE 325(65) MG
325 TABLET ORAL
Status: DISCONTINUED | OUTPATIENT
Start: 2022-10-13 | End: 2022-10-14 | Stop reason: HOSPADM

## 2022-10-13 RX ORDER — DEXTROSE MONOHYDRATE 25 G/50ML
25 INJECTION, SOLUTION INTRAVENOUS
Status: DISCONTINUED | OUTPATIENT
Start: 2022-10-13 | End: 2022-10-14 | Stop reason: HOSPADM

## 2022-10-13 RX ADMIN — ASPIRIN 81 MG: 81 TABLET, COATED ORAL at 09:41

## 2022-10-13 RX ADMIN — HYDROCODONE BITARTRATE AND ACETAMINOPHEN 1 TABLET: 7.5; 325 TABLET ORAL at 04:22

## 2022-10-13 RX ADMIN — FAMOTIDINE 20 MG: 20 TABLET ORAL at 20:50

## 2022-10-13 RX ADMIN — INSULIN LISPRO 6 UNITS: 100 INJECTION, SOLUTION INTRAVENOUS; SUBCUTANEOUS at 12:25

## 2022-10-13 RX ADMIN — INSULIN LISPRO 2 UNITS: 100 INJECTION, SOLUTION INTRAVENOUS; SUBCUTANEOUS at 12:25

## 2022-10-13 RX ADMIN — INSULIN LISPRO 6 UNITS: 100 INJECTION, SOLUTION INTRAVENOUS; SUBCUTANEOUS at 17:46

## 2022-10-13 RX ADMIN — FERROUS SULFATE TAB 325 MG (65 MG ELEMENTAL FE) 325 MG: 325 (65 FE) TAB at 09:41

## 2022-10-13 RX ADMIN — Medication 10 ML: at 09:42

## 2022-10-13 RX ADMIN — INSULIN LISPRO 2 UNITS: 100 INJECTION, SOLUTION INTRAVENOUS; SUBCUTANEOUS at 09:40

## 2022-10-13 RX ADMIN — FUROSEMIDE 40 MG: 40 TABLET ORAL at 09:41

## 2022-10-13 RX ADMIN — INSULIN GLARGINE-YFGN 10 UNITS: 100 INJECTION, SOLUTION SUBCUTANEOUS at 20:52

## 2022-10-13 RX ADMIN — CLOPIDOGREL 75 MG: 75 TABLET, FILM COATED ORAL at 09:41

## 2022-10-13 RX ADMIN — TAMSULOSIN HYDROCHLORIDE 0.4 MG: 0.4 CAPSULE ORAL at 09:41

## 2022-10-13 RX ADMIN — SODIUM CHLORIDE 75 ML/HR: 9 INJECTION, SOLUTION INTRAVENOUS at 09:40

## 2022-10-13 RX ADMIN — PANTOPRAZOLE SODIUM 40 MG: 40 TABLET, DELAYED RELEASE ORAL at 04:23

## 2022-10-13 RX ADMIN — HYDROCODONE BITARTRATE AND ACETAMINOPHEN 1 TABLET: 7.5; 325 TABLET ORAL at 20:50

## 2022-10-13 RX ADMIN — ATORVASTATIN CALCIUM 10 MG: 20 TABLET, FILM COATED ORAL at 09:41

## 2022-10-13 RX ADMIN — INSULIN LISPRO 6 UNITS: 100 INJECTION, SOLUTION INTRAVENOUS; SUBCUTANEOUS at 09:40

## 2022-10-13 NOTE — PLAN OF CARE
Goal Outcome Evaluation: Pt had uneventful day. Didn't require any PRN interventions. Blood sugars controlled. Hopeful to be discharged tomorrow to [facility].

## 2022-10-13 NOTE — PLAN OF CARE
Goal Outcome Evaluation:  Plan of Care Reviewed With: patient        Progress: improving  Outcome Evaluation: Uneventful night.  [place] x 2 for pain.  Purewick in place.  BP cont to be soft. CCP working on SNF precert.

## 2022-10-13 NOTE — PROGRESS NOTES
LOS: 3 days   Patient Care Team:  Percy Em MD as PCP - General  Percy Em MD as PCP - Family Medicine    Chief Complaint:   Chief Complaint   Patient presents with   • Fall   • Shoulder Pain   • Hip Pain         Subjective    Today the patient is awake, alert, very hard of hearing, but also still debilitated and unsteady on his feet.  I do not think it safe for him to return home so were pursuing rehab.  His wife is in agreement with that.  Hopefully placement can be found soon.  The pain in his right shoulder is well controlled for the most part.  He is very weak and debilitated and needs a lot of assistance.  I think he will do well in rehab.  Apparently he has been somewhat hypotensive and even though the nurse says she contacted me she actually did not.  Fortunately he has not been having lightheadedness or dizziness to any significant degree.    Interval History:     Patient Complaints: Pain in his right shoulder primarily and generalized weakness and malaise  Patient Denies: He denies shortness of breath or chest pain, no dizziness or lightheadedness, no abdominal pain or nausea  History taken from: patient chart RN    Review of Systems:    Overall his review of systems revolves around his shoulder pain and discomfort when he is up and moving.  Fortunately he is comfortable when he is quiet.  He is weak and unsteady on his feet as noted.  He is working with PT but I think he will need more than just a few sessions.  I think it looks like he is going to need to go to a rehab unit.  Otherwise he is awake, alert, oriented and seems to be doing well in general.  He is eating and drinking okay.  He has no new complaints that he reports.  He specifically states that his hip pain is less now than it was.    Objective      Vital Signs  Temp:  [98 °F (36.7 °C)-99.2 °F (37.3 °C)] 98.9 °F (37.2 °C)  Pulse:  [] 109  Resp:  [18] 18  BP: (111-154)/(55-97) 154/97    I/O'S  Intake & Output (last 7  days)       10/06 0701  10/07 0700 10/07 0701  10/08 0700 10/08 0701  10/09 0700 10/09 0701  10/10 0700 10/10 0701  10/11 0700 10/11 0701  10/12 0700 10/12 0701  10/13 0700 10/13 0701  10/14 0700    P.O.      200      I.V. (mL/kg)       1905.2 (25.6)     Total Intake(mL/kg)      200 (2.7) 1905.2 (25.6)     Urine (mL/kg/hr)      600 850 (0.5)     Total Output      600 850     Net      -400 +1055.2                 Urine Unmeasured Occurrence      1 x            Physical Exam:   General Appearance alert, pleasant, appears stated age, interactive, cooperative, pale and he is extremely hard of hearing.  Lungs clear to auscultation, respirations regular, respirations even and respirations unlabored  Heart regular rhythm & normal rate  Abdomen normal bowel sounds, soft non-tender, no guarding and no rebound tenderness  Extremities no edema, no cyanosis, no redness and his right shoulder is in a sling and there is extensive ecchymosis at the site of the injury.     Results Review:     I reviewed the patient's new clinical results.  I reviewed the patient's other test results and agree with the interpretation                     Results from last 7 days   Lab Units 10/12/22  0829   SODIUM mmol/L 137   POTASSIUM mmol/L 5.1   CHLORIDE mmol/L 106   CO2 mmol/L 24.5   BUN mg/dL 25*   CREATININE mg/dL 0.95   GLUCOSE mg/dL 214*   CALCIUM mg/dL 8.5*         Results from last 7 days   Lab Units 10/12/22  0829   WBC 10*3/mm3 12.79*   HEMOGLOBIN g/dL 8.3*   HEMATOCRIT % 25.4*   PLATELETS 10*3/mm3 135*         Results from last 7 days   Lab Units 10/12/22  0829 10/11/22  2323   MAGNESIUM mg/dL 1.8 1.9     Results from last 7 days   Lab Units 10/12/22  0829 10/11/22  2323   CHOLESTEROL mg/dL 82 85   TRIGLYCERIDES mg/dL 59 40   HDL CHOL mg/dL 35* 38*   LDL CHOL mg/dL 33 36               Lab Results   Component Value Date    HGBA1C 8.20 (H) 06/11/2022    HGBA1C 7.45 (H) 04/01/2020    HGBA1C 6.80 (H) 06/19/2018     Glucose   Date/Time Value  Ref Range Status   10/13/2022 0712 175 (H) 70 - 130 mg/dL Final     Comment:     Meter: [id number] : [id number] Bert Up NA   10/12/2022 1556 227 (H) 70 - 130 mg/dL Final     Comment:     Meter: [id number] : [id number] Jerald Varma NA   10/12/2022 1123 309 (H) 70 - 130 mg/dL Final     Comment:     Meter: [id number] : [id number] Jerald Varma NA   10/12/2022 0741 262 (H) 70 - 130 mg/dL Final     Comment:     Meter: [id number] : [id number] Jovan Tiwari NA   10/12/2022 0739 257 (H) 70 - 130 mg/dL Final     Comment:     Meter: [id number] : [id number] Aldana Karie NA   10/11/2022 2057 242 (H) 70 - 130 mg/dL Final     Comment:     Meter: [id number] : [id number] Jose FERNANDEZ        eGFR   Date Value Ref Range Status   10/12/2022 78.0 >60.0 mL/min/1.73 Final     Comment:     National Kidney Foundation and American Society of Nephrology (ASN) Task Force recommended calculation based on the Chronic Kidney Disease Epidemiology Collaboration (CKD-EPI) equation refit without adjustment for race.           Medication Review:   Scheduled Meds:aspirin, 81 mg, Oral, Daily  atorvastatin, 10 mg, Oral, Daily  clopidogrel, 75 mg, Oral, Daily  famotidine, 20 mg, Oral, Nightly  ferrous sulfate, 325 mg, Oral, Daily With Breakfast  furosemide, 40 mg, Oral, Daily  insulin glargine, 8 Units, Subcutaneous, Nightly  insulin lispro, 0-7 Units, Subcutaneous, TID AC  insulin lispro, 6 Units, Subcutaneous, TID With Meals  losartan, 25 mg, Oral, BID  metoprolol succinate XL, 25 mg, Oral, Daily  pantoprazole, 40 mg, Oral, QAM  sodium chloride, 10 mL, Intravenous, Q12H  tamsulosin, 0.4 mg, Oral, Daily      Continuous Infusions:sodium chloride, 75 mL/hr, Last Rate: 75 mL/hr (10/13/22 0608)      PRN Meds:.•  acetaminophen  •  dextrose  •  dextrose  •  glucagon (human recombinant)  •  HYDROcodone-acetaminophen  •  [DISCONTINUED] HYDROcodone-acetaminophen **OR** HYDROcodone-acetaminophen  •  influenza vaccine  •   nitroglycerin  •  sodium chloride  •  sodium chloride              Patient Active Problem List   Diagnosis   • 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HCC)   • Type 2 diabetes mellitus, with long-term current use of insulin (HCC)   • Acute kidney injury superimposed on chronic kidney disease (HCC)   • Elevated liver function tests   • Leukocytosis   • Anorexia   • Esophagus disorder, thickened distal wall CT Scan 6/18/18   • Granulomatous disease, chronic (HCC)   • Lung nodule seen on imaging study   • Compression fracture of lumbar spine, non-traumatic (HCC)   • Lumbar canal stenosis   • Weakness generalized   • Cough   • Hard of hearing   • Ptosis, left eyelid   • Osteoarthritis of multiple joints   • Paroxysmal atrial fibrillation (HCC)   • Carotid stenosis, asymptomatic, bilateral   • Remote history of stroke, small caudate nucleus   • Thrombocytopenia (HCC)   • Fall at home, subsequent encounter   • Hyperlipidemia   • Esophagitis determined by endoscopy by Sheri 6/19/18 LA Grade D Lower 1/3   • Hiatal hernia with GERD and esophagitis   • Abnormal ECG   • PAD (peripheral artery disease) (HCC)   • Arteriosclerosis of abdominal aorta (HCC)   • Coronary artery disease involving native coronary artery of native heart without angina pectoris   • Ischemic cardiomyopathy EF 40% Cath 6/10/2022   • Osteoarthritis of right hip   • BPH without obstruction/lower urinary tract symptoms   • RBBB (right bundle branch block)   • First degree AV block   • Fall, initial encounter   • Right hip pain   • Closed fracture of right proximal humerus       #1 Right Humeral Fracture-currently his pain is relatively well controlled.  Movement is very uncomfortable for him.  He has a lot of swelling and ecchymosis at the trauma site.  The sling does give him some degree of comfort.  He is working with PT and OT but he is very weak and unstable on his feet.  I do think to be safe for him to return home.  He is agreeable to go to a rehab  unit.    2.  Right hip pain-he notes that this pain is much less now than on admission.  Orthopedics does not see any issues or concerns.    3.  Diabetes mellitus type 2-his blood sugars are continuing to run a little high so I will add a low-dose sliding scale.    4.  Hypertension-his blood pressure is well controlled    5.  Reflux-no complaints on PPI    6.  DJD-he has diffuse degenerative disease but mostly it is located in his lower back.  Fortunately it is not bothering him much right now.  Generally it does not limit him a great deal when he is at home but I think it might be causing him some problems now.    7.  ASCVD-is not complaining of chest pain or any other cardiac symptoms at this point.    8.  High cholesterol-his cholesterol is well controlled on his current medications    9.  Past stroke-he has a history of a previous stroke with minimal residuals.  Most notably has some mild dysarthria but his speech is still very intelligible.  Most difficulty has with communications fact that he is so hard of hearing.  Unfortunately up to now he is refused hearing aids    10.  BPH-he continues on Flomax and continues to do well with few symptoms.    At this point looks like he needs to go to rehab for safety reasons.  Fortunately he is agreeable to that.      Plan for disposition:Where: rehab and When:  tomorrow    Percy Em MD  10/13/22  07:52 EDT          Time:

## 2022-10-13 NOTE — CASE MANAGEMENT/SOCIAL WORK
Continued Stay Note  [facility]     Patient Name: Javier Marin  MRN: [id number]  Today's Date: 10/13/2022    Admit Date: 10/11/2022    Plan: [facility]- pre-cert is needed.   Discharge Plan     Row Name 10/13/22 1411       Plan    Plan [facility]- pre-cert is needed.    Plan Comments Met with the patient and his wife Olga Marin [phone] to discuss d/c planning needs. The patient’s wife states that their first choice is [facility], 2nd choice is [facility] and 3rd choice is [facility].  The patient’s wife states that they want short term rehab however if for some reason the patient ends up needing long term care they have the funds to private pay for that level of care. The patient’s wife requested an ambulance transport upon d/c and was informed that insurance coverage is not guaranteed for an ambulance transport. Spoke to both Blaire with [facility] and Rhiannon in admissions at [facility] [phone] and both confirmed that they can accommodate the patient for short term rehab and private pay long term care if needed.  Both confirmed that they have beds available tomorrow for the patient. Blaire states that no COVID test is needed upon d/c. Blaire was requested to initiate pre-cert as [facility] is the family's first choice.  Rhiannon was informed. LYLE WALKER               Discharge Codes    No documentation.               Expected Discharge Date and Time     Expected Discharge Date Expected Discharge Time    Oct 13, 2022             LYLE Vance

## 2022-10-14 VITALS
WEIGHT: 163.8 LBS | BODY MASS INDEX: 22.93 KG/M2 | RESPIRATION RATE: 16 BRPM | HEART RATE: 81 BPM | TEMPERATURE: 98.4 F | DIASTOLIC BLOOD PRESSURE: 66 MMHG | OXYGEN SATURATION: 97 % | SYSTOLIC BLOOD PRESSURE: 124 MMHG | HEIGHT: 71 IN

## 2022-10-14 LAB
ANION GAP SERPL CALCULATED.3IONS-SCNC: 6.6 MMOL/L (ref 5–15)
BASOPHILS # BLD MANUAL: 0.18 10*3/MM3 (ref 0–0.2)
BASOPHILS NFR BLD MANUAL: 1 % (ref 0–1.5)
BUN SERPL-MCNC: 17 MG/DL (ref 8–23)
BUN/CREAT SERPL: 21.8 (ref 7–25)
CALCIUM SPEC-SCNC: 8 MG/DL (ref 8.6–10.5)
CHLORIDE SERPL-SCNC: 104 MMOL/L (ref 98–107)
CO2 SERPL-SCNC: 24.4 MMOL/L (ref 22–29)
CREAT SERPL-MCNC: 0.78 MG/DL (ref 0.76–1.27)
DEPRECATED RDW RBC AUTO: 39.8 FL (ref 37–54)
EGFRCR SERPLBLD CKD-EPI 2021: 86.9 ML/MIN/1.73
EOSINOPHIL # BLD MANUAL: 0.18 10*3/MM3 (ref 0–0.4)
EOSINOPHIL NFR BLD MANUAL: 1 % (ref 0.3–6.2)
ERYTHROCYTE [DISTWIDTH] IN BLOOD BY AUTOMATED COUNT: 12.7 % (ref 12.3–15.4)
GLUCOSE BLDC GLUCOMTR-MCNC: 126 MG/DL (ref 70–130)
GLUCOSE BLDC GLUCOMTR-MCNC: 185 MG/DL (ref 70–130)
GLUCOSE SERPL-MCNC: 121 MG/DL (ref 65–99)
HCT VFR BLD AUTO: 23.9 % (ref 37.5–51)
HGB BLD-MCNC: 7.8 G/DL (ref 13–17.7)
LYMPHOCYTES # BLD MANUAL: 8.32 10*3/MM3 (ref 0.7–3.1)
LYMPHOCYTES NFR BLD MANUAL: 2 % (ref 5–12)
MCH RBC QN AUTO: 27.9 PG (ref 26.6–33)
MCHC RBC AUTO-ENTMCNC: 32.6 G/DL (ref 31.5–35.7)
MCV RBC AUTO: 85.4 FL (ref 79–97)
MONOCYTES # BLD: 0.35 10*3/MM3 (ref 0.1–0.9)
NEUTROPHILS # BLD AUTO: 8.68 10*3/MM3 (ref 1.7–7)
NEUTROPHILS NFR BLD MANUAL: 49 % (ref 42.7–76)
NRBC BLD AUTO-RTO: 0.2 /100 WBC (ref 0–0.2)
NRBC SPEC MANUAL: 2 /100 WBC (ref 0–0.2)
PLAT MORPH BLD: NORMAL
PLATELET # BLD AUTO: 138 10*3/MM3 (ref 140–450)
PMV BLD AUTO: 10 FL (ref 6–12)
POTASSIUM SERPL-SCNC: 4.3 MMOL/L (ref 3.5–5.2)
RBC # BLD AUTO: 2.8 10*6/MM3 (ref 4.14–5.8)
RBC MORPH BLD: NORMAL
SMUDGE CELLS BLD QL SMEAR: ABNORMAL
SODIUM SERPL-SCNC: 135 MMOL/L (ref 136–145)
VARIANT LYMPHS NFR BLD MANUAL: 47 % (ref 19.6–45.3)
WBC NRBC COR # BLD: 17.71 10*3/MM3 (ref 3.4–10.8)

## 2022-10-14 PROCEDURE — 85025 COMPLETE CBC W/AUTO DIFF WBC: CPT | Performed by: INTERNAL MEDICINE

## 2022-10-14 PROCEDURE — 80048 BASIC METABOLIC PNL TOTAL CA: CPT | Performed by: INTERNAL MEDICINE

## 2022-10-14 PROCEDURE — 85007 BL SMEAR W/DIFF WBC COUNT: CPT | Performed by: INTERNAL MEDICINE

## 2022-10-14 PROCEDURE — 96361 HYDRATE IV INFUSION ADD-ON: CPT

## 2022-10-14 PROCEDURE — G0378 HOSPITAL OBSERVATION PER HR: HCPCS

## 2022-10-14 PROCEDURE — 82962 GLUCOSE BLOOD TEST: CPT

## 2022-10-14 RX ORDER — NITROGLYCERIN 0.4 MG/1
0.4 TABLET SUBLINGUAL
Qty: 30 TABLET | Refills: 3 | Status: SHIPPED | OUTPATIENT
Start: 2022-10-14

## 2022-10-14 RX ORDER — FAMOTIDINE 20 MG/1
20 TABLET, FILM COATED ORAL NIGHTLY
Qty: 90 TABLET | Refills: 3 | Status: SHIPPED | OUTPATIENT
Start: 2022-10-14

## 2022-10-14 RX ORDER — ASPIRIN 81 MG/1
81 TABLET ORAL DAILY
Qty: 90 TABLET | Refills: 3 | Status: SHIPPED | OUTPATIENT
Start: 2022-10-15

## 2022-10-14 RX ORDER — METOPROLOL SUCCINATE 25 MG/1
25 TABLET, EXTENDED RELEASE ORAL DAILY
Qty: 90 TABLET | Refills: 3 | Status: SHIPPED | OUTPATIENT
Start: 2022-10-15

## 2022-10-14 RX ORDER — FERROUS SULFATE 325(65) MG
325 TABLET ORAL
Qty: 90 TABLET | Refills: 3 | Status: SHIPPED | OUTPATIENT
Start: 2022-10-15

## 2022-10-14 RX ORDER — ATORVASTATIN CALCIUM 10 MG/1
10 TABLET, FILM COATED ORAL DAILY
Qty: 90 TABLET | Refills: 3 | Status: SHIPPED | OUTPATIENT
Start: 2022-10-15

## 2022-10-14 RX ORDER — CLOPIDOGREL BISULFATE 75 MG/1
75 TABLET ORAL DAILY
Qty: 30 TABLET | Refills: 3 | Status: SHIPPED | OUTPATIENT
Start: 2022-10-15

## 2022-10-14 RX ORDER — FUROSEMIDE 40 MG/1
40 TABLET ORAL DAILY
Qty: 90 TABLET | Refills: 3 | Status: SHIPPED | OUTPATIENT
Start: 2022-10-15

## 2022-10-14 RX ORDER — ACETAMINOPHEN 500 MG
500 TABLET ORAL EVERY 6 HOURS PRN
Qty: 90 TABLET | Refills: 3 | Status: SHIPPED | OUTPATIENT
Start: 2022-10-14

## 2022-10-14 RX ORDER — PANTOPRAZOLE SODIUM 40 MG/1
40 TABLET, DELAYED RELEASE ORAL EVERY MORNING
Qty: 90 TABLET | Refills: 3 | Status: SHIPPED | OUTPATIENT
Start: 2022-10-15

## 2022-10-14 RX ORDER — HYDROCODONE BITARTRATE AND ACETAMINOPHEN 7.5; 325 MG/1; MG/1
1 TABLET ORAL EVERY 4 HOURS PRN
Qty: 20 TABLET | Refills: 0 | Status: SHIPPED | OUTPATIENT
Start: 2022-10-14 | End: 2022-10-19

## 2022-10-14 RX ADMIN — PANTOPRAZOLE SODIUM 40 MG: 40 TABLET, DELAYED RELEASE ORAL at 08:39

## 2022-10-14 RX ADMIN — FUROSEMIDE 40 MG: 40 TABLET ORAL at 08:39

## 2022-10-14 RX ADMIN — TAMSULOSIN HYDROCHLORIDE 0.4 MG: 0.4 CAPSULE ORAL at 08:39

## 2022-10-14 RX ADMIN — SODIUM CHLORIDE 75 ML/HR: 9 INJECTION, SOLUTION INTRAVENOUS at 00:39

## 2022-10-14 RX ADMIN — FERROUS SULFATE TAB 325 MG (65 MG ELEMENTAL FE) 325 MG: 325 (65 FE) TAB at 08:39

## 2022-10-14 RX ADMIN — METOPROLOL SUCCINATE 25 MG: 25 TABLET, EXTENDED RELEASE ORAL at 08:39

## 2022-10-14 RX ADMIN — CLOPIDOGREL 75 MG: 75 TABLET, FILM COATED ORAL at 08:39

## 2022-10-14 RX ADMIN — ATORVASTATIN CALCIUM 10 MG: 20 TABLET, FILM COATED ORAL at 08:39

## 2022-10-14 RX ADMIN — ASPIRIN 81 MG: 81 TABLET, COATED ORAL at 08:39

## 2022-10-14 RX ADMIN — HYDROCODONE BITARTRATE AND ACETAMINOPHEN 1 TABLET: 7.5; 325 TABLET ORAL at 04:19

## 2022-10-14 NOTE — PLAN OF CARE
Goal Outcome Evaluation:  Plan of Care Reviewed With: patient        Progress: no change  Outcome Evaluation: pt has no new issues this shift, pt has arm sling in place for fx humeous, pt to go to [facility] facility later today

## 2022-10-14 NOTE — CASE MANAGEMENT/SOCIAL WORK
Continued Stay Note  [facility]     Patient Name: Javier Marin  MRN: [id number]  Today's Date: 10/14/2022    Admit Date: 10/11/2022    [facility]   Discharge Plan     Row Name 10/14/22 1309       Plan    Plan [facility]    Plan Comments Spoke to Blaire with [facility] who stated that pre-cert was obtained and a bed is available for the patient today. Spoke to the patient and his spouse Olga Marin [phone] who confirmed the d/c plan.  [facility] arranged for 3pm.  T.LYLE GATES               Discharge Codes    No documentation.               Expected Discharge Date and Time     Expected Discharge Date Expected Discharge Time    Oct 14, 2022             LYLE Vance

## 2022-10-14 NOTE — PLAN OF CARE
Problem: Adult Inpatient Plan of Care  Goal: Plan of Care Review  Outcome: Adequate for Care Transition  Goal: Patient-Specific Goal (Individualized)  Outcome: Adequate for Care Transition  Goal: Absence of Hospital-Acquired Illness or Injury  Outcome: Adequate for Care Transition  Intervention: Identify and Manage Fall Risk  Description: Perform standard risk assessment on admission using a validated tool or comprehensive approach appropriate to the patient; reassess fall risk frequently, with change in status or transfer to another level of care.  Communicate fall injury risk to interprofessional healthcare team.  Determine need for increased observation, equipment and environmental modification, such as low bed, signage and supportive, nonskid footwear.  Adjust safety measures to individual developmental age, stage and identified risk factors.  Reinforce the importance of safety and physical activity with patient and family.  Perform regular intentional rounding to assess need for position change, pain assessment and personal needs, including assistance with toileting.  Recent Flowsheet Documentation  Taken 10/14/2022 7308 by Bryanna Garcia RN  Safety Promotion/Fall Prevention:   safety round/check completed   room organization consistent   nonskid shoes/slippers when out of bed  Intervention: Prevent Skin Injury  Description: Perform a screening for skin injury risk, such as pressure or moisture associated skin damage on admission and at regular intervals throughout hospital stay.  Keep all areas of skin (especially folds) clean and dry.  Maintain adequate skin hydration.  Relieve and redistribute pressure and protect bony prominences; implement measures based on patient-specific risk factors.  Match turning and repositioning schedule to clinical condition.  Encourage weight shift frequently; assist with reposition if unable to complete independently.  Float heels off bed; avoid pressure on the Achilles  tendon.  Keep skin free from extended contact with medical devices.  Encourage functional activity and mobility, as early as tolerated.  Use aids (e.g., slide boards, mechanical lift) during transfer.  Recent Flowsheet Documentation  Taken 10/14/2022 0839 by Bryanna Garcia RN  Body Position: sitting up in bed  Intervention: Prevent and Manage VTE (Venous Thromboembolism) Risk  Description: Assess for VTE (venous thromboembolism) risk.  Encourage and assist with early ambulation.  Initiate and maintain compression or other therapy, as indicated, based on identified risk in accordance with organizational protocol and provider order.  Encourage both active and passive leg exercises while in bed, if unable to ambulate.  Recent Flowsheet Documentation  Taken 10/14/2022 0839 by Bryanna Garcia RN  Activity Management: activity adjusted per tolerance  VTE Prevention/Management:   bilateral   sequential compression devices on  Range of Motion: active ROM (range of motion) encouraged  Intervention: Prevent Infection  Description: Maintain skin and mucous membrane integrity; promote hand, oral and pulmonary hygiene.  Optimize fluid balance, nutrition, sleep and glycemic control to maximize infection resistance.  Identify potential sources of infection early to prevent or mitigate progression of infection (e.g., wound, lines, devices).  Evaluate ongoing need for invasive devices; remove promptly when no longer indicated.  Recent Flowsheet Documentation  Taken 10/14/2022 1000 by Bryanna Garcia RN  Infection Prevention:   hand hygiene promoted   personal protective equipment utilized   single patient room provided   visitors restricted/screened  Taken 10/14/2022 0800 by Bryanna Garcia RN  Infection Prevention:   hand hygiene promoted   personal protective equipment utilized   single patient room provided   visitors restricted/screened  Goal: Optimal Comfort and Wellbeing  Outcome: Adequate for Care  Transition  Intervention: Provide Person-Centered Care  Description: Use a family-focused approach to care.  Develop trust and rapport by proactively providing information, encouraging questions, addressing concerns and offering reassurance.  Acknowledge emotional response to hospitalization.  Recognize and utilize personal coping strategies.  Honor spiritual and cultural preferences.  Recent Flowsheet Documentation  Taken 10/14/2022 0839 by Bryanna Garcia RN  Trust Relationship/Rapport:   care explained   choices provided   empathic listening provided   emotional support provided   questions answered   questions encouraged   reassurance provided   thoughts/feelings acknowledged  Goal: Readiness for Transition of Care  Outcome: Adequate for Care Transition   Goal Outcome Evaluation:

## 2022-10-14 NOTE — DISCHARGE SUMMARY
Date of Discharge:  10/14/2022    Discharge Diagnosis:   #1 Right Humeral Fracture-secondary to fall with no surgery planned  2.  Right hip pain-resolved with no evidence for trauma or abnormality involving his previous hip replacement per orthopedic evaluation.  3.  Diabetes mellitus type 2-generally well controlled and patient is compliant with medications and diet.  4.  Hypertension-well-controlled on his current medications  5.  Reflux-no complaints on PPI  6.  DJD-he has diffuse degenerative disease with the most difficulty has in his low back.  7.  ASCVD-no cardiac symptoms or complaints  8.  High cholesterol-well-controlled on medications  9.  History of stroke-he has minimal residual and the most obvious thing is his dysarthria.  10.  BPH-well-controlled with medications  11.  Anemia-worse after fall with some bleeding into his arm.  Has been placed on iron  12.  Right elbow abrasion-this needs daily wound care                DETAILS OF HOSPITAL STAY     Presenting Problem/History of Present Illness  Right hip pain [M25.551]  Fall, initial encounter [W19.XXXA]  Other closed displaced fracture of proximal end of right humerus, initial encounter [S42.291A]    Closed fracture of right proximal humerus    Type 2 diabetes mellitus, with long-term current use of insulin (HCC)    Right hip pain    Acute kidney injury superimposed on chronic kidney disease (HCC)    Compression fracture of lumbar spine, non-traumatic (HCC)    Osteoarthritis of multiple joints    Remote history of stroke, small caudate nucleus    Hyperlipidemia    Esophagitis determined by endoscopy by Sheri 6/19/18 LA Grade D Lower 1/3    Hiatal hernia with GERD and esophagitis    BPH without obstruction/lower urinary tract symptoms    Fall, initial encounter    Past Medical History:   Diagnosis Date   • A-fib ([facility])    • 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[facility])    • Anorexia    • Arteriosclerosis of abdominal aorta ([facility]) 02/24/2014   • Arthritis    •  Carotid stenosis, asymptomatic, bilateral 02/24/2014    16-49%   • Closed right arm fracture 1994   • Compression fracture of lumbar spine, non-traumatic ([facility])    • Diabetes mellitus ([facility])    • Elevated LFTs    • Esophagitis 06/19/2018    DR RASHID GRADE D LOWER   • Esophagus disorder     THICKENED DISTAL WALL-CT SCAN 6/18/18   • First degree AV block    • Garbled speech    • Granulomatous disease, chronic ([facility])    • Hard of hearing    • Hiatal hernia with GERD and esophagitis    • History of acute gastritis    • Hyperlipidemia    • Hypertension    • Hyponatremia    • Leukocytosis    • Lumbar canal stenosis    • Lung nodule seen on imaging study    • Mandible fracture ([facility]) 03/28/2020    FROM FALL   • Osteoarthritis of multiple joints    • Ptosis of left eyelid    • Remote history of stroke     SMALL CAUDATE NECLEUS   • Syncope and collapse 1/2022 6/11/2022   • Thrombocytopenia ([facility])    • Weakness generalized      Past Surgical History:   Procedure Laterality Date   • CARDIAC CATHETERIZATION N/A 6/9/2022    Procedure: Left Heart Cath;  Surgeon: Reinaldo Miller MD;  Location: [facility] INVASIVE LOCATION;  Service: Cardiovascular;  Laterality: N/A;   • CARDIAC CATHETERIZATION N/A 6/9/2022    Procedure: Left ventriculography;  Surgeon: Reinaldo Miller MD;  Location: [facility] CATH INVASIVE LOCATION;  Service: Cardiovascular;  Laterality: N/A;   • CARDIAC CATHETERIZATION N/A 6/9/2022    Procedure: Coronary angiography;  Surgeon: Reinaldo Miller MD;  Location: [facility] CATH INVASIVE LOCATION;  Service: Cardiovascular;  Laterality: N/A;   • CARDIAC CATHETERIZATION N/A 6/13/2022    Procedure: STENT CHARISMA - CORONARY;  Surgeon: Michelle Alegre MD;  Location: [facility] INVASIVE LOCATION;  Service: Cardiovascular;  Laterality: N/A;  PCI of the LAD with atherectomy   • CARDIAC CATHETERIZATION N/A 6/13/2022    Procedure: Atherectomy-coronary;  Surgeon: Michelle Alegre MD;  Location: [facility] INVASIVE LOCATION;   Service: Cardiovascular;  Laterality: N/A;  rotablator   • CARDIAC CATHETERIZATION N/A 6/13/2022    Procedure: Optical Coherent Tomography;  Surgeon: Michelle Alegre MD;  Location: [facility] CATH INVASIVE LOCATION;  Service: Cardiovascular;  Laterality: N/A;   • CATARACT EXTRACTION, BILATERAL     • COLONOSCOPY     • ENDOSCOPY N/A 06/19/2018    Procedure: ESOPHAGOGASTRODUODENOSCOPY WITH BIOPSY;  Surgeon: Toribio Wade MD;  Location: [facility] ENDOSCOPY;  Service: Gastroenterology   • EYE PTOSIS REPAIR Left 11/15/2016    Procedure: LT UPPER LID EYE PTOSIS REPAIR;  Surgeon: Anup Lancaster MD;  Location: [facility] OR OSC;  Service:    • EYE SURGERY Left     victreous hemorrhage repair   • HIP ARTHROPLASTY Right    • ORIF MANDIBULAR FRACTURE Bilateral 03/31/2020    Procedure: OPEN REDUCTION AND INTERNAL FIXATION OF BILATERAL MANDIBLE FRACTURES;  Surgeon: Percy Jeronimo MD;  Location: [facility] MAIN OR;  Service: Maxillofacial;  Laterality: Bilateral;       Allergies  Patient has no known allergies.    Discharge Medications     Discharge Medications      New Medications      Instructions Start Date   ferrous sulfate 325 (65 FE) MG tablet   325 mg, Oral, Daily With Breakfast   Start Date: October 15, 2022     HYDROcodone-acetaminophen 5-325 MG per tablet  Commonly known as: NORCO   1 tablet, Oral, Every 6 Hours PRN      HYDROcodone-acetaminophen 7.5-325 MG per tablet  Commonly known as: NORCO   1 tablet, Oral, Every 4 Hours PRN      insulin glargine 100 UNIT/ML injection  Commonly known as: LANTUS, SEMGLEE   10 Units, Subcutaneous, Nightly      ondansetron ODT 4 MG disintegrating tablet  Commonly known as: ZOFRAN-ODT   4 mg, Sublingual, Every 6 Hours PRN      pantoprazole 40 MG EC tablet  Commonly known as: PROTONIX   40 mg, Oral, Every Morning   Start Date: October 15, 2022        Changes to Medications      Instructions Start Date   acetaminophen 500 MG tablet  Commonly known as: TYLENOL  What changed: Another  medication with the same name was added. Make sure you understand how and when to take each.   500 mg, Oral, Every 6 Hours PRN      acetaminophen 500 MG tablet  Commonly known as: TYLENOL  What changed: You were already taking a medication with the same name, and this prescription was added. Make sure you understand how and when to take each.   500 mg, Oral, Every 6 Hours PRN      aspirin 81 MG EC tablet  What changed: Another medication with the same name was added. Make sure you understand how and when to take each.   81 mg, Oral, Daily      aspirin 81 MG EC tablet  What changed: You were already taking a medication with the same name, and this prescription was added. Make sure you understand how and when to take each.   81 mg, Oral, Daily   Start Date: October 15, 2022     atorvastatin 10 MG tablet  Commonly known as: LIPITOR  What changed: Another medication with the same name was added. Make sure you understand how and when to take each.   10 mg, Oral, Daily      atorvastatin 10 MG tablet  Commonly known as: LIPITOR  What changed: You were already taking a medication with the same name, and this prescription was added. Make sure you understand how and when to take each.   10 mg, Oral, Daily   Start Date: October 15, 2022     clopidogrel 75 MG tablet  Commonly known as: PLAVIX  What changed: Another medication with the same name was added. Make sure you understand how and when to take each.   75 mg, Oral, Daily      clopidogrel 75 MG tablet  Commonly known as: PLAVIX  What changed: You were already taking a medication with the same name, and this prescription was added. Make sure you understand how and when to take each.   75 mg, Oral, Daily   Start Date: October 15, 2022     FAMOTIDINE PO  What changed: Another medication with the same name was added. Make sure you understand how and when to take each.   20 mg, Oral, Nightly      famotidine 20 MG tablet  Commonly known as: PEPCID  What changed: You were already  taking a medication with the same name, and this prescription was added. Make sure you understand how and when to take each.   20 mg, Oral, Nightly      furosemide 40 MG tablet  Commonly known as: LASIX  What changed: Another medication with the same name was added. Make sure you understand how and when to take each.   40 mg, Oral, Daily      furosemide 40 MG tablet  Commonly known as: LASIX  What changed: You were already taking a medication with the same name, and this prescription was added. Make sure you understand how and when to take each.   40 mg, Oral, Daily   Start Date: October 15, 2022     lansoprazole 15 MG capsule  Commonly known as: Prevacid  What changed: when to take this   15 mg, Oral, 2 Times Daily      metoprolol succinate XL 25 MG 24 hr tablet  Commonly known as: TOPROL-XL  What changed: Another medication with the same name was added. Make sure you understand how and when to take each.   25 mg, Oral, Daily      metoprolol succinate XL 25 MG 24 hr tablet  Commonly known as: TOPROL-XL  What changed: You were already taking a medication with the same name, and this prescription was added. Make sure you understand how and when to take each.   25 mg, Oral, Daily   Start Date: October 15, 2022     nitroglycerin 0.4 MG SL tablet  Commonly known as: NITROSTAT  What changed: Another medication with the same name was added. Make sure you understand how and when to take each.   0.4 mg, Sublingual, Every 5 Minutes PRN, Take no more than 3 doses in 15 minutes.      nitroglycerin 0.4 MG SL tablet  Commonly known as: NITROSTAT  What changed: You were already taking a medication with the same name, and this prescription was added. Make sure you understand how and when to take each.   0.4 mg, Sublingual, Every 5 Minutes PRN, Take no more than 3 doses in 15 minutes.         Continue These Medications      Instructions Start Date   Accu-Chek Guide test strip  Generic drug: glucose blood   USE ONE STRIP 4 TIMES A  DAY TO TEST BLOOD SUGAR      cholecalciferol 25 MCG (1000 UT) tablet  Commonly known as: VITAMIN D3   1,000 Units, Oral, Daily, PT HOLDING FOR SURGERY       Easy Touch Pen Needles 32G X 4 MM misc  Generic drug: Insulin Pen Needle   No dose, route, or frequency recorded.      fexofenadine 180 MG tablet  Commonly known as: ALLEGRA   180 mg, Oral, Daily PRN      insulin glargine 100 UNIT/ML injection  Commonly known as: LANTUS, SEMGLEE   8 Units, Subcutaneous, Nightly      losartan 25 MG tablet  Commonly known as: Cozaar   25 mg, Oral, 2 Times Daily      NOVOLOG SC   6 Units, Subcutaneous, 3 Times Daily With Meals      tamsulosin 0.4 MG capsule 24 hr capsule  Commonly known as: FLOMAX   1 capsule, Oral, Daily             Hospital Course  Patient is a 86 y.o. male presented with a fall in the parking lot while on a senior outing.  Apparently he was walking across a parking lot after the event when he fell onto his right side and fractured his humerus.  He was taken to the emergency room at [facility] with a humeral fracture was evident.  Also is complaining some pain in his right hip.  Orthopedics was consulted and felt that no surgery was indicated.  They placed him in a splint and will follow him up afterwards.  The right hip pain was thought to just be mild contusion and right hip appliance was stable with no evidence for loosening or other complications.  Fortunately he did not hit his head or have any lightheadedness or dizziness or have any loss of consciousness.  This seemed to be just a simple stumble and fall.  He has been encouraged to use a cane and a walker in the past but he has been very resistant to this idea but his wife is clearly more motivated now to enforce this restriction on him.    Unfortunately became evident shortly after admission that the patient was too debilitated and with too many restrictions to return home safely.  His wife was extremely concerned that she would not be able to manage  him at home.  She is elderly as well and has a number of medical problems that limit her strength and mobility.  He agreed to go to rehab and those arrangements have been made.  I suspect he will do well because generally he is very cooperative and very motivated as well.    His other medical problems remained stable throughout this hospital stay.  His blood sugars are well controlled, he had some transient hypotension but that is resolved.  He does have some chronic anemia and that got a little worse after the injury because I think he had some bleeding into his arm that left a large ecchymosis.  He also has an abrasion on his right elbow that will need some daily wound care.  Otherwise he continues to tolerate his home medications well and have no other complaints or problems.  Most importantly he has no cardiacs issues or concerns.  His previous stroke left him with minimal residuals and I do not think it will limit his recovery in any way.  As noted he is generally very pleasant and cooperative and very motivated to be independent.    Procedures Performed         Consults:   Consults     Date and Time Order Name Status Description    10/12/2022 12:34 AM Ortho (on-call MD unless specified) Completed           Pertinent Test Results:   Blood sugar 121, BUN 17, creatinine 0.78, sodium 135, potassium 4.3, GFR 86  WBC 16.6, hemoglobin 7.6, hematocrit 24.1, platelets 132,  Cholesterol 82, triglyceride 59, HDL 35, LDL 33  Magnesium 1.8  X-ray right humerus-comminuted impacted fracture is seen involving the right humeral neck.  There is a mildly displaced fragment.  No other significant fractures are noted.  X-ray right hip-previous total hip replacement.  (This was reviewed by orthopedics and felt to be stable with no further concerns or problems despite the issue raised by the radiologist)    Condition on Discharge:    This point the patient is awake, alert, and says his pain is well controlled.  He is eating and  "drinking well, he is not complaining of shortness of breath or chest pain, no nausea, no abdominal pain, and no new complaints are noted.  He is anxious to go to rehab.  He seems to be tolerating his medications well and overall he seems to be stable.  He is not complaining of lightheadedness or dizziness.    Temperature 98.4, pulse 81, respiratory rate 16, blood pressure 124/66, his O2 sat is 97% on room air  ENT-no masses or nodes  Lungs-bilateral breath sounds and clear with no wheezes or rhonchi  Heart-regular with no murmur noted  Abdomen-bowel sounds positive, soft, no masses, rebound, or guarding  Extremities-his right arm is in a sling and he has a large ecchymosis in the area of the humeral fracture.  He has a abrasion to his right elbow area that needs daily dressings.  Neurologic-he has no new focal neurologic signs, he is awake, alert, and oriented.  He is hard of hearing which is his only overt limitation.    Vital Signs  Temp:  [97.7 °F (36.5 °C)-98.9 °F (37.2 °C)] 98.4 °F (36.9 °C)  Heart Rate:  [71-88] 81  Resp:  [16-18] 16  BP: (106-124)/(59-67) 124/66    Flowsheet Rows    Flowsheet Row First Filed Value   Admission Height 175.3 cm (69\") Documented at 10/11/2022 1827   Admission Weight 70.3 kg (155 lb) Documented at 10/11/2022 1827              Discharge Disposition  Rehab Facility or Unit (DC - External)        Discharge Diet:   Diet Instructions     Diet: Regular, Consistent Carbohydrate, Cardiac      Discharge Diet:  Regular  Consistent Carbohydrate  Cardiac             Activity at Discharge:   Activity Instructions     Activity as Tolerated      Gradually Increase Activity Until at Pre-Hospitalization Level      Up WIth Assist            Follow-up Appointments  Future Appointments   Date Time Provider Department Center   12/30/2022  1:15 PM [facility]   12/30/2022  2:00 PM Fadi Alvarez III, MD [facility]     Additional Instructions for the Follow-ups that " You Need to Schedule     Discharge Follow-up with PCP   As directed       Currently Documented PCP:    Percy Em MD    PCP Phone Number:    [phone]     Follow Up Details: Seen in the office after discharge from rehab               Test Results Pending at Discharge  Pending Labs     Order Current Status    CBC & Differential In process    CBC Auto Differential In process    Manual Differential In process           Peryc Em MD  10/14/22  10:45 EDT

## 2022-10-14 NOTE — CASE MANAGEMENT/SOCIAL WORK
Case Management Discharge Note      Final Note: The patient was d/c to a skilled bed at [facility] and was transported by [facility]. KAELA WALKERW    Provided Post Acute Provider List?: Yes  Post Acute Provider List: Inpatient Rehab, Nursing Home  Provided Post Acute Provider Quality & Resource List?: Yes  Post Acute Provider Quality and Resource List: Long Term Acute Care, Nursing Home  Delivered To: Support Person  Method of Delivery: In person    Selected Continued Care - Admitted Since 10/11/2022     Destination Coordination complete.    Service Provider Selected Services Address Phone Fax Patient Preferred    [facility] [address] , [facility] [phone] --          Durable Medical Equipment    No services have been selected for the patient.              Dialysis/Infusion    No services have been selected for the patient.              Home Medical Care    No services have been selected for the patient.              Therapy    No services have been selected for the patient.              Community Resources    No services have been selected for the patient.              Community & DME    No services have been selected for the patient.                  Transportation Services  Ambulance: [facility]    Final Discharge Disposition Code: 03 - skilled nursing facility (SNF)

## 2022-10-14 NOTE — CASE MANAGEMENT/SOCIAL WORK
"Physicians Statement of Medical Necessity for  Ambulance Transportation    GENERAL INFORMATION     Name: Javier Marin  YOB: 1935  Medicare #: [employer] [id number]  Transport Date: 10/14/22 (Valid for round trips this date, or for scheduled repetitive trips for 60 days from the date signed below.)  Origin: [facility] [address] [place] [address]  Destination: [facility] [address] , [place] [id number]  Is the Patient's stay covered under Medicare Part A (PPS/DRG?)No   Closest appropriate facility? Yes  If this a hosp-hosp transfer? No  Is this a hospice patient? No    MEDICAL NECESSITY QUESTIONAIRE    Ambulance Transportation is medically necessary only if other means of transportation are contraindicated or would be potentially harmful to the patient.  To meet this requirement, the patient must be either \"bed confined\" or suffer from a condition such that transport by means other than an ambulance is contraindicated by the patient's condition.  The following questions must be answered by the healthcare professional signing below for this form to be valid:     1) Describe the MEDICAL CONDITION (physical and/or mental) of this patient AT THE TIME OF AMBULANCE TRANSPORT that requires the patient to be transported in an ambulance, and why transport by other means is contraindicated by the patient's condition:   Right Humeral Fracture-secondary to fall with no surgery planned  2.  Right hip pain-resolved with no evidence for trauma or abnormality involving his previous hip replacement per orthopedic evaluation.  3.  Diabetes mellitus type 2-generally well controlled and patient is compliant with medications and diet.  4.  Hypertension-well-controlled on his current medications  5.  Reflux-no complaints on PPI  6.  DJD-he has diffuse degenerative disease with the most difficulty has in his low back.  7.  ASCVD-no cardiac symptoms or complaints  8. "  High cholesterol-well-controlled on medications  9.  History of stroke-he has minimal residual and the most obvious thing is his dysarthria.  10.  BPH-well-controlled with medications  11.  Anemia-worse after fall with some bleeding into his arm.  Has been placed on iron  12.  Right elbow abrasion-this needs daily wound care         Presenting Problem/History of Present Illness  Right hip pain [M25.551]  Fall, initial encounter [W19.XXXA]  Other closed displaced fracture of proximal end of right humerus, initial encounter [S42.291A]    Closed fracture of right proximal humerus    Type 2 diabetes mellitus, with long-term current use of insulin (HCC)    Right hip pain    Acute kidney injury superimposed on chronic kidney disease (HCC)    Compression fracture of lumbar spine, non-traumatic ([facility])    Osteoarthritis of multiple joints    Remote history of stroke, small caudate nucleus    Hyperlipidemia    Esophagitis determined by endoscopy by Sheri 6/19/18 LA Grade D Lower 1/3    Hiatal hernia with GERD and esophagitis    BPH without obstruction/lower urinary tract symptoms    Fall, initial encounter         Past Medical History:   Diagnosis Date   • A-fib ([facility])    • 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[facility])    • Anorexia    • Arteriosclerosis of abdominal aorta ([facility]) 02/24/2014   • Arthritis    • Carotid stenosis, asymptomatic, bilateral 02/24/2014    16-49%   • Closed right arm fracture 1994   • Compression fracture of lumbar spine, non-traumatic ([facility])    • Diabetes mellitus ([facility])    • Elevated LFTs    • Esophagitis 06/19/2018    DR MARTINEZ-LA GRADE D LOWER   • Esophagus disorder     THICKENED DISTAL WALL-CT SCAN 6/18/18   • First degree AV block    • Garbled speech    • Granulomatous disease, chronic (HCC)    • Hard of hearing    • Hiatal hernia with GERD and esophagitis    • History of acute gastritis    • Hyperlipidemia    • Hypertension    • Hyponatremia    • Leukocytosis    • Lumbar canal stenosis    • Lung nodule seen  on imaging study    • Mandible fracture (HCC) 03/28/2020    FROM FALL   • Osteoarthritis of multiple joints    • Ptosis of left eyelid    • Remote history of stroke     SMALL CAUDATE NECLEUS   • Syncope and collapse 1/2022 6/11/2022   • Thrombocytopenia (HCC)    • Weakness generalized       Past Surgical History:   Procedure Laterality Date   • CARDIAC CATHETERIZATION N/A 6/9/2022    Procedure: Left Heart Cath;  Surgeon: Reinaldo Miller MD;  Location: [facility] CATH INVASIVE LOCATION;  Service: Cardiovascular;  Laterality: N/A;   • CARDIAC CATHETERIZATION N/A 6/9/2022    Procedure: Left ventriculography;  Surgeon: Reinaldo Miller MD;  Location: [facility] CATH INVASIVE LOCATION;  Service: Cardiovascular;  Laterality: N/A;   • CARDIAC CATHETERIZATION N/A 6/9/2022    Procedure: Coronary angiography;  Surgeon: Reinaldo Miller MD;  Location: [facility] CATH INVASIVE LOCATION;  Service: Cardiovascular;  Laterality: N/A;   • CARDIAC CATHETERIZATION N/A 6/13/2022    Procedure: STENT CHARISMA - CORONARY;  Surgeon: Michelle Alegre MD;  Location: [facility] CATH INVASIVE LOCATION;  Service: Cardiovascular;  Laterality: N/A;  PCI of the LAD with atherectomy   • CARDIAC CATHETERIZATION N/A 6/13/2022    Procedure: Atherectomy-coronary;  Surgeon: Michelle Alegre MD;  Location: [facility] CATH INVASIVE LOCATION;  Service: Cardiovascular;  Laterality: N/A;  rotablator   • CARDIAC CATHETERIZATION N/A 6/13/2022    Procedure: Optical Coherent Tomography;  Surgeon: Michelle Alegre MD;  Location: [facility] CATH INVASIVE LOCATION;  Service: Cardiovascular;  Laterality: N/A;   • CATARACT EXTRACTION, BILATERAL     • COLONOSCOPY     • ENDOSCOPY N/A 06/19/2018    Procedure: ESOPHAGOGASTRODUODENOSCOPY WITH BIOPSY;  Surgeon: Toribio Wade MD;  Location: [facility] ENDOSCOPY;  Service: Gastroenterology   • EYE PTOSIS REPAIR Left 11/15/2016    Procedure: LT UPPER LID EYE PTOSIS REPAIR;  Surgeon: Anup Lancaster MD;  Location: [facility] OR [facility];   "Service:    • EYE SURGERY Left     victreous hemorrhage repair   • HIP ARTHROPLASTY Right    • ORIF MANDIBULAR FRACTURE Bilateral 03/31/2020    Procedure: OPEN REDUCTION AND INTERNAL FIXATION OF BILATERAL MANDIBLE FRACTURES;  Surgeon: Percy Jeronimo MD;  Location: [facility];  Service: Maxillofacial;  Laterality: Bilateral;      2) Is this patient \"bed confined\" as defined below?Yes   To be \"bed confined\" the patient must satisfy all three of the following criteria:  (1) unable to get up from bed without assistance; AND (2) unable to ambulate;  AND (3) unable to sit in a chair or wheelchair.  3) Can this patient safely be transported by car or wheelchair van (I.e., may safely sit during transport, without an attendant or monitoring?)No   4. In addition to completing questions 1-3 above, please check any of the following conditions that apply*:          *Note: supporting documentation for any boxes checked must be maintained in the patient's medical records Other Patient is unable to stand and is assist of 2 for transfers      SIGNATURE OF PHYSICIAN OR OTHER AUTHORIZED HEALTHCARE PROFESSIONAL    I certify that the above information is true and correct based on my evaluation of this patient, and represent that the patient requires transport by ambulance and that other forms of transport are contraindicated.  I understand that this information will be used by the Centers for Medicare and Medicaid Services (CMS) to support the determiniation of medical necessity for ambulance services, and I represent that I have personal knowledge of the patient's condition at the time of transport.       If this box is checked, I also certify that the patient is physically or mentally incapable of signing the ambulance service's claim form and that the institution with which I am affiliated has furnished care, services or assistance to the patient.  My signature below is made on behalf of the patient pursuant to 42 CFR " 424.36(b)(4). In accordance with 42 .37, the specific reason(s) that the patient is physically or mentally incapable of signing the claim for is as follows:     Signature of Physician or Healthcare Professional  Date/Time:        (For Scheduled repetitive transport, this form is not valid for transports performed more than 60 days after this date).                                                                                                                                            -------------------LYLE Chávez, [facility]-----------------------------------------------------------  Printed Name and Credentials of Physician or Authorized Healthcare Professional     *Form must be signed by patient's attending physician for scheduled, repetitive transports,.  For non-repetitive ambulance transports, if unable to obtain the signature of the attending physician, any of the following may sign (please select below):     Physician  Clinical Nurse Specialist  Registered Nurse     Physician Assistant  Discharge Planner  Licensed Practical Nurse     Nurse Practitioner

## 2022-12-30 ENCOUNTER — HOSPITAL ENCOUNTER (OUTPATIENT)
Dept: CARDIOLOGY | Facility: HOSPITAL | Age: 87
Discharge: HOME | End: 2022-12-30
Admitting: NURSE PRACTITIONER

## 2022-12-30 ENCOUNTER — OFFICE VISIT (OUTPATIENT)
Dept: CARDIOLOGY | Facility: CLINIC | Age: 87
End: 2022-12-30

## 2022-12-30 VITALS
SYSTOLIC BLOOD PRESSURE: 100 MMHG | WEIGHT: 163 LBS | HEART RATE: 73 BPM | HEIGHT: 71 IN | DIASTOLIC BLOOD PRESSURE: 60 MMHG | BODY MASS INDEX: 22.82 KG/M2

## 2022-12-30 VITALS
SYSTOLIC BLOOD PRESSURE: 122 MMHG | BODY MASS INDEX: 31.61 KG/M2 | WEIGHT: 161 LBS | DIASTOLIC BLOOD PRESSURE: 50 MMHG | HEIGHT: 60 IN

## 2022-12-30 DIAGNOSIS — I25.5 ISCHEMIC CARDIOMYOPATHY: ICD-10-CM

## 2022-12-30 DIAGNOSIS — I45.10 RBBB (RIGHT BUNDLE BRANCH BLOCK): ICD-10-CM

## 2022-12-30 DIAGNOSIS — I25.10 CORONARY ARTERY DISEASE INVOLVING NATIVE CORONARY ARTERY OF NATIVE HEART WITHOUT ANGINA PECTORIS: ICD-10-CM

## 2022-12-30 DIAGNOSIS — I70.0 ARTERIOSCLEROSIS OF ABDOMINAL AORTA: Primary | Chronic | ICD-10-CM

## 2022-12-30 DIAGNOSIS — I48.0 PAROXYSMAL ATRIAL FIBRILLATION: ICD-10-CM

## 2022-12-30 DIAGNOSIS — I65.23 CAROTID STENOSIS, ASYMPTOMATIC, BILATERAL: ICD-10-CM

## 2022-12-30 DIAGNOSIS — I44.0 FIRST DEGREE AV BLOCK: ICD-10-CM

## 2022-12-30 DIAGNOSIS — E78.2 MIXED HYPERLIPIDEMIA: ICD-10-CM

## 2022-12-30 LAB
ASCENDING AORTA: 2.9 CM
BH CV ECHO MEAS - ACS: 1.91 CM
BH CV ECHO MEAS - AO MAX PG: 7.7 MMHG
BH CV ECHO MEAS - AO MEAN PG: 4 MMHG
BH CV ECHO MEAS - AO ROOT DIAM: 3.3 CM
BH CV ECHO MEAS - AO V2 MAX: 138.5 CM/SEC
BH CV ECHO MEAS - AO V2 VTI: 29.2 CM
BH CV ECHO MEAS - AVA(I,D): 1.68 CM2
BH CV ECHO MEAS - EDV(CUBED): 94.8 ML
BH CV ECHO MEAS - EDV(MOD-SP2): 137 ML
BH CV ECHO MEAS - EDV(MOD-SP4): 134 ML
BH CV ECHO MEAS - EF(MOD-BP): 45.8 %
BH CV ECHO MEAS - EF(MOD-SP2): 50.4 %
BH CV ECHO MEAS - EF(MOD-SP4): 48.5 %
BH CV ECHO MEAS - ESV(CUBED): 40.5 ML
BH CV ECHO MEAS - ESV(MOD-SP2): 68 ML
BH CV ECHO MEAS - ESV(MOD-SP4): 69 ML
BH CV ECHO MEAS - FS: 24.7 %
BH CV ECHO MEAS - IVS/LVPW: 1.04 CM
BH CV ECHO MEAS - IVSD: 0.99 CM
BH CV ECHO MEAS - LAT PEAK E' VEL: 11.5 CM/SEC
BH CV ECHO MEAS - LV DIASTOLIC VOL/BSA (35-75): 68.6 CM2
BH CV ECHO MEAS - LV MASS(C)D: 151.2 GRAMS
BH CV ECHO MEAS - LV MAX PG: 2.12 MMHG
BH CV ECHO MEAS - LV MEAN PG: 1.27 MMHG
BH CV ECHO MEAS - LV SYSTOLIC VOL/BSA (12-30): 35.3 CM2
BH CV ECHO MEAS - LV V1 MAX: 72.8 CM/SEC
BH CV ECHO MEAS - LV V1 VTI: 15.4 CM
BH CV ECHO MEAS - LVIDD: 4.6 CM
BH CV ECHO MEAS - LVIDS: 3.4 CM
BH CV ECHO MEAS - LVOT AREA: 3.2 CM2
BH CV ECHO MEAS - LVOT DIAM: 2.01 CM
BH CV ECHO MEAS - LVPWD: 0.95 CM
BH CV ECHO MEAS - MED PEAK E' VEL: 5.7 CM/SEC
BH CV ECHO MEAS - MV DEC SLOPE: 233.6 CM/SEC2
BH CV ECHO MEAS - MV DEC TIME: 0.3 MSEC
BH CV ECHO MEAS - MV E MAX VEL: 74.6 CM/SEC
BH CV ECHO MEAS - MV MAX PG: 3.6 MMHG
BH CV ECHO MEAS - MV MEAN PG: 1.46 MMHG
BH CV ECHO MEAS - MV P1/2T: 119.1 MSEC
BH CV ECHO MEAS - MV V2 VTI: 29.7 CM
BH CV ECHO MEAS - MVA(P1/2T): 1.85 CM2
BH CV ECHO MEAS - MVA(VTI): 1.65 CM2
BH CV ECHO MEAS - PA ACC TIME: 0.1 SEC
BH CV ECHO MEAS - PA PR(ACCEL): 36.2 MMHG
BH CV ECHO MEAS - PA V2 MAX: 96.9 CM/SEC
BH CV ECHO MEAS - QP/QS: 0.55
BH CV ECHO MEAS - RAP SYSTOLE: 15 MMHG
BH CV ECHO MEAS - RV MAX PG: 0.72 MMHG
BH CV ECHO MEAS - RV V1 MAX: 42.4 CM/SEC
BH CV ECHO MEAS - RV V1 VTI: 8.3 CM
BH CV ECHO MEAS - RVOT DIAM: 2.03 CM
BH CV ECHO MEAS - RVSP: 60.6 MMHG
BH CV ECHO MEAS - SI(MOD-SP2): 35.3 ML/M2
BH CV ECHO MEAS - SI(MOD-SP4): 33.3 ML/M2
BH CV ECHO MEAS - SV(LVOT): 48.9 ML
BH CV ECHO MEAS - SV(MOD-SP2): 69 ML
BH CV ECHO MEAS - SV(MOD-SP4): 65 ML
BH CV ECHO MEAS - SV(RVOT): 26.9 ML
BH CV ECHO MEAS - TAPSE (>1.6): 1.61 CM
BH CV ECHO MEAS - TR MAX PG: 45.6 MMHG
BH CV ECHO MEAS - TR MAX VEL: 337.5 CM/SEC
BH CV ECHO MEASUREMENTS AVERAGE E/E' RATIO: 8.67
BH CV XLRA - RV BASE: 3.9 CM
BH CV XLRA - RV LENGTH: 7.4 CM
BH CV XLRA - RV MID: 3.7 CM
BH CV XLRA - TDI S': 12.9 CM/SEC
LEFT ATRIUM VOLUME INDEX: 33.8 ML/M2
MAXIMAL PREDICTED HEART RATE: 133 BPM
SINUS: 2.8 CM
STJ: 2.5 CM
STRESS TARGET HR: 113 BPM

## 2022-12-30 PROCEDURE — 25010000002 PERFLUTREN (DEFINITY) 8.476 MG IN SODIUM CHLORIDE (PF) 0.9 % 10 ML INJECTION: Performed by: NURSE PRACTITIONER

## 2022-12-30 PROCEDURE — 93000 ELECTROCARDIOGRAM COMPLETE: CPT | Performed by: NURSE PRACTITIONER

## 2022-12-30 PROCEDURE — 93306 TTE W/DOPPLER COMPLETE: CPT

## 2022-12-30 PROCEDURE — 99214 OFFICE O/P EST MOD 30 MIN: CPT | Performed by: NURSE PRACTITIONER

## 2022-12-30 PROCEDURE — 93306 TTE W/DOPPLER COMPLETE: CPT | Performed by: INTERNAL MEDICINE

## 2022-12-30 RX ADMIN — PERFLUTREN 2 ML: 6.52 INJECTION, SUSPENSION INTRAVENOUS at 13:55

## 2022-12-30 NOTE — PROGRESS NOTES
Subjective:     Encounter Date:12/30/2022      Patient ID: Javier Marin is a 87 y.o. male.    Chief Complaint:follow up CAD  History of Present Illness  This is a 86 y/o man who follows with Dr. Alvarez and is new to me today. He has a pmhx of hypertension, hyperlipidemia, diabetes and carotid artery disease. He previously followed with Dr. Mccrary for paroxysmal atrial fibrillation. In January 2022, he was evaluated in the ED following a syncopal episode. A Holter monitor showed normla sinus rhythm with 3 episodes of nonsustained monomorphic ventricular tachycardia with the longest duration being 11 beats. An echocardiogram was obtained that showed an EF of 29% and new global hypokinesis.     He began following with Dr. Alvarez in April 2022 and was started on losartan. A CT coronary angiogram was performed and he had an abnormal calcium score of 6059 Agatston units. He was referred for a cardiac catheterization which was performed on 6/9/22. It showed an extensively calcified left main, extensively calcified LAD with 95% narrowing at the ostium and 60-70% stenosis of the mid segment, diffuse disease of the distal LAD, left circumflex occluded proximally and extensively calcified and RCA calcified in the proximal mid and distal segments. On 6/13/22, he underwent atherectomy and drug-eluting stent placement. He was started on clopidogrel and aspirin.     He was last seen in the office by JERRY Alejo on 6/23. He was doing well and was scheduled to follow up in 3 months with a follow up echocardiogram. He is here today for his echo and follow up visit. He denies any chest pain, shortness of breath or palpitations. He denies any dizziness or syncope. He denies any significant swelling in his lower extremities, orthopnea or PND. Blood pressure has been well controlled on his current regimen.     I have reviewed and updated as appropriate allergies, current medications, past family history, past medical  history, past surgical history and problem list.    Review of Systems   Constitutional: Negative for fever, malaise/fatigue, weight gain and weight loss.   HENT: Negative for congestion, hoarse voice and sore throat.    Eyes: Negative for blurred vision and double vision.   Cardiovascular: Negative for chest pain, dyspnea on exertion, leg swelling, orthopnea, palpitations and syncope.   Respiratory: Negative for cough, shortness of breath and wheezing.    Gastrointestinal: Negative for abdominal pain, hematemesis, hematochezia and melena.   Genitourinary: Negative for dysuria and hematuria.   Neurological: Negative for dizziness, headaches, light-headedness and numbness.   Psychiatric/Behavioral: Negative for depression. The patient is not nervous/anxious.          Current Outpatient Medications:   •  Accu-Chek Guide test strip, USE ONE STRIP 4 TIMES A DAY TO TEST BLOOD SUGAR, Disp: , Rfl:   •  acetaminophen (TYLENOL) 500 MG tablet, Take 500 mg by mouth Every 6 (Six) Hours As Needed for Mild Pain ., Disp: , Rfl:   •  acetaminophen (TYLENOL) 500 MG tablet, Take 1 tablet by mouth Every 6 (Six) Hours As Needed for Mild Pain., Disp: 90 tablet, Rfl: 3  •  aspirin 81 MG EC tablet, Take 81 mg by mouth Daily., Disp: , Rfl:   •  aspirin 81 MG EC tablet, Take 1 tablet by mouth Daily., Disp: 90 tablet, Rfl: 3  •  atorvastatin (LIPITOR) 10 MG tablet, Take 10 mg by mouth Daily., Disp: , Rfl:   •  atorvastatin (LIPITOR) 10 MG tablet, Take 1 tablet by mouth Daily., Disp: 90 tablet, Rfl: 3  •  cholecalciferol (VITAMIN D3) 1000 UNITS tablet, Take 1,000 Units by mouth Daily. PT HOLDING FOR SURGERY, Disp: , Rfl:   •  clopidogrel (PLAVIX) 75 MG tablet, Take 1 tablet by mouth Daily., Disp: 30 tablet, Rfl: 11  •  clopidogrel (PLAVIX) 75 MG tablet, Take 1 tablet by mouth Daily., Disp: 30 tablet, Rfl: 3  •  Easy Touch Pen Needles 32G X 4 MM misc, , Disp: , Rfl:   •  famotidine (PEPCID) 20 MG tablet, Take 1 tablet by mouth Every Night.,  Disp: 90 tablet, Rfl: 3  •  FAMOTIDINE PO, Take 20 mg by mouth Every Night., Disp: , Rfl:   •  ferrous sulfate 325 (65 FE) MG tablet, Take 1 tablet by mouth Daily With Breakfast., Disp: 90 tablet, Rfl: 3  •  fexofenadine (ALLEGRA) 180 MG tablet, Take 1 tablet by mouth Daily As Needed., Disp: , Rfl:   •  furosemide (LASIX) 40 MG tablet, Take 40 mg by mouth Daily., Disp: , Rfl:   •  furosemide (LASIX) 40 MG tablet, Take 1 tablet by mouth Daily., Disp: 90 tablet, Rfl: 3  •  HYDROcodone-acetaminophen (NORCO) 5-325 MG per tablet, Take 1 tablet by mouth Every 6 (Six) Hours As Needed for Severe Pain., Disp: 20 tablet, Rfl: 0  •  Insulin Aspart (NOVOLOG SC), Inject 6 Units under the skin into the appropriate area as directed 3 (Three) Times a Day With Meals., Disp: , Rfl:   •  insulin glargine (LANTUS) 100 UNIT/ML injection, Inject 8 Units under the skin into the appropriate area as directed Every Night., Disp: , Rfl:   •  insulin glargine (LANTUS, SEMGLEE) 100 UNIT/ML injection, Inject 10 Units under the skin into the appropriate area as directed Every Night., Disp: 100 mL, Rfl: 12  •  lansoprazole (PREVACID) 15 MG capsule, Take 1 capsule by mouth 2 (Two) Times a Day. (Patient taking differently: Take 1 capsule by mouth Daily.), Disp: 60 capsule, Rfl: 3  •  losartan (Cozaar) 25 MG tablet, Take 1 tablet by mouth 2 (Two) Times a Day., Disp: 180 tablet, Rfl: 3  •  metoprolol succinate XL (TOPROL-XL) 25 MG 24 hr tablet, Take 25 mg by mouth Daily., Disp: , Rfl:   •  metoprolol succinate XL (TOPROL-XL) 25 MG 24 hr tablet, Take 1 tablet by mouth Daily., Disp: 90 tablet, Rfl: 3  •  nitroglycerin (NITROSTAT) 0.4 MG SL tablet, Place 1 tablet under the tongue Every 5 (Five) Minutes As Needed for Chest Pain. Take no more than 3 doses in 15 minutes., Disp: 25 tablet, Rfl: 0  •  nitroglycerin (NITROSTAT) 0.4 MG SL tablet, Place 1 tablet under the tongue Every 5 (Five) Minutes As Needed for Chest Pain. Take no more than 3 doses in 15  minutes., Disp: 30 tablet, Rfl: 3  •  ondansetron ODT (ZOFRAN-ODT) 4 MG disintegrating tablet, Place 1 tablet under the tongue Every 6 (Six) Hours As Needed for Nausea or Vomiting., Disp: 15 tablet, Rfl: 0  •  pantoprazole (PROTONIX) 40 MG EC tablet, Take 1 tablet by mouth Every Morning., Disp: 90 tablet, Rfl: 3  •  tamsulosin (FLOMAX) 0.4 MG capsule 24 hr capsule, Take 1 capsule by mouth Daily., Disp: , Rfl:   No current facility-administered medications for this visit.    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[facility])    • Anorexia    • Arteriosclerosis of abdominal aorta ([facility]) 02/24/2014   • Arthritis    • Carotid stenosis, asymptomatic, bilateral 02/24/2014    16-49%   • Closed right arm fracture 1994   • Compression fracture of lumbar spine, non-traumatic ([facility])    • Diabetes mellitus ([facility])    • Elevated LFTs    • Esophagitis 06/19/2018    DR RASHID GRADE D LOWER   • Esophagus disorder     THICKENED DISTAL WALL-CT SCAN 6/18/18   • First degree AV block    • Garbled speech    • Granulomatous disease, chronic ([facility])    • Hard of hearing    • Hiatal hernia with GERD and esophagitis    • History of acute gastritis    • Hyperlipidemia    • Hypertension    • Hyponatremia    • Leukocytosis    • Lumbar canal stenosis    • Lung nodule seen on imaging study    • Mandible fracture ([facility]) 03/28/2020    FROM FALL   • Osteoarthritis of multiple joints    • Ptosis of left eyelid    • Remote history of stroke     SMALL CAUDATE NECLEUS   • Syncope and collapse 1/2022 6/11/2022   • Thrombocytopenia ([facility])    • Weakness generalized        Past Surgical History:   Procedure Laterality Date   • CARDIAC CATHETERIZATION N/A 6/9/2022    Procedure: Left Heart Cath;  Surgeon: Reinaldo Miller MD;  Location: [facility] CATH INVASIVE LOCATION;  Service: Cardiovascular;  Laterality: N/A;   • CARDIAC CATHETERIZATION N/A 6/9/2022    Procedure: Left ventriculography;  Surgeon: Paul  Reinaldo RIVAS MD;  Location: [facility] CATH INVASIVE LOCATION;  Service: Cardiovascular;  Laterality: N/A;   • CARDIAC CATHETERIZATION N/A 6/9/2022    Procedure: Coronary angiography;  Surgeon: Reinaldo Miller MD;  Location: [facility] CATH INVASIVE LOCATION;  Service: Cardiovascular;  Laterality: N/A;   • CARDIAC CATHETERIZATION N/A 6/13/2022    Procedure: STENT CHARISMA - CORONARY;  Surgeon: Michelle Alegre MD;  Location: [facility] CATH INVASIVE LOCATION;  Service: Cardiovascular;  Laterality: N/A;  PCI of the LAD with atherectomy   • CARDIAC CATHETERIZATION N/A 6/13/2022    Procedure: Atherectomy-coronary;  Surgeon: Michelle Alegre MD;  Location: [facility] CATH INVASIVE LOCATION;  Service: Cardiovascular;  Laterality: N/A;  rotablator   • CARDIAC CATHETERIZATION N/A 6/13/2022    Procedure: Optical Coherent Tomography;  Surgeon: Michelle Alegre MD;  Location: [facility] CATH INVASIVE LOCATION;  Service: Cardiovascular;  Laterality: N/A;   • CATARACT EXTRACTION, BILATERAL     • COLONOSCOPY     • ENDOSCOPY N/A 06/19/2018    Procedure: ESOPHAGOGASTRODUODENOSCOPY WITH BIOPSY;  Surgeon: Toribio Wade MD;  Location: [facility] ENDOSCOPY;  Service: Gastroenterology   • EYE PTOSIS REPAIR Left 11/15/2016    Procedure: LT UPPER LID EYE PTOSIS REPAIR;  Surgeon: Anup Lancaster MD;  Location: [facility] OR [facility]SC;  Service:    • EYE SURGERY Left     victreous hemorrhage repair   • HIP ARTHROPLASTY Right    • ORIF MANDIBULAR FRACTURE Bilateral 03/31/2020    Procedure: OPEN REDUCTION AND INTERNAL FIXATION OF BILATERAL MANDIBLE FRACTURES;  Surgeon: Precy Jeronimo MD;  Location: [facility] MAIN OR;  Service: Maxillofacial;  Laterality: Bilateral;       Family History   Problem Relation Age of Onset   • Pancreatic cancer Mother    • Hypertension Neg Hx    • Hyperlipidemia Neg Hx    • Heart failure Neg Hx    • Heart disease Neg Hx    • Heart attack Neg Hx    • Malig Hyperthermia Neg Hx        Social History     Tobacco Use   • Smoking status: Former  "    Types: Cigars     Quit date:      Years since quittin.0   • Smokeless tobacco: Never   Vaping Use   • Vaping Use: Never used   Substance Use Topics   • Alcohol use: No   • Drug use: No         ECG 12 Lead    Date/Time: 2022 3:14 PM  Performed by: Lyla Diaz APRN  Authorized by: Lyla Diaz APRN   Comparison: compared with previous ECG from 2022  Comparison to previous ECG: No longer in sinus  Rhythm: atrial flutter  Conduction: non-specific intraventricular conduction delay               Objective:     Visit Vitals  /50 (BP Location: Right arm, Patient Position: Sitting, Cuff Size: Adult)   Ht 71 cm (27.95\")   Wt 73 kg (161 lb)   .87 kg/m²             Physical Exam  Constitutional:       Appearance: Normal appearance. He is normal weight.   HENT:      Head: Normocephalic.   Neck:      Vascular: No carotid bruit.   Cardiovascular:      Rate and Rhythm: Normal rate and regular rhythm.      Chest Wall: PMI is not displaced.      Pulses: Normal pulses.           Radial pulses are 2+ on the right side and 2+ on the left side.        Posterior tibial pulses are 2+ on the right side and 2+ on the left side.      Heart sounds: Normal heart sounds. No murmur heard.    No friction rub. No gallop.   Pulmonary:      Effort: Pulmonary effort is normal.      Breath sounds: Normal breath sounds.   Abdominal:      General: Bowel sounds are normal. There is no distension.      Palpations: Abdomen is soft.   Musculoskeletal:      Right lower leg: No edema.      Left lower leg: No edema.   Skin:     General: Skin is warm and dry.      Capillary Refill: Capillary refill takes less than 2 seconds.   Neurological:      Mental Status: He is alert and oriented to person, place, and time.   Psychiatric:         Mood and Affect: Mood normal.         Behavior: Behavior normal.         Thought Content: Thought content normal.          Lab Review:   Lipid Panel    Lipid Panel 6/14/22 10/11/22 " 10/12/22   Total Cholesterol 93 85 82   Triglycerides 41 40 59   HDL Cholesterol 36 (A) 38 (A) 35 (A)   VLDL Cholesterol 11 11 14   LDL Cholesterol  46 36 33   LDL/HDL Ratio 1.36 1.03 1.01   (A) Abnormal value                Cardiac Procedures:   1. Cardiac catheterization 06/13/22:  FINDINGS:  SELECTIVE CORONARY ANGIOGRAMS:  1.  Left main artery: Patient severely calcified short, large caliber vessel with 0% stenosis.  The vessel bifurcates into left anterior descending and left circumflex arteries.  2.  Left anterior descending artery: Large caliber, severely calcified proximal vessel with 95% ostial stenosis.  The mid vessel tapers to a moderate caliber vessel with severe calcification and 50 to 60% stenosis.  Mid vessel gives rise to a small caliber first diagonal branch with no significant disease.  The distal vessel tapers to a small caliber and has scattered severe calcification with no significant stenosis.  3.  Left circumflex artery: 100% ostial chronic total occlusion with left to left and right to left collateral filling of the distal circumflex and OM1 branch.     PRE-PCI:  STENOSIS: 95% ostial LAD stenosis/50 to 60% mid LAD stenosis  HUNTER FLOW: 3     POST-PCI:  STENOSIS: 0%  HUNTER FLOW: 3  2. Echocardiogram 2/25/22:  • Estimated left ventricular EF = 29% Left ventricular systolic function is moderately decreased.  • Estimated right ventricular systolic pressure from tricuspid regurgitation is mildly elevated (35-45 mmHg). Calculated right ventricular systolic pressure from tricuspid regurgitation is 40.6 mmHg.  • There is calcification of the aortic valve.  • The right ventricular cavity is borderline dilated.  • The right atrial cavity is borderline dilated.  • The following left ventricular wall segments are hypokinetic: mid anterior, apical anterior, mid anterolateral and apical lateral.  • There is no evidence of pericardial effusion. .         Assessment:         Diagnoses and all orders for this  visit:    1. Arteriosclerosis of abdominal aorta (HCC) (Primary)    2. Paroxysmal atrial fibrillation (HCC)    3. Carotid stenosis, asymptomatic, bilateral    4. Mixed hyperlipidemia    5. Coronary artery disease involving native coronary artery of native heart without angina pectoris    6. Ischemic cardiomyopathy EF 40% Cath 6/10/2022    7. RBBB (right bundle branch block)    8. First degree AV block    Other orders  -     ECG 12 Lead            Plan:       1. Ischemic cardiomyopathy: LVEF 29% on previous echocardiogram in February 2022. Repeat echocardiogram performed today. Preliminary results show an improvement in EF. Discussed with patient. Will await an official reading from the physician and call patient with results. Will continue with GDMT. Appears euvolemic on exam today.  2. CAD: s/p PCI with stent placement to the LAD, known  of the circumflex. On aspirin, statin and clopidogrel. No anginal symptoms. EKG is stable. Continue medical management.  3. PAF: on metoprolol. Not anticoagulated.  4. HTN: blood pressure is stable. No changes  5. Hyperlipidemia: on statin therapy.     Thank you for allowing me to participate in this patient's care. Please call with any questions or concerns. Mr. Marin will follow up with Dr. Alvarez in 6 months.          Your medication list          Accurate as of December 30, 2022  3:16 PM. If you have any questions, ask your nurse or doctor.            CHANGE how you take these medications      Instructions Last Dose Given Next Dose Due   lansoprazole 15 MG capsule  Commonly known as: Prevacid  What changed: when to take this      Take 1 capsule by mouth 2 (Two) Times a Day.          CONTINUE taking these medications      Instructions Last Dose Given Next Dose Due   Accu-Chek Guide test strip  Generic drug: glucose blood      USE ONE STRIP 4 TIMES A DAY TO TEST BLOOD SUGAR       acetaminophen 500 MG tablet  Commonly known as: TYLENOL      Take 500 mg by mouth Every 6 (Six)  Hours As Needed for Mild Pain .       acetaminophen 500 MG tablet  Commonly known as: TYLENOL      Take 1 tablet by mouth Every 6 (Six) Hours As Needed for Mild Pain.       aspirin 81 MG EC tablet      Take 81 mg by mouth Daily.       aspirin 81 MG EC tablet      Take 1 tablet by mouth Daily.       atorvastatin 10 MG tablet  Commonly known as: LIPITOR      Take 10 mg by mouth Daily.       atorvastatin 10 MG tablet  Commonly known as: LIPITOR      Take 1 tablet by mouth Daily.       cholecalciferol 25 MCG (1000 UT) tablet  Commonly known as: VITAMIN D3      Take 1,000 Units by mouth Daily. PT HOLDING FOR SURGERY       clopidogrel 75 MG tablet  Commonly known as: PLAVIX      Take 1 tablet by mouth Daily.       clopidogrel 75 MG tablet  Commonly known as: PLAVIX      Take 1 tablet by mouth Daily.       Easy Touch Pen Needles 32G X 4 MM misc  Generic drug: Insulin Pen Needle           FAMOTIDINE PO      Take 20 mg by mouth Every Night.       famotidine 20 MG tablet  Commonly known as: PEPCID      Take 1 tablet by mouth Every Night.       ferrous sulfate 325 (65 FE) MG tablet      Take 1 tablet by mouth Daily With Breakfast.       fexofenadine 180 MG tablet  Commonly known as: ALLEGRA      Take 1 tablet by mouth Daily As Needed.       furosemide 40 MG tablet  Commonly known as: LASIX      Take 40 mg by mouth Daily.       furosemide 40 MG tablet  Commonly known as: LASIX      Take 1 tablet by mouth Daily.       HYDROcodone-acetaminophen 5-325 MG per tablet  Commonly known as: NORCO      Take 1 tablet by mouth Every 6 (Six) Hours As Needed for Severe Pain.       insulin glargine 100 UNIT/ML injection  Commonly known as: LANTUS, SEMGLEE      Inject 8 Units under the skin into the appropriate area as directed Every Night.       insulin glargine 100 UNIT/ML injection  Commonly known as: LANTUS, SEMGLEE      Inject 10 Units under the skin into the appropriate area as directed Every Night.       losartan 25 MG tablet  Commonly  known as: Cozaar      Take 1 tablet by mouth 2 (Two) Times a Day.       metoprolol succinate XL 25 MG 24 hr tablet  Commonly known as: TOPROL-XL      Take 25 mg by mouth Daily.       metoprolol succinate XL 25 MG 24 hr tablet  Commonly known as: TOPROL-XL      Take 1 tablet by mouth Daily.       nitroglycerin 0.4 MG SL tablet  Commonly known as: NITROSTAT      Place 1 tablet under the tongue Every 5 (Five) Minutes As Needed for Chest Pain. Take no more than 3 doses in 15 minutes.       nitroglycerin 0.4 MG SL tablet  Commonly known as: NITROSTAT      Place 1 tablet under the tongue Every 5 (Five) Minutes As Needed for Chest Pain. Take no more than 3 doses in 15 minutes.       NOVOLOG SC      Inject 6 Units under the skin into the appropriate area as directed 3 (Three) Times a Day With Meals.       ondansetron ODT 4 MG disintegrating tablet  Commonly known as: ZOFRAN-ODT      Place 1 tablet under the tongue Every 6 (Six) Hours As Needed for Nausea or Vomiting.       pantoprazole 40 MG EC tablet  Commonly known as: PROTONIX      Take 1 tablet by mouth Every Morning.       tamsulosin 0.4 MG capsule 24 hr capsule  Commonly known as: FLOMAX      Take 1 capsule by mouth Daily.                Lyla Diaz, JERRY  12/30/22  1:03 PM EST

## 2023-10-26 ENCOUNTER — TRANSCRIBE ORDERS (OUTPATIENT)
Dept: ADMINISTRATIVE | Facility: HOSPITAL | Age: 88
End: 2023-10-26
Payer: MEDICARE

## 2023-10-26 DIAGNOSIS — M40.00 ADOLESCENT POSTURAL KYPHOSIS: Primary | ICD-10-CM

## 2023-10-26 DIAGNOSIS — R33.9 RETENTION OF URINE: ICD-10-CM

## 2023-11-01 ENCOUNTER — HOSPITAL ENCOUNTER (OUTPATIENT)
Dept: ULTRASOUND IMAGING | Facility: HOSPITAL | Age: 88
Discharge: HOME | End: 2023-11-01
Admitting: INTERNAL MEDICINE
Payer: MEDICARE

## 2023-11-01 DIAGNOSIS — M40.00 ADOLESCENT POSTURAL KYPHOSIS: ICD-10-CM

## 2023-11-01 DIAGNOSIS — R33.9 RETENTION OF URINE: ICD-10-CM

## 2023-11-01 PROCEDURE — 76857 US EXAM PELVIC LIMITED: CPT

## 2023-11-28 ENCOUNTER — TRANSCRIBE ORDERS (OUTPATIENT)
Dept: ADMINISTRATIVE | Facility: HOSPITAL | Age: 88
End: 2023-11-28
Payer: MEDICARE

## 2023-11-28 DIAGNOSIS — M79.661 PAIN OF RIGHT CALF: Primary | ICD-10-CM

## 2023-12-04 ENCOUNTER — HOSPITAL ENCOUNTER (OUTPATIENT)
Dept: CARDIOLOGY | Facility: HOSPITAL | Age: 88
Discharge: HOME | End: 2023-12-04
Admitting: INTERNAL MEDICINE
Payer: MEDICARE

## 2023-12-04 DIAGNOSIS — M79.661 PAIN OF RIGHT CALF: ICD-10-CM

## 2023-12-04 LAB
BH CV LOWER VASCULAR LEFT COMMON FEMORAL AUGMENT: NORMAL
BH CV LOWER VASCULAR LEFT COMMON FEMORAL COMPETENT: NORMAL
BH CV LOWER VASCULAR LEFT COMMON FEMORAL COMPRESS: NORMAL
BH CV LOWER VASCULAR LEFT COMMON FEMORAL PHASIC: NORMAL
BH CV LOWER VASCULAR LEFT COMMON FEMORAL SPONT: NORMAL
BH CV LOWER VASCULAR RIGHT COMMON FEMORAL AUGMENT: NORMAL
BH CV LOWER VASCULAR RIGHT COMMON FEMORAL COMPETENT: NORMAL
BH CV LOWER VASCULAR RIGHT COMMON FEMORAL COMPRESS: NORMAL
BH CV LOWER VASCULAR RIGHT COMMON FEMORAL PHASIC: NORMAL
BH CV LOWER VASCULAR RIGHT COMMON FEMORAL SPONT: NORMAL
BH CV LOWER VASCULAR RIGHT DISTAL FEMORAL COMPRESS: NORMAL
BH CV LOWER VASCULAR RIGHT GASTRONEMIUS COMPRESS: NORMAL
BH CV LOWER VASCULAR RIGHT GREATER SAPH AK COMPRESS: NORMAL
BH CV LOWER VASCULAR RIGHT GREATER SAPH BK COMPRESS: NORMAL
BH CV LOWER VASCULAR RIGHT LESSER SAPH COMPRESS: NORMAL
BH CV LOWER VASCULAR RIGHT MID FEMORAL AUGMENT: NORMAL
BH CV LOWER VASCULAR RIGHT MID FEMORAL COMPETENT: NORMAL
BH CV LOWER VASCULAR RIGHT MID FEMORAL COMPRESS: NORMAL
BH CV LOWER VASCULAR RIGHT MID FEMORAL PHASIC: NORMAL
BH CV LOWER VASCULAR RIGHT MID FEMORAL SPONT: NORMAL
BH CV LOWER VASCULAR RIGHT PERONEAL COMPRESS: NORMAL
BH CV LOWER VASCULAR RIGHT POPLITEAL AUGMENT: NORMAL
BH CV LOWER VASCULAR RIGHT POPLITEAL COMPETENT: NORMAL
BH CV LOWER VASCULAR RIGHT POPLITEAL COMPRESS: NORMAL
BH CV LOWER VASCULAR RIGHT POPLITEAL PHASIC: NORMAL
BH CV LOWER VASCULAR RIGHT POPLITEAL SPONT: NORMAL
BH CV LOWER VASCULAR RIGHT POSTERIOR TIBIAL COMPRESS: NORMAL
BH CV LOWER VASCULAR RIGHT PROFUNDA FEMORAL COMPRESS: NORMAL
BH CV LOWER VASCULAR RIGHT PROXIMAL FEMORAL COMPRESS: NORMAL
BH CV LOWER VASCULAR RIGHT SAPHENOFEMORAL JUNCTION COMPRESS: NORMAL

## 2023-12-04 PROCEDURE — 93971 EXTREMITY STUDY: CPT

## 2024-06-13 DIAGNOSIS — I25.10 CORONARY ARTERY DISEASE INVOLVING NATIVE CORONARY ARTERY OF NATIVE HEART WITHOUT ANGINA PECTORIS: ICD-10-CM

## 2024-06-13 DIAGNOSIS — I45.10 RBBB (RIGHT BUNDLE BRANCH BLOCK): ICD-10-CM

## 2024-06-13 DIAGNOSIS — I48.0 PAROXYSMAL ATRIAL FIBRILLATION: ICD-10-CM

## 2024-06-13 DIAGNOSIS — I25.5 ISCHEMIC CARDIOMYOPATHY: ICD-10-CM

## 2024-06-17 RX ORDER — CLOPIDOGREL BISULFATE 75 MG/1
75 TABLET ORAL DAILY
Qty: 90 TABLET | Refills: 3 | Status: SHIPPED | OUTPATIENT
Start: 2024-06-17

## 2024-07-19 ENCOUNTER — OFFICE VISIT (OUTPATIENT)
Dept: CARDIOLOGY | Facility: CLINIC | Age: 89
End: 2024-07-19
Payer: MEDICARE

## 2024-07-19 DIAGNOSIS — E78.2 MIXED HYPERLIPIDEMIA: ICD-10-CM

## 2024-07-19 DIAGNOSIS — T50.905A BRADYCARDIA, DRUG INDUCED: ICD-10-CM

## 2024-07-19 DIAGNOSIS — I48.92 ATRIAL FLUTTER, UNSPECIFIED TYPE: Primary | ICD-10-CM

## 2024-07-19 DIAGNOSIS — I25.10 CORONARY ARTERY DISEASE INVOLVING NATIVE CORONARY ARTERY OF NATIVE HEART WITHOUT ANGINA PECTORIS: ICD-10-CM

## 2024-07-19 DIAGNOSIS — R00.1 BRADYCARDIA, DRUG INDUCED: ICD-10-CM

## 2024-07-19 DIAGNOSIS — I25.5 ISCHEMIC CARDIOMYOPATHY: ICD-10-CM

## 2024-07-19 DIAGNOSIS — I10 PRIMARY HYPERTENSION: ICD-10-CM

## 2024-07-19 DIAGNOSIS — I65.23 CAROTID STENOSIS, ASYMPTOMATIC, BILATERAL: ICD-10-CM

## 2024-07-19 DIAGNOSIS — I48.21 PERMANENT ATRIAL FIBRILLATION: ICD-10-CM

## 2024-07-19 DIAGNOSIS — I27.20 PULMONARY HYPERTENSION: ICD-10-CM

## 2024-07-19 DIAGNOSIS — I45.2 RIGHT BUNDLE BRANCH BLOCK (RBBB) WITH LEFT ANTERIOR FASCICULAR BLOCK (LAFB): ICD-10-CM

## 2024-07-19 DIAGNOSIS — I36.1 NONRHEUMATIC TRICUSPID VALVE REGURGITATION: ICD-10-CM

## 2024-07-28 ENCOUNTER — TELEPHONE (OUTPATIENT)
Dept: CARDIOLOGY | Facility: CLINIC | Age: 89
End: 2024-07-28
Payer: MEDICARE

## 2024-07-28 VITALS
BODY MASS INDEX: 22.71 KG/M2 | OXYGEN SATURATION: 99 % | SYSTOLIC BLOOD PRESSURE: 138 MMHG | WEIGHT: 162.2 LBS | HEIGHT: 71 IN | DIASTOLIC BLOOD PRESSURE: 70 MMHG

## 2024-07-28 PROBLEM — I27.20 PULMONARY HYPERTENSION: Status: ACTIVE | Noted: 2024-07-28

## 2024-07-28 PROBLEM — I25.5 ISCHEMIC CARDIOMYOPATHY: Status: RESOLVED | Noted: 2022-06-11 | Resolved: 2024-07-28
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
## 2024-07-28 PROBLEM — R94.31 ABNORMAL ECG: Status: RESOLVED | Noted: 2022-04-26 | Resolved: 2024-07-28

## 2024-07-28 PROBLEM — I48.0 PAROXYSMAL ATRIAL FIBRILLATION: Status: RESOLVED | Noted: 2018-06-19 | Resolved: 2024-07-28

## 2024-07-28 PROBLEM — I36.1 NONRHEUMATIC TRICUSPID VALVE REGURGITATION: Status: ACTIVE | Noted: 2024-07-28

## 2024-07-28 NOTE — TELEPHONE ENCOUNTER
Please call patient.  He needs to be scheduled for the followin.  Echocardiogram in our office in the near future  2.  Follow-up with Dr. Alvarez in 6 months  3.  Let him know that hospital scheduling will be calling to schedule a carotid artery duplex at the hospital    Thank you.

## 2024-07-28 NOTE — PROGRESS NOTES
Date of Office Visit: 2024  Encounter Provider: JERRY Crowell  Place of Service: [facility] CARDIOLOGY  Patient Name: Javier Marin  :1935  Primary Cardiologist: Dr. Fadi Alvarez     Chief Complaint   Patient presents with    Coronary Artery Disease    Annual Exam   :     HPI: Javier Marin is a 88 y.o. male who presents today for annual cardiac follow-up visit.  I reviewed his medical records.    He has known hypertension, hyperlipidemia, type 2 diabetes mellitus, and carotid artery disease. He sees cardiology for permanent atrial fibrillation/flutter.  He is not anticoagulated due to gait instability and falls.    In 2022, he had a syncopal episode at [facility].  Follow-up Holter monitor showed normal sinus rhythm with 3 episodes of nonsustained ventricular tachycardia the longest 11 beats in duration.  Echocardiogram revealed EF of 29% and global hypokinesis.  He was started on metoprolol.  At that time, he was seen by different cardiology group.    In 2022, established care with Dr. Fadi Alvarez.  CT coronary angiogram was abnormal with a total Score of 6059.  In 2022, he was diagnosed with coronary artery disease per cath and underwent atherectomy/drug-eluting stent placement to the LAD (3.0 x 38 Mm Xience [place]).  He was noted to have  of the circumflex with medical treatment recommended.  DAPT was recommended indefinitely.    In 2022, follow-up echocardiogram showed improved EF of 46%, diastolic function indeterminate, aortic valve thickening, moderate tricuspid regurgitation, and severe pulmonary hypertension (RVSP 60 mmHg).    He presents today with his wife accompanying him.  He denies chest pain, shortness of air, PND, orthopnea, palpitations, syncope, or bleeding.  He reports a cough from sinus trouble, chronic lower extremity edema, dizziness with position changes, and leg weakness upon standing.  He describes  himself as inactive.  He had 1 fall this year.  He bruises easily on his arms.  Blood pressure normal and heart rate okay. He remains in atrial flutter.      Past Medical History:   Diagnosis Date    Advanced diabetic retinal disease     Anorexia     Arteriosclerosis of abdominal aorta 02/24/2014    Arthritis     Atrial flutter     CAD (coronary artery disease)     Carotid stenosis, asymptomatic, bilateral 02/24/2014    16-49%    Closed right arm fracture 1994    Compression fracture of lumbar spine, non-traumatic     Diabetes mellitus     Elevated LFTs     Esophagitis 06/19/2018    DR RASHID GRADE D LOWER    Esophagus disorder     THICKENED DISTAL WALL-CT SCAN 6/18/18    First degree AV block     Garbled speech     Granulomatous disease, chronic     Hard of hearing     Hiatal hernia with GERD and esophagitis     History of acute gastritis     Hyperlipidemia     Hypertension     Hyponatremia     Leukocytosis     Lumbar canal stenosis     Lung nodule seen on imaging study     Mandible fracture 03/28/2020    FROM FALL    Nonrheumatic tricuspid valve regurgitation 07/28/2024    Osteoarthritis of multiple joints     Permanent atrial fibrillation     Ptosis of left eyelid     Pulmonary hypertension 07/28/2024    Remote history of stroke     SMALL CAUDATE NECLEUS    Right bundle branch block (RBBB) with left anterior fascicular block (LAFB)     Syncope and collapse 1/2022 06/11/2022    Thrombocytopenia     Weakness generalized        Past Surgical History:   Procedure Laterality Date    CARDIAC CATHETERIZATION N/A 6/9/2022    Procedure: Left Heart Cath;  Surgeon: Reinaldo Miller MD;  Location: [facility] CATH INVASIVE LOCATION;  Service: Cardiovascular;  Laterality: N/A;    CARDIAC CATHETERIZATION N/A 6/9/2022    Procedure: Left ventriculography;  Surgeon: Reinaldo Miller MD;  Location: [facility] CATH INVASIVE LOCATION;  Service: Cardiovascular;  Laterality: N/A;    CARDIAC CATHETERIZATION N/A 6/9/2022    Procedure:  Coronary angiography;  Surgeon: Reinaldo Miller MD;  Location: [facility] CATH INVASIVE LOCATION;  Service: Cardiovascular;  Laterality: N/A;    CARDIAC CATHETERIZATION N/A 2022    Procedure: STENT CHARISMA - CORONARY;  Surgeon: Michelle Alegre MD;  [facility];  Service: Cardiovascular;  Laterality: N/A;  PCI of the LAD with atherectomy    CARDIAC CATHETERIZATION N/A 2022    Procedure: Atherectomy-coronary;  Surgeon: Michelle Alegre MD;  Location: [facility] CATH INVASIVE LOCATION;  Service: Cardiovascular;  Laterality: N/A;  rotablator    CARDIAC CATHETERIZATION N/A 2022    Procedure: Optical Coherent Tomography;  Surgeon: Michelle Alegre MD;  Location: [facility] CATH INVASIVE LOCATION;  Service: Cardiovascular;  Laterality: N/A;    CATARACT EXTRACTION, BILATERAL      COLONOSCOPY      ENDOSCOPY N/A 2018    Procedure: ESOPHAGOGASTRODUODENOSCOPY WITH BIOPSY;  Surgeon: Toribio Wade MD;  Location: [facility] ENDOSCOPY;  Service: Gastroenterology    EYE PTOSIS REPAIR Left 11/15/2016    Procedure: LT UPPER LID EYE PTOSIS REPAIR;  Surgeon: Anup Lancaster MD;  Location: [facility] OR OSC;  Service:     EYE SURGERY Left     victreous hemorrhage repair    HIP ARTHROPLASTY Right     ORIF MANDIBULAR FRACTURE Bilateral 2020    Procedure: OPEN REDUCTION AND INTERNAL FIXATION OF BILATERAL MANDIBLE FRACTURES;  Surgeon: Percy Jeronimo MD;  Location: [facility] MAIN OR;  Service: Maxillofacial;  Laterality: Bilateral;       Social History     Socioeconomic History    Marital status:    Tobacco Use    Smoking status: Former     Types: Cigars     Quit date:      Years since quittin.5    Smokeless tobacco: Never   Vaping Use    Vaping status: Never Used   Substance and Sexual Activity    Alcohol use: No    Drug use: No    Sexual activity: Not Currently     Partners: Female       Family History   Problem Relation Age of Onset    Pancreatic cancer Mother     Hypertension  Neg Hx     Hyperlipidemia Neg Hx     Heart failure Neg Hx     Heart disease Neg Hx     Heart attack Neg Hx     Malig Hyperthermia Neg Hx        The following portion of the patient's history were reviewed and updated as appropriate: past medical history, past surgical history, past social history, past family history, allergies, current medications, and problem list.    Review of Systems   Constitutional: Negative.   HENT:  Positive for hearing loss.    Cardiovascular:  Positive for leg swelling.   Respiratory:  Positive for cough.    Hematologic/Lymphatic: Bruises/bleeds easily.   Genitourinary: Negative.    Neurological:  Positive for dizziness.       No Known Allergies      Current Outpatient Medications:     acetaminophen (TYLENOL) 500 MG tablet, Take 1 tablet by mouth Every 6 (Six) Hours As Needed for Mild Pain., Disp: , Rfl:     aspirin 81 MG EC tablet, Take 1 tablet by mouth Daily., Disp: , Rfl:     atorvastatin (LIPITOR) 10 MG tablet, Take 1 tablet by mouth Daily., Disp: 90 tablet, Rfl: 3    cholecalciferol (VITAMIN D3) 1000 UNITS tablet, Take 1 tablet by mouth Daily. PT HOLDING FOR SURGERY, Disp: , Rfl:     clopidogrel (PLAVIX) 75 MG tablet, TAKE ONE (1) TABLET BY MOUTH DAILY., Disp: 90 tablet, Rfl: 3    famotidine (PEPCID) 20 MG tablet, Take 1 tablet by mouth Every Night., Disp: 90 tablet, Rfl: 3    fexofenadine (ALLEGRA) 180 MG tablet, Take 1 tablet by mouth Daily As Needed., Disp: , Rfl:     furosemide (LASIX) 40 MG tablet, Take 1 tablet by mouth Daily. (Patient taking differently: Take 2 tablets by mouth Daily.), Disp: 90 tablet, Rfl: 3    insulin glargine (LANTUS, SEMGLEE) 100 UNIT/ML injection, Inject 10 Units under the skin into the appropriate area as directed Every Night., Disp: 100 mL, Rfl: 12    lansoprazole (PREVACID) 15 MG capsule, Take 1 capsule by mouth 2 (Two) Times a Day. (Patient taking differently: Take 1 capsule by mouth Daily.), Disp: 60 capsule, Rfl: 3    losartan (Cozaar) 25 MG  "tablet, Take 1 tablet by mouth 2 (Two) Times a Day., Disp: 180 tablet, Rfl: 3    metoprolol succinate XL (TOPROL-XL) 25 MG 24 hr tablet, Take 1 tablet by mouth Daily., Disp: 90 tablet, Rfl: 3    tamsulosin (FLOMAX) 0.4 MG capsule 24 hr capsule, Take 1 capsule by mouth Daily., Disp: , Rfl:          Objective:     Vitals:    07/19/24 1030   BP: 138/70   BP Location: Right arm   Patient Position: Sitting   SpO2: 99%   Weight: 73.6 kg (162 lb 3.2 oz)   Height: 180.3 cm (70.98\")     Body mass index is 22.63 kg/m².    PHYSICAL EXAM:    Vitals Reviewed.   General Appearance: No acute distress, well developed and well nourished.    Eyes: Glasses.   HENT: Hearing loss noted.    Respiratory: No signs of respiratory distress. Respiration rhythm and depth normal.  Clear to auscultation.   Cardiovascular:  Jugular Venous Pressure: Normal  Heart Rate and Rhythm: Irregular, Heart Sounds: Normal S1 and S2. No S3 or S4 noted.  Murmurs: No murmurs noted. No rubs, thrills, or gallops.   Lower Extremities: Bilateral +1 pitting lower extremity edema noted.  Musculoskeletal: Normal movement of extremities.  Skin: General appearance normal.  Bruising noted on arms.  Psychiatric: Patient alert and oriented to person, place, and time. Speech and behavior appropriate. Normal mood and affect.       ECG 12 Lead    Date/Time: 7/19/2024 10:49 AM  Performed by: Shaylee Dumont APRN    Authorized by: Shaylee Dumont APRN  Comparison: compared with previous ECG from 12/30/2022  Similar to previous ECG  Rhythm: atrial flutter  Rate: bradycardic  BPM: 46  Conduction: right bundle branch block and left anterior fascicular block  ST Segments: ST segments normal  T inversion: I and aVL  QRS axis: normal    Clinical impression: abnormal EKG            Assessment:       Diagnosis Plan   1. Atrial flutter, unspecified type        2. Permanent atrial fibrillation        3. Bradycardia, drug induced        4. Right bundle branch block (RBBB) with left " anterior fascicular block (LAFB)        5. Coronary artery disease involving native coronary artery of native heart without angina pectoris        6. Ischemic cardiomyopathy  Adult Transthoracic Echo Complete w/ Color, Spectral and Contrast if Necessary Per Protocol      7. Nonrheumatic tricuspid valve regurgitation  Adult Transthoracic Echo Complete w/ Color, Spectral and Contrast if Necessary Per Protocol      8. Pulmonary hypertension  Adult Transthoracic Echo Complete w/ Color, Spectral and Contrast if Necessary Per Protocol      9. Carotid stenosis, asymptomatic, bilateral        10. Primary hypertension        11. Mixed hyperlipidemia               Plan:       1/2/3.  He remains in atrial flutter and has a history of atrial fibrillation.  Asymptomatic.  Heart rate today 46 bpm.  Decrease metoprolol from 25 mg to 12.5 mg daily.  [id number] score of 6.  He is not anticoagulated due to leg weakness and falls.    4.  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    5.  Coronary Artery Disease: status post stent placement to the LAD and known  of the left circumflex with medical treatment recommended.  Continue aspirin and clopidogrel indefinitely.  He remains on atorvastatin.  Denies angina.      6.  Ischemic Cardiomyopathy: Last EF 46% on echocardiogram.  Repeat echocardiogram at his convenience.  Continue metoprolol succinate, losartan, and furosemide.    7/8.  Moderate pulmonary hypertension and severe pulmonary hypertension per echocardiogram in 2022.  Repeat echocardiogram.    9.  Carotid artery stenosis.  I reviewed his last carotid duplex from December 2020.  Left carotid artery showed mild stenosis in proximal right posterior 70 to 80% stenosis.  Repeat carotid duplex.  I am not sure if he has been referred to vascular surgery.    10.  Hypertension: Blood pressure stable today.    11.  Hyperlipidemia: Remains on atorvastatin.    12.  He will follow-up with Dr. Alvarez in  6 months.    As always, it has been a pleasure to participate in your patient's care. Thank you.         Sincerely,         JERRY Alejo  [facility] Cardiology      Dictated utilizing Dragon Dictation  This office note was dictated late due to the global computer shutdown on 7/19/2024.

## 2024-08-09 ENCOUNTER — TELEPHONE (OUTPATIENT)
Dept: CARDIOLOGY | Facility: CLINIC | Age: 89
End: 2024-08-09

## 2024-08-09 NOTE — TELEPHONE ENCOUNTER
Caller: Olga Marin    Relationship: Emergency Contact    Best call back number: [phone]    What is the best time to reach you: 12 OR 12:30PM    Who are you requesting to speak with (clinical staff, provider,  specific staff member): CLINICAL    What was the call regarding: PT'S WIFE JUST WANTS CLARIFICATION ON THESE UPCOMING APPT'S- ECHO ON 8.14.24 AND FOLLOW UP ON 01.07.2025

## 2024-08-12 NOTE — TELEPHONE ENCOUNTER
Called and spoke with patient's wife to give her clarification on her husbands upcoming appointments. Explained the echo and his yearly exam that is scheduled for 01/2025.     She was wanting clarification on the carotid artery duplex test that JERRY Alejo wants him to get done.     Explained that her  has had this done in the past and we are just wanting an updated one as it has been almost 4 years since his last one.    Wife verbalized understanding and would like to see if they can get his Echo and carotid artery duplex testing done on the same day. Stated I would send a message over to our schedulers to see if they can do so. Call was ended.

## 2024-08-14 ENCOUNTER — HOSPITAL ENCOUNTER (OUTPATIENT)
Dept: CARDIOLOGY | Facility: HOSPITAL | Age: 89
Discharge: HOME | End: 2024-08-14
Admitting: NURSE PRACTITIONER
Payer: MEDICARE

## 2024-08-14 VITALS
DIASTOLIC BLOOD PRESSURE: 52 MMHG | SYSTOLIC BLOOD PRESSURE: 118 MMHG | HEIGHT: 70 IN | WEIGHT: 162 LBS | BODY MASS INDEX: 23.19 KG/M2

## 2024-08-14 DIAGNOSIS — I27.20 PULMONARY HYPERTENSION: ICD-10-CM

## 2024-08-14 DIAGNOSIS — I25.5 ISCHEMIC CARDIOMYOPATHY: ICD-10-CM

## 2024-08-14 DIAGNOSIS — I36.1 NONRHEUMATIC TRICUSPID VALVE REGURGITATION: ICD-10-CM

## 2024-08-14 LAB
AORTIC ARCH: 3 CM
AORTIC DIMENSIONLESS INDEX: 0.4 (DI)
BH CV ECHO MEAS - ACS: 1.59 CM
BH CV ECHO MEAS - AO MAX PG: 6.5 MMHG
BH CV ECHO MEAS - AO MEAN PG: 3.7 MMHG
BH CV ECHO MEAS - AO ROOT DIAM: 3.2 CM
BH CV ECHO MEAS - AO V2 MAX: 127.2 CM/SEC
BH CV ECHO MEAS - AO V2 VTI: 29 CM
BH CV ECHO MEAS - AVA(I,D): 1.3 CM2
BH CV ECHO MEAS - EDV(CUBED): 63.2 ML
BH CV ECHO MEAS - EDV(MOD-SP2): 104 ML
BH CV ECHO MEAS - EDV(MOD-SP4): 108 ML
BH CV ECHO MEAS - EF(MOD-BP): 50.2 %
BH CV ECHO MEAS - EF(MOD-SP2): 46.2 %
BH CV ECHO MEAS - EF(MOD-SP4): 52.8 %
BH CV ECHO MEAS - ESV(CUBED): 26.6 ML
BH CV ECHO MEAS - ESV(MOD-SP2): 56 ML
BH CV ECHO MEAS - ESV(MOD-SP4): 51 ML
BH CV ECHO MEAS - FS: 25 %
BH CV ECHO MEAS - IVS/LVPW: 1.12 CM
BH CV ECHO MEAS - IVSD: 1.1 CM
BH CV ECHO MEAS - LAT PEAK E' VEL: 11.3 CM/SEC
BH CV ECHO MEAS - LV DIASTOLIC VOL/BSA (35-75): 55.9 CM2
BH CV ECHO MEAS - LV MASS(C)D: 133.4 GRAMS
BH CV ECHO MEAS - LV MAX PG: 1.02 MMHG
BH CV ECHO MEAS - LV MEAN PG: 0.57 MMHG
BH CV ECHO MEAS - LV SYSTOLIC VOL/BSA (12-30): 26.4 CM2
BH CV ECHO MEAS - LV V1 MAX: 50.6 CM/SEC
BH CV ECHO MEAS - LV V1 VTI: 11.9 CM
BH CV ECHO MEAS - LVIDD: 4 CM
BH CV ECHO MEAS - LVIDS: 3 CM
BH CV ECHO MEAS - LVOT AREA: 3.2 CM2
BH CV ECHO MEAS - LVOT DIAM: 2.01 CM
BH CV ECHO MEAS - LVPWD: 0.98 CM
BH CV ECHO MEAS - MED PEAK E' VEL: 6.4 CM/SEC
BH CV ECHO MEAS - MR MAX PG: 20.1 MMHG
BH CV ECHO MEAS - MR MAX VEL: 224 CM/SEC
BH CV ECHO MEAS - MV DEC SLOPE: 462.2 CM/SEC2
BH CV ECHO MEAS - MV DEC TIME: 0.25 SEC
BH CV ECHO MEAS - MV E MAX VEL: 85.3 CM/SEC
BH CV ECHO MEAS - MV MAX PG: 4.4 MMHG
BH CV ECHO MEAS - MV MEAN PG: 1.01 MMHG
BH CV ECHO MEAS - MV P1/2T: 67 MSEC
BH CV ECHO MEAS - MV V2 VTI: 25.9 CM
BH CV ECHO MEAS - MVA(P1/2T): 3.3 CM2
BH CV ECHO MEAS - MVA(VTI): 1.46 CM2
BH CV ECHO MEAS - PA ACC TIME: 0.08 SEC
BH CV ECHO MEAS - PA V2 MAX: 118.1 CM/SEC
BH CV ECHO MEAS - RAP SYSTOLE: 3 MMHG
BH CV ECHO MEAS - RV MAX PG: 1.74 MMHG
BH CV ECHO MEAS - RV V1 MAX: 66 CM/SEC
BH CV ECHO MEAS - RV V1 VTI: 12.8 CM
BH CV ECHO MEAS - RVSP: 44 MMHG
BH CV ECHO MEAS - SV(LVOT): 37.8 ML
BH CV ECHO MEAS - SV(MOD-SP2): 48 ML
BH CV ECHO MEAS - SV(MOD-SP4): 57 ML
BH CV ECHO MEAS - SVI(LVOT): 19.5 ML/M2
BH CV ECHO MEAS - SVI(MOD-SP2): 24.8 ML/M2
BH CV ECHO MEAS - SVI(MOD-SP4): 29.5 ML/M2
BH CV ECHO MEAS - TAPSE (>1.6): 1.24 CM
BH CV ECHO MEAS - TR MAX PG: 41.4 MMHG
BH CV ECHO MEAS - TR MAX VEL: 321.7 CM/SEC
BH CV ECHO MEASUREMENTS AVERAGE E/E' RATIO: 9.64
BH CV XLRA - TDI S': 10.4 CM/SEC
LEFT ATRIUM VOLUME INDEX: 30.5 ML/M2
SINUS: 3.1 CM

## 2024-08-14 PROCEDURE — 93306 TTE W/DOPPLER COMPLETE: CPT

## 2024-08-14 PROCEDURE — 25510000001 PERFLUTREN 6.52 MG/ML SUSPENSION 2 ML VIAL: Performed by: NURSE PRACTITIONER

## 2024-08-14 RX ADMIN — PERFLUTREN 3 ML: 6.52 INJECTION, SUSPENSION INTRAVENOUS at 13:31

## 2024-08-15 ENCOUNTER — TELEPHONE (OUTPATIENT)
Dept: CARDIOLOGY | Facility: CLINIC | Age: 89
End: 2024-08-15
Payer: MEDICARE

## 2024-08-15 NOTE — TELEPHONE ENCOUNTER
In December 2022, follow-up echocardiogram showed improved EF of 46%, diastolic function indeterminate, aortic valve thickening, moderate tricuspid regurgitation, and severe pulmonary hypertension (RVSP 60 mmHg).    Echocardiogram showed improved EF of 50%, left atrium borderline dilated, and moderate aortic valve calcification, trace MR, mild TR, and pulmonary pressure improved to 44 mmHg.    On his last visit, his heart rate was low at 46.  I decreased metoprolol from 25 to 12.5 mg daily.  I need to reassess his heart rates.  He is scheduled for a carotid test on 8/20.  I also need to see if he has a vascular surgeon.    I left messages on both phone numbers.  I told him I will try him back tomorrow or next week.

## 2024-08-15 NOTE — PROGRESS NOTES
In December 2022, follow-up echocardiogram showed improved EF of 46%, diastolic function indeterminate, aortic valve thickening, moderate tricuspid regurgitation, and severe pulmonary hypertension (RVSP 60 mmHg).    Echocardiogram showed improved EF of 50%, left atrium borderline dilated, and moderate aortic valve calcification, trace MR, mild TR, and pulmonary pressure improved to 44 mmHg.    Patient informed and verbalizes understanding.

## 2024-08-16 NOTE — TELEPHONE ENCOUNTER
I spoke with wife via phone about echo results. He is off the metoprolol now. BP the other day 121/52 HR 62. He is doing well. Carotid test to be done in future. He does not have vascular surgeon.

## 2024-08-20 ENCOUNTER — HOSPITAL ENCOUNTER (OUTPATIENT)
Dept: CARDIOLOGY | Facility: HOSPITAL | Age: 89
Discharge: HOME | End: 2024-08-20
Admitting: NURSE PRACTITIONER
Payer: MEDICARE

## 2024-08-20 DIAGNOSIS — I65.23 CAROTID STENOSIS, ASYMPTOMATIC, BILATERAL: ICD-10-CM

## 2024-08-20 LAB
BH CV XLRA MEAS LEFT DIST CCA EDV: 16.5 CM/SEC
BH CV XLRA MEAS LEFT DIST CCA PSV: 117.2 CM/SEC
BH CV XLRA MEAS LEFT DIST ICA EDV: -26.1 CM/SEC
BH CV XLRA MEAS LEFT DIST ICA PSV: -127.8 CM/SEC
BH CV XLRA MEAS LEFT ICA/CCA RATIO: 1.63
BH CV XLRA MEAS LEFT MID ICA EDV: -18.4 CM/SEC
BH CV XLRA MEAS LEFT MID ICA PSV: -139.5 CM/SEC
BH CV XLRA MEAS LEFT PROX CCA EDV: 14.5 CM/SEC
BH CV XLRA MEAS LEFT PROX CCA PSV: 129 CM/SEC
BH CV XLRA MEAS LEFT PROX ECA EDV: -9.7 CM/SEC
BH CV XLRA MEAS LEFT PROX ECA PSV: -138.5 CM/SEC
BH CV XLRA MEAS LEFT PROX ICA EDV: -43.9 CM/SEC
BH CV XLRA MEAS LEFT PROX ICA PSV: -190.7 CM/SEC
BH CV XLRA MEAS LEFT PROX SCLA PSV: 110 CM/SEC
BH CV XLRA MEAS LEFT VERTEBRAL A EDV: -7.9 CM/SEC
BH CV XLRA MEAS LEFT VERTEBRAL A PSV: -42.6 CM/SEC
BH CV XLRA MEAS RIGHT DIST CCA EDV: 5.9 CM/SEC
BH CV XLRA MEAS RIGHT DIST CCA PSV: 50.6 CM/SEC
BH CV XLRA MEAS RIGHT DIST ICA EDV: -9.1 CM/SEC
BH CV XLRA MEAS RIGHT DIST ICA PSV: -38.6 CM/SEC
BH CV XLRA MEAS RIGHT ICA/CCA RATIO: 9.11
BH CV XLRA MEAS RIGHT MID ICA EDV: -22 CM/SEC
BH CV XLRA MEAS RIGHT MID ICA PSV: -161.9 CM/SEC
BH CV XLRA MEAS RIGHT PROX CCA EDV: -11 CM/SEC
BH CV XLRA MEAS RIGHT PROX CCA PSV: -86.2 CM/SEC
BH CV XLRA MEAS RIGHT PROX ECA EDV: 29.6 CM/SEC
BH CV XLRA MEAS RIGHT PROX ECA PSV: 490.6 CM/SEC
BH CV XLRA MEAS RIGHT PROX ICA EDV: -75.7 CM/SEC
BH CV XLRA MEAS RIGHT PROX ICA PSV: -461 CM/SEC
BH CV XLRA MEAS RIGHT PROX SCLA PSV: 81.3 CM/SEC
BH CV XLRA MEAS RIGHT VERTEBRAL A EDV: 5.3 CM/SEC
BH CV XLRA MEAS RIGHT VERTEBRAL A PSV: 39.8 CM/SEC
LEFT ARM BP: NORMAL MMHG
RIGHT ARM BP: NORMAL MMHG

## 2024-08-20 PROCEDURE — 93880 EXTRACRANIAL BILAT STUDY: CPT

## 2024-08-20 PROCEDURE — 93880 EXTRACRANIAL BILAT STUDY: CPT | Performed by: SURGERY

## 2024-08-22 NOTE — PROGRESS NOTES
I tried calling both phone numbers today.  No answer and I left a message for patient to return my call.

## 2024-08-26 ENCOUNTER — TELEPHONE (OUTPATIENT)
Dept: CARDIOLOGY | Facility: CLINIC | Age: 89
End: 2024-08-26
Payer: MEDICARE

## 2024-08-26 DIAGNOSIS — I65.23 BILATERAL CAROTID ARTERY STENOSIS: Primary | ICD-10-CM

## 2024-08-26 NOTE — TELEPHONE ENCOUNTER
Carotid Test:     Right internal carotid artery demonstrates greater than 70% stenosis but less than near occlusion.    Antegrade right vertebral flow.    Left internal carotid artery demonstrates a 50-69% stenosis.    Antegrade left vertebral flow.      I spoke with wife about results. Refer to vascular surgery.  She verbalized understanding.  Continue clopidogrel and atorvastatin.

## 2024-09-04 RX ORDER — FUROSEMIDE 40 MG
80 TABLET ORAL DAILY
Status: SHIPPED
Start: 2024-09-04

## 2024-09-04 NOTE — PROGRESS NOTES
"Chief Complaint  Carotid Artery Disease    Subjective      HPI: Javier Marin is a 88 y.o. male seen as a new patient for evaluation of carotid stenosis.  Patient without previous TIA, amaurosis fugax or stroke.  Has coronary artery disease post PCI and stent.  Cardiac dysrhythmia including atrial fibrillation, with several recent falls, for which she ambulates with a walker and is not treated with anticoagulation.  Has pulmonary hypertension with RV SP 60 mmHg.  Says his personality favors noninterventional management of things that are not absolutely necessary.  No claudications.    Objective   Vital Signs:  /75 (BP Location: Left arm)   Pulse 60   Ht 177.8 cm (70\")   Wt 73.5 kg (162 lb)   BMI 23.24 kg/m²   Estimated body mass index is 23.24 kg/m² as calculated from the following:    Height as of this encounter: 177.8 cm (70\").    Weight as of this encounter: 73.5 kg (162 lb).   BMI is within normal parameters. No other follow-up for BMI required.   Javier Marin  reports that he quit smoking about 30 years ago. His smoking use included cigars. He has never used smokeless tobacco..     Exam: Elderly, infirm, very hard of hearing.  Drooling saliva.  Palpable radial artery pulses.  Regular cardiac rhythm.  Impalpable pedal artery pulses bilaterally.  Mild symmetric bilateral lower extremity swelling and pitting edema involving the proximal feet and distal to mid cast.    Personal review of data: Images and tracings of bilateral carotid duplex scan 8/20/2024.  Notes from cardiology 7/28/2024.     Assessment and Plan     Diagnoses and all orders for this visit:    1. Carotid stenosis, asymptomatic, bilateral (Primary)    Other orders  -     furosemide (LASIX) 40 MG tablet; Take 2 tablets by mouth Daily.  -     lansoprazole (Prevacid) 15 MG capsule; Take 1 capsule by mouth Daily.    Summary: 88-year-old gentleman referred for evaluation of carotid stenosis.  Takes aspirin, clopidogrel and low-dose " atorvastatin.  Carotid duplex 8/20/2024 demonstrates greater than 70% right ICA stenosis with velocities 461/76, ratio 9.1, with significant external carotid stenosis.  There is 50 to 69% left ICA stenosis with velocities 191/44, ratio 1.6.  Vertebrals antegrade bilaterally.  By [employer] data, now 35 years or so old, his 5-year stroke risk is 10 percent.  In addition, he high-grade stenosis is present in the right internal carotid artery, which perfuses the nondominant hemisphere.  He has moderate stenosis on the left, perfusing his dominant hemisphere.  The statistics regarding stroke risk became evident before the age of clopidogrel and knowledge of the importance of statins.  By the same token he is elderly, moderately infirm, with significant cardiopulmonary disease.  I recommend noninterventional management, and the patient and his wife are quick to agree.  I discussed these statistics.  He may choose interventional management if he has a neurologic symptom.  Thus I would follow him clinically rather than by carotid duplex scan.  Therefore he will not return again for serial duplex imaging.  I will see him again on request.    Follow Up     Return if symptoms worsen or fail to improve.  Patient was given instructions and counseling regarding his condition or for health maintenance advice. Please see specific information pulled into the AVS if appropriate.

## 2024-09-06 ENCOUNTER — OFFICE VISIT (OUTPATIENT)
Age: 89
End: 2024-09-06
Payer: MEDICARE

## 2024-09-06 VITALS
DIASTOLIC BLOOD PRESSURE: 75 MMHG | HEART RATE: 60 BPM | WEIGHT: 162 LBS | SYSTOLIC BLOOD PRESSURE: 168 MMHG | HEIGHT: 70 IN | BODY MASS INDEX: 23.19 KG/M2

## 2024-09-06 DIAGNOSIS — I65.23 CAROTID STENOSIS, ASYMPTOMATIC, BILATERAL: Primary | ICD-10-CM

## 2024-09-06 RX ORDER — MECOBALAMIN 5000 MCG
15 TABLET,DISINTEGRATING ORAL DAILY
Status: SHIPPED
Start: 2024-09-06

## 2025-01-23 ENCOUNTER — APPOINTMENT (OUTPATIENT)
Dept: GENERAL RADIOLOGY | Facility: HOSPITAL | Age: OVER 89
End: 2025-01-23
Payer: MEDICARE

## 2025-01-23 ENCOUNTER — HOSPITAL ENCOUNTER (OUTPATIENT)
Facility: HOSPITAL | Age: OVER 89
LOS: 1 days | Discharge: HOME | End: 2025-01-24
Attending: EMERGENCY MEDICINE | Admitting: INTERNAL MEDICINE
Payer: MEDICARE

## 2025-01-23 ENCOUNTER — APPOINTMENT (OUTPATIENT)
Dept: CT IMAGING | Facility: HOSPITAL | Age: OVER 89
End: 2025-01-23
Payer: MEDICARE

## 2025-01-23 DIAGNOSIS — S22.41XA CLOSED FRACTURE OF MULTIPLE RIBS OF RIGHT SIDE, INITIAL ENCOUNTER: Primary | ICD-10-CM

## 2025-01-23 DIAGNOSIS — Z78.9 DECREASED ACTIVITIES OF DAILY LIVING (ADL): ICD-10-CM

## 2025-01-23 DIAGNOSIS — S70.01XA CONTUSION OF RIGHT HIP, INITIAL ENCOUNTER: ICD-10-CM

## 2025-01-23 PROBLEM — N18.32 STAGE 3B CHRONIC KIDNEY DISEASE: Status: ACTIVE | Noted: 2025-01-23

## 2025-01-23 PROBLEM — S22.49XA MULTIPLE RIB FRACTURES: Status: ACTIVE | Noted: 2025-01-23

## 2025-01-23 LAB
ALBUMIN SERPL-MCNC: 3.6 G/DL (ref 3.5–5.2)
ALBUMIN/GLOB SERPL: 1 G/DL
ALP SERPL-CCNC: 118 U/L (ref 39–117)
ALT SERPL W P-5'-P-CCNC: 16 U/L (ref 1–41)
ANION GAP SERPL CALCULATED.3IONS-SCNC: 11.2 MMOL/L (ref 5–15)
ANION GAP SERPL CALCULATED.3IONS-SCNC: 6.9 MMOL/L (ref 5–15)
APTT PPP: 30.9 SECONDS (ref 22.7–35.4)
AST SERPL-CCNC: 16 U/L (ref 1–40)
BILIRUB SERPL-MCNC: 0.7 MG/DL (ref 0–1.2)
BUN SERPL-MCNC: 30 MG/DL (ref 8–23)
BUN SERPL-MCNC: 30 MG/DL (ref 8–23)
BUN/CREAT SERPL: 21.9 (ref 7–25)
BUN/CREAT SERPL: 22.9 (ref 7–25)
CALCIUM SPEC-SCNC: 9 MG/DL (ref 8.6–10.5)
CALCIUM SPEC-SCNC: 9.1 MG/DL (ref 8.6–10.5)
CHLORIDE SERPL-SCNC: 101 MMOL/L (ref 98–107)
CHLORIDE SERPL-SCNC: 99 MMOL/L (ref 98–107)
CHOLEST SERPL-MCNC: 101 MG/DL (ref 0–200)
CO2 SERPL-SCNC: 27.8 MMOL/L (ref 22–29)
CO2 SERPL-SCNC: 29.1 MMOL/L (ref 22–29)
CREAT SERPL-MCNC: 1.31 MG/DL (ref 0.76–1.27)
CREAT SERPL-MCNC: 1.37 MG/DL (ref 0.76–1.27)
DEPRECATED RDW RBC AUTO: 40.1 FL (ref 37–54)
DEPRECATED RDW RBC AUTO: 41.4 FL (ref 37–54)
EGFRCR SERPLBLD CKD-EPI 2021: 49.3 ML/MIN/1.73
EGFRCR SERPLBLD CKD-EPI 2021: 52 ML/MIN/1.73
ERYTHROCYTE [DISTWIDTH] IN BLOOD BY AUTOMATED COUNT: 12.4 % (ref 12.3–15.4)
ERYTHROCYTE [DISTWIDTH] IN BLOOD BY AUTOMATED COUNT: 12.6 % (ref 12.3–15.4)
GLOBULIN UR ELPH-MCNC: 3.7 GM/DL
GLUCOSE BLDC GLUCOMTR-MCNC: 200 MG/DL (ref 70–130)
GLUCOSE BLDC GLUCOMTR-MCNC: 212 MG/DL (ref 70–130)
GLUCOSE SERPL-MCNC: 250 MG/DL (ref 65–99)
GLUCOSE SERPL-MCNC: 269 MG/DL (ref 65–99)
HBA1C MFR BLD: 6.9 % (ref 4.8–5.6)
HCT VFR BLD AUTO: 36.1 % (ref 37.5–51)
HCT VFR BLD AUTO: 38.6 % (ref 37.5–51)
HDLC SERPL-MCNC: 42 MG/DL (ref 40–60)
HGB BLD-MCNC: 11.9 G/DL (ref 13–17.7)
HGB BLD-MCNC: 12.7 G/DL (ref 13–17.7)
INR PPP: 1.14 (ref 0.9–1.1)
LDLC SERPL CALC-MCNC: 47 MG/DL (ref 0–100)
LDLC/HDLC SERPL: 1.17 {RATIO}
LYMPHOCYTES # BLD MANUAL: 10.3 10*3/MM3 (ref 0.7–3.1)
LYMPHOCYTES # BLD MANUAL: 9.59 10*3/MM3 (ref 0.7–3.1)
LYMPHOCYTES NFR BLD MANUAL: 2 % (ref 5–12)
LYMPHOCYTES NFR BLD MANUAL: 3 % (ref 5–12)
MAGNESIUM SERPL-MCNC: 2.1 MG/DL (ref 1.6–2.4)
MCH RBC QN AUTO: 29.5 PG (ref 26.6–33)
MCH RBC QN AUTO: 29.6 PG (ref 26.6–33)
MCHC RBC AUTO-ENTMCNC: 32.9 G/DL (ref 31.5–35.7)
MCHC RBC AUTO-ENTMCNC: 33 G/DL (ref 31.5–35.7)
MCV RBC AUTO: 89.6 FL (ref 79–97)
MCV RBC AUTO: 89.8 FL (ref 79–97)
MONOCYTES # BLD: 0.36 10*3/MM3 (ref 0.1–0.9)
MONOCYTES # BLD: 0.53 10*3/MM3 (ref 0.1–0.9)
NEUTROPHILS # BLD AUTO: 6.92 10*3/MM3 (ref 1.7–7)
NEUTROPHILS # BLD AUTO: 8.14 10*3/MM3 (ref 1.7–7)
NEUTROPHILS NFR BLD MANUAL: 39 % (ref 42.7–76)
NEUTROPHILS NFR BLD MANUAL: 45 % (ref 42.7–76)
PLAT MORPH BLD: NORMAL
PLAT MORPH BLD: NORMAL
PLATELET # BLD AUTO: 122 10*3/MM3 (ref 140–450)
PLATELET # BLD AUTO: 124 10*3/MM3 (ref 140–450)
PMV BLD AUTO: 10.8 FL (ref 6–12)
PMV BLD AUTO: 10.9 FL (ref 6–12)
POTASSIUM SERPL-SCNC: 4.7 MMOL/L (ref 3.5–5.2)
POTASSIUM SERPL-SCNC: 4.7 MMOL/L (ref 3.5–5.2)
PROT SERPL-MCNC: 7.3 G/DL (ref 6–8.5)
PROTHROMBIN TIME: 14.5 SECONDS (ref 11.7–14.2)
RBC # BLD AUTO: 4.02 10*6/MM3 (ref 4.14–5.8)
RBC # BLD AUTO: 4.31 10*6/MM3 (ref 4.14–5.8)
RBC MORPH BLD: NORMAL
RBC MORPH BLD: NORMAL
SODIUM SERPL-SCNC: 137 MMOL/L (ref 136–145)
SODIUM SERPL-SCNC: 138 MMOL/L (ref 136–145)
TRIGL SERPL-MCNC: 49 MG/DL (ref 0–150)
VARIANT LYMPHS NFR BLD MANUAL: 53 % (ref 19.6–45.3)
VARIANT LYMPHS NFR BLD MANUAL: 58 % (ref 19.6–45.3)
VLDLC SERPL-MCNC: 12 MG/DL (ref 5–40)
WBC MORPH BLD: NORMAL
WBC MORPH BLD: NORMAL
WBC NRBC COR # BLD AUTO: 17.75 10*3/MM3 (ref 3.4–10.8)
WBC NRBC COR # BLD AUTO: 18.09 10*3/MM3 (ref 3.4–10.8)

## 2025-01-23 PROCEDURE — 71250 CT THORAX DX C-: CPT

## 2025-01-23 PROCEDURE — 85007 BL SMEAR W/DIFF WBC COUNT: CPT | Performed by: INTERNAL MEDICINE

## 2025-01-23 PROCEDURE — 63710000001 INSULIN LISPRO (HUMAN) PER 5 UNITS: Performed by: INTERNAL MEDICINE

## 2025-01-23 PROCEDURE — 85025 COMPLETE CBC W/AUTO DIFF WBC: CPT | Performed by: INTERNAL MEDICINE

## 2025-01-23 PROCEDURE — 73502 X-RAY EXAM HIP UNI 2-3 VIEWS: CPT

## 2025-01-23 PROCEDURE — 63710000001 INSULIN GLARGINE PER 5 UNITS: Performed by: INTERNAL MEDICINE

## 2025-01-23 PROCEDURE — 80053 COMPREHEN METABOLIC PANEL: CPT | Performed by: PHYSICIAN ASSISTANT

## 2025-01-23 PROCEDURE — 85007 BL SMEAR W/DIFF WBC COUNT: CPT | Performed by: PHYSICIAN ASSISTANT

## 2025-01-23 PROCEDURE — 80061 LIPID PANEL: CPT | Performed by: INTERNAL MEDICINE

## 2025-01-23 PROCEDURE — 85730 THROMBOPLASTIN TIME PARTIAL: CPT | Performed by: PHYSICIAN ASSISTANT

## 2025-01-23 PROCEDURE — 83036 HEMOGLOBIN GLYCOSYLATED A1C: CPT | Performed by: INTERNAL MEDICINE

## 2025-01-23 PROCEDURE — 82948 REAGENT STRIP/BLOOD GLUCOSE: CPT

## 2025-01-23 PROCEDURE — 85025 COMPLETE CBC W/AUTO DIFF WBC: CPT | Performed by: PHYSICIAN ASSISTANT

## 2025-01-23 PROCEDURE — 85610 PROTHROMBIN TIME: CPT | Performed by: PHYSICIAN ASSISTANT

## 2025-01-23 PROCEDURE — 99285 EMERGENCY DEPT VISIT HI MDM: CPT

## 2025-01-23 PROCEDURE — 83735 ASSAY OF MAGNESIUM: CPT | Performed by: INTERNAL MEDICINE

## 2025-01-23 PROCEDURE — 99221 1ST HOSP IP/OBS SF/LOW 40: CPT | Performed by: NURSE PRACTITIONER

## 2025-01-23 RX ORDER — LIDOCAINE 4 G/G
1 PATCH TOPICAL
Status: DISCONTINUED | OUTPATIENT
Start: 2025-01-23 | End: 2025-01-23

## 2025-01-23 RX ORDER — PANTOPRAZOLE SODIUM 40 MG/1
40 TABLET, DELAYED RELEASE ORAL
Status: DISCONTINUED | OUTPATIENT
Start: 2025-01-24 | End: 2025-01-24 | Stop reason: HOSPADM

## 2025-01-23 RX ORDER — ACETAMINOPHEN 325 MG/1
650 TABLET ORAL
Status: DISCONTINUED | OUTPATIENT
Start: 2025-01-23 | End: 2025-01-24 | Stop reason: HOSPADM

## 2025-01-23 RX ORDER — CLOPIDOGREL BISULFATE 75 MG/1
75 TABLET ORAL DAILY
Status: DISCONTINUED | OUTPATIENT
Start: 2025-01-23 | End: 2025-01-24 | Stop reason: HOSPADM

## 2025-01-23 RX ORDER — POLYETHYLENE GLYCOL 3350 17 G/17G
17 POWDER, FOR SOLUTION ORAL DAILY PRN
Status: DISCONTINUED | OUTPATIENT
Start: 2025-01-23 | End: 2025-01-24 | Stop reason: HOSPADM

## 2025-01-23 RX ORDER — INSULIN ASPART 100 [IU]/ML
6 INJECTION, SOLUTION INTRAVENOUS; SUBCUTANEOUS
COMMUNITY

## 2025-01-23 RX ORDER — BISACODYL 10 MG
10 SUPPOSITORY, RECTAL RECTAL DAILY PRN
Status: DISCONTINUED | OUTPATIENT
Start: 2025-01-23 | End: 2025-01-24 | Stop reason: HOSPADM

## 2025-01-23 RX ORDER — SODIUM CHLORIDE 0.9 % (FLUSH) 0.9 %
10 SYRINGE (ML) INJECTION AS NEEDED
Status: DISCONTINUED | OUTPATIENT
Start: 2025-01-23 | End: 2025-01-24 | Stop reason: HOSPADM

## 2025-01-23 RX ORDER — LOSARTAN POTASSIUM 25 MG/1
25 TABLET ORAL EVERY 12 HOURS SCHEDULED
Status: DISCONTINUED | OUTPATIENT
Start: 2025-01-23 | End: 2025-01-24 | Stop reason: HOSPADM

## 2025-01-23 RX ORDER — SODIUM CHLORIDE 0.9 % (FLUSH) 0.9 %
10 SYRINGE (ML) INJECTION EVERY 12 HOURS SCHEDULED
Status: DISCONTINUED | OUTPATIENT
Start: 2025-01-23 | End: 2025-01-24 | Stop reason: HOSPADM

## 2025-01-23 RX ORDER — ATORVASTATIN CALCIUM 10 MG/1
10 TABLET, FILM COATED ORAL NIGHTLY
Status: DISCONTINUED | OUTPATIENT
Start: 2025-01-23 | End: 2025-01-24 | Stop reason: HOSPADM

## 2025-01-23 RX ORDER — ACETAMINOPHEN 500 MG
1000 TABLET ORAL 3 TIMES DAILY
Status: DISCONTINUED | OUTPATIENT
Start: 2025-01-23 | End: 2025-01-23 | Stop reason: SDUPTHER

## 2025-01-23 RX ORDER — INSULIN LISPRO 100 [IU]/ML
6 INJECTION, SOLUTION INTRAVENOUS; SUBCUTANEOUS
Status: DISCONTINUED | OUTPATIENT
Start: 2025-01-23 | End: 2025-01-23

## 2025-01-23 RX ORDER — LOSARTAN POTASSIUM 50 MG/1
50 TABLET ORAL DAILY
COMMUNITY

## 2025-01-23 RX ORDER — LIDOCAINE 4 G/G
1 PATCH TOPICAL
Status: DISCONTINUED | OUTPATIENT
Start: 2025-01-24 | End: 2025-01-24 | Stop reason: HOSPADM

## 2025-01-23 RX ORDER — FUROSEMIDE 80 MG/1
80 TABLET ORAL DAILY
Status: DISCONTINUED | OUTPATIENT
Start: 2025-01-23 | End: 2025-01-24 | Stop reason: HOSPADM

## 2025-01-23 RX ORDER — TAMSULOSIN HYDROCHLORIDE 0.4 MG/1
0.8 CAPSULE ORAL DAILY
Status: DISCONTINUED | OUTPATIENT
Start: 2025-01-23 | End: 2025-01-24 | Stop reason: HOSPADM

## 2025-01-23 RX ORDER — SODIUM CHLORIDE 9 MG/ML
40 INJECTION, SOLUTION INTRAVENOUS AS NEEDED
Status: DISCONTINUED | OUTPATIENT
Start: 2025-01-23 | End: 2025-01-24 | Stop reason: HOSPADM

## 2025-01-23 RX ORDER — FAMOTIDINE 20 MG/1
20 TABLET, FILM COATED ORAL NIGHTLY
Status: DISCONTINUED | OUTPATIENT
Start: 2025-01-23 | End: 2025-01-24 | Stop reason: HOSPADM

## 2025-01-23 RX ORDER — NITROGLYCERIN 0.4 MG/1
0.4 TABLET SUBLINGUAL
Status: DISCONTINUED | OUTPATIENT
Start: 2025-01-23 | End: 2025-01-24 | Stop reason: HOSPADM

## 2025-01-23 RX ORDER — ASPIRIN 81 MG/1
81 TABLET ORAL DAILY
Status: DISCONTINUED | OUTPATIENT
Start: 2025-01-23 | End: 2025-01-24 | Stop reason: HOSPADM

## 2025-01-23 RX ORDER — LIDOCAINE 4 G/G
1 PATCH TOPICAL ONCE
Status: COMPLETED | OUTPATIENT
Start: 2025-01-23 | End: 2025-01-24

## 2025-01-23 RX ORDER — HYDROCODONE BITARTRATE AND ACETAMINOPHEN 5; 325 MG/1; MG/1
1 TABLET ORAL ONCE
Status: COMPLETED | OUTPATIENT
Start: 2025-01-23 | End: 2025-01-23

## 2025-01-23 RX ORDER — METHOCARBAMOL 500 MG/1
500 TABLET, FILM COATED ORAL EVERY 8 HOURS PRN
Status: DISCONTINUED | OUTPATIENT
Start: 2025-01-23 | End: 2025-01-24 | Stop reason: HOSPADM

## 2025-01-23 RX ORDER — HYDROCODONE BITARTRATE AND ACETAMINOPHEN 5; 325 MG/1; MG/1
1 TABLET ORAL EVERY 8 HOURS PRN
Status: DISCONTINUED | OUTPATIENT
Start: 2025-01-23 | End: 2025-01-24 | Stop reason: HOSPADM

## 2025-01-23 RX ORDER — INSULIN LISPRO 100 [IU]/ML
6 INJECTION, SOLUTION INTRAVENOUS; SUBCUTANEOUS
Status: DISCONTINUED | OUTPATIENT
Start: 2025-01-23 | End: 2025-01-24 | Stop reason: HOSPADM

## 2025-01-23 RX ORDER — AMOXICILLIN 250 MG
2 CAPSULE ORAL 2 TIMES DAILY PRN
Status: DISCONTINUED | OUTPATIENT
Start: 2025-01-23 | End: 2025-01-24 | Stop reason: HOSPADM

## 2025-01-23 RX ORDER — BISACODYL 5 MG/1
5 TABLET, DELAYED RELEASE ORAL DAILY PRN
Status: DISCONTINUED | OUTPATIENT
Start: 2025-01-23 | End: 2025-01-24 | Stop reason: HOSPADM

## 2025-01-23 RX ADMIN — METHOCARBAMOL 500 MG: 500 TABLET ORAL at 22:26

## 2025-01-23 RX ADMIN — TAMSULOSIN HYDROCHLORIDE 0.8 MG: 0.4 CAPSULE ORAL at 22:25

## 2025-01-23 RX ADMIN — HYDROCODONE BITARTRATE AND ACETAMINOPHEN 1 TABLET: 5; 325 TABLET ORAL at 11:37

## 2025-01-23 RX ADMIN — INSULIN LISPRO 6 UNITS: 100 INJECTION, SOLUTION INTRAVENOUS; SUBCUTANEOUS at 18:59

## 2025-01-23 RX ADMIN — ATORVASTATIN CALCIUM 10 MG: 10 TABLET, FILM COATED ORAL at 22:26

## 2025-01-23 RX ADMIN — FAMOTIDINE 20 MG: 20 TABLET, FILM COATED ORAL at 22:26

## 2025-01-23 RX ADMIN — ASPIRIN 81 MG: 81 TABLET, COATED ORAL at 22:26

## 2025-01-23 RX ADMIN — FUROSEMIDE 80 MG: 80 TABLET ORAL at 22:26

## 2025-01-23 RX ADMIN — ACETAMINOPHEN 650 MG: 325 TABLET, FILM COATED ORAL at 22:26

## 2025-01-23 RX ADMIN — ACETAMINOPHEN 1000 MG: 500 TABLET, FILM COATED ORAL at 18:59

## 2025-01-23 RX ADMIN — CLOPIDOGREL BISULFATE 75 MG: 75 TABLET ORAL at 22:26

## 2025-01-23 RX ADMIN — LIDOCAINE 1 PATCH: 4 PATCH TOPICAL at 13:04

## 2025-01-23 RX ADMIN — Medication 10 ML: at 22:27

## 2025-01-23 RX ADMIN — LOSARTAN POTASSIUM 25 MG: 25 TABLET ORAL at 22:25

## 2025-01-23 RX ADMIN — INSULIN GLARGINE 10 UNITS: 100 INJECTION, SOLUTION SUBCUTANEOUS at 22:28

## 2025-01-23 NOTE — ED NOTES
"Nursing report ED to floor  Javier Marin  89 y.o.  male    HPI :  HPI  Stated Reason for Visit: fall with right hip and abd pain  History Obtained From: patient, family    Chief Complaint  Chief Complaint   Patient presents with    Fall     9pm yesterday, did not hit head, -LOC, -thinners       Admitting doctor:   Percy Em MD    Admitting diagnosis:   The primary encounter diagnosis was Closed fracture of multiple ribs of right side, initial encounter. A diagnosis of Contusion of right hip, initial encounter was also pertinent to this visit.    Code status:   Current Code Status       Date Active Code Status Order ID Comments User Context       Prior            Allergies:   Patient has no known allergies.    Isolation:   No active isolations    Intake and Output  No intake or output data in the 24 hours ending 01/23/25 1603    Weight:       01/23/25  1059   Weight: 72.6 kg (160 lb)       Most recent vitals:   Vitals:    01/23/25 1042 01/23/25 1057 01/23/25 1059 01/23/25 1306   BP:  130/71  140/65   BP Location:  Right arm  Right arm   Patient Position:  Sitting  Lying   Pulse: 69   60   Resp: 18      Temp: 98.2 °F (36.8 °C)      TempSrc: Tympanic      SpO2: 97%   97%   Weight:   72.6 kg (160 lb)    Height:   175.3 cm (69\")        Active LDAs/IV Access:   Lines, Drains & Airways       Active LDAs       Name Placement date Placement time Site Days    Peripheral IV 01/23/25 1354 Left Antecubital 01/23/25  1354  Antecubital  less than 1                    Labs (abnormal labs have a star):   Labs Reviewed   COMPREHENSIVE METABOLIC PANEL - Abnormal; Notable for the following components:       Result Value    Glucose 269 (*)     BUN 30 (*)     Creatinine 1.31 (*)     Alkaline Phosphatase 118 (*)     eGFR 52.0 (*)     All other components within normal limits    Narrative:     GFR Categories in Chronic Kidney Disease (CKD)      GFR Category          GFR (mL/min/1.73)    Interpretation  G1                     90 " or greater         Normal or high (1)  G2                      60-89                Mild decrease (1)  G3a                   45-59                Mild to moderate decrease  G3b                   30-44                Moderate to severe decrease  G4                    15-29                Severe decrease  G5                    14 or less           Kidney failure          (1)In the absence of evidence of kidney disease, neither GFR category G1 or G2 fulfill the criteria for CKD.    eGFR calculation 2021 CKD-EPI creatinine equation, which does not include race as a factor   PROTIME-INR - Abnormal; Notable for the following components:    Protime 14.5 (*)     INR 1.14 (*)     All other components within normal limits   CBC WITH AUTO DIFFERENTIAL - Abnormal; Notable for the following components:    WBC 18.09 (*)     Hemoglobin 12.7 (*)     Platelets 124 (*)     All other components within normal limits   MANUAL DIFFERENTIAL - Abnormal; Notable for the following components:    Lymphocyte % 53.0 (*)     Monocyte % 2.0 (*)     Neutrophils Absolute 8.14 (*)     Lymphocytes Absolute 9.59 (*)     All other components within normal limits   APTT - Normal   CBC AND DIFFERENTIAL    Narrative:     The following orders were created for panel order CBC & Differential.  Procedure                               Abnormality         Status                     ---------                               -----------         ------                     CBC Auto Differential[[id number]]        Abnormal            Final result                 Please view results for these tests on the individual orders.       EKG:   No orders to display       Meds given in ED:   Medications   Lidocaine 4 % 1 patch (1 patch Transdermal Medication Applied 1/23/25 1304)   sodium chloride 0.9 % flush 10 mL (has no administration in time range)   HYDROcodone-acetaminophen (NORCO) 5-325 MG per tablet 1 tablet (1 tablet Oral Given 1/23/25 1137)       Imaging results:  CT  Chest Without Contrast Diagnostic    Result Date: 2025   1. Likely acute nondisplaced fractures of the anterolateral right third through fifth ribs, the posterior right fifth through 10th ribs, and the right fifth through seventh costal cartilages. 2. Otherwise, no acute intrathoracic abnormality.  This report was finalized on 2025 1:24 PM by Anthony Mariano MD on Workstation: [id number]       Ambulatory status:   - assist    Social issues:   Social History     Socioeconomic History    Marital status:    Tobacco Use    Smoking status: Former     Types: Cigars     Quit date:      Years since quittin.0    Smokeless tobacco: Never   Vaping Use    Vaping status: Never Used   Substance and Sexual Activity    Alcohol use: No    Drug use: No    Sexual activity: Not Currently     Partners: Female       Peripheral Neurovascular  Peripheral Neurovascular (Adult)  Peripheral Neurovascular WDL: WDL    Neuro Cognitive  Neuro Cognitive (Adult)  Cognitive/Neuro/Behavioral WDL: orientation, speech, level of consciousness, WDL  Level of Consciousness: Alert  Orientation: oriented x 4  Speech: clear, spontaneous, logical  Sedation Group  POSS (Pasero Opioid-Induced Sed Scale): 1 - Awake and alert    Learning  Learning Assessment  Learning Readiness and Ability: no barriers identified  Education Provided  Person Taught: patient, family member/friend  Teaching Method: verbal instruction  Teaching Focus: symptom/problem overview, diagnostic test  Education Outcome Evaluation: verbalizes understanding, acceptance expressed    Respiratory  Respiratory WDL  Respiratory WDL: WDL    Abdominal Pain       Pain Assessments  Pain (Adult)  (0-10) Pain Rating: Rest: 0  (0-10) Pain Rating: Activity: 6  Pain Location: other (see comments) (ribs)  Pain Side/Orientation: right  Response to Pain Interventions: interventions effective per patient    NIH Stroke Scale       Maria Teresa Glaser RN  25 16:03 EST

## 2025-01-23 NOTE — H&P
Patient Care Team:  Percy Em MD as PCP - General  Percy Em MD as PCP - Family Medicine  Fadi Alvarez III, MD as Cardiologist (Cardiology)    Chief complaint   Chief Complaint   Patient presents with    Fall     9pm yesterday, did not hit head, -LOC, -thinners         Subjective     Patient is a 89 y.o. male presents with a history of a fall 3 days ago at home.  It was in the evening and he was walking around his bed and apparently he stumbled, fell to the floor, did not hit anything on the way down, did not hit his head or lose consciousness, and needed assistance from his wife to get up off of the floor.  He fell onto his right side and his biggest was concern was that he had some right hip discomfort in an area where he had had a previous right hip fracture.  The next day the family member came by and encouraged them to go into the emergency room with the primary concern again being his right hip pain and some minimal right chest wall pain.  Obviously the biggest concern was a hip fracture and a possible rib fracture as well.  The patient finally came to the emergency room today and was seen and evaluated.  A CT scan showed fractures of the anterior lateral right 3rd through 5th ribs and the posterior 5th through 10th ribs as well as the right 5th through 7th costal cartilages.  The hip x-ray showed no change in the appliance or new fractures.  Remarkably enough the patient's not in any significant pain at rest.  He can actually use a spirometer without much discomfort.  He does have some notable discomfort when he coughs or turns over in bed.  He is otherwise awake and alert and has no significant confusion.  He is hard of hearing so it is sometimes hard for him to understand what you are saying but once he does he answers appropriately and follows the conversation well.  It is also notable that his pain was not severe at home and that he had only used 1 Tylenol twice a day for pain and  discomfort.  He was given a 5/3/2025 Norco in the emergency room which he seemed to tolerate and clearly has given him some additional relief.  We are waiting for thoracic surgery to see the patient.  Will also ask physical therapy to see him as well.  Considering how good he looks his hospital stay may not be long.    Otherwise the patient was in his normal state of health and getting along well.  He has diabetes mellitus type 2 that is generally well-controlled considering his last hemoglobin A1c was 6.8.  His wife manages his insulin and diet at home and watches things very closely.  He is generally compliant with medications and office follow-ups.  He has a complicated past medical history which includes atherosclerotic cardiovascular disease with stenting, atrial flutter,, ischemic cardiomyopathy, hypertension, high cholesterol, and high blood pressure.  All of these things have been under good control and he is essentially asymptomatic.  He sees cardiology about every 6 months and he recently saw vascular surgery for his carotid stenosis with no intervention planned.  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  He and his wife live in a home that they have occupied for many years and do the majority of their self-care and activities of daily living without assistance.  The patient's wife and a nephew were present when the patient was evaluated.  They understand the current situation and agree with the current plans for treatment and further evaluation.    Review of Systems   Pertinent items are noted in HPI, all other systems reviewed and negative    History  Past Medical History:   Diagnosis Date    Advanced diabetic retinal disease     Anorexia     Arteriosclerosis of abdominal aorta 02/24/2014    Arthritis     Atrial flutter     CAD (coronary artery disease)     Carotid stenosis, asymptomatic, bilateral 02/24/2014    16-49%    Closed right  arm fracture 1994    Compression fracture of lumbar spine, non-traumatic     Diabetes mellitus     Elevated LFTs     Esophagitis 06/19/2018    DR RASHID GRADE D LOWER    Esophagus disorder     THICKENED DISTAL WALL-CT SCAN 6/18/18    First degree AV block     Garbled speech     Granulomatous disease, chronic     Hard of hearing     Hiatal hernia with GERD and esophagitis     History of acute gastritis     Hyperlipidemia     Hypertension     Hyponatremia     Leukocytosis     Lumbar canal stenosis     Lung nodule seen on imaging study     Mandible fracture 03/28/2020    FROM FALL    Nonrheumatic tricuspid valve regurgitation 07/28/2024    Osteoarthritis of multiple joints     Permanent atrial fibrillation     Ptosis of left eyelid     Pulmonary hypertension 07/28/2024    Remote history of stroke     SMALL CAUDATE NECLEUS    Right bundle branch block (RBBB) with left anterior fascicular block (LAFB)     Syncope and collapse 1/2022 06/11/2022    Thrombocytopenia     Weakness generalized      Past Surgical History:   Procedure Laterality Date    CARDIAC CATHETERIZATION N/A 6/9/2022    Procedure: Left Heart Cath;  Surgeon: Reinaldo Miller MD;  Location: [facility] CATH INVASIVE LOCATION;  Service: Cardiovascular;  Laterality: N/A;    CARDIAC CATHETERIZATION N/A 6/9/2022    Procedure: Left ventriculography;  Surgeon: Reinaldo Miller MD;  Location: [facility] CATH INVASIVE LOCATION;  Service: Cardiovascular;  Laterality: N/A;    CARDIAC CATHETERIZATION N/A 6/9/2022    Procedure: Coronary angiography;  Surgeon: Reinaldo Miller MD;  [facility];  Service: Cardiovascular;  Laterality: N/A;    CARDIAC CATHETERIZATION N/A 6/13/2022    Procedure: STENT CHARISMA - CORONARY;  Surgeon: Michelle Alegre MD;  Location: [facility] CATH INVASIVE LOCATION;  Service: Cardiovascular;  Laterality: N/A;  PCI of the LAD with atherectomy    CARDIAC CATHETERIZATION N/A 6/13/2022    Procedure: Atherectomy-coronary;   Surgeon: Michelle Alegre MD;  Location: [facility] CATH INVASIVE LOCATION;  Service: Cardiovascular;  Laterality: N/A;  rotablator    CARDIAC CATHETERIZATION N/A 2022    Procedure: Optical Coherent Tomography;  Surgeon: Michelle Alegre MD;  [facility];  Service: Cardiovascular;  Laterality: N/A;    CATARACT EXTRACTION, BILATERAL      COLONOSCOPY      ENDOSCOPY N/A 2018    Procedure: ESOPHAGOGASTRODUODENOSCOPY WITH BIOPSY;  Surgeon: Toribio Wade MD;  [facility];  Service: Gastroenterology    EYE PTOSIS REPAIR Left 11/15/2016    Procedure: LT UPPER LID EYE PTOSIS REPAIR;  Surgeon: Anup Lancaster MD;  [facility];  Service:     EYE SURGERY Left     victreous hemorrhage repair    HIP ARTHROPLASTY Right     ORIF MANDIBULAR FRACTURE Bilateral 2020    Procedure: OPEN REDUCTION AND INTERNAL FIXATION OF BILATERAL MANDIBLE FRACTURES;  Surgeon: Percy Jeronimo MD;  Location: [facility] MAIN OR;  Service: Maxillofacial;  Laterality: Bilateral;     Family History   Problem Relation Age of Onset    Pancreatic cancer Mother     Hypertension Neg Hx     Hyperlipidemia Neg Hx     Heart failure Neg Hx     Heart disease Neg Hx     Heart attack Neg Hx     Malig Hyperthermia Neg Hx      Social History     Tobacco Use    Smoking status: Former     Types: Cigars     Quit date:      Years since quittin.0    Smokeless tobacco: Never   Vaping Use    Vaping status: Never Used   Substance Use Topics    Alcohol use: No    Drug use: No     Medications Prior to Admission   Medication Sig Dispense Refill Last Dose/Taking    acetaminophen (TYLENOL) 500 MG tablet Take 1 tablet by mouth Every 6 (Six) Hours As Needed for Mild Pain.   Taking As Needed    aspirin 81 MG EC tablet Take 1 tablet by mouth Daily.   Taking    atorvastatin (LIPITOR) 10 MG tablet Take 1 tablet by mouth Daily. 90 tablet 3 Taking    cholecalciferol (VITAMIN D3) 1000 UNITS tablet Take 1 tablet  by mouth Daily.   Taking    clopidogrel (PLAVIX) 75 MG tablet TAKE ONE (1) TABLET BY MOUTH DAILY. 90 tablet 3 Taking    famotidine (PEPCID) 20 MG tablet Take 1 tablet by mouth Every Night. 90 tablet 3 Taking    fexofenadine (ALLEGRA) 180 MG tablet Take 1 tablet by mouth Daily As Needed.   Taking As Needed    furosemide (LASIX) 40 MG tablet Take 2 tablets by mouth Daily.   Taking    Insulin Aspart (novoLOG) 100 UNIT/ML injection Inject 6 Units under the skin into the appropriate area as directed 3 (Three) Times a Day Before Meals.   Taking    insulin glargine (LANTUS, SEMGLEE) 100 UNIT/ML injection Inject 10 Units under the skin into the appropriate area as directed Every Night. (Patient taking differently: Inject 15 Units under the skin into the appropriate area as directed Every Night.) 100 mL 12 Taking Differently    lansoprazole (Prevacid) 15 MG capsule Take 1 capsule by mouth Daily.   Taking    losartan (COZAAR) 50 MG tablet Take 1 tablet by mouth Daily.   Taking    tamsulosin (FLOMAX) 0.4 MG capsule 24 hr capsule Take 2 capsules by mouth Daily.   Taking     Allergies:  Patient has no known allergies.        Objective     Vital Signs  Temp:  [98 °F (36.7 °C)-98.2 °F (36.8 °C)] 98 °F (36.7 °C)  Heart Rate:  [60-74] 74  Resp:  [18] 18  BP: (130-153)/(65-95) 153/95    Physical Exam:      General Appearance:  Alert, cooperative, in no acute distress, he is clearly oriented and has good recall of events.  He is very pleasant and cooperative in general.   Head:  Normocephalic, without obvious abnormality, atraumatic   Eyes:  Lids and lashes normal, conjunctivae and sclerae normal, no icterus, no pallor, corneas clear, PERRLA   Ears:  Ears appear intact with no abnormalities noted other than the fact that his hearing is significantly impaired, you need to speak loudly and clearly for him to understand which helps him engage in conversation more easily.   Throat:  No oral lesions, no thrush, oral mucosa moist   Neck:   "No adenopathy, supple, trachea midline, no thyromegaly, no carotid bruit, no JVD   Back:  No kyphosis present, no scoliosis present, no skin lesions, erythema or scars, no tenderness to percussion or palpation, range of motion normal, no bruises or contusions are noted as well.   Lungs:  Clear to auscultation, respirations regular, even and unlabored, no wheezes or rhonchi are noted    Heart:  Regular rhythm and normal rate,, no murmur, and he does not complain of flutterings or palpitations.   Chest Wall:  No abnormalities observed, no contusions as noted.  His right chest wall is tender though to palpation although he has only minimal discomfort with deep inspiration.   Abdomen:  Normal bowel sounds, no masses, no organomegaly, soft non-tender, non-distended, no guarding, no rebound tenderness   Rectal:  Deferred   Extremities:  Moves all extremities well, no edema, no cyanosis, no redness, he has no evidence for contusions that his right hip and he has minimal discomfort sitting in bed with range of motion and palpation.   Pulses:  Pulses palpable and equal bilaterally   Skin:  No bleeding, bruising or rash   Lymph nodes:  No palpable adenopathy   Neurologic:  Cranial nerves 2 - 12 grossly intact, sensation intact, DTR present and equal bilaterally                                                                               Results Review:    I reviewed the patient's new clinical results.  I reviewed the patient's new imaging results and agree with the interpretation.  I reviewed the patient's other test results and agree with the interpretation  Discussed with this case with his nurse, his wife, and his nephew.  \"           Results from last 7 days   Lab Units 01/23/25  1353   SODIUM mmol/L 138   POTASSIUM mmol/L 4.7   CHLORIDE mmol/L 99   CO2 mmol/L 27.8   BUN mg/dL 30*   CREATININE mg/dL 1.31*   GLUCOSE mg/dL 269*   CALCIUM mg/dL 9.1         Results from last 7 days   Lab Units 01/23/25  1353   WBC 10*3/mm3 " 18.09*   HEMOGLOBIN g/dL 12.7*   HEMATOCRIT % 38.6   PLATELETS 10*3/mm3 124*     Results from last 7 days   Lab Units 01/23/25  1353   INR  1.14*   APTT seconds 30.9                    Assessment & Plan       Multiple rib fractures    Type 2 diabetes mellitus, with long-term current use of insulin    Hyperlipidemia    Hiatal hernia with GERD and esophagitis    BPH without obstruction/lower urinary tract symptoms    Atrial flutter    Essential hypertension    Lumbar canal stenosis    Contusion of right hip    Stage 3b chronic kidney disease    Arteriosclerotic cardiovascular disease (ASCVD)      #1 Fall with Multiple Right Rib Fractures-as noted 3 through 5 anteriorly and 5 through 10 posteriorly along with the costal cartilage involvement disrupted with his fall.  He is remarkably in minimal discomfort.  The lidocaine patch seems to help.  Will get him on some regular Tylenol and have Norco available if he needs it.  We are waiting for thoracic surgery to see the patient to see if they have any concerns or feel any other intervention is needed.  Will also ask physical therapy to see the patient to evaluate his mobility and safety and consideration of him returning home.    2.  Diabetes mellitus type 2-his hemoglobin A1c is well-controlled at 6.8.  His wife watches his diet closely and manages his insulin well.  She is on an excellent job of keeping things under control.    3.  ASCVD-he has no complaints of chest pain or other cardiac symptoms    4.  Atrial fibs/flutter-he has a very well-controlled rate that surprisingly slow and stable.  He is not anticoagulated because of the risk of falls.  He sees cardiology regularly and tolerates his medications well.    5.  Hypertension-his blood pressure is well-controlled as an outpatient although it is a little bit elevated in the hospital as would be expected with pain and discomfort.    6.  High cholesterol-his cholesterol is well-controlled as an outpatient with his  last LDL being 52    7.  CKD 3-his last GFR in the office was 44 and his last protein creatinine ratio was less than 8.  His renal function has been very stable    8.  Hiatal hernia/esophagitis-he is on a proton pump inhibitor and H2 blocker with no complaints of reflux symptoms.    9.  BPH-he has few complaints and seems to get a good response from his tamsulosin    10.  Right hip contusion-fortunately the x-ray showed no evidence for fracture had a hip that he fractured previously.  He has surprisingly little discomfort there.  Will see how he does with physical therapy evaluates him tomorrow.    The patient is remarkably stable and in minimal pain despite the extensive nature of the rib fractures.  He and his wife both hope to return home as soon as possible as long as it is safe.        I discussed the patient's findings and my recommendations with patient, family, and nursing staff.     Percy Em MD  01/23/25  18:46 EST    Time:

## 2025-01-23 NOTE — CONSULTS
General MD Inpatient Consult  Consult performed by: Antonette Busby, HARSH, APRN  Consult ordered by: Uli Car PA          Patient Care Team:  Percy Em MD as PCP - General  Percy Em MD as PCP - Family Medicine  Fadi Alvarez III, MD as Cardiologist (Cardiology)    Chief Complaint   Patient presents with    Fall     9pm yesterday, did not hit head, -LOC, -thinners       Subjective     Fall        Mr. Marin is a very pleasant 89-year-old gentleman who is hard of hearing.  Other past medical history includes hypertension, A-fib, carotid stenosis and CAD on Plavix.  He presented to [facility] earlier today with increased right-sided chest wall pain after a fall from standing on Tuesday night.  He denied head injury and loss of consciousness.  His family was concerned about a right hip fracture.  Initial evaluation in ER included hip x-ray which was negative for fracture.  CT chest demonstrated multiple right-sided rib fractures, for which we have been asked to see him.    Review of Systems   HENT:  Positive for hearing loss.    Eyes: Negative.    Respiratory: Negative.     Cardiovascular:  Positive for chest pain.   Gastrointestinal: Negative.    Endocrine: Negative.    Genitourinary: Negative.    Musculoskeletal: Negative.    Allergic/Immunologic: Negative.    Neurological:  Positive for weakness.   Hematological:  Bruises/bleeds easily.        Past Medical History:   Diagnosis Date    Advanced diabetic retinal disease     Anorexia     Arteriosclerosis of abdominal aorta 02/24/2014    Arthritis     Atrial flutter     CAD (coronary artery disease)     Carotid stenosis, asymptomatic, bilateral 02/24/2014    16-49%    Closed right arm fracture 1994    Compression fracture of lumbar spine, non-traumatic     Diabetes mellitus     Elevated LFTs     Esophagitis 06/19/2018    DR RASHID GRADE D LOWER    Esophagus disorder     THICKENED DISTAL WALL-CT SCAN 6/18/18    First degree AV  block     Garbled speech     Granulomatous disease, chronic     Hard of hearing     Hiatal hernia with GERD and esophagitis     History of acute gastritis     Hyperlipidemia     Hypertension     Hyponatremia     Leukocytosis     Lumbar canal stenosis     Lung nodule seen on imaging study     Mandible fracture 03/28/2020    FROM FALL    Nonrheumatic tricuspid valve regurgitation 07/28/2024    Osteoarthritis of multiple joints     Permanent atrial fibrillation     Ptosis of left eyelid     Pulmonary hypertension 07/28/2024    Remote history of stroke     SMALL CAUDATE NECLEUS    Right bundle branch block (RBBB) with left anterior fascicular block (LAFB)     Syncope and collapse 1/2022 06/11/2022    Thrombocytopenia     Weakness generalized      Past Surgical History:   Procedure Laterality Date    CARDIAC CATHETERIZATION N/A 6/9/2022    Procedure: Left Heart Cath;  Surgeon: Reinaldo Miller MD;  Location: [facility] CATH INVASIVE LOCATION;  Service: Cardiovascular;  Laterality: N/A;    CARDIAC CATHETERIZATION N/A 6/9/2022    Procedure: Left ventriculography;  Surgeon: Reinaldo Miller MD;  Location: [facility] CATH INVASIVE LOCATION;  Service: Cardiovascular;  Laterality: N/A;    CARDIAC CATHETERIZATION N/A 6/9/2022    Procedure: Coronary angiography;  Surgeon: Reinaldo Miller MD;  Location: [facility] CATH INVASIVE LOCATION;  Service: Cardiovascular;  Laterality: N/A;    CARDIAC CATHETERIZATION N/A 6/13/2022    Procedure: STENT CHARISMA - CORONARY;  Surgeon: Michelle Alegre MD;  Location: [facility] CATH INVASIVE LOCATION;  Service: Cardiovascular;  Laterality: N/A;  PCI of the LAD with atherectomy    CARDIAC CATHETERIZATION N/A 6/13/2022    Procedure: Atherectomy-coronary;  Surgeon: Michelle Alegre MD;  Location: [facility] CATH INVASIVE LOCATION;  Service: Cardiovascular;  Laterality: N/A;  rotablator    CARDIAC CATHETERIZATION N/A 6/13/2022    Procedure: Optical Coherent Tomography;  Surgeon: Michelle Alegre MD;  Location:  [facility] CATH INVASIVE LOCATION;  Service: Cardiovascular;  Laterality: N/A;    CATARACT EXTRACTION, BILATERAL      COLONOSCOPY      ENDOSCOPY N/A 2018    Procedure: ESOPHAGOGASTRODUODENOSCOPY WITH BIOPSY;  Surgeon: Toribio Wade MD;  Location: [facility] ENDOSCOPY;  Service: Gastroenterology    EYE PTOSIS REPAIR Left 11/15/2016    Procedure: LT UPPER LID EYE PTOSIS REPAIR;  Surgeon: Anup Lancaster MD;  Location: [facility] OR OSC;  Service:     EYE SURGERY Left     victreous hemorrhage repair    HIP ARTHROPLASTY Right     ORIF MANDIBULAR FRACTURE Bilateral 2020    Procedure: OPEN REDUCTION AND INTERNAL FIXATION OF BILATERAL MANDIBLE FRACTURES;  Surgeon: Percy Jeronimo MD;  Location: [facility] MAIN OR;  Service: Maxillofacial;  Laterality: Bilateral;     Family History   Problem Relation Age of Onset    Pancreatic cancer Mother     Hypertension Neg Hx     Hyperlipidemia Neg Hx     Heart failure Neg Hx     Heart disease Neg Hx     Heart attack Neg Hx     Malig Hyperthermia Neg Hx      Social History     Socioeconomic History    Marital status:    Tobacco Use    Smoking status: Former     Types: Cigars     Quit date:      Years since quittin.0    Smokeless tobacco: Never   Vaping Use    Vaping status: Never Used   Substance and Sexual Activity    Alcohol use: No    Drug use: No    Sexual activity: Not Currently     Partners: Female     Medications Prior to Admission   Medication Sig Dispense Refill Last Dose/Taking    acetaminophen (TYLENOL) 500 MG tablet Take 1 tablet by mouth Every 6 (Six) Hours As Needed for Mild Pain.   Taking As Needed    aspirin 81 MG EC tablet Take 1 tablet by mouth Daily.   Taking    atorvastatin (LIPITOR) 10 MG tablet Take 1 tablet by mouth Daily. 90 tablet 3 Taking    cholecalciferol (VITAMIN D3) 1000 UNITS tablet Take 1 tablet by mouth Daily.   Taking    clopidogrel (PLAVIX) 75 MG tablet TAKE ONE (1) TABLET BY MOUTH DAILY. 90 tablet 3 Taking    famotidine  (PEPCID) 20 MG tablet Take 1 tablet by mouth Every Night. 90 tablet 3 Taking    fexofenadine (ALLEGRA) 180 MG tablet Take 1 tablet by mouth Daily As Needed.   Taking As Needed    furosemide (LASIX) 40 MG tablet Take 2 tablets by mouth Daily.   Taking    Insulin Aspart (novoLOG) 100 UNIT/ML injection Inject 6 Units under the skin into the appropriate area as directed 3 (Three) Times a Day Before Meals.   Taking    insulin glargine (LANTUS, SEMGLEE) 100 UNIT/ML injection Inject 10 Units under the skin into the appropriate area as directed Every Night. (Patient taking differently: Inject 15 Units under the skin into the appropriate area as directed Every Night.) 100 mL 12 Taking Differently    lansoprazole (Prevacid) 15 MG capsule Take 1 capsule by mouth Daily.   Taking    losartan (COZAAR) 50 MG tablet Take 1 tablet by mouth Daily.   Taking    tamsulosin (FLOMAX) 0.4 MG capsule 24 hr capsule Take 2 capsules by mouth Daily.   Taking     No Known Allergies    Objective      Vital Signs  Temp:  [97.5 °F (36.4 °C)-99 °F (37.2 °C)] 97.5 °F (36.4 °C)  Heart Rate:  [] 107  Resp:  [18] 18  BP: (108-153)/(65-95) 144/66    Intake & Output (last day)         01/23 0701  01/24 0700 01/24 0701  01/25 0700    P.O. 120     Total Intake(mL/kg) 120 (1.7)     Urine (mL/kg/hr) 700     Total Output 700     Net -580                   Physical Exam  Constitutional:       General: He is not in acute distress.     Appearance: Normal appearance.   HENT:      Head: Normocephalic and atraumatic.      Nose: Nose normal.   Cardiovascular:      Rate and Rhythm: Normal rate.   Pulmonary:      Effort: Pulmonary effort is normal. No respiratory distress.   Chest:      Chest wall: Tenderness present.   Musculoskeletal:         General: Normal range of motion.      Cervical back: Normal range of motion and neck supple.   Neurological:      General: No focal deficit present.      Mental Status: He is alert.      Motor: Weakness present.    Psychiatric:         Mood and Affect: Mood normal.         Thought Content: Thought content normal.         Results Review:    I reviewed the patient's new clinical results.  I reviewed the patient's new imaging results and agree with the interpretation.  Discussed with patient, RN and surgeon    Imaging Results (Last 24 Hours)       Procedure Component Value Units Date/Time    XR Chest 1 View [[id number]] Collected: 01/24/25 0722     Updated: 01/24/25 0728    Narrative:      XR CHEST 1 VW-1/24/2025     HISTORY: Rib fractures.     Heart size is within normal limits. Lungs are underinflated with some  vascular crowding.     The reported rib fractures are not well seen though there is minimal  deformity of the lateral aspects of the right fifth and possibly sixth  ribs. No pneumothorax is seen. There is deformity of the bilateral  proximal humeri likely from old fractures. There is some aortic  calcification.       Impression:      1. Underinflation of the lungs with no focal infiltrates.  2. The right rib fractures are not well seen though there is mild  deformity of the lateral aspects of the right fifth and probably sixth  ribs.  3. No pneumothorax is seen.        This report was finalized on 1/24/2025 7:24 AM by Dr. Umesh Velasquez M.D on Workstation: [id number]       CT Chest Without Contrast Diagnostic [[id number]] Collected: 01/23/25 1304     Updated: 01/23/25 1327    Narrative:      CT CHEST WO CONTRAST DIAGNOSTIC-     DATE OF EXAM: 1/23/2025 12:21 PM     INDICATION: Right-sided chest wall pain after fall from standing,  concern for rib fracture.     COMPARISON: Cardiac CT 5/27/2022. CT abdomen and pelvis 6/18/2018.     TECHNIQUE: Multiple contiguous axial images were acquired through the  chest without the intravenous administration of contrast. Reformatted  coronal and sagittal sequences and 3D reconstruction images were also  reviewed. Radiation dose reduction techniques were utilized,  including  automated exposure control and exposure modulation based on body size.     FINDINGS:  Evaluation for intrathoracic trauma is mildly limited by the lack of  intravenous contrast. Respiratory motion artifact.     The heart and great vessels of the chest are normal in size. Severe  calcified coronary artery disease. No pericardial effusion. Calcified  remnants of prior granulomatous disease in the mediastinum and right  hilum. No pathologically enlarged intrathoracic lymph nodes are  identified. Tiny hiatal hernia.     Low lung volumes with bilateral perihilar and bibasilar subsegmental  atelectasis and/or scarring. Scattered likely benign sub-6 mm pulmonary  nodules in both lungs. Benign calcified granulomas in the right lung. No  dense lung consolidation. The central airways are widely patent. No  pneumothorax or pleural effusion.     Limited noncontrast CT imaging of the upper abdomen is unremarkable.     Flowing multilevel mid and lower thoracic anterior osteophyte formation  indicating DISH. Likely acute nondisplaced fractures of the  anterolateral right third, fourth, and fifth ribs. Age-indeterminate but  possibly acute nondisplaced fractures of the posterior right fifth  through 10th ribs. Suspected nondisplaced fracture of the right fifth,  sixth, and seventh costal cartilages. Multiple superimposed old  bilateral rib fracture deformities. Partially imaged plate and screw  fixation hardware at the mandible and old healed fracture deformity of  the mandible. The patient is edentulous.       Impression:         1. Likely acute nondisplaced fractures of the anterolateral right third  through fifth ribs, the posterior right fifth through 10th ribs, and the  right fifth through seventh costal cartilages.  2. Otherwise, no acute intrathoracic abnormality.     This report was finalized on 1/23/2025 1:24 PM by Anthony Mariano MD on  Workstation: [id number]       XR Hip With or Without Pelvis 2 - 3 View Right  [[id number]] Collected: 01/23/25 1135     Updated: 01/23/25 1140    Narrative:      XR HIP W OR WO PELVIS 2-3 VIEW RIGHT-     Clinical: Fell with right hip pain     COMPARISON 10/11/2022     FINDINGS: Alignment of the total right hip replacement prosthesis is  satisfactory and similar to the previous examination. No component  loosening seen. The right hemipelvis is satisfactory in appearance. No  acute osseous abnormality involving the proximal femur. Healed fracture  site is similar to the previous examination. Vascular arterial  calcifications noted within the soft tissues.     CONCLUSION: Hip prosthesis is satisfactory in appearance, no fracture  seen.     This report was finalized on 1/23/2025 11:36 AM by Dr. Spencer Gracia M.D on Workstation: [id number]               Lab Results:  Lab Results (last 24 hours)       Procedure Component Value Units Date/Time    Manual Differential [[id number]]  (Abnormal) Collected: 01/24/25 0658    Specimen: Blood Updated: 01/24/25 0752     Neutrophil % 53.0 %      Lymphocyte % 30.1 %      Monocyte % 2.4 %      Eosinophil % 1.2 %      Basophil % 0.0 %      Atypical Lymphocyte % 13.3 %      Neutrophils Absolute 8.70 10*3/mm3      Lymphocytes Absolute 7.13 10*3/mm3      Monocytes Absolute 0.39 10*3/mm3      Eosinophils Absolute 0.20 10*3/mm3      Basophils Absolute 0.00 10*3/mm3      Acanthocytes Mod/2+     Anisocytosis Mod/2+     Elliptocytes Mod/2+     Hypochromia Mod/2+     Microcytes Mod/2+     Ovalocytes Mod/2+     Poikilocytes Slight/1+     Smudge Cells Large/3+     Platelet Morphology Normal    Basic Metabolic Panel [[id number]]  (Abnormal) Collected: 01/24/25 0658    Specimen: Blood Updated: 01/24/25 0732     Glucose 135 mg/dL      BUN 28 mg/dL      Creatinine 1.27 mg/dL      Sodium 140 mmol/L      Potassium 4.4 mmol/L      Chloride 102 mmol/L      CO2 31.0 mmol/L      Calcium 9.0 mg/dL      BUN/Creatinine Ratio 22.0     Anion Gap 7.0 mmol/L      eGFR 54.0 mL/min/1.73      Narrative:      GFR Categories in Chronic Kidney Disease (CKD)      GFR Category          GFR (mL/min/1.73)    Interpretation  G1                     90 or greater         Normal or high (1)  G2                      60-89                Mild decrease (1)  G3a                   45-59                Mild to moderate decrease  G3b                   30-44                Moderate to severe decrease  G4                    15-29                Severe decrease  G5                    14 or less           Kidney failure          (1)In the absence of evidence of kidney disease, neither GFR category G1 or G2 fulfill the criteria for CKD.    eGFR calculation 2021 CKD-EPI creatinine equation, which does not include race as a factor    CBC & Differential [[id number]]  (Abnormal) Collected: 01/24/25 0658    Specimen: Blood Updated: 01/24/25 0727    Narrative:      The following orders were created for panel order CBC & Differential.  Procedure                               Abnormality         Status                     ---------                               -----------         ------                     CBC Auto Differential[[id number]]        Abnormal            Final result                 Please view results for these tests on the individual orders.    CBC Auto Differential [[id number]]  (Abnormal) Collected: 01/24/25 0658    Specimen: Blood Updated: 01/24/25 0727     WBC 16.42 10*3/mm3      RBC 3.92 10*6/mm3      Hemoglobin 11.7 g/dL      Hematocrit 35.5 %      MCV 90.6 fL      MCH 29.8 pg      MCHC 33.0 g/dL      RDW 13.0 %      RDW-SD 43.0 fl      MPV 10.6 fL      Platelets 116 10*3/mm3     POC Glucose Once [[id number]]  (Abnormal) Collected: 01/24/25 0543    Specimen: Blood Updated: 01/24/25 0544     Glucose 136 mg/dL     Manual Differential [[id number]]  (Abnormal) Collected: 01/23/25 2025    Specimen: Blood Updated: 01/23/25 2232     Neutrophil % 39.0 %      Lymphocyte % 58.0 %      Monocyte % 3.0 %      Neutrophils Absolute  6.92 10*3/mm3      Lymphocytes Absolute 10.30 10*3/mm3      Monocytes Absolute 0.53 10*3/mm3      RBC Morphology Normal     WBC Morphology Normal     Platelet Morphology Normal    Lipid Panel [[id number]] Collected: 01/23/25 2025    Specimen: Blood Updated: 01/23/25 2131     Total Cholesterol 101 mg/dL      Triglycerides 49 mg/dL      HDL Cholesterol 42 mg/dL      LDL Cholesterol  47 mg/dL      VLDL Cholesterol 12 mg/dL      LDL/HDL Ratio 1.17    Narrative:      Cholesterol Reference Ranges  (U.S. Department of Health and Human Services ATP III Classifications)    Desirable          <200 mg/dL  Borderline High    200-239 mg/dL  High Risk          >240 mg/dL      Triglyceride Reference Ranges  (U.S. Department of Health and Human Services ATP III Classifications)    Normal           <150 mg/dL  Borderline High  150-199 mg/dL  High             200-499 mg/dL  Very High        >500 mg/dL    HDL Reference Ranges  (U.S. Department of Health and Human Services ATP III Classifications)    Low     <40 mg/dl (major risk factor for CHD)  High    >60 mg/dl ('negative' risk factor for CHD)        LDL Reference Ranges  (U.S. Department of Health and Human Services ATP III Classifications)    Optimal          <100 mg/dL  Near Optimal     100-129 mg/dL  Borderline High  130-159 mg/dL  High             160-189 mg/dL  Very High        >189 mg/dL    LDL is calculated using the NIH LDL-C calculation.      Hemoglobin A1c [[id number]]  (Abnormal) Collected: 01/23/25 2025    Specimen: Blood Updated: 01/23/25 2131     Hemoglobin A1C 6.90 %     Narrative:      Hemoglobin A1C Ranges:    Increased Risk for Diabetes  5.7% to 6.4%  Diabetes                     >= 6.5%  Diabetic Goal                < 7.0%    Magnesium [[id number]]  (Normal) Collected: 01/23/25 2025    Specimen: Blood Updated: 01/23/25 2131     Magnesium 2.1 mg/dL     Basic Metabolic Panel [[id number]]  (Abnormal) Collected: 01/23/25 2025    Specimen: Blood Updated: 01/23/25 2131      Glucose 250 mg/dL      BUN 30 mg/dL      Creatinine 1.37 mg/dL      Sodium 137 mmol/L      Potassium 4.7 mmol/L      Chloride 101 mmol/L      CO2 29.1 mmol/L      Calcium 9.0 mg/dL      BUN/Creatinine Ratio 21.9     Anion Gap 6.9 mmol/L      eGFR 49.3 mL/min/1.73     Narrative:      GFR Categories in Chronic Kidney Disease (CKD)      GFR Category          GFR (mL/min/1.73)    Interpretation  G1                     90 or greater         Normal or high (1)  G2                      60-89                Mild decrease (1)  G3a                   45-59                Mild to moderate decrease  G3b                   30-44                Moderate to severe decrease  G4                    15-29                Severe decrease  G5                    14 or less           Kidney failure          (1)In the absence of evidence of kidney disease, neither GFR category G1 or G2 fulfill the criteria for CKD.    eGFR calculation 2021 CKD-EPI creatinine equation, which does not include race as a factor    CBC & Differential [[id number]]  (Abnormal) Collected: 01/23/25 2025    Specimen: Blood Updated: 01/23/25 2116    Narrative:      The following orders were created for panel order CBC & Differential.  Procedure                               Abnormality         Status                     ---------                               -----------         ------                     CBC Auto Differential[[id number]]        Abnormal            Final result                 Please view results for these tests on the individual orders.    CBC Auto Differential [[id number]]  (Abnormal) Collected: 01/23/25 2025    Specimen: Blood Updated: 01/23/25 2116     WBC 17.75 10*3/mm3      RBC 4.02 10*6/mm3      Hemoglobin 11.9 g/dL      Hematocrit 36.1 %      MCV 89.8 fL      MCH 29.6 pg      MCHC 33.0 g/dL      RDW 12.4 %      RDW-SD 40.1 fl      MPV 10.9 fL      Platelets 122 10*3/mm3     POC Glucose Once [[id number]]  (Abnormal) Collected: 01/23/25 2101     Specimen: Blood Updated: 01/23/25 2103     Glucose 200 mg/dL     POC Glucose Once [[id number]]  (Abnormal) Collected: 01/23/25 1724    Specimen: Blood Updated: 01/23/25 1726     Glucose 212 mg/dL     Manual Differential [[id number]]  (Abnormal) Collected: 01/23/25 1353    Specimen: Blood Updated: 01/23/25 1453     Neutrophil % 45.0 %      Lymphocyte % 53.0 %      Monocyte % 2.0 %      Neutrophils Absolute 8.14 10*3/mm3      Lymphocytes Absolute 9.59 10*3/mm3      Monocytes Absolute 0.36 10*3/mm3      RBC Morphology Normal     WBC Morphology Normal     Platelet Morphology Normal    Comprehensive Metabolic Panel [[id number]]  (Abnormal) Collected: 01/23/25 1353    Specimen: Blood Updated: 01/23/25 1424     Glucose 269 mg/dL      BUN 30 mg/dL      Creatinine 1.31 mg/dL      Sodium 138 mmol/L      Potassium 4.7 mmol/L      Chloride 99 mmol/L      CO2 27.8 mmol/L      Calcium 9.1 mg/dL      Total Protein 7.3 g/dL      Albumin 3.6 g/dL      ALT (SGPT) 16 U/L      AST (SGOT) 16 U/L      Alkaline Phosphatase 118 U/L      Total Bilirubin 0.7 mg/dL      Globulin 3.7 gm/dL      A/G Ratio 1.0 g/dL      BUN/Creatinine Ratio 22.9     Anion Gap 11.2 mmol/L      eGFR 52.0 mL/min/1.73     Narrative:      GFR Categories in Chronic Kidney Disease (CKD)      GFR Category          GFR (mL/min/1.73)    Interpretation  G1                     90 or greater         Normal or high (1)  G2                      60-89                Mild decrease (1)  G3a                   45-59                Mild to moderate decrease  G3b                   30-44                Moderate to severe decrease  G4                    15-29                Severe decrease  G5                    14 or less           Kidney failure          (1)In the absence of evidence of kidney disease, neither GFR category G1 or G2 fulfill the criteria for CKD.    eGFR calculation 2021 CKD-EPI creatinine equation, which does not include race as a factor    Protime-INR [[id number]]   (Abnormal) Collected: 01/23/25 1353    Specimen: Blood Updated: 01/23/25 1414     Protime 14.5 Seconds      INR 1.14    aPTT [[id number]]  (Normal) Collected: 01/23/25 1353    Specimen: Blood Updated: 01/23/25 1414     PTT 30.9 seconds     CBC & Differential [[id number]]  (Abnormal) Collected: 01/23/25 1353    Specimen: Blood Updated: 01/23/25 1411    Narrative:      The following orders were created for panel order CBC & Differential.  Procedure                               Abnormality         Status                     ---------                               -----------         ------                     CBC Auto Differential[[id number]]        Abnormal            Final result                 Please view results for these tests on the individual orders.    CBC Auto Differential [[id number]]  (Abnormal) Collected: 01/23/25 1353    Specimen: Blood Updated: 01/23/25 1411     WBC 18.09 10*3/mm3      RBC 4.31 10*6/mm3      Hemoglobin 12.7 g/dL      Hematocrit 38.6 %      MCV 89.6 fL      MCH 29.5 pg      MCHC 32.9 g/dL      RDW 12.6 %      RDW-SD 41.4 fl      MPV 10.8 fL      Platelets 124 10*3/mm3                 Assessment & Plan       Multiple rib fractures    Type 2 diabetes mellitus, with long-term current use of insulin    Essential hypertension    Lumbar canal stenosis    Hyperlipidemia    Hiatal hernia with GERD and esophagitis    Arteriosclerotic cardiovascular disease (ASCVD)    BPH without obstruction/lower urinary tract symptoms    Atrial flutter    Contusion of right hip    Stage 3b chronic kidney disease      Assessment & Plan    Right-sided rib fractures: As seen on Ct chest. Recommend nonoperative management with adequate pain control as per rib fracture protocol medications.  Patient will need aggressive pulmonary toilet to prevent pneumonia.  We will check a chest x-ray in the morning.  Discussed with patient and family.  They agree to the plan.      I discussed the patient's findings and our  recommendations with patient, nursing staff, and consulting provider    Thank you for this consult and allowing us to participate in the care of your patient.  We will follow along with you during this hospitalization.       Antonette Busby DNP, APRN  [facility]  01/24/25  08:48 EST    I spent >45 minutes reviewing the patient's chart including medical history, notes, radiographic imaging, labs and performing an assessment and development of a plan and discussion with the patient/family at bedside.

## 2025-01-23 NOTE — ED PROVIDER NOTES
EMERGENCY DEPARTMENT ENCOUNTER      Room Number:  S01/01  PCP: Percy Em MD  Independent Historians: Patient and Family  Patient Care Team:  Percy Em MD as PCP - General  Percy Em MD as PCP - Family Medicine  Fadi Alvarez III, MD as Cardiologist (Cardiology)       HPI:  Chief Complaint: Chest wall pain, hip pain    A complete HPI/ROS/PMH/PSH/SH/FH are unobtainable due to: None    Chronic or social conditions impacting patient care (Social Determinants of Health): None      Context: Javier Marin is a 89 y.o. male with a PMH significant for hypertension, permanent A-fib on Plavix with no anticoagulation, hyperlipidemia, carotid stenosis, CAD who presents to the ED c/o acute right hip pain and right-sided chest wall pain after fall from standing 2 days ago.  The patient tripped over the bed frame in his bedroom and fell on his right side at that time.  No injury to the head or neck.  Symptoms were very minimal at first but over the past couple of days he has grown more uncomfortable.  He describes an aching discomfort to the right hip that is worse with ambulation.  At rest almost no pain.  Range of motion intact.  No numbness or tingling distally to the extremities.  No shortness of breath but he does describe some increased right-sided chest wall pain with deep breathing.  No tenderness overlying the affected area.  No noticeable bruising.      Upon review of prior external notes (non-ED) -and- Review of prior external test results outside of this encounter it appears the patient was evaluated in the office with ophthalmology for type 2 diabetes with retinopathy on November 11, 2024.  The patient had a normal glucose on 10/14/2022 and on 10/13/2022.      PAST MEDICAL HISTORY  Active Ambulatory Problems     Diagnosis Date Noted    Advanced diabetic retinal disease 06/18/2018    Type 2 diabetes mellitus, with long-term current use of insulin 06/18/2018    Acute kidney injury  superimposed on chronic kidney disease 06/18/2018    Elevated liver function tests 06/18/2018    Leukocytosis 06/18/2018    Anorexia 06/18/2018    Esophagus disorder, thickened distal wall CT Scan 6/18/18 06/18/2018    Granulomatous disease, chronic 06/18/2018    Lung nodule seen on imaging study 06/18/2018    Compression fracture of lumbar spine, non-traumatic 06/18/2018    Lumbar canal stenosis 06/18/2018    Weakness generalized 06/18/2018    Cough 06/18/2018    Hard of hearing 06/19/2018    Ptosis, left eyelid 06/19/2018    Osteoarthritis of multiple joints 06/19/2018    Carotid stenosis, asymptomatic, bilateral 06/19/2018    Remote history of stroke, small caudate nucleus 06/19/2018    Thrombocytopenia 06/19/2018    Fall at home, subsequent encounter 06/19/2018    Hyperlipidemia 06/19/2018    Esophagitis determined by endoscopy by Sheri 6/19/18 LA Grade D Lower 1/3 06/20/2018    Hiatal hernia with GERD and esophagitis 06/20/2018    PAD (peripheral artery disease) 02/24/2014    Arteriosclerosis of abdominal aorta 02/24/2014    Coronary artery disease involving native coronary artery of native heart without angina pectoris 06/07/2022    Ischemic cardiomyopathy EF 40% Cath 6/10/2022 06/11/2022    Osteoarthritis of right hip 06/11/2022    BPH without obstruction/lower urinary tract symptoms 06/11/2022    Right hip pain 10/12/2022    Closed fracture of right proximal humerus 10/12/2022    Permanent atrial fibrillation     Atrial flutter     Right bundle branch block (RBBB) with left anterior fascicular block (LAFB)     Nonrheumatic tricuspid valve regurgitation 07/28/2024    Pulmonary hypertension 07/28/2024     Resolved Ambulatory Problems     Diagnosis Date Noted    Acute gastritis 06/18/2018    Diabetes 06/18/2018    Limb swelling 06/18/2018    Hypertension, essential 06/18/2018    Acute hyponatremia 06/18/2018    Paroxysmal atrial fibrillation 06/19/2018    Mandible fracture 03/31/2020    Abnormal ECG 04/26/2022     Chronic systolic CHF (congestive heart failure) 06/07/2022    Chronic chest pain with high risk for CAD 06/09/2022    Coronary artery disease involving native coronary artery with angina pectoris 06/11/2022    Syncope and collapse 1/2022 06/11/2022    ASCVD (arteriosclerotic cardiovascular disease) 06/11/2022    RBBB (right bundle branch block)     First degree AV block     Fall, initial encounter 10/11/2022     Past Medical History:   Diagnosis Date    Arthritis     CAD (coronary artery disease)     Closed right arm fracture 1994    Diabetes mellitus     Elevated LFTs     Esophagitis 06/19/2018    Garbled speech     History of acute gastritis     Hypertension     Hyponatremia     Ptosis of left eyelid          PAST SURGICAL HISTORY  Past Surgical History:   Procedure Laterality Date    CARDIAC CATHETERIZATION N/A 6/9/2022    Procedure: Left Heart Cath;  Surgeon: Reinaldo Miller MD;  Location: [facility] INVASIVE LOCATION;  Service: Cardiovascular;  Laterality: N/A;    CARDIAC CATHETERIZATION N/A 6/9/2022    Procedure: Left ventriculography;  Surgeon: Reinaldo Miller MD;  Location: [facility] INVASIVE LOCATION;  Service: Cardiovascular;  Laterality: N/A;    CARDIAC CATHETERIZATION N/A 6/9/2022    Procedure: Coronary angiography;  Surgeon: Reinaldo Miller MD;  Location: [facility] INVASIVE LOCATION;  Service: Cardiovascular;  Laterality: N/A;    CARDIAC CATHETERIZATION N/A 6/13/2022    Procedure: STENT CHARISMA - CORONARY;  Surgeon: Michelle Alegre MD;  Location: [facility] INVASIVE LOCATION;  Service: Cardiovascular;  Laterality: N/A;  PCI of the LAD with atherectomy    CARDIAC CATHETERIZATION N/A 6/13/2022    Procedure: Atherectomy-coronary;  Surgeon: Michelle Alegre MD;  Location: [facility] INVASIVE LOCATION;  Service: Cardiovascular;  Laterality: N/A;  rotablator    CARDIAC CATHETERIZATION N/A 6/13/2022    Procedure: Optical Coherent Tomography;  Surgeon: Michelle Alegre MD;  Location:   [facility] INVASIVE LOCATION;  Service: Cardiovascular;  Laterality: N/A;    CATARACT EXTRACTION, BILATERAL      COLONOSCOPY      ENDOSCOPY N/A 2018    Procedure: ESOPHAGOGASTRODUODENOSCOPY WITH BIOPSY;  Surgeon: Toribio Wade MD;  Location: [facility] ENDOSCOPY;  Service: Gastroenterology    EYE PTOSIS REPAIR Left 11/15/2016    Procedure: LT UPPER LID EYE PTOSIS REPAIR;  Surgeon: Anup Lancaster MD;  Location:  [facility];  Service:     EYE SURGERY Left     victreous hemorrhage repair    HIP ARTHROPLASTY Right     ORIF MANDIBULAR FRACTURE Bilateral 2020    Procedure: OPEN REDUCTION AND INTERNAL FIXATION OF BILATERAL MANDIBLE FRACTURES;  Surgeon: Percy Jeronimo MD;  Location: [facility] MAIN OR;  Service: Maxillofacial;  Laterality: Bilateral;         FAMILY HISTORY  Family History   Problem Relation Age of Onset    Pancreatic cancer Mother     Hypertension Neg Hx     Hyperlipidemia Neg Hx     Heart failure Neg Hx     Heart disease Neg Hx     Heart attack Neg Hx     Malig Hyperthermia Neg Hx          SOCIAL HISTORY  Social History     Socioeconomic History    Marital status:    Tobacco Use    Smoking status: Former     Types: Cigars     Quit date:      Years since quittin.0    Smokeless tobacco: Never   Vaping Use    Vaping status: Never Used   Substance and Sexual Activity    Alcohol use: No    Drug use: No    Sexual activity: Not Currently     Partners: Female         ALLERGIES  Patient has no known allergies.      REVIEW OF SYSTEMS  Included in HPI  All systems reviewed and negative except for those discussed in HPI.      PHYSICAL EXAM    I have reviewed the triage vital signs and nursing notes.    ED Triage Vitals   Temp Heart Rate Resp BP SpO2   25 1042 25 1042 25 1042 25 1057 25 1042   98.2 °F (36.8 °C) 69 18 130/71 97 %      Temp src Heart Rate Source Patient Position BP Location FiO2 (%)   25 1042 25 1042 25 1057 25 1057 --    Tympanic Monitor Sitting Right arm        Physical Exam  Constitutional:       General: He is not in acute distress.     Appearance: He is well-developed.   HENT:      Head: Normocephalic and atraumatic.   Eyes:      General: No scleral icterus.     Conjunctiva/sclera: Conjunctivae normal.   Neck:      Trachea: No tracheal deviation.   Cardiovascular:      Rate and Rhythm: Normal rate and regular rhythm.   Pulmonary:      Effort: Pulmonary effort is normal.      Breath sounds: Normal breath sounds.   Chest:      Chest wall: No deformity or crepitus.       Abdominal:      Palpations: Abdomen is soft.      Tenderness: There is no abdominal tenderness. There is no guarding.   Musculoskeletal:         General: No deformity.      Cervical back: Normal range of motion.      Right hip: No deformity or tenderness. Normal range of motion.   Lymphadenopathy:      Cervical: No cervical adenopathy.   Skin:     General: Skin is warm and dry.   Neurological:      Mental Status: He is alert and oriented to person, place, and time.   Psychiatric:         Behavior: Behavior normal.         Vital signs and nursing notes reviewed.      PPE: I wore a surgical mask throughout my encounters with the pt. I performed hand hygiene on entry into the pt room and upon exit.     LAB RESULTS  Recent Results (from the past 24 hours)   Comprehensive Metabolic Panel    Collection Time: 01/23/25  1:53 PM    Specimen: Blood   Result Value Ref Range    Glucose 269 (H) 65 - 99 mg/dL    BUN 30 (H) 8 - 23 mg/dL    Creatinine 1.31 (H) 0.76 - 1.27 mg/dL    Sodium 138 136 - 145 mmol/L    Potassium 4.7 3.5 - 5.2 mmol/L    Chloride 99 98 - 107 mmol/L    CO2 27.8 22.0 - 29.0 mmol/L    Calcium 9.1 8.6 - 10.5 mg/dL    Total Protein 7.3 6.0 - 8.5 g/dL    Albumin 3.6 3.5 - 5.2 g/dL    ALT (SGPT) 16 1 - 41 U/L    AST (SGOT) 16 1 - 40 U/L    Alkaline Phosphatase 118 (H) 39 - 117 U/L    Total Bilirubin 0.7 0.0 - 1.2 mg/dL    Globulin 3.7 gm/dL    A/G Ratio 1.0 g/dL     BUN/Creatinine Ratio 22.9 7.0 - 25.0    Anion Gap 11.2 5.0 - 15.0 mmol/L    eGFR 52.0 (L) >60.0 mL/min/1.73   Protime-INR    Collection Time: 01/23/25  1:53 PM    Specimen: Blood   Result Value Ref Range    Protime 14.5 (H) 11.7 - 14.2 Seconds    INR 1.14 (H) 0.90 - 1.10   aPTT    Collection Time: 01/23/25  1:53 PM    Specimen: Blood   Result Value Ref Range    PTT 30.9 22.7 - 35.4 seconds   CBC Auto Differential    Collection Time: 01/23/25  1:53 PM    Specimen: Blood   Result Value Ref Range    WBC 18.09 (H) 3.40 - 10.80 10*3/mm3    RBC 4.31 4.14 - 5.80 10*6/mm3    Hemoglobin 12.7 (L) 13.0 - 17.7 g/dL    Hematocrit 38.6 37.5 - 51.0 %    MCV 89.6 79.0 - 97.0 fL    MCH 29.5 26.6 - 33.0 pg    MCHC 32.9 31.5 - 35.7 g/dL    RDW 12.6 12.3 - 15.4 %    RDW-SD 41.4 37.0 - 54.0 fl    MPV 10.8 6.0 - 12.0 fL    Platelets 124 (L) 140 - 450 10*3/mm3   Manual Differential    Collection Time: 01/23/25  1:53 PM    Specimen: Blood   Result Value Ref Range    Neutrophil % 45.0 42.7 - 76.0 %    Lymphocyte % 53.0 (H) 19.6 - 45.3 %    Monocyte % 2.0 (L) 5.0 - 12.0 %    Neutrophils Absolute 8.14 (H) 1.70 - 7.00 10*3/mm3    Lymphocytes Absolute 9.59 (H) 0.70 - 3.10 10*3/mm3    Monocytes Absolute 0.36 0.10 - 0.90 10*3/mm3    RBC Morphology Normal Normal    WBC Morphology Normal Normal    Platelet Morphology Normal Normal         RADIOLOGY  CT Chest Without Contrast Diagnostic    Result Date: 1/23/2025  CT CHEST WO CONTRAST DIAGNOSTIC-  DATE OF EXAM: 1/23/2025 12:21 PM  INDICATION: Right-sided chest wall pain after fall from standing, concern for rib fracture.  COMPARISON: Cardiac CT 5/27/2022. CT abdomen and pelvis 6/18/2018.  TECHNIQUE: Multiple contiguous axial images were acquired through the chest without the intravenous administration of contrast. Reformatted coronal and sagittal sequences and 3D reconstruction images were also reviewed. Radiation dose reduction techniques were utilized, including automated exposure control and  exposure modulation based on body size.  FINDINGS: Evaluation for intrathoracic trauma is mildly limited by the lack of intravenous contrast. Respiratory motion artifact.  The heart and great vessels of the chest are normal in size. Severe calcified coronary artery disease. No pericardial effusion. Calcified remnants of prior granulomatous disease in the mediastinum and right hilum. No pathologically enlarged intrathoracic lymph nodes are identified. Tiny hiatal hernia.  Low lung volumes with bilateral perihilar and bibasilar subsegmental atelectasis and/or scarring. Scattered likely benign sub-6 mm pulmonary nodules in both lungs. Benign calcified granulomas in the right lung. No dense lung consolidation. The central airways are widely patent. No pneumothorax or pleural effusion.  Limited noncontrast CT imaging of the upper abdomen is unremarkable.  Flowing multilevel mid and lower thoracic anterior osteophyte formation indicating DISH. Likely acute nondisplaced fractures of the anterolateral right third, fourth, and fifth ribs. Age-indeterminate but possibly acute nondisplaced fractures of the posterior right fifth through 10th ribs. Suspected nondisplaced fracture of the right fifth, sixth, and seventh costal cartilages. Multiple superimposed old bilateral rib fracture deformities. Partially imaged plate and screw fixation hardware at the mandible and old healed fracture deformity of the mandible. The patient is edentulous.       1. Likely acute nondisplaced fractures of the anterolateral right third through fifth ribs, the posterior right fifth through 10th ribs, and the right fifth through seventh costal cartilages. 2. Otherwise, no acute intrathoracic abnormality.  This report was finalized on 1/23/2025 1:24 PM by Anthony Mariano MD on Workstation: [id number]      XR Hip With or Without Pelvis 2 - 3 View Right    Result Date: 1/23/2025  XR HIP W OR WO PELVIS 2-3 VIEW RIGHT-  Clinical: Fell with right hip pain   COMPARISON 10/11/2022  FINDINGS: Alignment of the total right hip replacement prosthesis is satisfactory and similar to the previous examination. No component loosening seen. The right hemipelvis is satisfactory in appearance. No acute osseous abnormality involving the proximal femur. Healed fracture site is similar to the previous examination. Vascular arterial calcifications noted within the soft tissues.  CONCLUSION: Hip prosthesis is satisfactory in appearance, no fracture seen.  This report was finalized on 1/23/2025 11:36 AM by Dr. Spencer Gracia M.D on Workstation: [id number]         MEDICATIONS GIVEN IN ER  Medications   Lidocaine 4 % 1 patch (1 patch Transdermal Medication Applied 1/23/25 1304)   sodium chloride 0.9 % flush 10 mL (has no administration in time range)   HYDROcodone-acetaminophen (NORCO) 5-325 MG per tablet 1 tablet (1 tablet Oral Given 1/23/25 1137)         ORDERS PLACED DURING THIS VISIT:  Orders Placed This Encounter   Procedures    XR Hip With or Without Pelvis 2 - 3 View Right    CT Chest Without Contrast Diagnostic    Comprehensive Metabolic Panel    Protime-INR    aPTT    CBC Auto Differential    Manual Differential    [facility] (on-call MD unless specified) Details    Incentive Spirometry    Insert Peripheral IV    Inpatient Admission    CBC & Differential         OUTPATIENT MEDICATION MANAGEMENT:  Current Facility-Administered Medications Ordered in Epic   Medication Dose Route Frequency Provider Last Rate Last Admin    Lidocaine 4 % 1 patch  1 patch Transdermal Once Bj Gil MD   1 patch at 01/23/25 1304    sodium chloride 0.9 % flush 10 mL  10 mL Intravenous PRN Uli Car PA         Current Outpatient Medications Ordered in Epic   Medication Sig Dispense Refill    acetaminophen (TYLENOL) 500 MG tablet Take 1 tablet by mouth Every 6 (Six) Hours As Needed for Mild Pain.      aspirin 81 MG EC tablet Take 1 tablet by mouth Daily.      atorvastatin (LIPITOR) 10 MG tablet  Take 1 tablet by mouth Daily. 90 tablet 3    cholecalciferol (VITAMIN D3) 1000 UNITS tablet Take 1 tablet by mouth Daily.      clopidogrel (PLAVIX) 75 MG tablet TAKE ONE (1) TABLET BY MOUTH DAILY. 90 tablet 3    famotidine (PEPCID) 20 MG tablet Take 1 tablet by mouth Every Night. 90 tablet 3    fexofenadine (ALLEGRA) 180 MG tablet Take 1 tablet by mouth Daily As Needed.      furosemide (LASIX) 40 MG tablet Take 2 tablets by mouth Daily.      Insulin Aspart (novoLOG) 100 UNIT/ML injection Inject 6 Units under the skin into the appropriate area as directed 3 (Three) Times a Day Before Meals.      insulin glargine (LANTUS, SEMGLEE) 100 UNIT/ML injection Inject 10 Units under the skin into the appropriate area as directed Every Night. (Patient taking differently: Inject 15 Units under the skin into the appropriate area as directed Every Night.) 100 mL 12    lansoprazole (Prevacid) 15 MG capsule Take 1 capsule by mouth Daily.      losartan (COZAAR) 50 MG tablet Take 1 tablet by mouth Daily.      tamsulosin (FLOMAX) 0.4 MG capsule 24 hr capsule Take 2 capsules by mouth Daily.                PROGRESS, DATA ANALYSIS, CONSULTS, AND MEDICAL DECISION MAKING  All labs have been independently interpreted by me.  All radiology studies have been reviewed by me. All EKG's have been independently viewed and interpreted by me.  Discussion below represents my analysis of pertinent findings related to patient's condition, differential diagnosis, treatment plan and final disposition.       DIFFERENTIAL DIAGNOSIS INCLUDE BUT NOT LIMITED TO:     Hip contusion, chest contusion, rib fracture    Clinical Scores: N/A      ED Course as of 01/24/25 0951   u Jan 23, 2025   1143 I reviewed right hip with pelvis x-rays in PACS, previous right hip replacement, prosthesis in place, no fracture per my read. [JG]   8115 Patient presentation and evaluation consistent with right hip contusion and multiple rib fractures requiring admission for thoracic  surgery evaluation and pulmonary toilet.  Family and patient agreeable with the plan. [DC]   1444 Creatinine(!): 1.31 [DC]   1453 I discussed the case with MD Aden with [facility] at this time regarding the patient.  I discussed work-up, results, concerns.  I discussed the consulting provider's desire for tele admit.    [DC]   1536 I discussed the case with MD Maria Antonia with [facility] at this time regarding the patient.  I discussed work-up, results, concerns.  I discussed the consulting provider's desire for admitting the patient himself.  The [facility] team has been notified of the switch in management of care.   [DC]   1622 I discussed the case with JERRY Busby with Thoracic Surgery at this time regarding the patient.  I discussed work-up, results, concerns.  I discussed the consulting provider's desire for medical management only.    [DC]      ED Course User Index  [DC] Uli Car, PA  [JG] Bj Gil MD         5692 I rechecked the patient.  I discussed the patient's labs, radiology findings (including all incidental findings), diagnosis, and plan for admission. The patient understands and agrees with the plan.      AS OF 14:53 EST VITALS:    BP - 140/65  HR - 60  TEMP - 98.2 °F (36.8 °C) (Tympanic)  O2 SATS - 97%    COMPLEXITY OF CARE  The patient requires admission.      DIAGNOSIS  Final diagnoses:   Closed fracture of multiple ribs of right side, initial encounter   Contusion of right hip, initial encounter         DISPOSITION  ED Disposition       ED Disposition   Decision to Admit    Condition   --    Comment   Level of Care: Telemetry [5]   Diagnosis: Multiple rib fractures [326235]   Admitting Physician: BONNIE ZHAO [[id number]]   Attending Physician: BONNIE ZHAO [[id number]]   Certification: I Certify That Inpatient Hospital Services Are Medically Necessary For Greater Than 2 Midnights                  Please note that portions of this document were completed with a voice recognition program.    Note  Disclaimer: At [facility], we believe that sharing information builds trust and better relationships. You are receiving this note because you recently visited [facility]. It is possible you will see health information before a provider has talked with you about it. This kind of information can be easy to misunderstand. To help you fully understand what it means for your health, we urge you to discuss this note with your provider.         Uli Car PA  01/23/25 1454       Uli Car PA  01/24/25 0959

## 2025-01-23 NOTE — PROGRESS NOTES
Clinical Pharmacy Services: Medication History    Javier Marin is a 89 y.o. male presenting to [facility] for   Chief Complaint   Patient presents with    Fall     9pm yesterday, did not hit head, -LOC, -thinners       He  has a past medical history of Advanced diabetic retinal disease, Anorexia, Arteriosclerosis of abdominal aorta (02/24/2014), Arthritis, Atrial flutter, CAD (coronary artery disease), Carotid stenosis, asymptomatic, bilateral (02/24/2014), Closed right arm fracture (1994), Compression fracture of lumbar spine, non-traumatic, Diabetes mellitus, Elevated LFTs, Esophagitis (06/19/2018), Esophagus disorder, First degree AV block, Garbled speech, Granulomatous disease, chronic, Hard of hearing, Hiatal hernia with GERD and esophagitis, History of acute gastritis, Hyperlipidemia, Hypertension, Hyponatremia, Leukocytosis, Lumbar canal stenosis, Lung nodule seen on imaging study, Mandible fracture (03/28/2020), Nonrheumatic tricuspid valve regurgitation (07/28/2024), Osteoarthritis of multiple joints, Permanent atrial fibrillation, Ptosis of left eyelid, Pulmonary hypertension (07/28/2024), Remote history of stroke, Right bundle branch block (RBBB) with left anterior fascicular block (LAFB), Syncope and collapse 1/2022 (06/11/2022), Thrombocytopenia, and Weakness generalized.    Allergies as of 01/23/2025    (No Known Allergies)       Medication information was obtained from: Spouse   Pharmacy and Phone Number:     Prior to Admission Medications       Prescriptions Last Dose Informant Patient Reported? Taking?    acetaminophen (TYLENOL) 500 MG tablet  Spouse/Significant Other Yes Yes    Take 1 tablet by mouth Every 6 (Six) Hours As Needed for Mild Pain.    aspirin 81 MG EC tablet  Spouse/Significant Other Yes Yes    Take 1 tablet by mouth Daily.    atorvastatin (LIPITOR) 10 MG tablet  Spouse/Significant Other No Yes    Take 1 tablet by mouth Daily.    cholecalciferol (VITAMIN D3) 1000  UNITS tablet  Spouse/Significant Other Yes Yes    Take 1 tablet by mouth Daily.    clopidogrel (PLAVIX) 75 MG tablet  Spouse/Significant Other No Yes    TAKE ONE (1) TABLET BY MOUTH DAILY.    famotidine (PEPCID) 20 MG tablet  Spouse/Significant Other No Yes    Take 1 tablet by mouth Every Night.    fexofenadine (ALLEGRA) 180 MG tablet  Spouse/Significant Other Yes Yes    Take 1 tablet by mouth Daily As Needed.    furosemide (LASIX) 40 MG tablet  Spouse/Significant Other No Yes    Take 2 tablets by mouth Daily.    Insulin Aspart (novoLOG) 100 UNIT/ML injection  Spouse/Significant Other Yes Yes    Inject 6 Units under the skin into the appropriate area as directed 3 (Three) Times a Day Before Meals.    insulin glargine (LANTUS, SEMGLEE) 100 UNIT/ML injection  Spouse/Significant Other No Yes    Inject 10 Units under the skin into the appropriate area as directed Every Night.    Patient taking differently:  Inject 15 Units under the skin into the appropriate area as directed Every Night.    lansoprazole (Prevacid) 15 MG capsule  Spouse/Significant Other No Yes    Take 1 capsule by mouth Daily.    losartan (COZAAR) 50 MG tablet  Spouse/Significant Other Yes Yes    Take 1 tablet by mouth Daily.    tamsulosin (FLOMAX) 0.4 MG capsule 24 hr capsule  Spouse/Significant Other Yes Yes    Take 2 capsules by mouth Daily.              Medication notes:     This medication list is complete to the best of my knowledge as of 1/23/2025    Please call if questions.    Dallas Mcgee  Medication History Technician   [phone]    1/23/2025 14:29 EST

## 2025-01-23 NOTE — Clinical Note
Level of Care: Telemetry [5]   Diagnosis: Multiple rib fractures [700474]   Admitting Physician: BONNIE ZHAO [[id number]]   Attending Physician: BONNIE ZHAO [[id number]]   Certification: I Certify That Inpatient Hospital Services Are Medically Necessary For Greater Than 2 Midnights

## 2025-01-23 NOTE — ED TRIAGE NOTES
Patient complaint of right hip & right abdominal pain. Wife reports patient has had hx of hip replacement on this side. Patient is ambulatory with a walker in triage. -LOC, -thinner, fall happened at 9pm yesterday.

## 2025-01-23 NOTE — ED PROVIDER NOTES
MD ATTESTATION NOTE  I supervised care provided by the midlevel provider. We have discussed this patient's history, physical exam, and treatment plan. I have reviewed the midlevel provider's note and I agree with the midlevel provider's findings and plan of care.   SHARED VISIT: This visit was performed by BOTH a physician and an APC. The substantive portion of the medical decision making was performed by this attesting physician who made or approved the management plan and takes responsibility for patient management. All studies in the APC note (if performed) were independently interpreted by me.   I have personally had a face to face encounter with the patient.     PCP: Percy Em MD  Patient Care Team:  Percy Em MD as PCP - General  Percy Em MD as PCP - Family Medicine  Fadi Alvarez III, MD as Cardiologist (Cardiology)     Javier Marin is a 89 y.o. male who presents to the ED c/o rib pain as well as right hip pain.  Patient fell from night before last, has had some pain in his right hip as well as right lower ribs since that time.  Pain is worse with ambulation, worse with breathing.  Patient denies any difficulty breathing.  No other chest pain.  No head injury occurred.  Patient otherwise at baseline health.  Patient does have a history of right hip replacement.    On exam:  General: NAD.  Head: NCAT.  ENT: nares patent, no scleral icterus  Neck: Supple, trachea midline.  Cardiac: regular rate and rhythm.  Lungs: normal effort, clear to auscultation bilaterally  Chest wall: Tenderness at right anterior lateral inferior ribs, no crepitus or deformity, no splinting or paradoxical motion  Abdomen: Soft, nondistended, NTTP, no rebound tenderness, no guarding or rigidity.   Stable.  Extremities: Moves all extremities well, no peripheral edema.  Tenderness at right lateral hip, no crepitus or deformity, neurovascular intact distally.  Neuro: alert, MAEW, follows commands  Psych:  calm, cooperative  Skin: Warm, dry.    Medical Decision Making:  After the initial H&P, I discussed pertinent information from history and physical exam with patient/family.  Discussed differential diagnosis.  Discussed plan for ED evaluation/work-up/treatment.  All questions answered.  Patient/family is agreeable with plan.    ED Course as of 01/24/25 1958   Thu Jan 23, 2025   1149 I reviewed right hip with pelvis x-rays in PACS, previous right hip replacement, prosthesis in place, no fracture per my read. [JG]   1335 Patient presentation and evaluation consistent with right hip contusion and multiple rib fractures requiring admission for thoracic surgery evaluation and pulmonary toilet.  Family and patient agreeable with the plan. [DC]   1444 Creatinine(!): 1.31 [DC]   1453 I discussed the case with MD Aden with [facility] at this time regarding the patient.  I discussed work-up, results, concerns.  I discussed the consulting provider's desire for tele admit.    [DC]   1536 I discussed the case with MD Maria Antonia with [facility] at this time regarding the patient.  I discussed work-up, results, concerns.  I discussed the consulting provider's desire for admitting the patient himself.  The [facility] team has been notified of the switch in management of care.   [DC]   1622 I discussed the case with JERRY Busby with Thoracic Surgery at this time regarding the patient.  I discussed work-up, results, concerns.  I discussed the consulting provider's desire for medical management only.    [DC]      ED Course User Index  [DC] Uli Car PA  [JG] Bj Gil MD       Diagnosis  Final diagnoses:   Closed fracture of multiple ribs of right side, initial encounter   Contusion of right hip, initial encounter          Bj Gil MD  01/24/25 1958

## 2025-01-23 NOTE — CASE MANAGEMENT/SOCIAL WORK
Discharge Planning Assessment  [facility]     Patient Name: Javier Marin  MRN: [id number]  Today's Date: 1/23/2025    Admit Date: 1/23/2025    Plan: Home with family   Discharge Needs Assessment       Row Name 01/23/25 1758       Living Environment    People in Home spouse    Name(s) of People in Home Olga    Current Living Arrangements home    Potentially Unsafe Housing Conditions none    Primary Care Provided by self    Family Caregiver if Needed spouse;other relative(s)    Family Caregiver Names wife or nephew    Quality of Family Relationships involved;helpful    Able to Return to Prior Arrangements yes       Resource/Environmental Concerns    Resource/Environmental Concerns none    Transportation Concerns none       Transition Planning    Patient/Family Anticipates Transition to home with family;home with help/services    Patient/Family Anticipated Services at Transition home health care    Transportation Anticipated family or friend will provide       Discharge Needs Assessment    Readmission Within the Last 30 Days no previous admission in last 30 days    Equipment Currently Used at Home walker, rolling;commode;cane, straight;bp cuff;grab bar    Concerns to be Addressed adjustment to diagnosis/illness    Anticipated Changes Related to Illness none    Equipment Needed After Discharge none                   Discharge Plan       Row Name 01/23/25 1758       Plan    Plan Home with family    Patient/Family in Agreement with Plan yes    Plan Comments Spoke to at bedside, very [place], spoke with pt wife Olga and nephew Seth, introduced self and explained CCP role, verified face sheet and pharmacy information. Pt lives with Olga in 1 level home with 2-3 JACK. He is normally IADLs, uses bp cuff, rwx, gb, bsc/elevated commode seat, and gb. He cannot recall HH agency and has been to [facility] for SNF in the past. He plans home with wife, unsure if will need HH, await therapy and progress. CCP will  pankaj - Ruthann GRIDER                  Continued Care and Services - Admitted Since 1/23/2025    No active coordination exists for this encounter.       Expected Discharge Date and Time       Expected Discharge Date Expected Discharge Time    Jan 25, 2025            Demographic Summary       Row Name 01/23/25 1757       General Information    Admission Type inpatient                   Functional Status       Row Name 01/23/25 1757       Functional Status    Usual Activity Tolerance excellent    Current Activity Tolerance good       Assessment of Health Literacy    Health Literacy Fair       Functional Status, IADL    Medications assistive person;independent    Meal Preparation independent;assistive person    Housekeeping independent;assistive person    Laundry assistive person    Shopping assistive person       Mental Status    General Appearance WDL WDL       Mental Status Summary    Recent Changes in Mental Status/Cognitive Functioning no changes                   Psychosocial    No documentation.                  Abuse/Neglect    No documentation.                  Legal       Row Name 01/23/25 1758       Financial/Legal    Who Manages Finances if Patient Unable wife                   Substance Abuse    No documentation.                  Patient Forms    No documentation.                     Ruthann Ramirez RN

## 2025-01-24 ENCOUNTER — DOCUMENTATION (OUTPATIENT)
Dept: HOME HEALTH SERVICES | Facility: HOME HEALTHCARE | Age: OVER 89
End: 2025-01-24
Payer: MEDICARE

## 2025-01-24 ENCOUNTER — APPOINTMENT (OUTPATIENT)
Dept: GENERAL RADIOLOGY | Facility: HOSPITAL | Age: OVER 89
End: 2025-01-24
Payer: MEDICARE

## 2025-01-24 ENCOUNTER — READMISSION MANAGEMENT (OUTPATIENT)
Dept: CALL CENTER | Facility: HOSPITAL | Age: OVER 89
End: 2025-01-24
Payer: MEDICARE

## 2025-01-24 VITALS
WEIGHT: 160 LBS | DIASTOLIC BLOOD PRESSURE: 52 MMHG | OXYGEN SATURATION: 97 % | TEMPERATURE: 98.4 F | BODY MASS INDEX: 23.7 KG/M2 | HEART RATE: 70 BPM | HEIGHT: 69 IN | RESPIRATION RATE: 18 BRPM | SYSTOLIC BLOOD PRESSURE: 116 MMHG

## 2025-01-24 DIAGNOSIS — S22.49XA MULTIPLE RIB FRACTURES INVOLVING FOUR OR MORE RIBS: Primary | ICD-10-CM

## 2025-01-24 DIAGNOSIS — M48.061 SPINAL STENOSIS OF LUMBAR REGION, UNSPECIFIED WHETHER NEUROGENIC CLAUDICATION PRESENT: ICD-10-CM

## 2025-01-24 LAB
ACANTHOCYTES BLD QL SMEAR: ABNORMAL
ANION GAP SERPL CALCULATED.3IONS-SCNC: 7 MMOL/L (ref 5–15)
ANISOCYTOSIS BLD QL: ABNORMAL
BASOPHILS # BLD MANUAL: 0 10*3/MM3 (ref 0–0.2)
BASOPHILS NFR BLD MANUAL: 0 % (ref 0–1.5)
BUN SERPL-MCNC: 28 MG/DL (ref 8–23)
BUN/CREAT SERPL: 22 (ref 7–25)
CALCIUM SPEC-SCNC: 9 MG/DL (ref 8.6–10.5)
CHLORIDE SERPL-SCNC: 102 MMOL/L (ref 98–107)
CO2 SERPL-SCNC: 31 MMOL/L (ref 22–29)
CREAT SERPL-MCNC: 1.27 MG/DL (ref 0.76–1.27)
DEPRECATED RDW RBC AUTO: 43 FL (ref 37–54)
EGFRCR SERPLBLD CKD-EPI 2021: 54 ML/MIN/1.73
ELLIPTOCYTES BLD QL SMEAR: ABNORMAL
EOSINOPHIL # BLD MANUAL: 0.2 10*3/MM3 (ref 0–0.4)
EOSINOPHIL NFR BLD MANUAL: 1.2 % (ref 0.3–6.2)
ERYTHROCYTE [DISTWIDTH] IN BLOOD BY AUTOMATED COUNT: 13 % (ref 12.3–15.4)
GLUCOSE BLDC GLUCOMTR-MCNC: 111 MG/DL (ref 70–130)
GLUCOSE BLDC GLUCOMTR-MCNC: 136 MG/DL (ref 70–130)
GLUCOSE SERPL-MCNC: 135 MG/DL (ref 65–99)
HCT VFR BLD AUTO: 35.5 % (ref 37.5–51)
HGB BLD-MCNC: 11.7 G/DL (ref 13–17.7)
HYPOCHROMIA BLD QL: ABNORMAL
LYMPHOCYTES # BLD MANUAL: 7.13 10*3/MM3 (ref 0.7–3.1)
LYMPHOCYTES NFR BLD MANUAL: 2.4 % (ref 5–12)
MCH RBC QN AUTO: 29.8 PG (ref 26.6–33)
MCHC RBC AUTO-ENTMCNC: 33 G/DL (ref 31.5–35.7)
MCV RBC AUTO: 90.6 FL (ref 79–97)
MICROCYTES BLD QL: ABNORMAL
MONOCYTES # BLD: 0.39 10*3/MM3 (ref 0.1–0.9)
NEUTROPHILS # BLD AUTO: 8.7 10*3/MM3 (ref 1.7–7)
NEUTROPHILS NFR BLD MANUAL: 53 % (ref 42.7–76)
OVALOCYTES BLD QL SMEAR: ABNORMAL
PLAT MORPH BLD: NORMAL
PLATELET # BLD AUTO: 116 10*3/MM3 (ref 140–450)
PMV BLD AUTO: 10.6 FL (ref 6–12)
POIKILOCYTOSIS BLD QL SMEAR: ABNORMAL
POTASSIUM SERPL-SCNC: 4.4 MMOL/L (ref 3.5–5.2)
RBC # BLD AUTO: 3.92 10*6/MM3 (ref 4.14–5.8)
SMUDGE CELLS BLD QL SMEAR: ABNORMAL
SODIUM SERPL-SCNC: 140 MMOL/L (ref 136–145)
VARIANT LYMPHS NFR BLD MANUAL: 13.3 % (ref 0–5)
VARIANT LYMPHS NFR BLD MANUAL: 30.1 % (ref 19.6–45.3)
WBC NRBC COR # BLD AUTO: 16.42 10*3/MM3 (ref 3.4–10.8)

## 2025-01-24 PROCEDURE — G0378 HOSPITAL OBSERVATION PER HR: HCPCS

## 2025-01-24 PROCEDURE — 93005 ELECTROCARDIOGRAM TRACING: CPT | Performed by: INTERNAL MEDICINE

## 2025-01-24 PROCEDURE — 63710000001 INSULIN LISPRO (HUMAN) PER 5 UNITS: Performed by: INTERNAL MEDICINE

## 2025-01-24 PROCEDURE — 71045 X-RAY EXAM CHEST 1 VIEW: CPT

## 2025-01-24 PROCEDURE — 99231 SBSQ HOSP IP/OBS SF/LOW 25: CPT | Performed by: NURSE PRACTITIONER

## 2025-01-24 PROCEDURE — 93010 ELECTROCARDIOGRAM REPORT: CPT | Performed by: INTERNAL MEDICINE

## 2025-01-24 PROCEDURE — 85007 BL SMEAR W/DIFF WBC COUNT: CPT | Performed by: INTERNAL MEDICINE

## 2025-01-24 PROCEDURE — 82948 REAGENT STRIP/BLOOD GLUCOSE: CPT

## 2025-01-24 PROCEDURE — 97162 PT EVAL MOD COMPLEX 30 MIN: CPT

## 2025-01-24 PROCEDURE — 80048 BASIC METABOLIC PNL TOTAL CA: CPT | Performed by: INTERNAL MEDICINE

## 2025-01-24 PROCEDURE — 85025 COMPLETE CBC W/AUTO DIFF WBC: CPT | Performed by: INTERNAL MEDICINE

## 2025-01-24 PROCEDURE — 97166 OT EVAL MOD COMPLEX 45 MIN: CPT

## 2025-01-24 PROCEDURE — 97530 THERAPEUTIC ACTIVITIES: CPT

## 2025-01-24 RX ORDER — HYDROCODONE BITARTRATE AND ACETAMINOPHEN 5; 325 MG/1; MG/1
1 TABLET ORAL EVERY 8 HOURS PRN
Qty: 10 TABLET | Refills: 0 | Status: SHIPPED | OUTPATIENT
Start: 2025-01-24

## 2025-01-24 RX ORDER — LIDOCAINE 4 G/G
1 PATCH TOPICAL
Qty: 7 PATCH | Refills: 0 | Status: SHIPPED | OUTPATIENT
Start: 2025-01-25 | End: 2025-02-01

## 2025-01-24 RX ADMIN — INSULIN LISPRO 6 UNITS: 100 INJECTION, SOLUTION INTRAVENOUS; SUBCUTANEOUS at 12:44

## 2025-01-24 RX ADMIN — ASPIRIN 81 MG: 81 TABLET, COATED ORAL at 08:14

## 2025-01-24 RX ADMIN — Medication 10 ML: at 08:14

## 2025-01-24 RX ADMIN — ACETAMINOPHEN 650 MG: 325 TABLET, FILM COATED ORAL at 08:14

## 2025-01-24 RX ADMIN — LIDOCAINE 1 PATCH: 4 PATCH TOPICAL at 08:14

## 2025-01-24 RX ADMIN — TAMSULOSIN HYDROCHLORIDE 0.8 MG: 0.4 CAPSULE ORAL at 08:14

## 2025-01-24 RX ADMIN — INSULIN LISPRO 6 UNITS: 100 INJECTION, SOLUTION INTRAVENOUS; SUBCUTANEOUS at 08:14

## 2025-01-24 RX ADMIN — LOSARTAN POTASSIUM 25 MG: 25 TABLET ORAL at 08:14

## 2025-01-24 RX ADMIN — HYDROCODONE BITARTRATE AND ACETAMINOPHEN 1 TABLET: 5; 325 TABLET ORAL at 02:51

## 2025-01-24 RX ADMIN — PANTOPRAZOLE SODIUM 40 MG: 40 TABLET, DELAYED RELEASE ORAL at 06:52

## 2025-01-24 RX ADMIN — ACETAMINOPHEN 650 MG: 325 TABLET, FILM COATED ORAL at 12:27

## 2025-01-24 RX ADMIN — CLOPIDOGREL BISULFATE 75 MG: 75 TABLET ORAL at 08:14

## 2025-01-24 RX ADMIN — FUROSEMIDE 80 MG: 80 TABLET ORAL at 08:14

## 2025-01-24 NOTE — DISCHARGE SUMMARY
Date of Discharge:  1/24/2025    Discharge Diagnosis:     #1 Fall with Multiple Rib Fractures-he fractured ribs 3 through 5 anteriorly and 5 through 10 posteriorly.  Evaluated by thoracic surgery with no further intervention necessary at this time  2.  Diabetes mellitus type 2-generally well-controlled at home but his A1c is 6.9  3.  ASCVD-no cardiac complaints and no acute changes on EKG  4.  Atrial fibs/flutter-his EKG indicates he is in a normal sinus rhythm and is not a candidate for anticoagulation because of his risk for falls and advanced age per cardiology.  5.  Hypertension-his blood pressure is well-controlled as an outpatient  6.  High cholesterol-his cholesterol is well-controlled with his most recent LDL of 47  7.  CKD 3-his renal function is stable and he seems to be euvolemic and his most recent GFR is 54 with a creatinine of 1.27  8.  Hiatal hernia/esophagitis-no complaints on PPI and H2 blocker  9.  BPH-no complaints and a good response to tamsulosin  10.  Right hip contusion-no evidence for fracture on x-rays and he has minimal discomfort at the hip at this point.  10.  Weakness/instability-he has a little bit of weakness and instability that the therapist says can be addressed with using the walker instead of a cane.  He will need home health physical therapy and Occupational Therapy to help avoid future falls  11 Thrombocytopenia-chronic and stable          DETAILS OF HOSPITAL STAY     Presenting Problem/History of Present Illness  Multiple rib fractures [S22.49XA]  Contusion of right hip, initial encounter [S70.01XA]  Closed fracture of multiple ribs of right side, initial encounter [S22.41XA]  Rib fractures [S22.49XA]    Multiple rib fractures    Type 2 diabetes mellitus, with long-term current use of insulin    Hyperlipidemia    Hiatal hernia with GERD and esophagitis    BPH without obstruction/lower urinary tract symptoms    Atrial flutter    Essential hypertension    Lumbar canal  stenosis    Contusion of right hip    Stage 3b chronic kidney disease    Arteriosclerotic cardiovascular disease (ASCVD)    Rib fractures    Past Medical History:   Diagnosis Date    Advanced diabetic retinal disease     Anorexia     Arteriosclerosis of abdominal aorta 02/24/2014    Arthritis     Atrial flutter     CAD (coronary artery disease)     Carotid stenosis, asymptomatic, bilateral 02/24/2014    16-49%    Closed right arm fracture 1994    Compression fracture of lumbar spine, non-traumatic     Diabetes mellitus     Elevated LFTs     Esophagitis 06/19/2018    DR RASHID GRADE D LOWER    Esophagus disorder     THICKENED DISTAL WALL-CT SCAN 6/18/18    First degree AV block     Garbled speech     Granulomatous disease, chronic     Hard of hearing     Hiatal hernia with GERD and esophagitis     History of acute gastritis     Hyperlipidemia     Hypertension     Hyponatremia     Leukocytosis     Lumbar canal stenosis     Lung nodule seen on imaging study     Mandible fracture 03/28/2020    FROM FALL    Nonrheumatic tricuspid valve regurgitation 07/28/2024    Osteoarthritis of multiple joints     Permanent atrial fibrillation     Ptosis of left eyelid     Pulmonary hypertension 07/28/2024    Remote history of stroke     SMALL CAUDATE NECLEUS    Right bundle branch block (RBBB) with left anterior fascicular block (LAFB)     Syncope and collapse 1/2022 06/11/2022    Thrombocytopenia     Weakness generalized      Past Surgical History:   Procedure Laterality Date    CARDIAC CATHETERIZATION N/A 6/9/2022    Procedure: Left Heart Cath;  Surgeon: Reinaldo Miller MD;  Location:  [facility] C[facility] INVASIVE [facility];  Service: Cardiovascular;  Laterality: N/A;    CARDIAC CATHETERIZATION N/A 6/9/2022    Procedure: Left ventriculography;  Surgeon: Reinaldo Miller MD;  Location:  [facility]VASIVE [facility];  Service: Cardiovascular;  Laterality: N/A;    CARDIAC CATHETERIZATION N/A 6/9/2022    Procedure: Coronary  angiography;  Surgeon: Reinaldo Miller MD;  Location: [facility] CATH INVASIVE LOCATION;  Service: Cardiovascular;  Laterality: N/A;    CARDIAC CATHETERIZATION N/A 6/13/2022    Procedure: STENT CHARISMA - CORONARY;  Surgeon: Michelle Alegre MD;  Location: [facility] CATH INVASIVE LOCATION;  Service: Cardiovascular;  Laterality: N/A;  PCI of the LAD with atherectomy    CARDIAC CATHETERIZATION N/A 6/13/2022    Procedure: Atherectomy-coronary;  Surgeon: Michelle Alegre MD;  Location: [facility] CATH INVASIVE LOCATION;  Service: Cardiovascular;  Laterality: N/A;  rotablator    CARDIAC CATHETERIZATION N/A 6/13/2022    Procedure: Optical Coherent Tomography;  Surgeon: Michelle Alegre MD;  Location: [facility] CATH INVASIVE LOCATION;  Service: Cardiovascular;  Laterality: N/A;    CATARACT EXTRACTION, BILATERAL      COLONOSCOPY      ENDOSCOPY N/A 06/19/2018    Procedure: ESOPHAGOGASTRODUODENOSCOPY WITH BIOPSY;  Surgeon: Toribio Wade MD;  Location: [facility] ENDOSCOPY;  Service: Gastroenterology    EYE PTOSIS REPAIR Left 11/15/2016    Procedure: LT UPPER LID EYE PTOSIS REPAIR;  Surgeon: Anup Lancaster MD;  Location: [facility] OR OSC;  Service:     EYE SURGERY Left     victreous hemorrhage repair    HIP ARTHROPLASTY Right     ORIF MANDIBULAR FRACTURE Bilateral 03/31/2020    Procedure: OPEN REDUCTION AND INTERNAL FIXATION OF BILATERAL MANDIBLE FRACTURES;  Surgeon: Percy Jeronimo MD;  Location: [facility] MAIN OR;  Service: Maxillofacial;  Laterality: Bilateral;       Allergies  Patient has no known allergies.    Discharge Medications     Discharge Medications        New Medications        Instructions Start Date   HYDROcodone-acetaminophen 5-325 MG per tablet  Commonly known as: NORCO   1 tablet, Oral, Every 8 Hours PRN      Lidocaine 4 %   1 patch, Transdermal, Every 24 Hours Scheduled, Remove & Discard patch within 12 hours or as directed by MD   Start Date: January 25, 2025            Changes to Medications        Instructions Start  Date   insulin glargine 100 UNIT/ML injection  Commonly known as: LANTUS, SEMGLEE  What changed: how much to take   10 Units, Subcutaneous, Nightly             Continue These Medications        Instructions Start Date   acetaminophen 500 MG tablet  Commonly known as: TYLENOL   500 mg, Every 6 Hours PRN      aspirin 81 MG EC tablet   81 mg, Daily      atorvastatin 10 MG tablet  Commonly known as: LIPITOR   10 mg, Oral, Daily      cholecalciferol 25 MCG (1000 UT) tablet  Commonly known as: VITAMIN D3   1,000 Units, Daily      clopidogrel 75 MG tablet  Commonly known as: PLAVIX   75 mg, Oral, Daily      famotidine 20 MG tablet  Commonly known as: PEPCID   20 mg, Oral, Nightly      fexofenadine 180 MG tablet  Commonly known as: ALLEGRA   180 mg, Daily PRN      furosemide 40 MG tablet  Commonly known as: LASIX   80 mg, Oral, Daily      Insulin Aspart 100 UNIT/ML injection  Commonly known as: novoLOG   6 Units, 3 Times Daily Before Meals      lansoprazole 15 MG capsule  Commonly known as: Prevacid   15 mg, Oral, Daily      losartan 50 MG tablet  Commonly known as: COZAAR   50 mg, Daily      tamsulosin 0.4 MG capsule 24 hr capsule  Commonly known as: FLOMAX   2 capsules, Daily               Hospital Course  Patient is a 89 y.o. male presented with a fall at home 2 days prior to admission.  Apparently on Tuesday evening he was walking around the foot of his bed when he stumbled and fell onto his right side.  He was not lightheaded or dizzy, he was not having any other symptoms or problems.  He says he just tripped.  His wife heard him fall and went into the room and helped him get up.  He did not seem to have suffered any significant trauma at the time.  The next day he was having some soreness in his right hip and in his chest wall but seems to be managing well enough.  He was able to get out of bed and move about the house.  A family member came by and they expressed concerns about his hip because of a previous fracture  in this area.  They finally decided to come into the hospital the next morning for evaluation.  In the emergency room his right hip was intact with no evidence for fractures or abnormalities.  A CT scan of his chest showed extensive right sided rib fractures.  He had anterior fractures from 3 through 5 and posterior fractures from 5-10 as well as some costal cartilage damage.  Remarkably enough he had very little pain or discomfort at rest and only modest pain with motion.  His oxygen saturations were maintained on room air and he was having no other issues or concerns.  Since he had no loss of consciousness there was no concern for head trauma and his vital signs were stable and the only significant lab abnormality was an elevated white count which was thought to be related to demargination from stress.    He was seen and evaluated by the thoracic surgeons who agreed that he seemed to be stable and in no distress.  He was able to take a deep breath and using incentive spirometer with minimal discomfort.  His pain was controlled with Tylenol and the rest of his workup remained unremarkable.  He was seen by Occupational Therapy who got him out of bed and was able to walk with him on a walker with minimal assistance.  Their primary recommendation was that he would benefit from further therapy and that he should stop using a cane and only use a walker from this point forward.  Because he is so stable and doing so remarkably well from a pain standpoint it was elected to send him home.  This morning as part of his exam I asked him to get out of bed which she was able to do independently.  He was also able to get back into bed and scoot up to a proper position without any help.  Again he had minimal discomfort.  His wife said the whole process seem to be relatively normal to her.  She was comfortable with the idea of taking him home.  He will be discharged with regular Tylenol every 6 hours and I will also send him home  with 10 Norco in case he has more significant pain.  I will also arrange for [facility] to see the patient for PT and OT at home to continue his recovery and increase his strength and stability.    Fortunately throughout this hospitalization his other medical conditions remained under good control.  His blood sugars were initially high but they were brought down and are stable at this point.  His renal function is stable, he is not having any cardiac symptoms, his blood pressure is adequately controlled, he is tolerating his medications, and no new issues have arisen.  He is anxious to be discharged home.  He says he will be much more comfortable there.  He already has a preset appointment with me in about 2-1/2 weeks and they have been instructed to keep that appointment.  They have been instructed to call if they have any questions or concerns.        Procedures Performed         Consults:   Consults       Date and Time Order Name Status Description    1/23/2025  3:50 PM General MD Inpatient Consult Completed             Pertinent Test Results:     Blood sugar 135, BUN 28, creatinine 1.27, sodium 140, potassium 4.4, chloride 102, EGFR 54  WBC 16.4, hemoglobin 11.7, hematocrit 35.5, MCV 90, platelets 116  Cholesterol 101, triglyceride 49, HDL 42, LDL 47  Magnesium 2.1  Hemoglobin A1c 6.9  INR 1.14    Right hip-hip prosthesis is satisfactory in appearance no fracture is seen  CT chest acute nondisplaced fracture of the anterior right 3rd through 5th ribs, posterior right 5th through 10th ribs, and the right 5th through 7th costal cartilages.  Otherwise no other acute change or intrathoracic abnormality is identified          Condition on Discharge:    At this point the patient is awake, alert, and oriented.  He is eating and drinking without difficulty.  At rest he has essentially no pain and with activity he has only moderate pain.  He is able to get out of bed without assistance as well as getting back  into bed.  So far his pain has been controlled with just Tylenol.  He slept well last night and he is anxious to be discharged home today.  He worked with occupational therapy and they felt he did relatively well but did recommend further therapy at home which has been arranged.  No other issues have arisen and he seems to be remarkably stable and nearly pain-free.      Temperature 97.5, pulse 107, respiratory rate 18, blood pressure 144/66, O2 sats 96% on room air  EENT-no JVD or thyromegaly.  He is somewhat hard of hearing so he have to speak loudly for him to understand you clearly.  He clearly follows the conversation and has opinions about how he is doing.  Lungs-bilateral breath sounds and relatively clear.  He may have some decreased breath sounds on the right versus left.  He is able to use his incentive spirometer with minimal discomfort  Heart-regular rate and rhythm  Abdomen-bowel sounds positive, soft, no masses, rebound, or guarding  Extremities-no clubbing, cyanosis, or edema.  Interestingly enough he does not even have a contusion of his right hip and his right hip tenderness is less today than on admission.  He has no other bruises or contusions that are noted as well.    Overall think he is doing surprisingly well and would benefit from discharge home to the care of his family with PT and OT follow-up.  He will see me in the office as previously arranged.  His pain will be controlled with Tylenol.  He has a few Norco to use if he has more severe pain.  He will follow-up with the thoracic surgeons as they arrange.  He has been instructed to call if he has any questions or concerns.  His wife is in the room and she fully agrees with this plan and is confident they can manage well at home.  I suspect he will do well and will be compliant with using a walker instead of a cane from this time forward.        Vital Signs  Temp:  [97.5 °F (36.4 °C)-99 °F (37.2 °C)] 97.5 °F (36.4 °C)  Heart Rate:  []  "107  Resp:  [18] 18  BP: (108-153)/(66-95) 144/66    Flowsheet Rows      Flowsheet Row First Filed Value   Admission Height 175.3 cm (69\") Documented at 01/23/2025 1059   Admission Weight 72.6 kg (160 lb) Documented at 01/23/2025 1059                Discharge Disposition  Home or Self Care        Discharge Diet:   Diet Instructions       Diet: Regular/House Diet, Diabetic Diets; Consistent Carbohydrate; Thin (IDDSI 0)      Discharge Diet:  Regular/House Diet  Diabetic Diets       Diabetic Diet: Consistent Carbohydrate    Fluid Consistency: Thin (IDDSI 0)            Activity at Discharge:   Activity Instructions       Activity as Tolerated      Gradually Increase Activity Until at Pre-Hospitalization Level      Other Activity Instructions      Activity Instructions: Always use a walker when moving around the house to avoid further falls.  A cane is no longer adequate for safety.  For the first month you should always have your wife in attendance to help avoid stumbles and falls.  Always use a walker when leaving the house as well.    Up WIth Assist              Follow-up Appointments  Future Appointments   Date Time Provider Department Center   1/28/2025  2:30 PM Fadi Alvarez III, MD [facility]     Additional Instructions for the Follow-ups that You Need to Schedule       Ambulatory Referral to Home Health   As directed      Face to Face Visit Date: 1/24/2025   Follow-up provider for Plan of Care?: I will be treating the patient on an ongoing basis.  Please send me the Plan of Care for signature.   Follow-up provider: LOIS BARON [[id number]]   Reason/Clinical Findings: Fall with multiple rib fractures and the occupational therapist feels he is somewhat weak and would benefit from physical therapy   Describe mobility limitations that make leaving home difficult: He has significant arthritis and a previous hip fracture   Nursing/Therapeutic Services Requested: Physical Therapy Occupational Therapy Home " Monitoring (Select INITIATE PROTOCOL order from panel below)   PT orders: Therapeutic exercise Gait Training Transfer training Strengthening Home safety assessment   Weight Bearing Status: As Tolerated   Occupational orders: Strengthening Home safety assessment   Frequency: 1 Week 1        Discharge Follow-up with PCP   As directed       Currently Documented PCP:    Percy Em MD    PCP Phone Number:    [phone]     Follow Up Details: He has a pre-existing appointment with me that is in the appropriate timeframe and he has been instructed to keep that appointment                Test Results Pending at Discharge       Percy Em MD  01/24/25  13:34 EST

## 2025-01-24 NOTE — PROGRESS NOTES
"    Chief Complaint: Multiple right rib fractures    Subjective:  Symptoms:  Stable.      Up in bed.  Worked with PT this morning.  On room air.  Pain well-controlled and eating lunch.    Vital Signs:  Temp:  [97.5 °F (36.4 °C)-99 °F (37.2 °C)] 97.5 °F (36.4 °C)  Heart Rate:  [] 107  Resp:  [18] 18  BP: (108-153)/(65-95) 144/66    Intake & Output (last day)         01/23 0701  01/24 0700 01/24 0701  01/25 0700    P.O. 120 300    Total Intake(mL/kg) 120 (1.7) 300 (4.1)    Urine (mL/kg/hr) 700 100 (0.2)    Total Output 700 100    Net -580 +200                  Objective:  General Appearance:  Comfortable.    Vital signs: (most recent): Blood pressure 144/66, pulse 107, temperature 97.5 °F (36.4 °C), temperature source Oral, resp. rate 18, height 175.3 cm (69\"), weight 72.6 kg (160 lb), SpO2 96%.    HEENT: Normal HEENT exam.    Lungs:  Normal effort.  He is not in respiratory distress.    Heart: Normal rate.    Chest: Chest wall tenderness present.    Extremities: (Limited ROM secondary to weakness)  Neurological: Patient is alert.    Skin:  Warm and dry.                  Results Review:     I reviewed the patient's new clinical results.  I reviewed the patient's new imaging results and agree with the interpretation.  Discussed with patient, nursing staff and surgeon    Imaging Results (Last 24 Hours)       Procedure Component Value Units Date/Time    XR Chest 1 View [[id number]] Collected: 01/24/25 0722     Updated: 01/24/25 0728    Narrative:      XR CHEST 1 VW-1/24/2025     HISTORY: Rib fractures.     Heart size is within normal limits. Lungs are underinflated with some  vascular crowding.     The reported rib fractures are not well seen though there is minimal  deformity of the lateral aspects of the right fifth and possibly sixth  ribs. No pneumothorax is seen. There is deformity of the bilateral  proximal humeri likely from old fractures. There is some aortic  calcification.       Impression:      1. " Underinflation of the lungs with no focal infiltrates.  2. The right rib fractures are not well seen though there is mild  deformity of the lateral aspects of the right fifth and probably sixth  ribs.  3. No pneumothorax is seen.        This report was finalized on 1/24/2025 7:24 AM by Dr. Umesh Velasquez M.D on Workstation: [id number]       CT Chest Without Contrast Diagnostic [[id number]] Collected: 01/23/25 1304     Updated: 01/23/25 1327    Narrative:      CT CHEST WO CONTRAST DIAGNOSTIC-     DATE OF EXAM: 1/23/2025 12:21 PM     INDICATION: Right-sided chest wall pain after fall from standing,  concern for rib fracture.     COMPARISON: Cardiac CT 5/27/2022. CT abdomen and pelvis 6/18/2018.     TECHNIQUE: Multiple contiguous axial images were acquired through the  chest without the intravenous administration of contrast. Reformatted  coronal and sagittal sequences and 3D reconstruction images were also  reviewed. Radiation dose reduction techniques were utilized, including  automated exposure control and exposure modulation based on body size.     FINDINGS:  Evaluation for intrathoracic trauma is mildly limited by the lack of  intravenous contrast. Respiratory motion artifact.     The heart and great vessels of the chest are normal in size. Severe  calcified coronary artery disease. No pericardial effusion. Calcified  remnants of prior granulomatous disease in the mediastinum and right  hilum. No pathologically enlarged intrathoracic lymph nodes are  identified. Tiny hiatal hernia.     Low lung volumes with bilateral perihilar and bibasilar subsegmental  atelectasis and/or scarring. Scattered likely benign sub-6 mm pulmonary  nodules in both lungs. Benign calcified granulomas in the right lung. No  dense lung consolidation. The central airways are widely patent. No  pneumothorax or pleural effusion.     Limited noncontrast CT imaging of the upper abdomen is unremarkable.     Flowing multilevel mid and lower  thoracic anterior osteophyte formation  indicating DISH. Likely acute nondisplaced fractures of the  anterolateral right third, fourth, and fifth ribs. Age-indeterminate but  possibly acute nondisplaced fractures of the posterior right fifth  through 10th ribs. Suspected nondisplaced fracture of the right fifth,  sixth, and seventh costal cartilages. Multiple superimposed old  bilateral rib fracture deformities. Partially imaged plate and screw  fixation hardware at the mandible and old healed fracture deformity of  the mandible. The patient is edentulous.       Impression:         1. Likely acute nondisplaced fractures of the anterolateral right third  through fifth ribs, the posterior right fifth through 10th ribs, and the  right fifth through seventh costal cartilages.  2. Otherwise, no acute intrathoracic abnormality.     This report was finalized on 1/23/2025 1:24 PM by Anthony Mariano MD on  Workstation: [id number]               Lab Results:     Lab Results (last 24 hours)       Procedure Component Value Units Date/Time    POC Glucose Once [[id number]]  (Normal) Collected: 01/24/25 1150    Specimen: Blood Updated: 01/24/25 1152     Glucose 111 mg/dL     Manual Differential [[id number]]  (Abnormal) Collected: 01/24/25 0658    Specimen: Blood Updated: 01/24/25 0752     Neutrophil % 53.0 %      Lymphocyte % 30.1 %      Monocyte % 2.4 %      Eosinophil % 1.2 %      Basophil % 0.0 %      Atypical Lymphocyte % 13.3 %      Neutrophils Absolute 8.70 10*3/mm3      Lymphocytes Absolute 7.13 10*3/mm3      Monocytes Absolute 0.39 10*3/mm3      Eosinophils Absolute 0.20 10*3/mm3      Basophils Absolute 0.00 10*3/mm3      Acanthocytes Mod/2+     Anisocytosis Mod/2+     Elliptocytes Mod/2+     Hypochromia Mod/2+     Microcytes Mod/2+     Ovalocytes Mod/2+     Poikilocytes Slight/1+     Smudge Cells Large/3+     Platelet Morphology Normal    Basic Metabolic Panel [[id number]]  (Abnormal) Collected: 01/24/25 0658    Specimen: Blood  Updated: 01/24/25 0732     Glucose 135 mg/dL      BUN 28 mg/dL      Creatinine 1.27 mg/dL      Sodium 140 mmol/L      Potassium 4.4 mmol/L      Chloride 102 mmol/L      CO2 31.0 mmol/L      Calcium 9.0 mg/dL      BUN/Creatinine Ratio 22.0     Anion Gap 7.0 mmol/L      eGFR 54.0 mL/min/1.73     Narrative:      GFR Categories in Chronic Kidney Disease (CKD)      GFR Category          GFR (mL/min/1.73)    Interpretation  G1                     90 or greater         Normal or high (1)  G2                      60-89                Mild decrease (1)  G3a                   45-59                Mild to moderate decrease  G3b                   30-44                Moderate to severe decrease  G4                    15-29                Severe decrease  G5                    14 or less           Kidney failure          (1)In the absence of evidence of kidney disease, neither GFR category G1 or G2 fulfill the criteria for CKD.    eGFR calculation 2021 CKD-EPI creatinine equation, which does not include race as a factor    CBC & Differential [[id number]]  (Abnormal) Collected: 01/24/25 0658    Specimen: Blood Updated: 01/24/25 0727    Narrative:      The following orders were created for panel order CBC & Differential.  Procedure                               Abnormality         Status                     ---------                               -----------         ------                     CBC Auto Differential[[id number]]        Abnormal            Final result                 Please view results for these tests on the individual orders.    CBC Auto Differential [[id number]]  (Abnormal) Collected: 01/24/25 0658    Specimen: Blood Updated: 01/24/25 0727     WBC 16.42 10*3/mm3      RBC 3.92 10*6/mm3      Hemoglobin 11.7 g/dL      Hematocrit 35.5 %      MCV 90.6 fL      MCH 29.8 pg      MCHC 33.0 g/dL      RDW 13.0 %      RDW-SD 43.0 fl      MPV 10.6 fL      Platelets 116 10*3/mm3     POC Glucose Once [[id number]]  (Abnormal)  Collected: 01/24/25 0543    Specimen: Blood Updated: 01/24/25 0544     Glucose 136 mg/dL     Manual Differential [[id number]]  (Abnormal) Collected: 01/23/25 2025    Specimen: Blood Updated: 01/23/25 2232     Neutrophil % 39.0 %      Lymphocyte % 58.0 %      Monocyte % 3.0 %      Neutrophils Absolute 6.92 10*3/mm3      Lymphocytes Absolute 10.30 10*3/mm3      Monocytes Absolute 0.53 10*3/mm3      RBC Morphology Normal     WBC Morphology Normal     Platelet Morphology Normal    Lipid Panel [[id number]] Collected: 01/23/25 2025    Specimen: Blood Updated: 01/23/25 2131     Total Cholesterol 101 mg/dL      Triglycerides 49 mg/dL      HDL Cholesterol 42 mg/dL      LDL Cholesterol  47 mg/dL      VLDL Cholesterol 12 mg/dL      LDL/HDL Ratio 1.17    Narrative:      Cholesterol Reference Ranges  (U.S. Department of Health and Human Services ATP III Classifications)    Desirable          <200 mg/dL  Borderline High    200-239 mg/dL  High Risk          >240 mg/dL      Triglyceride Reference Ranges  (U.S. Department of Health and Human Services ATP III Classifications)    Normal           <150 mg/dL  Borderline High  150-199 mg/dL  High             200-499 mg/dL  Very High        >500 mg/dL    HDL Reference Ranges  (U.S. Department of Health and Human Services ATP III Classifications)    Low     <40 mg/dl (major risk factor for CHD)  High    >60 mg/dl ('negative' risk factor for CHD)        LDL Reference Ranges  (U.S. Department of Health and Human Services ATP III Classifications)    Optimal          <100 mg/dL  Near Optimal     100-129 mg/dL  Borderline High  130-159 mg/dL  High             160-189 mg/dL  Very High        >189 mg/dL    LDL is calculated using the NIH LDL-C calculation.      Hemoglobin A1c [[id number]]  (Abnormal) Collected: 01/23/25 2025    Specimen: Blood Updated: 01/23/25 2131     Hemoglobin A1C 6.90 %     Narrative:      Hemoglobin A1C Ranges:    Increased Risk for Diabetes  5.7% to 6.4%  Diabetes                      >= 6.5%  Diabetic Goal                < 7.0%    Magnesium [[id number]]  (Normal) Collected: 01/23/25 2025    Specimen: Blood Updated: 01/23/25 2131     Magnesium 2.1 mg/dL     Basic Metabolic Panel [[id number]]  (Abnormal) Collected: 01/23/25 2025    Specimen: Blood Updated: 01/23/25 2131     Glucose 250 mg/dL      BUN 30 mg/dL      Creatinine 1.37 mg/dL      Sodium 137 mmol/L      Potassium 4.7 mmol/L      Chloride 101 mmol/L      CO2 29.1 mmol/L      Calcium 9.0 mg/dL      BUN/Creatinine Ratio 21.9     Anion Gap 6.9 mmol/L      eGFR 49.3 mL/min/1.73     Narrative:      GFR Categories in Chronic Kidney Disease (CKD)      GFR Category          GFR (mL/min/1.73)    Interpretation  G1                     90 or greater         Normal or high (1)  G2                      60-89                Mild decrease (1)  G3a                   45-59                Mild to moderate decrease  G3b                   30-44                Moderate to severe decrease  G4                    15-29                Severe decrease  G5                    14 or less           Kidney failure          (1)In the absence of evidence of kidney disease, neither GFR category G1 or G2 fulfill the criteria for CKD.    eGFR calculation 2021 CKD-EPI creatinine equation, which does not include race as a factor    CBC & Differential [[id number]]  (Abnormal) Collected: 01/23/25 2025    Specimen: Blood Updated: 01/23/25 2116    Narrative:      The following orders were created for panel order CBC & Differential.  Procedure                               Abnormality         Status                     ---------                               -----------         ------                     CBC Auto Differential[[id number]]        Abnormal            Final result                 Please view results for these tests on the individual orders.    CBC Auto Differential [[id number]]  (Abnormal) Collected: 01/23/25 2025    Specimen: Blood Updated: 01/23/25 2116     WBC  17.75 10*3/mm3      RBC 4.02 10*6/mm3      Hemoglobin 11.9 g/dL      Hematocrit 36.1 %      MCV 89.8 fL      MCH 29.6 pg      MCHC 33.0 g/dL      RDW 12.4 %      RDW-SD 40.1 fl      MPV 10.9 fL      Platelets 122 10*3/mm3     POC Glucose Once [[id number]]  (Abnormal) Collected: 01/23/25 2101    Specimen: Blood Updated: 01/23/25 2103     Glucose 200 mg/dL     POC Glucose Once [[id number]]  (Abnormal) Collected: 01/23/25 1724    Specimen: Blood Updated: 01/23/25 1726     Glucose 212 mg/dL     Manual Differential [[id number]]  (Abnormal) Collected: 01/23/25 1353    Specimen: Blood Updated: 01/23/25 1453     Neutrophil % 45.0 %      Lymphocyte % 53.0 %      Monocyte % 2.0 %      Neutrophils Absolute 8.14 10*3/mm3      Lymphocytes Absolute 9.59 10*3/mm3      Monocytes Absolute 0.36 10*3/mm3      RBC Morphology Normal     WBC Morphology Normal     Platelet Morphology Normal    Comprehensive Metabolic Panel [[id number]]  (Abnormal) Collected: 01/23/25 1353    Specimen: Blood Updated: 01/23/25 1424     Glucose 269 mg/dL      BUN 30 mg/dL      Creatinine 1.31 mg/dL      Sodium 138 mmol/L      Potassium 4.7 mmol/L      Chloride 99 mmol/L      CO2 27.8 mmol/L      Calcium 9.1 mg/dL      Total Protein 7.3 g/dL      Albumin 3.6 g/dL      ALT (SGPT) 16 U/L      AST (SGOT) 16 U/L      Alkaline Phosphatase 118 U/L      Total Bilirubin 0.7 mg/dL      Globulin 3.7 gm/dL      A/G Ratio 1.0 g/dL      BUN/Creatinine Ratio 22.9     Anion Gap 11.2 mmol/L      eGFR 52.0 mL/min/1.73     Narrative:      GFR Categories in Chronic Kidney Disease (CKD)      GFR Category          GFR (mL/min/1.73)    Interpretation  G1                     90 or greater         Normal or high (1)  G2                      60-89                Mild decrease (1)  G3a                   45-59                Mild to moderate decrease  G3b                   30-44                Moderate to severe decrease  G4                    15-29                Severe decrease  G5                     14 or less           Kidney failure          (1)In the absence of evidence of kidney disease, neither GFR category G1 or G2 fulfill the criteria for CKD.    eGFR calculation 2021 CKD-EPI creatinine equation, which does not include race as a factor    Protime-INR [[id number]]  (Abnormal) Collected: 01/23/25 1353    Specimen: Blood Updated: 01/23/25 1414     Protime 14.5 Seconds      INR 1.14    aPTT [[id number]]  (Normal) Collected: 01/23/25 1353    Specimen: Blood Updated: 01/23/25 1414     PTT 30.9 seconds     CBC & Differential [[id number]]  (Abnormal) Collected: 01/23/25 1353    Specimen: Blood Updated: 01/23/25 1411    Narrative:      The following orders were created for panel order CBC & Differential.  Procedure                               Abnormality         Status                     ---------                               -----------         ------                     CBC Auto Differential[[id number]]        Abnormal            Final result                 Please view results for these tests on the individual orders.    CBC Auto Differential [[id number]]  (Abnormal) Collected: 01/23/25 1353    Specimen: Blood Updated: 01/23/25 1411     WBC 18.09 10*3/mm3      RBC 4.31 10*6/mm3      Hemoglobin 12.7 g/dL      Hematocrit 38.6 %      MCV 89.6 fL      MCH 29.5 pg      MCHC 32.9 g/dL      RDW 12.6 %      RDW-SD 41.4 fl      MPV 10.8 fL      Platelets 124 10*3/mm3              Assessment & Plan       Multiple rib fractures    Type 2 diabetes mellitus, with long-term current use of insulin    Essential hypertension    Lumbar canal stenosis    Hyperlipidemia    Hiatal hernia with GERD and esophagitis    Arteriosclerotic cardiovascular disease (ASCVD)    BPH without obstruction/lower urinary tract symptoms    Atrial flutter    Contusion of right hip    Stage 3b chronic kidney disease       Assessment & Plan    Multiple right rib fractures: Chest x-ray from this morning without any development of pleural  effusion or pneumothorax.  Continue nonoperative management with adequate pain control.  He will need to continue incentive spirometry at time of discharge.  Appreciate assistance from physical therapy.  Not much else to offer from a thoracic perspective.  He may follow-up with us as needed.  Discharge per primary service.    Antonette Busby DNP, APRN  [facility]  01/24/25  12:46 EST

## 2025-01-24 NOTE — PROGRESS NOTES
[facility] to provide home PT and OT per order.  SOC authorized from Anne with Dr Em for Monday.  Unable to reach patient or wife with voice messages left for them.  Per CCP, patient /wife were agreeable.

## 2025-01-24 NOTE — CASE MANAGEMENT/SOCIAL WORK
Continued Stay Note  [facility]     Patient Name: Javier Marin  MRN: [id number]  Today's Date: 1/24/2025    Admit Date: 1/23/2025    Plan: Home with family and HH pending acceptance   Discharge Plan       Row Name 01/24/25 5624       Plan    Plan Home with family and HH pending acceptance    Patient/Family in Agreement with Plan yes    Plan Comments Dr. Em plans dc home today, requested HH referral, sent to [facility], pt had no preference.   LVM for [facility], await confirmation of acceptance. CCP will follow - Ruthann GRIDER                   Discharge Codes    No documentation.                 Expected Discharge Date and Time       Expected Discharge Date Expected Discharge Time    Jan 24, 2025               Ruthann Ramirez RN

## 2025-01-24 NOTE — CASE MANAGEMENT/SOCIAL WORK
Case Management Discharge Note      Final Note: spoke with [facility], can accept and will start Monday per MD figueroa         Selected Continued Care - Admitted Since 1/23/2025       Destination    No services have been selected for the patient.                Durable Medical Equipment    No services have been selected for the patient.                Dialysis/Infusion    No services have been selected for the patient.                Home Medical Care       Service Provider Services Address Phone Fax Patient Preferred     [facility] [address] [place] [address] [address] [address] [phone] --              Therapy    No services have been selected for the patient.                Community Resources    No services have been selected for the patient.                Community & DME    No services have been selected for the patient.                    Transportation Services  Private: Car    Final Discharge Disposition Code: 06 - home with home health care

## 2025-01-24 NOTE — PLAN OF CARE
Goal Outcome Evaluation:  Plan of Care Reviewed With: patient, spouse        Progress: improving       Pt is a 89 y.o. male with h/o HTN, a-fib, and CAD who was admitted to [facility] on 1/23/2025 with c/o R hip pain after a mechanical fall. Imaging (-) for R hip abnormality, but was (+) for acute non-displaced fx of the anterolateral R 3-5 ribs, posterior R 5-10 ribs and R5-7 costal cartilages. Patient is (I) at baseline, with use of a RW and lives with his wife in a home with 2 JACK. Pts spouse states that she supervises his mobility and ADLs, she helps w/ shoes, but he is able to perform everything himself. Today, pt performed bed mobility with SBA, required CGA for transfers with a RW, and ambulated 120' with a RW and CGA w/ moments of SBA. No SOB, dizziness, pain, LOB or buckling. Pt reports feeling at his baseline and has no concerns or questions for return home; spouse in agreement. Pt may benefit from skilled PT services acutely to address their impaired strength, balance and endurance, as well as, improve their level of independence prior to discharge. SBAR w/ RN. Rec home w/ HH upon DC.      Anticipated Discharge Disposition (PT): home with home health, home with assist

## 2025-01-24 NOTE — THERAPY EVALUATION
Patient Name: Javier Marni  : 1935    MRN: [id number]                              Today's Date: 2025       Admit Date: 2025    Visit Dx:     ICD-10-CM ICD-9-CM   1. Closed fracture of multiple ribs of right side, initial encounter  S22.41XA 807.09   2. Contusion of right hip, initial encounter  S70.01XA 924.01   3. Decreased activities of daily living (ADL)  Z78.9 V49.89     Patient Active Problem List   Diagnosis    Advanced diabetic retinal disease    Type 2 diabetes mellitus, with long-term current use of insulin    Essential hypertension    Acute kidney injury superimposed on chronic kidney disease    Elevated liver function tests    Leukocytosis    Anorexia    Esophagus disorder, thickened distal wall CT Scan 18    Granulomatous disease, chronic    Lung nodule seen on imaging study    Compression fracture of lumbar spine, non-traumatic    Lumbar canal stenosis    Weakness generalized    Cough    Hard of hearing    Ptosis, left eyelid    Osteoarthritis of multiple joints    Carotid stenosis, asymptomatic, bilateral    Remote history of stroke, small caudate nucleus    Thrombocytopenia    Fall at home, subsequent encounter    Hyperlipidemia    Esophagitis determined by endoscopy by Sheri 18 LA Grade D Lower 1/3    Hiatal hernia with GERD and esophagitis    PAD (peripheral artery disease)    Arteriosclerosis of abdominal aorta    Arteriosclerotic cardiovascular disease (ASCVD)    Ischemic cardiomyopathy EF 40% Cath 6/10/2022    Osteoarthritis of right hip    BPH without obstruction/lower urinary tract symptoms    Right hip pain    Closed fracture of right proximal humerus    Permanent atrial fibrillation    Atrial flutter    Right bundle branch block (RBBB) with left anterior fascicular block (LAFB)    Nonrheumatic tricuspid valve regurgitation    Pulmonary hypertension    Multiple rib fractures    Contusion of right hip    Stage 3b chronic kidney disease     Past Medical History:    Diagnosis Date    Advanced diabetic retinal disease     Anorexia     Arteriosclerosis of abdominal aorta 02/24/2014    Arthritis     Atrial flutter     CAD (coronary artery disease)     Carotid stenosis, asymptomatic, bilateral 02/24/2014    16-49%    Closed right arm fracture 1994    Compression fracture of lumbar spine, non-traumatic     Diabetes mellitus     Elevated LFTs     Esophagitis 06/19/2018    DR RASHID GRADE D LOWER    Esophagus disorder     THICKENED DISTAL WALL-CT SCAN 6/18/18    First degree AV block     Garbled speech     Granulomatous disease, chronic     Hard of hearing     Hiatal hernia with GERD and esophagitis     History of acute gastritis     Hyperlipidemia     Hypertension     Hyponatremia     Leukocytosis     Lumbar canal stenosis     Lung nodule seen on imaging study     Mandible fracture 03/28/2020    FROM FALL    Nonrheumatic tricuspid valve regurgitation 07/28/2024    Osteoarthritis of multiple joints     Permanent atrial fibrillation     Ptosis of left eyelid     Pulmonary hypertension 07/28/2024    Remote history of stroke     SMALL CAUDATE NECLEUS    Right bundle branch block (RBBB) with left anterior fascicular block (LAFB)     Syncope and collapse 1/2022 06/11/2022    Thrombocytopenia     Weakness generalized      Past Surgical History:   Procedure Laterality Date    CARDIAC CATHETERIZATION N/A 6/9/2022    Procedure: Left Heart Cath;  Surgeon: Reinaldo Miller MD;  Location: [facility] CATH INVASIVE LOCATION;  Service: Cardiovascular;  Laterality: N/A;    CARDIAC CATHETERIZATION N/A 6/9/2022    Procedure: Left ventriculography;  Surgeon: Reinaldo Miller MD;  Location: [facility] CATH INVASIVE LOCATION;  Service: Cardiovascular;  Laterality: N/A;    CARDIAC CATHETERIZATION N/A 6/9/2022    Procedure: Coronary angiography;  Surgeon: Reinaldo Miller MD;  Location: [facility] CATH INVASIVE LOCATION;  Service: Cardiovascular;  Laterality: N/A;    CARDIAC CATHETERIZATION N/A 6/13/2022     Procedure: STENT CHARISMA - CORONARY;  Surgeon: Michelle Alegre MD;  Location: [facility] CATH INVASIVE LOCATION;  Service: Cardiovascular;  Laterality: N/A;  PCI of the LAD with atherectomy    CARDIAC CATHETERIZATION N/A 6/13/2022    Procedure: Atherectomy-coronary;  Surgeon: Michelle Alegre MD;  Location: [facility] CATH INVASIVE LOCATION;  Service: Cardiovascular;  Laterality: N/A;  rotablator    CARDIAC CATHETERIZATION N/A 6/13/2022    Procedure: Optical Coherent Tomography;  Surgeon: Michelle Alegre MD;  Location: [facility] CATH INVASIVE LOCATION;  Service: Cardiovascular;  Laterality: N/A;    CATARACT EXTRACTION, BILATERAL      COLONOSCOPY      ENDOSCOPY N/A 06/19/2018    Procedure: ESOPHAGOGASTRODUODENOSCOPY WITH BIOPSY;  Surgeon: Toribio Wade MD;  Location: [facility] ENDOSCOPY;  Service: Gastroenterology    EYE PTOSIS REPAIR Left 11/15/2016    Procedure: LT UPPER LID EYE PTOSIS REPAIR;  Surgeon: Anup Lancaster MD;  Location: [facility] OR OSC;  Service:     EYE SURGERY Left     victreous hemorrhage repair    HIP ARTHROPLASTY Right     ORIF MANDIBULAR FRACTURE Bilateral 03/31/2020    Procedure: OPEN REDUCTION AND INTERNAL FIXATION OF BILATERAL MANDIBLE FRACTURES;  Surgeon: Percy Jeronimo MD;  Location: [facility] MAIN OR;  Service: Maxillofacial;  Laterality: Bilateral;      General Information       Row Name 01/24/25 1254          OT Time and Intention    Subjective Information no complaints  -ES     Document Type evaluation  -ES     Mode of Treatment individual therapy;occupational therapy  -ES     Patient Effort good  -ES       Row Name 01/24/25 6290          General Information    Patient Profile Reviewed yes  -ES     Prior Level of Function independent:;ADL's;all household mobility  Patient reports he is independent with ADLs. Functional mobility with use of rolling walker. Standing shower and grooming completion. No home O2 use.  -ES     Existing Precautions/Restrictions fall  -ES     Barriers to Rehab hearing  deficit  patient is very [place], reports he left his hearing aides at home  -ES       Row Name 01/24/25 1254          Living Environment    People in Home spouse  -ES       Row Name 01/24/25 1254          Cognition    Orientation Status (Cognition) oriented x 3  patient is pleasant and cooperative, agreeable to therapy evaluation. Patient is very [place], does not have hearing aides present.  -ES       Row Name 01/24/25 1254          Safety Issues/Impairments Affecting Functional Mobility    Impairments Affecting Function (Mobility) balance;endurance/activity tolerance;strength  -ES               User Key  (r) = Recorded By, (t) = Taken By, (c) = Cosigned By      Initials Name Provider Type    ES Saturnino, Chanell, OTR/L, CSRS Occupational Therapist                     Mobility/ADL's       Row Name 01/24/25 1255          Bed Mobility    Bed Mobility supine-sit;sit-supine  -ES     Supine-Sit [place] (Bed Mobility) standby assist  -ES     Sit-Supine [place] (Bed Mobility) standby assist  -ES       Row Name 01/24/25 1255          Transfers    Transfers sit-stand transfer;stand-sit transfer  -ES       Row Name 01/24/25 1255          Sit-Stand Transfer    Sit-Stand [place] (Transfers) contact guard;1 person assist  -ES     Assistive Device (Sit-Stand Transfers) walker, front-wheeled  -ES       Row Name 01/24/25 1255          Stand-Sit Transfer    Stand-Sit [place] (Transfers) contact guard;1 person assist  -ES     Assistive Device (Stand-Sit Transfers) walker, front-wheeled  -ES       Row Name 01/24/25 1255          Functional Mobility    Functional Mobility- Ind. Level contact guard assist;1 person  -ES     Functional Mobility- Device walker, front-wheeled  -ES     Functional Mobility- Comment patient is slightly unsteady on his feet, CGA with use of rolling walker  -ES       Row Name 01/24/25 1255          Activities of Daily Living    BADL Assessment/Intervention lower body dressing  -ES       Row Name  01/24/25 1255          Lower Body Dressing Assessment/Training    [place] Level (Lower Body Dressing) lower body dressing skills;don;socks;standby assist  -ES     Position (Lower Body Dressing) edge of bed sitting  -ES     Comment, (Lower Body Dressing) non skid socks donned prior to mobility  -ES               User Key  (r) = Recorded By, (t) = Taken By, (c) = Cosigned By      Initials Name Provider Type    ES Chanell Matamoros, OTR/L, CSRS Occupational Therapist                   Obj/Interventions       Row Name 01/24/25 1256          Vision Assessment/Intervention    Visual Impairment/Limitations corrective lenses full-time  -ES       Row Name 01/24/25 1256          Range of Motion Comprehensive    General Range of Motion bilateral upper extremity ROM WNL  -ES       Row Name 01/24/25 1256          Strength Comprehensive (MMT)    Comment, General Manual Muscle Testing (MMT) Assessment BUEs 4/5  -ES       Row Name 01/24/25 1256          Motor Skills    Motor Skills functional endurance  -ES     Functional Endurance fair  -ES       Row Name 01/24/25 1256          Balance    Balance Assessment sitting dynamic balance;standing dynamic balance  -ES     Dynamic Sitting Balance standby assist  -ES     Position, Sitting Balance unsupported;sitting edge of bed  -ES     Dynamic Standing Balance contact guard;1-person assist  -ES     Position/Device Used, Standing Balance supported;walker, front-wheeled  -ES     Comment, Balance SBA seated EOB. CGA in stance with use of rolling walker  -ES               User Key  (r) = Recorded By, (t) = Taken By, (c) = Cosigned By      Initials Name Provider Type    ES Chanell Matamoros, OTR/L, CSRS Occupational Therapist                   Goals/Plan       Row Name 01/24/25 1301          Bed Mobility Goal 1 (OT)    Activity/Assistive Device (Bed Mobility Goal 1, OT) bed mobility activities, all  -ES     [place] Level/Cues Needed (Bed Mobility Goal 1, OT) independent  -ES     Time Frame  (Bed Mobility Goal 1, OT) short term goal (STG);2 weeks  -ES     Progress/Outcomes (Bed Mobility Goal 1, OT) new goal  -ES       Row Name 01/24/25 1301          Transfer Goal 1 (OT)    Activity/Assistive Device (Transfer Goal 1, OT) transfers, all  -ES     [place] Level/Cues Needed (Transfer Goal 1, OT) modified independence  -ES     Time Frame (Transfer Goal 1, OT) short term goal (STG);2 weeks  -ES     Progress/Outcome (Transfer Goal 1, OT) new goal  -ES       Row Name 01/24/25 1301          Bathing Goal 1 (OT)    Activity/Device (Bathing Goal 1, OT) bathing skills, all  -ES     [place] Level/Cues Needed (Bathing Goal 1, OT) independent  -ES     Time Frame (Bathing Goal 1, OT) short term goal (STG);2 weeks  -ES     Progress/Outcomes (Bathing Goal 1, OT) new goal  -ES       Row Name 01/24/25 1301          Dressing Goal 1 (OT)    Activity/Device (Dressing Goal 1, OT) dressing skills, all  -ES     [place]/Cues Needed (Dressing Goal 1, OT) independent  -ES     Time Frame (Dressing Goal 1, OT) short term goal (STG);2 weeks  -ES     Progress/Outcome (Dressing Goal 1, OT) new goal  -ES       Row Name 01/24/25 1301          Toileting Goal 1 (OT)    Activity/Device (Toileting Goal 1, OT) toileting skills, all  -ES     [place] Level/Cues Needed (Toileting Goal 1, OT) independent  -ES     Time Frame (Toileting Goal 1, OT) short term goal (STG);2 weeks  -ES     Progress/Outcome (Toileting Goal 1, OT) new goal  -ES       Row Name 01/24/25 1301          Problem Specific Goal 1 (OT)    Problem Specific Goal 1 (OT) Patient will demonstrate fair plus dynamic standing balance in preperation for independent ADL routine completion at time of discharge  -ES     Time Frame (Problem Specific Goal 1, OT) short term goal (STG);2 weeks  -ES     Progress/Outcome (Problem Specific Goal 1, OT) new goal  -ES       Row Name 01/24/25 1301          Therapy Assessment/Plan (OT)    Planned Therapy Interventions (OT) activity  tolerance training;BADL retraining;functional balance retraining;occupation/activity based interventions;patient/caregiver education/training;strengthening exercise;transfer/mobility retraining  -ES               User Key  (r) = Recorded By, (t) = Taken By, (c) = Cosigned By      Initials Name Provider Type    Chanell Warren, OTR/L, CSRS Occupational Therapist                   Clinical Impression       Row Name 01/24/25 1253          Pain Assessment    Pretreatment Pain Rating 0/10 - no pain  -ES     Posttreatment Pain Rating 0/10 - no pain  -ES     Pre/Posttreatment Pain Comment patient with no complaints or signs of pain  -ES       Row Name 01/24/25 1251          Plan of Care Review    Plan of Care Reviewed With patient  -ES     Progress no change  -ES     Outcome Evaluation Patient is 89 year old Male presenting to [facility] on 1/23/2025 with complaints of mechanical fall at home with resulting hip pain. CT negative for hip abnormality, however shows fractures of the anterior lateral right 3rd through 5th ribs and the posterior 5th through 10th ribs as well as the right 5th through 7th costal cartilages. At baseline, patient reports he is independent with ADLs and uses a rolling walker for functional mobility. Today, patient is SBA with bed mobility. SBA seated edge of bed to complete LB dressing with s/u. CGA STS and functional mobility with use of rolling walker, slightly unsteady on his feet. Returned to supine at conclusion of evaluation. Patient presents with deficits related to strength, tolerance, balance, transfers and mobility. Recommend HH at time of discharge.  -ES       Row Name 01/24/25 4549          Therapy Assessment/Plan (OT)    Rehab Potential (OT) good  -ES     Criteria for Skilled Therapeutic Interventions Met (OT) yes;meets criteria;skilled treatment is necessary  -ES     Therapy Frequency (OT) 5 times/wk  -ES       Row Name 01/24/25 0967          Therapy Plan  Review/Discharge Plan (OT)    Anticipated Discharge Disposition (OT) home with home health  -ES       Row Name 01/24/25 1258          Positioning and Restraints    Pre-Treatment Position in bed  -ES     Post Treatment Position bed  -ES     In Bed fowlers;call light within reach;encouraged to call for assist;exit alarm on  -ES               User Key  (r) = Recorded By, (t) = Taken By, (c) = Cosigned By      Initials Name Provider Type    Chanell Warren OTR/L, LEODAN Occupational Therapist                   Outcome Measures       Row Name 01/24/25 1302          How much help from another is currently needed...    Putting on and taking off regular lower body clothing? 3  -ES     Bathing (including washing, rinsing, and drying) 3  -ES     Toileting (which includes using toilet bed pan or urinal) 3  -ES     Putting on and taking off regular upper body clothing 4  -ES     Taking care of personal grooming (such as brushing teeth) 4  -ES     Eating meals 4  -ES     AM-PAC 6 Clicks Score (OT) 21  -ES       Row Name 01/24/25 0800          How much help from another person do you currently need...    Turning from your back to your side while in flat bed without using bedrails? 2  -CC     Moving from lying on back to sitting on the side of a flat bed without bedrails? 2  -CC     Moving to and from a bed to a chair (including a wheelchair)? 2  -CC     Standing up from a chair using your arms (e.g., wheelchair, bedside chair)? 2  -CC     Climbing 3-5 steps with a railing? 1  -CC     To walk in hospital room? 2  -CC     AM-PAC 6 Clicks Score (PT) 11  -CC       Row Name 01/24/25 1302          Functional Assessment    Outcome Measure Options AM-PAC 6 Clicks Daily Activity (OT)  -ES               User Key  (r) = Recorded By, (t) = Taken By, (c) = Cosigned By      Initials Name Provider Type    Chanell Warren OTR/L, LEODAN Occupational Therapist    Luc Couch, RN Registered Nurse                    Occupational Therapy  Education       Title: PT OT SLP Therapies (In Progress)       Topic: Occupational Therapy (Not Started)       Point: ADL training (Not Started)       Description:   Instruct learner(s) on proper safety adaptation and remediation techniques during self care or transfers.   Instruct in proper use of assistive devices.                  Learner Progress:  Not documented in this visit.              Point: Home exercise program (Not Started)       Description:   Instruct learner(s) on appropriate technique for monitoring, assisting and/or progressing therapeutic exercises/activities.                  Learner Progress:  Not documented in this visit.              Point: Precautions (Not Started)       Description:   Instruct learner(s) on prescribed precautions during self-care and functional transfers.                  Learner Progress:  Not documented in this visit.              Point: Body mechanics (Not Started)       Description:   Instruct learner(s) on proper positioning and spine alignment during self-care, functional mobility activities and/or exercises.                  Learner Progress:  Not documented in this visit.                                  OT Recommendation and Plan  Planned Therapy Interventions (OT): activity tolerance training, BADL retraining, functional balance retraining, occupation/activity based interventions, patient/caregiver education/training, strengthening exercise, transfer/mobility retraining  Therapy Frequency (OT): 5 times/wk  Plan of Care Review  Plan of Care Reviewed With: patient  Progress: no change  Outcome Evaluation: Patient is 89 year old Male presenting to [facility] on 1/23/2025 with complaints of mechanical fall at home with resulting hip pain. CT negative for hip abnormality, however shows fractures of the anterior lateral right 3rd through 5th ribs and the posterior 5th through 10th ribs as well as the right 5th through 7th costal cartilages. At baseline,  patient reports he is independent with ADLs and uses a rolling walker for functional mobility. Today, patient is SBA with bed mobility. SBA seated edge of bed to complete LB dressing with s/u. CGA STS and functional mobility with use of rolling walker, slightly unsteady on his feet. Returned to supine at conclusion of evaluation. Patient presents with deficits related to strength, tolerance, balance, transfers and mobility. Recommend HH at time of discharge.     Time Calculation:   Evaluation Complexity (OT)  Review Occupational Profile/Medical/Therapy History Complexity: expanded/moderate complexity  Assessment, Occupational Performance/Identification of Deficit Complexity: 3-5 performance deficits  Clinical Decision Making Complexity (OT): detailed assessment/moderate complexity  Overall Complexity of Evaluation (OT): moderate complexity     Time Calculation- OT       Row Name 01/24/25 1303             Time Calculation- OT    OT Start Time 0923  -ES      OT Stop Time 0944  -ES      OT Time Calculation (min) 21 min  -ES      Total Timed Code Minutes- OT 16 minute(s)  -ES      OT Received On 01/24/25  -ES      OT - Next Appointment 01/27/25  -ES      OT Goal Re-Cert Due Date 02/07/25  -ES         Timed Charges    99711 - OT Therapeutic Activity Minutes 11  -ES      37835 - OT Self Care/Mgmt Minutes 5  -ES         Untimed Charges    OT Eval/Re-eval Minutes 5  -ES         Total Minutes    Timed Charges Total Minutes 16  -ES      Untimed Charges Total Minutes 5  -ES       Total Minutes 21  -ES                User Key  (r) = Recorded By, (t) = Taken By, (c) = Cosigned By      Initials Name Provider Type    ES Chanell Matamoros, OTR/L, CSRS Occupational Therapist                  Therapy Charges for Today       Code Description Service Date Service Provider Modifiers Qty    [id number]  OT THERAPEUTIC ACT EA 15 MIN 1/24/2025 Chanell Matamoros, OTR/L, CSRS GO 1    [id number] HC OT EVAL MOD COMPLEXITY 2 1/24/2025 Chanell Matamoros  OTR/L, CSRS GO 1                 Chanell Matamoros, OTR/L, CSRS  1/24/2025

## 2025-01-24 NOTE — THERAPY EVALUATION
Patient Name: Javier Marin  : 1935    MRN: [id number]                              Today's Date: 2025       Admit Date: 2025    Visit Dx:     ICD-10-CM ICD-9-CM   1. Closed fracture of multiple ribs of right side, initial encounter  S22.41XA 807.09   2. Contusion of right hip, initial encounter  S70.01XA 924.01   3. Decreased activities of daily living (ADL)  Z78.9 V49.89     Patient Active Problem List   Diagnosis    Advanced diabetic retinal disease    Type 2 diabetes mellitus, with long-term current use of insulin    Essential hypertension    Acute kidney injury superimposed on chronic kidney disease    Elevated liver function tests    Leukocytosis    Anorexia    Esophagus disorder, thickened distal wall CT Scan 18    Granulomatous disease, chronic    Lung nodule seen on imaging study    Compression fracture of lumbar spine, non-traumatic    Lumbar canal stenosis    Weakness generalized    Cough    Hard of hearing    Ptosis, left eyelid    Osteoarthritis of multiple joints    Carotid stenosis, asymptomatic, bilateral    Remote history of stroke, small caudate nucleus    Thrombocytopenia    Fall at home, subsequent encounter    Hyperlipidemia    Esophagitis determined by endoscopy by Sheri 18 LA Grade D Lower 1/3    Hiatal hernia with GERD and esophagitis    PAD (peripheral artery disease)    Arteriosclerosis of abdominal aorta    Arteriosclerotic cardiovascular disease (ASCVD)    Ischemic cardiomyopathy EF 40% Cath 6/10/2022    Osteoarthritis of right hip    BPH without obstruction/lower urinary tract symptoms    Right hip pain    Closed fracture of right proximal humerus    Permanent atrial fibrillation    Atrial flutter    Right bundle branch block (RBBB) with left anterior fascicular block (LAFB)    Nonrheumatic tricuspid valve regurgitation    Pulmonary hypertension    Multiple rib fractures    Contusion of right hip    Stage 3b chronic kidney disease    Rib fractures     Past  Medical History:   Diagnosis Date    Advanced diabetic retinal disease     Anorexia     Arteriosclerosis of abdominal aorta 02/24/2014    Arthritis     Atrial flutter     CAD (coronary artery disease)     Carotid stenosis, asymptomatic, bilateral 02/24/2014    16-49%    Closed right arm fracture 1994    Compression fracture of lumbar spine, non-traumatic     Diabetes mellitus     Elevated LFTs     Esophagitis 06/19/2018    DR RASHID GRADE D LOWER    Esophagus disorder     THICKENED DISTAL WALL-CT SCAN 6/18/18    First degree AV block     Garbled speech     Granulomatous disease, chronic     Hard of hearing     Hiatal hernia with GERD and esophagitis     History of acute gastritis     Hyperlipidemia     Hypertension     Hyponatremia     Leukocytosis     Lumbar canal stenosis     Lung nodule seen on imaging study     Mandible fracture 03/28/2020    FROM FALL    Nonrheumatic tricuspid valve regurgitation 07/28/2024    Osteoarthritis of multiple joints     Permanent atrial fibrillation     Ptosis of left eyelid     Pulmonary hypertension 07/28/2024    Remote history of stroke     SMALL CAUDATE NECLEUS    Right bundle branch block (RBBB) with left anterior fascicular block (LAFB)     Syncope and collapse 1/2022 06/11/2022    Thrombocytopenia     Weakness generalized      Past Surgical History:   Procedure Laterality Date    CARDIAC CATHETERIZATION N/A 6/9/2022    Procedure: Left Heart Cath;  Surgeon: Reinaldo Miller MD;  Location: [facility] CATH INVASIVE LOCATION;  Service: Cardiovascular;  Laterality: N/A;    CARDIAC CATHETERIZATION N/A 6/9/2022    Procedure: Left ventriculography;  Surgeon: Reinaldo Miller MD;  [facility] CATH [facility]VAS[facility];  Service: Cardiovascular;  Laterality: N/A;    CARDIAC CATHETERIZATION N/A 6/9/2022    Procedure: Coronary angiography;  Surgeon: Reinaldo Miller MD;  Location: [facility] CATH INVASIVE LOCATION;  Service: Cardiovascular;  Laterality: N/A;    CARDIAC  CATHETERIZATION N/A 6/13/2022    Procedure: STENT CHARISMA - CORONARY;  Surgeon: Michelle Alegre MD;  Location: [facility] CATH INVASIVE LOCATION;  Service: Cardiovascular;  Laterality: N/A;  PCI of the LAD with atherectomy    CARDIAC CATHETERIZATION N/A 6/13/2022    Procedure: Atherectomy-coronary;  Surgeon: Michelle Alegre MD;  Location: [facility] CATH INVASIVE LOCATION;  Service: Cardiovascular;  Laterality: N/A;  rotablator    CARDIAC CATHETERIZATION N/A 6/13/2022    Procedure: Optical Coherent Tomography;  Surgeon: Michelle Alegre MD;  Location: [facility] CATH INVASIVE LOCATION;  Service: Cardiovascular;  Laterality: N/A;    CATARACT EXTRACTION, BILATERAL      COLONOSCOPY      ENDOSCOPY N/A 06/19/2018    Procedure: ESOPHAGOGASTRODUODENOSCOPY WITH BIOPSY;  Surgeon: Toribio Wade MD;  Location: [facility] ENDOSCOPY;  Service: Gastroenterology    EYE PTOSIS REPAIR Left 11/15/2016    Procedure: LT UPPER LID EYE PTOSIS REPAIR;  Surgeon: Anup Lancaster MD;  Location: [facility] OR OSC;  Service:     EYE SURGERY Left     victreous hemorrhage repair    HIP ARTHROPLASTY Right     ORIF MANDIBULAR FRACTURE Bilateral 03/31/2020    Procedure: OPEN REDUCTION AND INTERNAL FIXATION OF BILATERAL MANDIBLE FRACTURES;  Surgeon: Percy Jreonimo MD;  Location: [facility] MAIN OR;  Service: Maxillofacial;  Laterality: Bilateral;      General Information       Row Name 01/24/25 1511          Physical Therapy Time and Intention    Document Type evaluation  -MG     Mode of Treatment individual therapy;physical therapy  -MG       Row Name 01/24/25 1511          General Information    Patient Profile Reviewed yes  -MG     Prior Level of Function independent:  Pt/spouse report he is (I) with a RW. Spouse supervises ADLs and mobility, but pt is able to complete most everything himself.  -MG     Existing Precautions/Restrictions fall  -MG     Barriers to Rehab hearing deficit  Wears hearing aids  -MG       Row Name 01/24/25 8886          Living  Environment    People in Home spouse  -MG       Row Name 01/24/25 1511          Home Main Entrance    Number of Stairs, Main Entrance two  -MG       Row Name 01/24/25 1511          Stairs Within Home, Primary    Number of Stairs, Within Home, Primary none  -MG       Row Name 01/24/25 1511          Cognition    Orientation Status (Cognition) oriented x 3  -MG       Row Name 01/24/25 1511          Safety Issues/Impairments Affecting Functional Mobility    Impairments Affecting Function (Mobility) balance;endurance/activity tolerance;strength  -MG     Comment, Safety Issues/Impairments (Mobility) Gait belt and shoes donned.  -MG               User Key  (r) = Recorded By, (t) = Taken By, (c) = Cosigned By      Initials Name Provider Type    MG Mehreen Arteaga PT Physical Therapist                   Mobility       Row Name 01/24/25 1517          Bed Mobility    Supine-Sit [place] (Bed Mobility) standby assist  -MG     Sit-Supine [place] (Bed Mobility) standby assist  -MG     Assistive Device (Bed Mobility) head of bed elevated  -MG       Row Name 01/24/25 1517          Sit-Stand Transfer    Sit-Stand [place] (Transfers) standby assist  -MG     Assistive Device (Sit-Stand Transfers) walker, front-wheeled  -MG       Row Name 01/24/25 1517          Gait/Stairs (Locomotion)    [place] Level (Gait) contact guard;standby assist  -MG     Assistive Device (Gait) walker, front-wheeled  -MG     Distance in Feet (Gait) 120  -MG     Deviations/Abnormal Patterns (Gait) gait speed decreased;stride length decreased  -MG     Bilateral Gait Deviations forward flexed posture;heel strike decreased  -MG     Comment, (Gait/Stairs) Denies any dizziness, pain or SOB. Cues for upright posture and to stay within the RW. No overt LOB or buckling.  -MG               User Key  (r) = Recorded By, (t) = Taken By, (c) = Cosigned By      Initials Name Provider Type    Mehreen Adler PT Physical Therapist                    Obj/Interventions       Row Name 01/24/25 1518          Range of Motion Comprehensive    General Range of Motion bilateral lower extremity ROM WFL  -MG       Row Name 01/24/25 1518          Strength Comprehensive (MMT)    General Manual Muscle Testing (MMT) Assessment lower extremity strength deficits identified  -MG     Comment, General Manual Muscle Testing (MMT) Assessment Generalized weakness. Grossly 4/5.  -MG       Row Name 01/24/25 1518          Balance    Dynamic Sitting Balance standby assist  -MG     Position, Sitting Balance sitting edge of bed  -MG     Dynamic Standing Balance contact guard  -MG     Position/Device Used, Standing Balance supported;walker, front-wheeled  -MG     Comment, Balance CGA for ambulation w/ RW and moments of SBA. No overt LOB.  -MG       Row Name 01/24/25 1518          Sensory Assessment (Somatosensory)    Sensory Assessment (Somatosensory) sensation intact  -MG               User Key  (r) = Recorded By, (t) = Taken By, (c) = Cosigned By      Initials Name Provider Type    MG Mehreen Arteaga, PT Physical Therapist                   Goals/Plan       Row Name 01/24/25 1519          Bed Mobility Goal 1 (PT)    Activity/Assistive Device (Bed Mobility Goal 1, PT) bed mobility activities, all  -MG     [place] Level/Cues Needed (Bed Mobility Goal 1, PT) supervision required  -MG     Time Frame (Bed Mobility Goal 1, PT) 1 week  -MG       Row Name 01/24/25 1519          Transfer Goal 1 (PT)    Activity/Assistive Device (Transfer Goal 1, PT) transfers, all  -MG     [place] Level/Cues Needed (Transfer Goal 1, PT) standby assist  -MG     Time Frame (Transfer Goal 1, PT) 1 week  -MG       Row Name 01/24/25 1519          Gait Training Goal 1 (PT)    Activity/Assistive Device (Gait Training Goal 1, PT) gait (walking locomotion)  -MG     [place] Level (Gait Training Goal 1, PT) standby assist  -MG     Distance (Gait Training Goal 1, PT) 120  -MG     Time Frame (Gait Training Goal  1, PT) 1 week  -MG       Row Name 01/24/25 1518          Therapy Assessment/Plan (PT)    Planned Therapy Interventions (PT) balance training;bed mobility training;gait training;home exercise program;patient/family education;stretching;strengthening;neuromuscular re-education;ROM (range of motion);postural re-education;transfer training  -MG               User Key  (r) = Recorded By, (t) = Taken By, (c) = Cosigned By      Initials Name Provider Type    MG Mehreen Arteaga, PT Physical Therapist                   Clinical Impression       Row Name 01/24/25 151          Pain    Pretreatment Pain Rating 0/10 - no pain  -MG     Posttreatment Pain Rating 0/10 - no pain  -MG     Pre/Posttreatment Pain Comment Pt reports having no rib or hip pain.  -MG       Row Name 01/24/25 1514          Plan of Care Review    Plan of Care Reviewed With patient;spouse  -MG     Progress improving  -MG     Outcome Evaluation Pt is a 89 y.o. male with h/o HTN, a-fib, and CAD who was admitted to [facility] on 1/23/2025 with c/o R hip pain after a mechanical fall. Imaging (-) for R hip abnormality, but was (+) for acute non-displaced fx of the anterolateral R 3-5 ribs, posterior R 5-10 ribs and R5-7 costal cartilages. Patient is (I) at baseline, with use of a RW and lives with his wife in a home with 2 JACK. Pts spouse states that she supervises his mobility and ADLs, she helps w/ shoes, but he is able to perform everything himself. Today, pt performed bed mobility with SBA, required CGA for transfers with a RW, and ambulated 120' with a RW and CGA w/ moments of SBA. No SOB, dizziness, pain, LOB or buckling. Pt reports feeling at his baseline and has no concerns or questions for return home; spouse in agreement. Pt may benefit from skilled PT services acutely to address their impaired strength, balance and endurance, as well as, improve their level of independence prior to discharge. SBAR w/ RN. Rec home w/ HH upon DC.  -MG       Row Name 01/24/25 0919           Therapy Assessment/Plan (PT)    Rehab Potential (PT) good  -MG     Criteria for Skilled Interventions Met (PT) yes;meets criteria  -MG     Therapy Frequency (PT) 5 times/wk  -MG       Row Name 01/24/25 1519          Positioning and Restraints    Pre-Treatment Position in bed  -MG     Post Treatment Position bed  -MG     In Bed notified nsg;supine;fowlers;call light within reach;encouraged to call for assist;exit alarm on;with family/caregiver;side rails up x3  -MG               User Key  (r) = Recorded By, (t) = Taken By, (c) = Cosigned By      Initials Name Provider Type    Mehreen Adler, PT Physical Therapist                   Outcome Measures       Row Name 01/24/25 1520 01/24/25 0800       How much help from another person do you currently need...    Turning from your back to your side while in flat bed without using bedrails? 3  -MG 2  -CC    Moving from lying on back to sitting on the side of a flat bed without bedrails? 3  -MG 2  -CC    Moving to and from a bed to a chair (including a wheelchair)? 3  -MG 2  -CC    Standing up from a chair using your arms (e.g., wheelchair, bedside chair)? 3  -MG 2  -CC    Climbing 3-5 steps with a railing? 3  -MG 1  -CC    To walk in hospital room? 3  -MG 2  -CC    AM-PAC 6 Clicks Score (PT) 18  -MG 11  -CC      Row Name 01/24/25 1302          Functional Assessment    Outcome Measure Options AM-PAC 6 Clicks Daily Activity (OT)  -ES               User Key  (r) = Recorded By, (t) = Taken By, (c) = Cosigned By      Initials Name Provider Type    Mehreen Adler, PT Physical Therapist    ES Chanell Matamoros, OTR/L, CSRS Occupational Therapist    CC Luc Benites, RN Registered Nurse                                 Physical Therapy Education       Title: PT OT SLP Therapies (In Progress)       Topic: Physical Therapy (In Progress)       Point: Mobility training (Done)       Learning Progress Summary            Patient Acceptance, E,D, VU,NR by  at 1/24/2025 1520                       Point: Home exercise program (Not Started)       Learner Progress:  Not documented in this visit.              Point: Body mechanics (Done)       Learning Progress Summary            Patient Acceptance, E,D, VU,NR by  at 1/24/2025 1520                      Point: Precautions (Done)       Learning Progress Summary            Patient Acceptance, E,D, VU,NR by  at 1/24/2025 1520                                      User Key       Initials Effective Dates Name Provider Type Discipline     05/24/22 -  Mehreen Arteaga, PT Physical Therapist PT                  PT Recommendation and Plan  Planned Therapy Interventions (PT): balance training, bed mobility training, gait training, home exercise program, patient/family education, stretching, strengthening, neuromuscular re-education, ROM (range of motion), postural re-education, transfer training  Progress: improving  Outcome Evaluation: Pt is a 89 y.o. male with h/o HTN, a-fib, and CAD who was admitted to [facility] on 1/23/2025 with c/o R hip pain after a mechanical fall. Imaging (-) for R hip abnormality, but was (+) for acute non-displaced fx of the anterolateral R 3-5 ribs, posterior R 5-10 ribs and R5-7 costal cartilages. Patient is (I) at baseline, with use of a RW and lives with his wife in a home with 2 JACK. Pts spouse states that she supervises his mobility and ADLs, she helps w/ shoes, but he is able to perform everything himself. Today, pt performed bed mobility with SBA, required CGA for transfers with a RW, and ambulated 120' with a RW and CGA w/ moments of SBA. No SOB, dizziness, pain, LOB or buckling. Pt reports feeling at his baseline and has no concerns or questions for return home; spouse in agreement. Pt may benefit from skilled PT services acutely to address their impaired strength, balance and endurance, as well as, improve their level of independence prior to discharge. SBAR w/ RN. Rec home w/ HH upon DC.     Time Calculation:         PT  Charges       Row Name 01/24/25 1520             Time Calculation    Start Time 1454  -MG      Stop Time 1509  -MG      Time Calculation (min) 15 min  -MG      PT Received On 01/24/25  -MG      PT - Next Appointment 01/27/25  -MG      PT Goal Re-Cert Due Date 02/07/25  -MG                User Key  (r) = Recorded By, (t) = Taken By, (c) = Cosigned By      Initials Name Provider Type    MG Mehreen Arteaga, PT Physical Therapist                  Therapy Charges for Today       Code Description Service Date Service Provider Modifiers Qty    [id number] HC PT EVAL MOD COMPLEXITY 2 1/24/2025 Mehreen Arteaga, PT GP 1            PT G-Codes  Outcome Measure Options: AM-PAC 6 Clicks Daily Activity (OT)  AM-PAC 6 Clicks Score (PT): 18  AM-PAC 6 Clicks Score (OT): 21  PT Discharge Summary  Anticipated Discharge Disposition (PT): home with home health, home with assist    Mehreen Arteaga, PT  1/24/2025

## 2025-01-24 NOTE — PLAN OF CARE
"Goal Outcome Evaluation:  Plan of Care Reviewed With: patient        Progress: no change  Outcome Evaluation:       A&O x4. VSS. Afib on telemetry. Room air.       C/O pain to Right Rib area \"with movement\" r/t multiple fx's.    PRN pain medication available.       Accuchecks.           Will continue to monitor.                             "

## 2025-01-24 NOTE — PLAN OF CARE
Goal Outcome Evaluation:  Plan of Care Reviewed With: patient        Progress: no change  Outcome Evaluation: Patient is 89 year old Male presenting to [facility] on 1/23/2025 with complaints of mechanical fall at home with resulting hip pain. CT negative for hip abnormality, however shows fractures of the anterior lateral right 3rd through 5th ribs and the posterior 5th through 10th ribs as well as the right 5th through 7th costal cartilages. At baseline, patient reports he is independent with ADLs and uses a rolling walker for functional mobility. Today, patient is SBA with bed mobility. SBA seated edge of bed to complete LB dressing with s/u. CGA STS and functional mobility with use of rolling walker, slightly unsteady on his feet. Returned to supine at conclusion of evaluation. Patient presents with deficits related to strength, tolerance, balance, transfers and mobility. Recommend HH at time of discharge.    Anticipated Discharge Disposition (OT): home with home health

## 2025-01-25 LAB
QT INTERVAL: 493 MS
QTC INTERVAL: 569 MS

## 2025-01-25 NOTE — OUTREACH NOTE
Prep Survey      Flowsheet Row Responses   [facility] discharged [facility]? [place]   Is LACE score < 7 ? No   Eligibility Readm Mgmt   Discharge diagnosis 1 Fall with Multiple Rib Fractures   Does the patient have one of the following disease processes/diagnoses(primary or secondary)? Other   Does the patient have Home health ordered? Yes   What is the Home health agency?  [facility]   Is there a DME ordered? No   Prep survey completed? Yes            LORRI GREGORIO - Registered Nurse

## 2025-01-27 ENCOUNTER — HOME CARE VISIT (OUTPATIENT)
Dept: HOME HEALTH SERVICES | Facility: HOME HEALTHCARE | Age: OVER 89
End: 2025-01-27

## 2025-01-27 ENCOUNTER — HOME CARE VISIT (OUTPATIENT)
Dept: HOME HEALTH SERVICES | Facility: HOME HEALTHCARE | Age: OVER 89
End: 2025-01-27
Payer: MEDICARE

## 2025-01-28 ENCOUNTER — READMISSION MANAGEMENT (OUTPATIENT)
Dept: CALL CENTER | Facility: HOSPITAL | Age: OVER 89
End: 2025-01-28
Payer: MEDICARE

## 2025-01-28 NOTE — OUTREACH NOTE
Medical Week 1 Survey      Flowsheet Row Responses   [facility] patient discharged from? [place]   Does the patient have one of the following disease processes/diagnoses(primary or secondary)? Other   Week 1 attempt successful? No   Unsuccessful attempts Attempt 1  [Niece- advises us to call home number for patient/spouse [phone]]   Meds reviewed with patient/caregiver? Yes   Does the patient have a primary care provider?  Yes   What is the Home health agency?  [facility]   Has home health visited the patient within 72 hours of discharge? Yes   Home health comments  was to come yesterday.            Kimberly GATES - Registered Nurse

## 2025-01-31 ENCOUNTER — READMISSION MANAGEMENT (OUTPATIENT)
Dept: CALL CENTER | Facility: HOSPITAL | Age: OVER 89
End: 2025-01-31
Payer: MEDICARE

## 2025-01-31 NOTE — OUTREACH NOTE
Medical Week 1 Survey      Flowsheet Row Responses   [facility] patient discharged from? [place]   Does the patient have one of the following disease processes/diagnoses(primary or secondary)? Other   Week 1 attempt successful? Yes   Call start time 1521   Call end time 1533   Discharge diagnosis 1 Fall with Multiple Rib Fractures   Is patient permission given to speak with other caregiver? Yes   List who call center can speak with Olga   Person spoke with today (if not patient) and relationship Olga   Meds reviewed with patient/caregiver? Yes   Is the patient having any side effects they believe may be caused by any medication additions or changes? No   Does the patient have all medications ordered at discharge? Yes   Is the patient taking all medications as directed (includes completed medication regime)? Yes   Does the patient have a primary care provider?  Yes   Does the patient have an appointment with their PCP within 7 days of discharge? Greater than 7 days   What is preventing the patient from scheduling follow up appointments within 7 days of discharge? Earlier appointment not available   Nursing Interventions Verified appointment date/time/provider   What is the Home health agency?  [facility]   Has home health visited the patient within 72 hours of discharge? Yes   Home health comments Pt declined  after first visit per wife's report   Comments Per pt's wife the pt is utilizing his walker for ambulation now instead of the cane. No further falls since [place], wife reports. Pt is improving, tolerating po intake and moving around more, per Olga.   Did the patient receive a copy of their discharge instructions? Yes   Nursing interventions Reviewed instructions with patient   What is the patient's perception of their health status since discharge? Improving   Is the patient/caregiver able to teach back signs and symptoms related to disease process for when to call PCP? Yes   Is the  patient/caregiver able to teach back signs and symptoms related to disease process for when to call 911? Yes   Is the patient/caregiver able to teach back the hierarchy of who to call/visit for symptoms/problems? PCP, Specialist, Home health nurse, Urgent Care, ED, 911 Yes   If the patient is a current smoker, are they able to teach back resources for cessation? Not a smoker   Week 1 call completed? Yes   Revoked No further contact(revokes)-requires comment   Call end time [id number]            Kimi BROWNING - Registered Nurse

## 2025-02-28 ENCOUNTER — OFFICE VISIT (OUTPATIENT)
Dept: CARDIOLOGY | Facility: CLINIC | Age: OVER 89
End: 2025-02-28
Payer: MEDICARE

## 2025-02-28 VITALS
DIASTOLIC BLOOD PRESSURE: 82 MMHG | SYSTOLIC BLOOD PRESSURE: 122 MMHG | BODY MASS INDEX: 25.33 KG/M2 | HEIGHT: 69 IN | HEART RATE: 77 BPM | WEIGHT: 171 LBS

## 2025-02-28 DIAGNOSIS — I48.92 ATRIAL FLUTTER, UNSPECIFIED TYPE: Primary | ICD-10-CM

## 2025-02-28 DIAGNOSIS — I25.5 ISCHEMIC CARDIOMYOPATHY: ICD-10-CM

## 2025-02-28 DIAGNOSIS — I45.2 RIGHT BUNDLE BRANCH BLOCK (RBBB) WITH LEFT ANTERIOR FASCICULAR BLOCK (LAFB): ICD-10-CM

## 2025-02-28 DIAGNOSIS — I10 ESSENTIAL HYPERTENSION: ICD-10-CM

## 2025-02-28 PROCEDURE — 1159F MED LIST DOCD IN RCRD: CPT | Performed by: INTERNAL MEDICINE

## 2025-02-28 PROCEDURE — 93000 ELECTROCARDIOGRAM COMPLETE: CPT | Performed by: INTERNAL MEDICINE

## 2025-02-28 PROCEDURE — 99214 OFFICE O/P EST MOD 30 MIN: CPT | Performed by: INTERNAL MEDICINE

## 2025-02-28 PROCEDURE — 1160F RVW MEDS BY RX/DR IN RCRD: CPT | Performed by: INTERNAL MEDICINE

## 2025-02-28 NOTE — PROGRESS NOTES
Subjective:     Encounter Date:02/28/25      Patient ID: Javier Marin is a 89 y.o. male.    Chief Complaint:  History of Present Illness    Dear Dr. Em,    I had the pleasure seeing this patient in the office today for follow-up on cardiomyopathy and history of ventricular tachycardia.  He also has a history of persistent atrial flutter.    Patient comes in for 6-month follow-up. He denies any chest pain, pressure, tightness, squeezing, or heartburn.  He has not experienced any feeling of palpitations, tachycardia or heart racing and no presyncope or syncope.  There has not been any problems with dizziness or lightheadedness.  There has not been any orthopnea or PND, and no problems with lower extremity edema.  He denies any shortness of breath at rest or with activity and has not had any wheezing.  He  has continued to perform daily activities of living without any specific problem or change in the level of activity.    He has previously had a CT angiogram that was performed after he was found to have a cardiomyopathy and nonsustained ventricular tachycardia.  CT angiogram showed profound heavy coronary artery calcification.  The severity of the coronary artery calcification precluded an assessment of the degree, if any, of stenosis of his coronary arteries.  However his coronary artery CAC score was greater than 6000.    Conclusions:  1.  Calcium score is 6059 Agatston units.  This is between the 90-100th percentile for men between the ages of over age 74 years old.  2.  Structurally normal heart.  3.  Due to the severe nature of the coronary artery calcification, this test is nondiagnostic for obstructive coronary artery disease.    Patient then had a cardiac catheterization performed with PCI performed:  Results for orders placed during the hospital encounter of 06/09/22    Cardiac Catheterization/Vascular Study    Narrative  CONCLUSION:  1.  Coronary artery disease status post atherectomy and PCI of  the left main and proximal to mid LAD  *Calcified 95% stenosis of the ostial LAD and 50 to 60% stenosis of the mid LAD now status post atherectomy and drug-eluting stent placement using a Xience Skypoint 3.0 x 38 mm balloon (postdilated with a 4.0 mm and 3.5 mm noncompliant balloons)  *100% ostial left circumflex artery stenosis with known left to left and right to left collateral filling    RECOMMENDATIONS:  Continue dual antiplatelet therapy, indefinitely if possible.  Continue medical management of cardiomyopathy including metoprolol succinate and losartan as tolerates.  Continue IV fluids.  Monitor renal function.  If appears stable normal renal function possible discharge in the morning.    FINDINGS:  SELECTIVE CORONARY ANGIOGRAMS:  1.  Left main artery: Patient severely calcified short, large caliber vessel with 0% stenosis.  The vessel bifurcates into left anterior descending and left circumflex arteries.  2.  Left anterior descending artery: Large caliber, severely calcified proximal vessel with 95% ostial stenosis.  The mid vessel tapers to a moderate caliber vessel with severe calcification and 50 to 60% stenosis.  Mid vessel gives rise to a small caliber first diagonal branch with no significant disease.  The distal vessel tapers to a small caliber and has scattered severe calcification with no significant stenosis.  3.  Left circumflex artery: 100% ostial chronic total occlusion with left to left and right to left collateral filling of the distal circumflex and OM1 branch.    PRE-PCI:  STENOSIS: 95% ostial LAD stenosis/50 to 60% mid LAD stenosis  HUNTER FLOW: 3    POST-PCI:  STENOSIS: 0%  HUNTER FLOW: 3       Patient typically was followed with Dr. Mccrary.  He is evaluate this patient in the past for paroxysmal atrial fibrillation.  Patient was scheduled follow-up with him in a few months for his annual follow-up.  When I saw him on the initial visit I offered to have him seen by Dr. Mccrary but  the patient preferred to be seen here since he was already here and I went along with his wishes.    Patient was seen in the emergency room January 2022 after an episode of syncope while at [employer].  The patient had just walked about 200 feet.  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  He decided that there must of been something that was bothering him although he does not recall it.  He remembers reaching the door to his classroom in the next thing he remembers several paramedics were standing around him.  He apparently had bruising posteriorly.  He was seen in the emergency room but was not hospitalized.  Patient was not found to have any particular abnormality in the emergency room.  Cardiology was not consulted.  He was found to have a shoulder fracture and Ortho was involved.    You arranged for a Holter monitor to be obtained on February 25.  This showed 3 episodes of nonsustained monomorphic ventricular tachycardia, longest 11 beats in duration.    Patient was then started on metoprolol succinate 25 mg once daily by yourself.    Patient also had an echocardiogram performed that showed new global hypokinesis with ejection fraction of 29%.  Not mentioned in the report when I review the images right ventricular function is also diminished.     No prior history coronary disease that is known.  He had a prior stress test in 2018 that showed no ischemia.  He did have an abdominal and pelvic CT scan performed June 2018 that I reviewed and on very limited images there appears to be some coronary artery calcification but the vast majority the heart is not included in the images on that study.  Atherosclerotic disease of the abdominal aorta is present.    Patient has a history of bilateral carotid artery disease, greater on the right than the left.  He had a duplex scan performed December 2020 that showed a 70 to 80%  "stenosis in the right intracarotid artery, mild stenosis on the left internal carotid artery.  His understanding is that you plan to repeat this soon but I do not yet have records from your office.    The following portions of the patient's history were reviewed and updated as appropriate: allergies, current medications, past family history, past medical history, past social history, past surgical history and problem list.      ECG 12 Lead    Date/Time: 2/28/2025 4:10 PM  Performed by: Fadi Alvarez III, MD    Authorized by: Fadi Alvarez III, MD  Comparison: compared with previous ECG   Similar to previous ECG  Rhythm: atrial fibrillation  Rate: normal  Conduction: right bundle branch block and left anterior fascicular block  ST Segments: ST segments normal  QRS axis: left  Other findings: non-specific ST-T wave changes    Clinical impression: abnormal EKG             Objective:     Vitals:    02/28/25 1441   BP: 122/82   BP Location: Right arm   Patient Position: Sitting   Cuff Size: Adult   Pulse: 77   Weight: 77.6 kg (171 lb)   Height: 175.3 cm (69\")     Body mass index is 25.25 kg/m².      Vitals reviewed.   Constitutional:       General: Not in acute distress.     Appearance: Well-developed. Not diaphoretic.   Eyes:      General:         Right eye: No discharge.         Left eye: No discharge.      Conjunctiva/sclera: Conjunctivae normal.      Pupils: Pupils are equal, round, and reactive to light.   HENT:      Head: Normocephalic and atraumatic.      Nose: Nose normal.   Neck:      Thyroid: No thyromegaly.      Vascular: Carotid bruit present.      Trachea: No tracheal deviation.      Lymphadenopathy: No cervical adenopathy.      Comments: Right carotid bruit, possible soft left carotid bruit  Pulmonary:      Effort: Pulmonary effort is normal. No respiratory distress.      Breath sounds: Normal breath sounds. No stridor.   Chest:      Chest wall: Not tender to palpatation.   Cardiovascular:      Normal " rate. Regular rhythm.      Murmurs: There is no murmur.      . No S3 gallop. No click. No rub.   Pulses:     Intact distal pulses.   Edema:     Peripheral edema present.     Ankle: bilateral 1+ edema of the ankle.     Feet: bilateral 1+ edema of the feet.  Abdominal:      General: Bowel sounds are normal. There is no distension.      Palpations: Abdomen is soft. There is no abdominal mass.      Tenderness: There is no abdominal tenderness. There is no guarding or rebound.   Musculoskeletal: Normal range of motion.         General: No tenderness or deformity.      Cervical back: Normal range of motion and neck supple. Skin:     General: Skin is warm and dry.      Findings: No erythema or rash.   Neurological:      Mental Status: Alert and oriented to person, place, and time.      Deep Tendon Reflexes: Reflexes are normal and symmetric.   Psychiatric:         Thought Content: Thought content normal.         Data and records reviewed:     Lab Results   Component Value Date    GLUCOSE 135 (H) 01/24/2025    BUN 28 (H) 01/24/2025    CREATININE 1.27 01/24/2025    EGFRIFNONA 69 01/02/2022    BCR 22.0 01/24/2025    K 4.4 01/24/2025    CO2 31.0 (H) 01/24/2025    CALCIUM 9.0 01/24/2025    ALBUMIN 3.6 01/23/2025    AST 16 01/23/2025    ALT 16 01/23/2025     Lab Results   Component Value Date    CHOL 101 01/23/2025    CHOL 82 10/12/2022    CHOL 85 10/11/2022     Lab Results   Component Value Date    TRIG 49 01/23/2025    TRIG 59 10/12/2022    TRIG 40 10/11/2022     Lab Results   Component Value Date    HDL 42 01/23/2025    HDL 35 (L) 10/12/2022    HDL 38 (L) 10/11/2022     Lab Results   Component Value Date    LDL 47 01/23/2025    LDL 33 10/12/2022    LDL 36 10/11/2022     Lab Results   Component Value Date    VLDL 12 01/23/2025    VLDL 14 10/12/2022    VLDL 11 10/11/2022     Lab Results   Component Value Date    LDLHDL 1.17 01/23/2025    LDLHDL 1.01 10/12/2022    LDLHDL 1.03 10/11/2022     CBC          1/23/2025    13:53  1/23/2025    20:25 1/24/2025    06:58   CBC   WBC 18.09  17.75  16.42    RBC 4.31  4.02  3.92    Hemoglobin 12.7  11.9  11.7    Hematocrit 38.6  36.1  35.5    MCV 89.6  89.8  90.6    MCH 29.5  29.6  29.8    MCHC 32.9  33.0  33.0    RDW 12.6  12.4  13.0    Platelets 124  122  116      XR Chest 1 View    Result Date: 1/24/2025  1. Underinflation of the lungs with no focal infiltrates. 2. The right rib fractures are not well seen though there is mild deformity of the lateral aspects of the right fifth and probably sixth ribs. 3. No pneumothorax is seen.   This report was finalized on 1/24/2025 7:24 AM by Dr. Umesh Velasquez M.D on Workstation: [id number]      CT Chest Without Contrast Diagnostic    Result Date: 1/23/2025   1. Likely acute nondisplaced fractures of the anterolateral right third through fifth ribs, the posterior right fifth through 10th ribs, and the right fifth through seventh costal cartilages. 2. Otherwise, no acute intrathoracic abnormality.  This report was finalized on 1/23/2025 1:24 PM by Anthony Mariano MD on Workstation: [id number]     Results for orders placed during the hospital encounter of 08/14/24    Adult Transthoracic Echo Complete w/ Color, Spectral and Contrast if Necessary Per Protocol    Interpretation Summary    Left ventricular systolic function is normal. Calculated left ventricular EF = 50.2%    The following left ventricular wall segments are hypokinetic: mid anterior, basal anterolateral, mid anterolateral, basal inferolateral and basal anterior.    Left ventricular diastolic function is consistent with (grade I) impaired relaxation.    Mild tricuspid valve regurgitation is present. Calculated right ventricular systolic pressure from tricuspid regurgitation is 44 mmHg.          Assessment:          Diagnosis Plan   1. Atrial flutter, unspecified type        2. Essential hypertension        3. Ischemic cardiomyopathy EF 40% Cath 6/10/2022        4. Right bundle branch block  (RBBB) with left anterior fascicular block (LAFB)                 Plan:       1.  CAD, PCI LAD June 2022, known  of the circumflex, remains on dual antiplatelet therapy with his history of cerebrovascular disease, no anginal symptoms  2.  Cardiomyopathy- chronic systolic heart failure with an initial ejection fraction in the 20s but recovery of function and ejection ration 50% continue current medical therapy  3.  Pulmonary hypertension, continue current medical therapy  4.  Cerebrovascular disease- followed by yourself  5.  Abdominal aortic arteriosclerosis, continue risk factor modification.  6.  Persistent atrial flutter on metoprolol, not anticoagulated due to stability/weakness/falls, previously decided against a watchman.    Thank you very much for allowing us to participate in the care of this pleasant patient.  Please don't hesitate to call if I can be of assistance in any way.      Current Outpatient Medications:     acetaminophen (TYLENOL) 500 MG tablet, Take 1 tablet by mouth Every 6 (Six) Hours As Needed for Mild Pain., Disp: , Rfl:     aspirin 81 MG EC tablet, Take 1 tablet by mouth Daily., Disp: , Rfl:     atorvastatin (LIPITOR) 10 MG tablet, Take 1 tablet by mouth Daily., Disp: 90 tablet, Rfl: 3    cholecalciferol (VITAMIN D3) 1000 UNITS tablet, Take 1 tablet by mouth Daily., Disp: , Rfl:     clopidogrel (PLAVIX) 75 MG tablet, TAKE ONE (1) TABLET BY MOUTH DAILY., Disp: 90 tablet, Rfl: 3    famotidine (PEPCID) 20 MG tablet, Take 1 tablet by mouth Every Night., Disp: 90 tablet, Rfl: 3    fexofenadine (ALLEGRA) 180 MG tablet, Take 1 tablet by mouth Daily As Needed., Disp: , Rfl:     furosemide (LASIX) 40 MG tablet, Take 2 tablets by mouth Daily., Disp: , Rfl:     HYDROcodone-acetaminophen (NORCO) 5-325 MG per tablet, Take 1 tablet by mouth Every 8 (Eight) Hours As Needed for Moderate Pain., Disp: 10 tablet, Rfl: 0    Insulin Aspart (novoLOG) 100 UNIT/ML injection, Inject 6 Units under the skin into  the appropriate area as directed 3 (Three) Times a Day Before Meals., Disp: , Rfl:     insulin glargine (LANTUS, SEMGLEE) 100 UNIT/ML injection, Inject 10 Units under the skin into the appropriate area as directed Every Night., Disp: 100 mL, Rfl: 12    lansoprazole (Prevacid) 15 MG capsule, Take 1 capsule by mouth Daily., Disp: , Rfl:     losartan (COZAAR) 50 MG tablet, Take 1 tablet by mouth Daily., Disp: , Rfl:     tamsulosin (FLOMAX) 0.4 MG capsule 24 hr capsule, Take 2 capsules by mouth Daily., Disp: , Rfl:          Return in about 1 year (around 2/28/2026).

## 2025-06-12 DIAGNOSIS — I48.0 PAROXYSMAL ATRIAL FIBRILLATION: ICD-10-CM

## 2025-06-12 DIAGNOSIS — I45.10 RBBB (RIGHT BUNDLE BRANCH BLOCK): ICD-10-CM

## 2025-06-12 DIAGNOSIS — I25.10 CORONARY ARTERY DISEASE INVOLVING NATIVE CORONARY ARTERY OF NATIVE HEART WITHOUT ANGINA PECTORIS: ICD-10-CM

## 2025-06-12 DIAGNOSIS — I25.5 ISCHEMIC CARDIOMYOPATHY: ICD-10-CM

## 2025-06-12 RX ORDER — CLOPIDOGREL BISULFATE 75 MG/1
75 TABLET ORAL DAILY
Qty: 90 TABLET | Refills: 3 | Status: SHIPPED | OUTPATIENT
Start: 2025-06-12

## (undated) DEVICE — GW EMR FIX EXCHG J STD .035 3MM 260CM

## (undated) DEVICE — RUNTHROUGH NS EXTRA FLOPPY PTCA GUIDEWIRE: Brand: RUNTHROUGH

## (undated) DEVICE — INTENT TO BE USED WITH SUTURE MATERIAL FOR TISSUE CLOSURE: Brand: RICHARD-ALLAN® NEEDLE STRAIGHT CUTTING

## (undated) DEVICE — TBG 02 CRUSH RESIST LF CLR 7FT

## (undated) DEVICE — IMPLANTABLE DEVICE
Type: IMPLANTABLE DEVICE | Site: MANDIBLE | Status: NON-FUNCTIONAL
Removed: 2020-03-31

## (undated) DEVICE — Device

## (undated) DEVICE — ELECTRD BLD EDGE/INSUL1P 2.4X5.1MM STRL

## (undated) DEVICE — GW PERIPH GUIDERIGHT STD/EXCHNG/J/TIP SS 0.035IN 5X260CM

## (undated) DEVICE — IRRIGATOR BULB ASEPTO 60CC STRL

## (undated) DEVICE — NC TREK CORONARY DILATATION CATHETER 3.0 MM X 12 MM / RAPID-EXCHANGE: Brand: NC TREK

## (undated) DEVICE — CATH DIAG IMPULSE PIG145 6F 110CM

## (undated) DEVICE — 0.9MM TAPERED SIDE CUTTING CARBIDE BUR MEDIUM

## (undated) DEVICE — SUT PROLN 2/0 SH 36IN 8523H

## (undated) DEVICE — TUBING, SUCTION, 1/4" X 10', STRAIGHT: Brand: MEDLINE

## (undated) DEVICE — TUBING, SUCTION, 1/4" X 20', STRAIGHT: Brand: MEDLINE INDUSTRIES, INC.

## (undated) DEVICE — PREMIUM WET SKIN PREP TRAY: Brand: MEDLINE INDUSTRIES, INC.

## (undated) DEVICE — NC TREK CORONARY DILATATION CATHETER 3.5 MM X 20 MM / RAPID-EXCHANGE: Brand: NC TREK

## (undated) DEVICE — NC TREK CORONARY DILATATION CATHETER 3.5 MM X 15 MM / RAPID-EXCHANGE: Brand: NC TREK

## (undated) DEVICE — PRE-CONNECTED EXCHANGEABLE BURR CATHETER AND BURR ADVANCING DEVICE: Brand: ROTAPRO™

## (undated) DEVICE — STERILE WIRE CUTTER (WCS144): Brand: CENTURION

## (undated) DEVICE — CATH DIAG IMPULSE FR4 6F 100CM

## (undated) DEVICE — BLD TONG INDIV/WRP A/ 6IN STRL

## (undated) DEVICE — COVER,MAYO STAND,STERILE: Brand: MEDLINE

## (undated) DEVICE — YANKAUER TIP WITH 5 CM, EXTENDED METAL SUCTION LUMEN: Brand: VITAL VUE

## (undated) DEVICE — SPONGE,LAP,4"X18",XR,ST,5/PK,40PK/CS: Brand: MEDLINE INDUSTRIES, INC.

## (undated) DEVICE — SUT GUT CHRM 3/0 PS2 27IN 1638H

## (undated) DEVICE — GAUZE,SPONGE,4"X4",16PLY,XRAY,STRL,LF: Brand: MEDLINE

## (undated) DEVICE — SUT SILK 2/0 FS BLK 18IN 685G

## (undated) DEVICE — BND PRESS RADL COMFRT 14IN STRL

## (undated) DEVICE — SUT VIC 4/0 P3 18IN J494G

## (undated) DEVICE — SOL NACL 0.9PCT 1000ML

## (undated) DEVICE — INTENDED FOR TISSUE SEPARATION, AND OTHER PROCEDURES THAT REQUIRE A SHARP SURGICAL BLADE TO PUNCTURE OR CUT.: Brand: BARD-PARKER ® CARBON RIB-BACK BLADES

## (undated) DEVICE — DEV INDEFLATOR P/N 580289

## (undated) DEVICE — PK ENT MINOR 40

## (undated) DEVICE — KT MANIFLD CARDIAC

## (undated) DEVICE — BITEBLOCK OMNI BLOC

## (undated) DEVICE — GLIDESHEATH BASIC HYDROPHILIC COATED INTRODUCER SHEATH: Brand: GLIDESHEATH

## (undated) DEVICE — SUT PROLN 6/0 P1 18IN 8697G

## (undated) DEVICE — NDL HYPO PRECISIONGLIDE REG 25G 1 1/2

## (undated) DEVICE — FRCP BX RADJAW4 NDL 2.8 240CM LG OG BX40

## (undated) DEVICE — CANN NASL CO2 TRULINK W/O2 A/

## (undated) DEVICE — PENCL E/S HNDSWCH ROCKR CB

## (undated) DEVICE — Device: Brand: CORSAIR PRO

## (undated) DEVICE — CATH DIAG IMPULSE FL3.5 6F 100CM

## (undated) DEVICE — MAGNETIC INSTR DRAPE 20X16: Brand: MEDLINE INDUSTRIES, INC.

## (undated) DEVICE — NC TREK CORONARY DILATATION CATHETER 4.0 MM X 12 MM / RAPID-EXCHANGE: Brand: NC TREK

## (undated) DEVICE — WR ROTOBLATOR 3.25MM

## (undated) DEVICE — TREK CORONARY DILATATION CATHETER 2.50 MM X 15 MM / RAPID-EXCHANGE: Brand: TREK

## (undated) DEVICE — PK CATH CARD 40

## (undated) DEVICE — KT CATH IMG DRAGONFLY DUO HC 2.7F 135CM

## (undated) DEVICE — Device: Brand: DEFENDO AIR/WATER/SUCTION AND BIOPSY VALVE

## (undated) DEVICE — GLV SURG PREMIERPRO ORTHO LTX PF SZ8.5 BRN

## (undated) DEVICE — GUIDE CATHETER: Brand: MACH1™